# Patient Record
Sex: FEMALE | Race: WHITE | URBAN - METROPOLITAN AREA
[De-identification: names, ages, dates, MRNs, and addresses within clinical notes are randomized per-mention and may not be internally consistent; named-entity substitution may affect disease eponyms.]

---

## 2017-02-14 ENCOUNTER — EMERGENCY (EMERGENCY)
Facility: HOSPITAL | Age: 62
LOS: 0 days | Discharge: HOME | End: 2017-02-14
Attending: EMERGENCY MEDICINE | Admitting: EMERGENCY MEDICINE
Payer: COMMERCIAL

## 2017-02-14 VITALS
HEART RATE: 118 BPM | DIASTOLIC BLOOD PRESSURE: 77 MMHG | OXYGEN SATURATION: 98 % | SYSTOLIC BLOOD PRESSURE: 133 MMHG | RESPIRATION RATE: 15 BRPM | WEIGHT: 199.96 LBS | TEMPERATURE: 98 F

## 2017-02-14 VITALS
RESPIRATION RATE: 18 BRPM | TEMPERATURE: 98 F | OXYGEN SATURATION: 92 % | DIASTOLIC BLOOD PRESSURE: 79 MMHG | HEART RATE: 83 BPM | SYSTOLIC BLOOD PRESSURE: 116 MMHG

## 2017-02-14 DIAGNOSIS — R53.1 WEAKNESS: ICD-10-CM

## 2017-02-14 LAB
ALBUMIN SERPL ELPH-MCNC: 3.4 G/DL — SIGNIFICANT CHANGE UP (ref 3.3–5)
ALP SERPL-CCNC: 139 U/L — HIGH (ref 40–120)
ALT FLD-CCNC: 24 U/L — SIGNIFICANT CHANGE UP (ref 12–78)
ANION GAP SERPL CALC-SCNC: 8 MMOL/L — SIGNIFICANT CHANGE UP (ref 5–17)
APTT BLD: 30.1 SEC — SIGNIFICANT CHANGE UP (ref 27.5–37.4)
AST SERPL-CCNC: 30 U/L — SIGNIFICANT CHANGE UP (ref 15–37)
BASOPHILS # BLD AUTO: 0.1 K/UL — SIGNIFICANT CHANGE UP (ref 0–0.2)
BASOPHILS NFR BLD AUTO: 2 % — SIGNIFICANT CHANGE UP (ref 0–2)
BILIRUB SERPL-MCNC: 0.2 MG/DL — SIGNIFICANT CHANGE UP (ref 0.2–1.2)
BUN SERPL-MCNC: 11 MG/DL — SIGNIFICANT CHANGE UP (ref 7–23)
CALCIUM SERPL-MCNC: 8.2 MG/DL — LOW (ref 8.5–10.1)
CHLORIDE SERPL-SCNC: 100 MMOL/L — SIGNIFICANT CHANGE UP (ref 96–108)
CO2 SERPL-SCNC: 27 MMOL/L — SIGNIFICANT CHANGE UP (ref 22–31)
CREAT SERPL-MCNC: 0.81 MG/DL — SIGNIFICANT CHANGE UP (ref 0.5–1.3)
EOSINOPHIL # BLD AUTO: 0.4 K/UL — SIGNIFICANT CHANGE UP (ref 0–0.5)
EOSINOPHIL NFR BLD AUTO: 5.7 % — SIGNIFICANT CHANGE UP (ref 0–6)
GLUCOSE SERPL-MCNC: 95 MG/DL — SIGNIFICANT CHANGE UP (ref 70–99)
HCT VFR BLD CALC: 38.9 % — SIGNIFICANT CHANGE UP (ref 34.5–45)
HGB BLD-MCNC: 12.9 G/DL — SIGNIFICANT CHANGE UP (ref 11.5–15.5)
INR BLD: 0.99 RATIO — SIGNIFICANT CHANGE UP (ref 0.88–1.16)
LYMPHOCYTES # BLD AUTO: 2.2 K/UL — SIGNIFICANT CHANGE UP (ref 1–3.3)
LYMPHOCYTES # BLD AUTO: 32.7 % — SIGNIFICANT CHANGE UP (ref 13–44)
MCHC RBC-ENTMCNC: 32.8 PG — SIGNIFICANT CHANGE UP (ref 27–34)
MCHC RBC-ENTMCNC: 33.1 GM/DL — SIGNIFICANT CHANGE UP (ref 32–36)
MCV RBC AUTO: 99.1 FL — SIGNIFICANT CHANGE UP (ref 80–100)
MONOCYTES # BLD AUTO: 0.5 K/UL — SIGNIFICANT CHANGE UP (ref 0–0.9)
MONOCYTES NFR BLD AUTO: 6.7 % — SIGNIFICANT CHANGE UP (ref 2–14)
NEUTROPHILS # BLD AUTO: 3.6 K/UL — SIGNIFICANT CHANGE UP (ref 1.8–7.4)
NEUTROPHILS NFR BLD AUTO: 52.9 % — SIGNIFICANT CHANGE UP (ref 43–77)
PLATELET # BLD AUTO: 226 K/UL — SIGNIFICANT CHANGE UP (ref 150–400)
POTASSIUM SERPL-MCNC: 4 MMOL/L — SIGNIFICANT CHANGE UP (ref 3.5–5.3)
POTASSIUM SERPL-SCNC: 4 MMOL/L — SIGNIFICANT CHANGE UP (ref 3.5–5.3)
PROT SERPL-MCNC: 7.1 GM/DL — SIGNIFICANT CHANGE UP (ref 6–8.3)
PROTHROM AB SERPL-ACNC: 10.9 SEC — SIGNIFICANT CHANGE UP (ref 10–13.1)
RBC # BLD: 3.92 M/UL — SIGNIFICANT CHANGE UP (ref 3.8–5.2)
RBC # FLD: 12 % — SIGNIFICANT CHANGE UP (ref 10.3–14.5)
SODIUM SERPL-SCNC: 135 MMOL/L — SIGNIFICANT CHANGE UP (ref 135–145)
TROPONIN I SERPL-MCNC: <0.015 NG/ML — SIGNIFICANT CHANGE UP (ref 0.01–0.04)
WBC # BLD: 6.8 K/UL — SIGNIFICANT CHANGE UP (ref 3.8–10.5)
WBC # FLD AUTO: 6.8 K/UL — SIGNIFICANT CHANGE UP (ref 3.8–10.5)

## 2017-02-14 PROCEDURE — 99285 EMERGENCY DEPT VISIT HI MDM: CPT

## 2017-02-14 PROCEDURE — 93010 ELECTROCARDIOGRAM REPORT: CPT

## 2017-02-14 PROCEDURE — 70450 CT HEAD/BRAIN W/O DYE: CPT | Mod: 26

## 2017-02-14 RX ORDER — SODIUM CHLORIDE 9 MG/ML
3 INJECTION INTRAMUSCULAR; INTRAVENOUS; SUBCUTANEOUS ONCE
Qty: 0 | Refills: 0 | Status: DISCONTINUED | OUTPATIENT
Start: 2017-02-14 | End: 2017-02-14

## 2017-02-14 NOTE — ED STATDOCS - CHPI ED SYMPTOM NEG
no palpitations/no vomiting/no shortness of breath/no chills/no back pain/no diaphoresis/no fever/no cough/no nausea/no dizziness

## 2017-02-14 NOTE — ED PROVIDER NOTE - OBJECTIVE STATEMENT
60 y/o F with a hx of brain aneurysm, depression, anxiety, on trazodone, Klonopin  presents s/p episode of leg shakiness and weakness when she went to get up off of her recliner. Sx alleviated when pt sat back down. Was ambulatory to ED. At this time sx have resolved. Denies LOC, vision changes, n/v, CP, difficulty breathing and has no additional constitutional complaints. Pt was not answering 's questions for a few seconds, pt does not remember this. Denies changes in medication. Denies smoking hx. Has hx of similar episodes of weakness. PMD- [place].

## 2017-02-14 NOTE — ED PROVIDER NOTE - MEDICAL DECISION MAKING DETAILS
patient with episode of tremors; multiple similar episodes in past; following with a neurologist  well appearing in ED with normal neuro exam; will dc with family

## 2017-02-14 NOTE — ED PROVIDER NOTE - NEUROLOGICAL, MLM
Alert and oriented, no focal deficits, no motor or sensory deficits. NV in tact. No pronator drift. 5/5 strength in all 4 extremities. Good strength throughout.

## 2017-02-14 NOTE — ED PROVIDER NOTE - NS ED MD SCRIBE ATTENDING SCRIBE SECTIONS
DISPOSITION/REVIEW OF SYSTEMS/RESULTS/CONSULTATIONS/SHIFT CHANGE/PROGRESS NOTE/PAST MEDICAL/SURGICAL/SOCIAL HISTORY/PHYSICAL EXAM/HISTORY OF PRESENT ILLNESS

## 2017-02-14 NOTE — ED ADULT NURSE REASSESSMENT NOTE - NS ED NURSE REASSESS COMMENT FT1
pt received. alert and oriented. no signs of respiratory distress. breathing even and unlabored. denies complaints at this time. son at bedside. will monitor.
pt aaox4, neuro intact, as per dtr, pt had this seizure like activity 3 days ago with slurred speech, pt had similar episode today which started around 10am.  pt speech is slightly slurred, otherwise, neuro intact.  will con't to monitor.

## 2017-02-14 NOTE — ED ADULT TRIAGE NOTE - CHIEF COMPLAINT QUOTE
pt complains of general weakness, hx of brain aneurysm, no deficits at this time. pt presents alert and oriented x4, ambulatory onto EMS stretcher

## 2017-02-14 NOTE — ED STATDOCS - OBJECTIVE STATEMENT
62 y/o F BIBA after her  came home and found her hands were shaking and had memory loss. Sx currently resolved and patient back at baseline per .   Pt has had similar symptoms in the past and was found to have a brain aneurysm, followed by Dr Alston in [place]. Denies HA, weakness/numbness in extremities. PMD- Isis 62 y/o F BIBA after her  came home and found her hands were shaking and seemed to have memory loss. Sx currently resolved and patient back at baseline per .   Pt has had similar symptoms in the past and was found to have a brain aneurysm, followed by Dr Alston in [place]. Denies HA, weakness/numbness in extremities. Drank less than half a glass of wine earlier today. DEBO- Isis 62 y/o F BIBA after her  came home and found her hands were shaking and seemed to have memory loss. Sx currently resolved and patient back at baseline per .   Pt has had similar symptoms in the past and was found to have a brain aneurysm, followed by Dr Alston in [place]. Denies HA, weakness/numbness in extremities. No history of seizures. Drank less than half a glass of wine earlier today. PMJOSH- Isis

## 2017-04-16 NOTE — ED PROVIDER NOTE - CARDIOVASCULAR NEGATIVE STATEMENT, MLM
normal... normal rate and rhythm, no chest pain and no edema. well appearing, well nourished, and in no apparent distress.

## 2018-12-07 PROBLEM — F32.9 MAJOR DEPRESSIVE DISORDER, SINGLE EPISODE, UNSPECIFIED: Chronic | Status: ACTIVE | Noted: 2017-02-14

## 2018-12-07 PROBLEM — F41.9 ANXIETY DISORDER, UNSPECIFIED: Chronic | Status: ACTIVE | Noted: 2017-02-14

## 2018-12-07 PROBLEM — I67.1 CEREBRAL ANEURYSM, NONRUPTURED: Chronic | Status: ACTIVE | Noted: 2017-02-14

## 2018-12-11 ENCOUNTER — APPOINTMENT (OUTPATIENT)
Dept: DERMATOLOGY | Facility: CLINIC | Age: 63
End: 2018-12-11

## 2020-06-06 ENCOUNTER — INPATIENT (INPATIENT)
Facility: HOSPITAL | Age: 65
LOS: 4 days | Discharge: HOME | DRG: 643 | End: 2020-06-11
Attending: INTERNAL MEDICINE | Admitting: INTERNAL MEDICINE
Payer: COMMERCIAL

## 2020-06-06 VITALS
DIASTOLIC BLOOD PRESSURE: 87 MMHG | OXYGEN SATURATION: 94 % | SYSTOLIC BLOOD PRESSURE: 156 MMHG | WEIGHT: 149.91 LBS | HEIGHT: 64 IN | RESPIRATION RATE: 17 BRPM | TEMPERATURE: 99 F | HEART RATE: 92 BPM

## 2020-06-06 DIAGNOSIS — E87.1 HYPO-OSMOLALITY AND HYPONATREMIA: ICD-10-CM

## 2020-06-06 LAB
ADD ON TEST-SPECIMEN IN LAB: SIGNIFICANT CHANGE UP
ALBUMIN SERPL ELPH-MCNC: 3.6 G/DL — SIGNIFICANT CHANGE UP (ref 3.3–5)
ALP SERPL-CCNC: 96 U/L — SIGNIFICANT CHANGE UP (ref 40–120)
ALT FLD-CCNC: 18 U/L — SIGNIFICANT CHANGE UP (ref 12–78)
ANION GAP SERPL CALC-SCNC: 5 MMOL/L — SIGNIFICANT CHANGE UP (ref 5–17)
APPEARANCE UR: CLEAR — SIGNIFICANT CHANGE UP
AST SERPL-CCNC: 18 U/L — SIGNIFICANT CHANGE UP (ref 15–37)
BASOPHILS # BLD AUTO: 0.01 K/UL — SIGNIFICANT CHANGE UP (ref 0–0.2)
BASOPHILS NFR BLD AUTO: 0.2 % — SIGNIFICANT CHANGE UP (ref 0–2)
BILIRUB SERPL-MCNC: 0.5 MG/DL — SIGNIFICANT CHANGE UP (ref 0.2–1.2)
BILIRUB UR-MCNC: NEGATIVE — SIGNIFICANT CHANGE UP
BUN SERPL-MCNC: 11 MG/DL — SIGNIFICANT CHANGE UP (ref 7–23)
CALCIUM SERPL-MCNC: 9.1 MG/DL — SIGNIFICANT CHANGE UP (ref 8.5–10.1)
CHLORIDE SERPL-SCNC: 89 MMOL/L — LOW (ref 96–108)
CO2 SERPL-SCNC: 24 MMOL/L — SIGNIFICANT CHANGE UP (ref 22–31)
COLOR SPEC: YELLOW — SIGNIFICANT CHANGE UP
CREAT SERPL-MCNC: 0.62 MG/DL — SIGNIFICANT CHANGE UP (ref 0.5–1.3)
DIFF PNL FLD: NEGATIVE — SIGNIFICANT CHANGE UP
EOSINOPHIL # BLD AUTO: 0.05 K/UL — SIGNIFICANT CHANGE UP (ref 0–0.5)
EOSINOPHIL NFR BLD AUTO: 0.8 % — SIGNIFICANT CHANGE UP (ref 0–6)
GLUCOSE SERPL-MCNC: 114 MG/DL — HIGH (ref 70–99)
GLUCOSE UR QL: NEGATIVE MG/DL — SIGNIFICANT CHANGE UP
HCT VFR BLD CALC: 33.5 % — LOW (ref 34.5–45)
HGB BLD-MCNC: 12.2 G/DL — SIGNIFICANT CHANGE UP (ref 11.5–15.5)
IMM GRANULOCYTES NFR BLD AUTO: 0.5 % — SIGNIFICANT CHANGE UP (ref 0–1.5)
KETONES UR-MCNC: ABNORMAL
LEUKOCYTE ESTERASE UR-ACNC: NEGATIVE — SIGNIFICANT CHANGE UP
LYMPHOCYTES # BLD AUTO: 0.63 K/UL — LOW (ref 1–3.3)
LYMPHOCYTES # BLD AUTO: 9.7 % — LOW (ref 13–44)
MCHC RBC-ENTMCNC: 32.5 PG — SIGNIFICANT CHANGE UP (ref 27–34)
MCHC RBC-ENTMCNC: 36.4 GM/DL — HIGH (ref 32–36)
MCV RBC AUTO: 89.3 FL — SIGNIFICANT CHANGE UP (ref 80–100)
MONOCYTES # BLD AUTO: 0.52 K/UL — SIGNIFICANT CHANGE UP (ref 0–0.9)
MONOCYTES NFR BLD AUTO: 8 % — SIGNIFICANT CHANGE UP (ref 2–14)
NEUTROPHILS # BLD AUTO: 5.27 K/UL — SIGNIFICANT CHANGE UP (ref 1.8–7.4)
NEUTROPHILS NFR BLD AUTO: 80.8 % — HIGH (ref 43–77)
NITRITE UR-MCNC: NEGATIVE — SIGNIFICANT CHANGE UP
PH UR: 7 — SIGNIFICANT CHANGE UP (ref 5–8)
PLATELET # BLD AUTO: 277 K/UL — SIGNIFICANT CHANGE UP (ref 150–400)
POTASSIUM SERPL-MCNC: 4.2 MMOL/L — SIGNIFICANT CHANGE UP (ref 3.5–5.3)
POTASSIUM SERPL-SCNC: 4.2 MMOL/L — SIGNIFICANT CHANGE UP (ref 3.5–5.3)
PROT SERPL-MCNC: 7.2 GM/DL — SIGNIFICANT CHANGE UP (ref 6–8.3)
PROT UR-MCNC: NEGATIVE MG/DL — SIGNIFICANT CHANGE UP
RBC # BLD: 3.75 M/UL — LOW (ref 3.8–5.2)
RBC # FLD: 11.1 % — SIGNIFICANT CHANGE UP (ref 10.3–14.5)
SARS-COV-2 RNA SPEC QL NAA+PROBE: SIGNIFICANT CHANGE UP
SODIUM SERPL-SCNC: 118 MMOL/L — CRITICAL LOW (ref 135–145)
SP GR SPEC: 1.01 — SIGNIFICANT CHANGE UP (ref 1.01–1.02)
TROPONIN I SERPL-MCNC: <0.015 NG/ML — SIGNIFICANT CHANGE UP (ref 0.01–0.04)
UROBILINOGEN FLD QL: NEGATIVE MG/DL — SIGNIFICANT CHANGE UP
WBC # BLD: 6.51 K/UL — SIGNIFICANT CHANGE UP (ref 3.8–10.5)
WBC # FLD AUTO: 6.51 K/UL — SIGNIFICANT CHANGE UP (ref 3.8–10.5)

## 2020-06-06 PROCEDURE — 83935 ASSAY OF URINE OSMOLALITY: CPT

## 2020-06-06 PROCEDURE — 83930 ASSAY OF BLOOD OSMOLALITY: CPT

## 2020-06-06 PROCEDURE — 84443 ASSAY THYROID STIM HORMONE: CPT

## 2020-06-06 PROCEDURE — 82436 ASSAY OF URINE CHLORIDE: CPT

## 2020-06-06 PROCEDURE — 95816 EEG AWAKE AND DROWSY: CPT

## 2020-06-06 PROCEDURE — 71045 X-RAY EXAM CHEST 1 VIEW: CPT | Mod: 26

## 2020-06-06 PROCEDURE — 80048 BASIC METABOLIC PNL TOTAL CA: CPT

## 2020-06-06 PROCEDURE — 86803 HEPATITIS C AB TEST: CPT

## 2020-06-06 PROCEDURE — 84550 ASSAY OF BLOOD/URIC ACID: CPT

## 2020-06-06 PROCEDURE — 36415 COLL VENOUS BLD VENIPUNCTURE: CPT

## 2020-06-06 PROCEDURE — 99222 1ST HOSP IP/OBS MODERATE 55: CPT

## 2020-06-06 PROCEDURE — 93010 ELECTROCARDIOGRAM REPORT: CPT

## 2020-06-06 PROCEDURE — 70450 CT HEAD/BRAIN W/O DYE: CPT | Mod: 26

## 2020-06-06 PROCEDURE — 85027 COMPLETE CBC AUTOMATED: CPT

## 2020-06-06 PROCEDURE — 81003 URINALYSIS AUTO W/O SCOPE: CPT

## 2020-06-06 PROCEDURE — 84300 ASSAY OF URINE SODIUM: CPT

## 2020-06-06 RX ORDER — QUETIAPINE FUMARATE 200 MG/1
150 TABLET, FILM COATED ORAL AT BEDTIME
Refills: 0 | Status: DISCONTINUED | OUTPATIENT
Start: 2020-06-06 | End: 2020-06-11

## 2020-06-06 RX ORDER — CLONAZEPAM 1 MG
0.5 TABLET ORAL THREE TIMES A DAY
Refills: 0 | Status: DISCONTINUED | OUTPATIENT
Start: 2020-06-06 | End: 2020-06-09

## 2020-06-06 RX ORDER — GABAPENTIN 400 MG/1
100 CAPSULE ORAL DAILY
Refills: 0 | Status: DISCONTINUED | OUTPATIENT
Start: 2020-06-06 | End: 2020-06-09

## 2020-06-06 RX ORDER — SODIUM CHLORIDE 9 MG/ML
1000 INJECTION INTRAMUSCULAR; INTRAVENOUS; SUBCUTANEOUS ONCE
Refills: 0 | Status: COMPLETED | OUTPATIENT
Start: 2020-06-06 | End: 2020-06-06

## 2020-06-06 RX ORDER — HEPARIN SODIUM 5000 [USP'U]/ML
5000 INJECTION INTRAVENOUS; SUBCUTANEOUS
Refills: 0 | Status: DISCONTINUED | OUTPATIENT
Start: 2020-06-06 | End: 2020-06-11

## 2020-06-06 RX ORDER — SODIUM CHLORIDE 9 MG/ML
1000 INJECTION INTRAMUSCULAR; INTRAVENOUS; SUBCUTANEOUS
Refills: 0 | Status: DISCONTINUED | OUTPATIENT
Start: 2020-06-06 | End: 2020-06-08

## 2020-06-06 RX ORDER — ESCITALOPRAM OXALATE 10 MG/1
10 TABLET, FILM COATED ORAL DAILY
Refills: 0 | Status: DISCONTINUED | OUTPATIENT
Start: 2020-06-06 | End: 2020-06-07

## 2020-06-06 RX ADMIN — SODIUM CHLORIDE 1000 MILLILITER(S): 9 INJECTION INTRAMUSCULAR; INTRAVENOUS; SUBCUTANEOUS at 14:00

## 2020-06-06 RX ADMIN — SODIUM CHLORIDE 75 MILLILITER(S): 9 INJECTION INTRAMUSCULAR; INTRAVENOUS; SUBCUTANEOUS at 18:43

## 2020-06-06 RX ADMIN — Medication 0.5 MILLIGRAM(S): at 18:42

## 2020-06-06 RX ADMIN — QUETIAPINE FUMARATE 150 MILLIGRAM(S): 200 TABLET, FILM COATED ORAL at 21:58

## 2020-06-06 NOTE — ED ADULT TRIAGE NOTE - CHIEF COMPLAINT QUOTE
Pt BIBA with initial report of aphasia with pt verbal 3 hours ago per daughter.  MD to bedside with no code stroke called.  Per daughter, pt has been having increasingly complex symptoms including confusion and erratic behavior over time with adjustment of psych meds. Psychiatrist is Dr. Sepulveda.  Pt alert and able to nod head to questions. Pt BIBA with initial report of aphasia with pt verbal 3 hours ago per daughter.  MD to bedside with no code stroke called.  Per daughter, pt has been having increasingly complex symptoms including confusion and erratic behavior over time with adjustment of psych meds. Psychiatrist is Dr. Sepulveda.  Pt alert and able to nod head to questions. Stacey-daughter-cell-[phone], Tong-spouse-cell-[phone]

## 2020-06-06 NOTE — ED ADULT NURSE NOTE - NSIMPLEMENTINTERV_GEN_ALL_ED
Implemented All Fall Risk Interventions:  [place] to call system. Call bell, personal items and telephone within reach. Instruct patient to call for assistance. Room bathroom lighting operational. Non-slip footwear when patient is off stretcher. Physically safe environment: no spills, clutter or unnecessary equipment. Stretcher in lowest position, wheels locked, appropriate side rails in place. Provide visual cue, wrist band, yellow gown, etc. Monitor gait and stability. Monitor for mental status changes and reorient to person, place, and time. Review medications for side effects contributing to fall risk. Reinforce activity limits and safety measures with patient and family.

## 2020-06-06 NOTE — ED PROVIDER NOTE - NS_ ATTENDINGSCRIBEDETAILS _ED_A_ED_FT
Miriam Contreras M.D.: The history, relevant review of systems, past medical and surgical history, medical decision making, and physical examination was documented by the scribe in my presence and I attest to the accuracy of the documentation .

## 2020-06-06 NOTE — ED PROVIDER NOTE - NEUROLOGICAL, MLM
Alert and awake, able to say one word phrases, but when asked complicated questions was unable to give answers. All cranial nerves intact. Alert and awake, able to say one word phrases, but when asked complicated questions was unable to give answers. CN intact. Pt nodding head yes and no to questions but can not further assess orientation.

## 2020-06-06 NOTE — ED PROVIDER NOTE - PROGRESS NOTE DETAILS
[place] SCANLON for ED attending, Dr. Contreras: Spoke with Dr. Sepulveda, pt's psychiatrist of many years, who notes that pt has had catatonic features in the past but recently had been doing well and had no concerns. Recently there has been medication titration. He does not think that this is psych in origin and agrees with the work up we have done so far. Notes that hospitalist can call with questions as needed, number is [phone]. [place] SCANLON for ED attending, Dr. Contreras: Spoke to ICU about possibility of hypertonics, given pt's euvolemic stats, feel likely SH8 not treated with hypertonics feels pt is fitting to floor and routine management, but open to reconsult if needed. Miriam Contreras M.D: case discussed with hospitalist who accepts pt. requesting we give NS at this time to help imrpove sodium.

## 2020-06-06 NOTE — ED PROVIDER NOTE - OBJECTIVE STATEMENT
63 y/o female with PMHx of brain aneurysm, anxiety, depression presents to ED c/o aphasia. Pt not speaking, hx obtained from family who note that pt has been coming off of her psychiatric medications under the observation of Dr. Vazquez. Over the last week family have noticed intermittent waxing and waning episodes of confusion. Pt had a very good day yesterday, was at her baseline mental status and functional status. This morning pt was found in kitchen staring at ceiling with the refrigerator door open, thinking that she was making coffee. Following, pt became aphasic and has not said a word since. Pt is nodding head yes and no to questions, shaking head no to any pain or HA. 63 y/o female with PMHx of brain aneurysm, anxiety, depression presents to ED c/o aphasia. Pt not speaking, hx obtained from family who noted that pt has been coming off of her psychiatric medications under the observation of Dr. Sepulveda. Over the last week, pt's family noticed intermittent waxing and waning episodes of confusion. Pt was at her baseline mental status and functional status yesterday. This morning, pt was found in kitchen staring at ceiling with the refrigerator door open, thinking that she was making coffee. Following, pt became aphasic and has not said a word since. Pt is nodding head yes and no to questions, shaking head no to any pain or HA. No other complaints at this time.

## 2020-06-06 NOTE — ED PROVIDER NOTE - CLINICAL SUMMARY MEDICAL DECISION MAKING FREE TEXT BOX
Pt here with a week of waxing and waning confusion, now with aphasia that seems to be selective. possibility of underlying infection primary to neurological process, though given psychiatric hx and recent alteration of meds, feel that this may be some kind of catatonia. Plan: labs, EKG, CXR, CT head, neuro consult and likely admission for MRI and further workup. Pt here with a week of waxing and waning confusion, now with aphasia that seems to be selective. Possibility of underlying infection primary to neurological process, though given psychiatric hx and recent alteration of meds, feel that this may be some kind of catatonia. Plan: labs, EKG, CXR, CT head, neuro consult and likely admission for MRI and further workup.

## 2020-06-06 NOTE — ED ADULT NURSE NOTE - OBJECTIVE STATEMENT
Pt sent from home for intermittent confusion and erratic behavior x 1 week , today pt had 3 hours of aphasia , pt will say her name and say "yes" but will not answer other questions

## 2020-06-06 NOTE — H&P ADULT - ASSESSMENT
65 yo female with psychiatric Hx. who arrived to the ED via EMS, and she states her  called the ambulance because she was confused, staring  at the ceiling, and became aphasic. She started speaking while in the ER. ED attending spoke to Dr. Sepulveda the patient psychiatrist who felt the patient has a medical condition. ICU contacted for hyponatremia and did not meed criteria for ICU admission.  Rec. IV NS for hyponatremia.   assessment Dx:                       1.Hyponatremia                       2. AMS                       3.Psychosis                       4. Depression/ Anxiety    Plan:    admit to medicine               medications as per med rec. Hold central muscle relaxers, IV NS,                VTEP: Heparin               Labs: cbc.bmp               Radiology: CTH, CXRay               Cardiac diagnostics: EKG               Consults: Psychiatry

## 2020-06-06 NOTE — H&P ADULT - HISTORY OF PRESENT ILLNESS
HPI: The patient is a 65 yo female with psychiatric Hx. who arrived to the ED via EMS, and she states her  called the ambulance because she was confused, staring  at the ceiling, and became aphasic. She started speaking while in the ER. ED attending spoke to Dr. Sepulveda the patient psychiatrist who felt the patient has a medical condition. ICU contacted for hyponatremia and did not meed criteria for ICU admission.  Rec. IV NS for hyponatremia.     PMHx: brain aneurysm, muscle spasms, anxiety , depression.    PSHx: denies surgeries    Family Hx: she does not remember.      Social Hx.: not smoking, no alcohol use    ROS:   Eyes: no changes in vision    ENT/Mouth: no changes    Cardiovascular: no chest pain    Respiratory: no SOB    Gastrointestinal: no diarrhea, no nausea, no vomiting    Genitourinary: no dysuria    Breast: no pain    Musculoskeletal: no pain    Integumentary: no itching    Neurological: No Headache, no tremor,    Psychiatric: no suicidal ideations    Endocrine: no excessive thirst,     Hematologic/Lymphatic: no swollen glands    Allergic/Immunologic: no itching      Physical Exam: Vital Signs Last 24 Hrs  T(C): 36.9 (06 Jun 2020 15:04), Max: 37 (06 Jun 2020 11:23)  T(F): 98.5 (06 Jun 2020 15:04), Max: 98.6 (06 Jun 2020 11:23)  HR: 98 (06 Jun 2020 15:04) (92 - 98)  BP: 162/87 (06 Jun 2020 15:04) (156/87 - 162/87)  BP(mean): --  RR: 18 (06 Jun 2020 15:04) (17 - 18)  SpO2: 97% (06 Jun 2020 15:04) (94% - 97%)        HEENT: PRRL EOMI    MOUTH/TEETH/GUMS: Clear    NECK: no JVD    LUNGS: Clear    HEART: S1,S2 RR    ABDOMEN: soft nontender    EXTREMITIES:  no pedal edema    MUSCULOSKELETAL: no joint swelling     NEURO: no tremor, has generalized weakness, no focal signs.     SKIN: no rash    : CVA negative,     Lab: assessment Dx:                       1. Hyponatremia                       2. Aphasia                       3. AMS                       4. catatonic state.    Plan:    admit to medicine, hold muscle relaxers,                medications: as per med rec., IV NS  at 75 /h               VTEP: Heparin                Labs: cbc.bmp               Radiology: CTH neg               Cardiac diagnostics: EKG               Consults: Cardiology, Psychiatry Benzoyl Peroxide Counseling: Patient counseled that medicine may cause skin irritation and bleach clothing.  In the event of skin irritation, the patient was advised to reduce the amount of the drug applied or use it less frequently.   The patient verbalized understanding of the proper use and possible adverse effects of benzoyl peroxide.  All of the patient's questions and concerns were addressed.

## 2020-06-07 LAB
ANION GAP SERPL CALC-SCNC: 10 MMOL/L — SIGNIFICANT CHANGE UP (ref 5–17)
BUN SERPL-MCNC: 9 MG/DL — SIGNIFICANT CHANGE UP (ref 7–23)
CALCIUM SERPL-MCNC: 8.9 MG/DL — SIGNIFICANT CHANGE UP (ref 8.5–10.1)
CHLORIDE SERPL-SCNC: 94 MMOL/L — LOW (ref 96–108)
CO2 SERPL-SCNC: 20 MMOL/L — LOW (ref 22–31)
CREAT SERPL-MCNC: 0.52 MG/DL — SIGNIFICANT CHANGE UP (ref 0.5–1.3)
GLUCOSE SERPL-MCNC: 97 MG/DL — SIGNIFICANT CHANGE UP (ref 70–99)
HCV AB S/CO SERPL IA: 0.08 S/CO — SIGNIFICANT CHANGE UP (ref 0–0.99)
HCV AB SERPL-IMP: SIGNIFICANT CHANGE UP
OSMOLALITY UR: 351 MOSM/KG — SIGNIFICANT CHANGE UP (ref 50–1200)
POTASSIUM SERPL-MCNC: 4 MMOL/L — SIGNIFICANT CHANGE UP (ref 3.5–5.3)
POTASSIUM SERPL-SCNC: 4 MMOL/L — SIGNIFICANT CHANGE UP (ref 3.5–5.3)
SODIUM SERPL-SCNC: 124 MMOL/L — LOW (ref 135–145)
SODIUM UR-SCNC: 50 MMOL/L — SIGNIFICANT CHANGE UP
TSH SERPL-MCNC: 0.92 UU/ML — SIGNIFICANT CHANGE UP (ref 0.34–4.82)
URATE SERPL-MCNC: 1.8 MG/DL — LOW (ref 2.5–7)

## 2020-06-07 PROCEDURE — 99232 SBSQ HOSP IP/OBS MODERATE 35: CPT

## 2020-06-07 RX ORDER — ACETAMINOPHEN 500 MG
650 TABLET ORAL ONCE
Refills: 0 | Status: COMPLETED | OUTPATIENT
Start: 2020-06-07 | End: 2020-06-07

## 2020-06-07 RX ADMIN — HEPARIN SODIUM 5000 UNIT(S): 5000 INJECTION INTRAVENOUS; SUBCUTANEOUS at 18:56

## 2020-06-07 RX ADMIN — ESCITALOPRAM OXALATE 10 MILLIGRAM(S): 10 TABLET, FILM COATED ORAL at 11:31

## 2020-06-07 RX ADMIN — Medication 15 MILLIGRAM(S): at 18:55

## 2020-06-07 RX ADMIN — Medication 0.5 MILLIGRAM(S): at 04:04

## 2020-06-07 RX ADMIN — HEPARIN SODIUM 5000 UNIT(S): 5000 INJECTION INTRAVENOUS; SUBCUTANEOUS at 05:35

## 2020-06-07 RX ADMIN — GABAPENTIN 100 MILLIGRAM(S): 400 CAPSULE ORAL at 11:31

## 2020-06-07 RX ADMIN — Medication 0.5 MILLIGRAM(S): at 22:32

## 2020-06-07 RX ADMIN — QUETIAPINE FUMARATE 150 MILLIGRAM(S): 200 TABLET, FILM COATED ORAL at 21:29

## 2020-06-07 RX ADMIN — Medication 650 MILLIGRAM(S): at 04:18

## 2020-06-07 NOTE — PROGRESS NOTE ADULT - SUBJECTIVE AND OBJECTIVE BOX
Outpatient Providers	PCP Dr. Julian Marinelli,  Psychiatry Dr. Sepulveda.     History of Present Illness:  Reason for Admission: hyponatremia, catatonic state    History of Present Illness:   HPI: The patient is a 65 yo female with psychiatric Hx. who arrived to the ED via EMS, and she states her  called the ambulance because she was confused, staring  at the ceiling, and became aphasic. She started speaking while in the ER. ED attending spoke to Dr. Sepulveda the patient psychiatrist who felt the patient has a medical condition. ICU contacted for hyponatremia and did not meed criteria for ICU admission.  Rec. IV NS for hyponatremia.   6/7 - much more alert and state she feels better today    ROS:   All 10 systems reviewed and found to be negative with the exception of what has been described above.    Vital Signs Last 24 Hrs  T(C): 36.9 (07 Jun 2020 11:10), Max: 37.2 (07 Jun 2020 05:43)  T(F): 98.4 (07 Jun 2020 11:10), Max: 98.9 (07 Jun 2020 05:43)  HR: 88 (07 Jun 2020 11:10) (88 - 103)  BP: 132/66 (07 Jun 2020 11:10) (132/66 - 162/87)  BP(mean): --  RR: 18 (07 Jun 2020 11:10) (17 - 18)  SpO2: 99% (07 Jun 2020 11:10) (97% - 100%)    GEN: lying in bed, NAD  HEENT:   NC/AT, pupils equal and reactive, EOMI  CV:  +S1, +S2, RRR  RESP:   lungs clear to auscultation bilaterally, no wheeze, rales, rhonchi   BREAST:  not examined  GI:  abdomen soft, non-tender, non-distended, normoactive BS  RECTAL:  not examined  :  not examined  MSK:   normal muscle tone  EXT:  no edema  NEURO:  AAOX3, no focal neurological deficits  SKIN:  no rashes      Pt is 65 yo female with psychiatric hx presents with Hyponatremia, Aphasia, AMS and catatonic state.    # hyponatremia - possibly 2/2 SIADH in setting of lexapro  - will stop lexapro  - obtain psych eval for medication recs  - continue other psych meds for now  - continue IV NS and case d/w dr romano who will see the patient    # metabolic encephalopathy/catatonia/aphasia - 2/2 hyponatremia - resolving  - monitor                   #DVT proph - heparin

## 2020-06-07 NOTE — CONSULT NOTE ADULT - SUBJECTIVE AND OBJECTIVE BOX
NEPHROLOGY CONSULT  HPI:  HPI: The patient is a 63 yo female with psychiatric Hx. who arrived to the ED via EMS, and she states her  called the ambulance because she was confused, staring  at the ceiling, and became aphasic. She started speaking while in the ER. ED attending spoke to Dr. Sepulveda the patient psychiatrist who felt the patient has a medical condition. ICU contacted for hyponatremia and did not meed criteria for ICU admission.  Rec. IV NS for hyponatremia.     Above from chart:  Pt unable to give much info  Fluid intake 6-8 16 oz bottles a day  On lexapro, unable to recall how long  SSRI dc d  admitted w Na level 117 yesterday  up to 124 today  feels well although not spontaneous in answering questions    PMHx: brain aneurysm, muscle spasms, anxiety , depression.    PSHx: denies surgeries    Family Hx: she does not remember.      Social Hx.: not smoking, no alcohol use      PAST MEDICAL & SURGICAL HISTORY:  Brain aneurysm  Anxiety  Depression      FAMILY HISTORY:      MEDICATIONS  (STANDING):  busPIRone 15 milliGRAM(s) Oral two times a day  gabapentin 100 milliGRAM(s) Oral daily  heparin   Injectable 5000 Unit(s) SubCutaneous two times a day  QUEtiapine 150 milliGRAM(s) Oral at bedtime  sodium chloride 0.9%. 1000 milliLiter(s) (75 mL/Hr) IV Continuous <Continuous>    MEDICATIONS  (PRN):  clonazePAM  Tablet 0.5 milliGRAM(s) Oral three times a day PRN anxiety      Allergies    No Known Allergies    Intolerances        I&O's Summary        REVIEW OF SYSTEMS:        Vital Signs Last 24 Hrs  T(C): 36.9 (2020 11:10), Max: 37.2 (2020 05:43)  T(F): 98.4 (2020 11:10), Max: 98.9 (2020 05:43)  HR: 88 (2020 11:10) (88 - 103)  BP: 132/66 (2020 11:10) (132/66 - 157/70)  BP(mean): --  RR: 18 (2020 11:10) (18 - 18)  SpO2: 99% (2020 11:10) (99% - 100%)  Daily     Daily Weight in k.7 (2020 19:47)  I&O's Summary      PHYSICAL EXAM:    General:  Alert, well-developed ,No acute distress.    Neuro:  Alert and oriented to person, place, and time.     HEENT:  No JVD, no masses, Eyes anicteric, No carotid bruits.No lymphadenopathy,    Cardiovascular:  Regular rate and rhythm, with normal S1 and S2. No murmurs, rubs,  or gallops. No JVD.     Lungs:  clear. no rales, no wheezing, .    Abdomen:  Normoactive bowel sounds. Soft, flat, non-tender, and non-distended.  No hepatosplenomegaly, positive bowel sounds    Skin:  Warm, dry, well-perfused. No rashes or other lesions.     Extremities:  2+ pulses in upper and lower extremities. No lower extremity pain or  edema; legs are symmetric in appearance.    LABS:                        12.2   6.51  )-----------( 277      ( 2020 11:28 )             33.5     06-07    124<L>  |  94<L>  |  9   ----------------------------<  97  4.0   |  20<L>  |  0.52    Ca    8.9      2020 07:14    TPro  7.2  /  Alb  3.6  /  TBili  0.5  /  DBili  x   /  AST  18  /  ALT  18  /  AlkPhos  96  06-06      Urinalysis Basic - ( 2020 13:06 )    Color: Yellow / Appearance: Clear / S.010 / pH: x  Gluc: x / Ketone: Moderate  / Bili: Negative / Urobili: Negative mg/dL   Blood: x / Protein: Negative mg/dL / Nitrite: Negative   Leuk Esterase: Negative / RBC: x / WBC x   Sq Epi: x / Non Sq Epi: x / Bacteria: x

## 2020-06-07 NOTE — CONSULT NOTE ADULT - ASSESSMENT
HPI: The patient is a 63 yo female with psychiatric Hx. who arrived to the ED via EMS, and she states her  called the ambulance because she was confused, staring  at the ceiling, and became aphasic. She started speaking while in the ER. ED attending spoke to Dr. Sepulveda the patient psychiatrist who felt the patient has a medical condition. ICU contacted for hyponatremia and did not meed criteria for ICU admission.  Rec. IV NS for hyponatremia.     Above from chart:  Pt unable to give much info  Fluid intake 6-8 16 oz bottles a day  On lexapro, unable to recall how long  SSRI dc d  admitted w Na level 117 yesterday  up to 124 today  feels well although not spontaneous in answering questions    A/P  Hyponatremia  Possibly due to SSRI and water intake of approx 2 gallons of water daily  Agree w stopping SSRI and fluid intake  improving w NS @ 75 ml/hr  Urine lytes  Uric acid, serum osm sent

## 2020-06-08 DIAGNOSIS — F05 DELIRIUM DUE TO KNOWN PHYSIOLOGICAL CONDITION: ICD-10-CM

## 2020-06-08 DIAGNOSIS — F41.9 ANXIETY DISORDER, UNSPECIFIED: ICD-10-CM

## 2020-06-08 LAB
ANION GAP SERPL CALC-SCNC: 7 MMOL/L — SIGNIFICANT CHANGE UP (ref 5–17)
BUN SERPL-MCNC: 12 MG/DL — SIGNIFICANT CHANGE UP (ref 7–23)
CALCIUM SERPL-MCNC: 8.9 MG/DL — SIGNIFICANT CHANGE UP (ref 8.5–10.1)
CHLORIDE SERPL-SCNC: 94 MMOL/L — LOW (ref 96–108)
CHLORIDE UR-SCNC: 104 MMOL/L — SIGNIFICANT CHANGE UP
CO2 SERPL-SCNC: 24 MMOL/L — SIGNIFICANT CHANGE UP (ref 22–31)
CREAT SERPL-MCNC: 0.52 MG/DL — SIGNIFICANT CHANGE UP (ref 0.5–1.3)
GLUCOSE SERPL-MCNC: 87 MG/DL — SIGNIFICANT CHANGE UP (ref 70–99)
OSMOLALITY SERPL: 263 MOSMOL/KG — LOW (ref 280–301)
OSMOLALITY UR: 549 MOSM/KG — SIGNIFICANT CHANGE UP (ref 50–1200)
POTASSIUM SERPL-MCNC: 3.9 MMOL/L — SIGNIFICANT CHANGE UP (ref 3.5–5.3)
POTASSIUM SERPL-SCNC: 3.9 MMOL/L — SIGNIFICANT CHANGE UP (ref 3.5–5.3)
SODIUM SERPL-SCNC: 125 MMOL/L — LOW (ref 135–145)
SODIUM UR-SCNC: 102 MMOL/L — SIGNIFICANT CHANGE UP

## 2020-06-08 PROCEDURE — 99233 SBSQ HOSP IP/OBS HIGH 50: CPT

## 2020-06-08 RX ORDER — SODIUM CHLORIDE 9 MG/ML
1 INJECTION INTRAMUSCULAR; INTRAVENOUS; SUBCUTANEOUS THREE TIMES A DAY
Refills: 0 | Status: COMPLETED | OUTPATIENT
Start: 2020-06-08 | End: 2020-06-11

## 2020-06-08 RX ORDER — ACETAMINOPHEN 500 MG
650 TABLET ORAL ONCE
Refills: 0 | Status: COMPLETED | OUTPATIENT
Start: 2020-06-08 | End: 2020-06-08

## 2020-06-08 RX ADMIN — Medication 650 MILLIGRAM(S): at 05:45

## 2020-06-08 RX ADMIN — QUETIAPINE FUMARATE 150 MILLIGRAM(S): 200 TABLET, FILM COATED ORAL at 21:51

## 2020-06-08 RX ADMIN — HEPARIN SODIUM 5000 UNIT(S): 5000 INJECTION INTRAVENOUS; SUBCUTANEOUS at 05:45

## 2020-06-08 RX ADMIN — HEPARIN SODIUM 5000 UNIT(S): 5000 INJECTION INTRAVENOUS; SUBCUTANEOUS at 17:36

## 2020-06-08 RX ADMIN — SODIUM CHLORIDE 1 GRAM(S): 9 INJECTION INTRAMUSCULAR; INTRAVENOUS; SUBCUTANEOUS at 22:41

## 2020-06-08 RX ADMIN — SODIUM CHLORIDE 75 MILLILITER(S): 9 INJECTION INTRAMUSCULAR; INTRAVENOUS; SUBCUTANEOUS at 11:22

## 2020-06-08 RX ADMIN — SODIUM CHLORIDE 75 MILLILITER(S): 9 INJECTION INTRAMUSCULAR; INTRAVENOUS; SUBCUTANEOUS at 21:15

## 2020-06-08 RX ADMIN — GABAPENTIN 100 MILLIGRAM(S): 400 CAPSULE ORAL at 11:20

## 2020-06-08 NOTE — PROGRESS NOTE ADULT - ASSESSMENT
HPI: The patient is a 63 yo female with psychiatric Hx. who arrived to the ED via EMS, and she states her  called the ambulance because she was confused, staring  at the ceiling, and became aphasic. She started speaking while in the ER. ED attending spoke to Dr. Sepulveda the patient psychiatrist who felt the patient has a medical condition. ICU contacted for hyponatremia and did not meed criteria for ICU admission.  Rec. IV NS for hyponatremia.     Above from chart:  Pt unable to give much info  Fluid intake 6-8 16 oz bottles a day  On lexapro, unable to recall how long  SSRI dc d  admitted w Na level 117 yesterday  up to 124 today  feels well although not spontaneous in answering questions    A/P  Hyponatremia  Possibly due to SSRI and water intake of approx 2 gallons of water daily  Agree w stopping SSRI and fluid intake  improving w NS @ 75 ml/hr  Urine lytes  Uric acid, serum osm sent    6/8  Labs c/w SIADH most likely due to SSRI  now dc d  Na 125, was 124 yesterday  will dc NS  start nacl 1 g tid

## 2020-06-08 NOTE — PHYSICAL THERAPY INITIAL EVALUATION ADULT - PERTINENT HX OF CURRENT PROBLEM, REHAB EVAL
pt presented to ED 6/6/20 from home after  called ambulance as pt appeared more confused ,staring at the ceiling,not speaking ; pt has a Psych history and is follwed by Dr Sepulveda as outpatient

## 2020-06-08 NOTE — PROGRESS NOTE ADULT - SUBJECTIVE AND OBJECTIVE BOX
Outpatient Providers	PCP Dr. Julian Marinelli,  Psychiatry Dr. Sepulveda.     History of Present Illness:  Reason for Admission: hyponatremia, catatonic state    History of Present Illness:   HPI: The patient is a 63 yo female with psychiatric Hx. who arrived to the ED via EMS, and she states her  called the ambulance because she was confused, staring  at the ceiling, and became aphasic. She started speaking while in the ER. ED attending spoke to Dr. Sepulveda the patient psychiatrist who felt the patient has a medical condition. ICU contacted for hyponatremia and did not meed criteria for ICU admission.  Rec. IV NS for hyponatremia.   6/7 - much more alert and state she feels better today  6/8 - pt seen and examined at bedside. pt feeling better today no new acute complaints    ROS:   All 10 systems reviewed and found to be negative with the exception of what has been described above.    Vital Signs Last 24 Hrs  T(C): 36.7 (08 Jun 2020 06:17), Max: 36.9 (07 Jun 2020 22:55)  T(F): 98.1 (08 Jun 2020 06:17), Max: 98.5 (07 Jun 2020 22:55)  HR: 92 (08 Jun 2020 06:17) (78 - 92)  BP: 136/73 (08 Jun 2020 06:17) (136/67 - 136/73)  BP(mean): --  RR: 18 (08 Jun 2020 06:17) (18 - 18)  SpO2: 98% (08 Jun 2020 06:17) (98% - 98%)    GEN: lying in bed, NAD  HEENT:   NC/AT, pupils equal and reactive, EOMI  CV:  +S1, +S2, RRR  RESP:   lungs clear to auscultation bilaterally, no wheeze, rales, rhonchi   BREAST:  not examined  GI:  abdomen soft, non-tender, non-distended, normoactive BS  RECTAL:  not examined  :  not examined  MSK:   normal muscle tone  EXT:  no edema  NEURO:  AAOX3, no focal neurological deficits  SKIN:  no rashes    06-08    125<L>  |  94<L>  |  12  ----------------------------<  87  3.9   |  24  |  0.52    Ca    8.9      08 Jun 2020 09:08      MEDICATIONS  (STANDING):  busPIRone 15 milliGRAM(s) Oral two times a day  gabapentin 100 milliGRAM(s) Oral daily  heparin   Injectable 5000 Unit(s) SubCutaneous two times a day  QUEtiapine 150 milliGRAM(s) Oral at bedtime  sodium chloride 0.9%. 1000 milliLiter(s) (75 mL/Hr) IV Continuous <Continuous>    MEDICATIONS  (PRN):  clonazePAM  Tablet 0.5 milliGRAM(s) Oral three times a day PRN anxiety

## 2020-06-08 NOTE — BEHAVIORAL HEALTH ASSESSMENT NOTE - HPI (INCLUDE ILLNESS QUALITY, SEVERITY, DURATION, TIMING, CONTEXT, MODIFYING FACTORS, ASSOCIATED SIGNS AND SYMPTOMS)
64 year-old  female, with history of anxiety and depression, medical history of brain aneurysm, treated by Dr. Sepulveda - [phone], with no prior in-patient hospitalization, admitted after presenting with aphasia and catatonic like symptoms in the setting of hyponatremia: 6/6 - 117; 6/7 - 124.     As per Dr. Luna, patient is less lethargic and relatively more expressive today, however presenting with poverty of speech and content, providing minimal history. Note. Patient is a limited and poor historian. Patient is impaired in reasoning, and illogical at times. Reports  calling ambulance however uncertain of reason. Admits to drinking 3  - 4 L daily. Denies depressed mood although appearing to be depressed, blunted which may be lethargy. Denies anhedonia, hopelessness or suicidal ideation. Denies anxiety. Denies manic / psychotic symptoms.     As per chart review, Dr. Sepulveda does not think medical presentation is secondary to psychiatric condition.

## 2020-06-08 NOTE — PHYSICAL THERAPY INITIAL EVALUATION ADULT - GAIT DEVIATIONS NOTED, PT EVAL
decreased armswing B ,tending to rush/walk swiftly ,encouraged to slow down,take deep breaths ,relieve anxiety

## 2020-06-08 NOTE — BEHAVIORAL HEALTH ASSESSMENT NOTE - NSBHCHARTREVIEWLAB_PSY_A_CORE FT
06-08    125<L>  |  94<L>  |  12  ----------------------------<  87  3.9   |  24  |  0.52    Ca    8.9      08 Jun 2020 09:08                        Lactate Trend            CAPILLARY BLOOD GLUCOSE

## 2020-06-08 NOTE — PHYSICAL THERAPY INITIAL EVALUATION ADULT - FOLLOWS COMMANDS/ANSWERS QUESTIONS, REHAB EVAL
75% of the time/able to follow single-step instructions/gets up out of bed without warning to use bathroom while pT left briefly to fetch linen

## 2020-06-08 NOTE — BEHAVIORAL HEALTH ASSESSMENT NOTE - SUMMARY
64 year-old  female, with history of anxiety and depression, medical history of brain aneurysm, treated by Dr. Sepulveda - [phone], with no prior in-patient hospitalization, admitted after presenting with aphasia and catatonic like symptoms in the setting of hyponatremia: 6/6 - 117; 6/7 - 124.     Patient presentation indicative of hypoactive delirium in the setting of hyponatremia. Hyponatremia is less likely to be secondary to Lexapro and more likely to be secondary to hyperhydration. Hence. Lexapro to be reinstituted once sodium normalizes. Patient is seen to have clinical improvement during sodium being repleted but remains lethargic, with psychomotor retardation.

## 2020-06-08 NOTE — BEHAVIORAL HEALTH ASSESSMENT NOTE - NSBHCONSULTMEDS_PSY_A_CORE FT
Buspar 15 mg twice daily   Gabapentin 100 mg daily  Klonopin 0.5 mg three times daily as needed  Seroquel 150 mg at bedtime  HOLD Lexapro 10 mg

## 2020-06-08 NOTE — BEHAVIORAL HEALTH ASSESSMENT NOTE - NSBHCHARTREVIEWINVESTIGATE_PSY_A_CORE FT
Ventricular Rate 91 BPM    Atrial Rate 91 BPM    P-R Interval 188 ms    QRS Duration 82 ms    Q-T Interval 378 ms    QTC Calculation(Bezet) 464 ms    P Axis 49 degrees    R Axis 65 degrees    T Axis 45 degrees    Diagnosis Line Normal sinus rhythm  Possible Left atrial enlargement  Borderline ECG  When compared with ECG of 06-JUN-2020 12:18,  No significant change was found  Confirmed by Kvng Sánchez ([id number]),  TRINA BARBOZA ([id number]) on 6/8/2020 8:10:36 AM

## 2020-06-08 NOTE — PROGRESS NOTE ADULT - SUBJECTIVE AND OBJECTIVE BOX
NEPHROLOGY CONSULT  HPI:  HPI: The patient is a 63 yo female with psychiatric Hx. who arrived to the ED via EMS, and she states her  called the ambulance because she was confused, staring  at the ceiling, and became aphasic. She started speaking while in the ER. ED attending spoke to Dr. Sepulveda the patient psychiatrist who felt the patient has a medical condition. ICU contacted for hyponatremia and did not meed criteria for ICU admission.  Rec. IV NS for hyponatremia.     Above from chart:  Pt unable to give much info  Fluid intake 6-8 16 oz bottles a day  On lexapro, unable to recall how long  SSRI dc d  admitted w Na level 117 yesterday  up to 124 today  feels well although not spontaneous in answering questions    6/8  no new complaints  restricting fluids  on NS    PMHx: brain aneurysm, muscle spasms, anxiety , depression.    PSHx: denies surgeries    Family Hx: she does not remember.      Social Hx.: not smoking, no alcohol use      PAST MEDICAL & SURGICAL HISTORY:  Brain aneurysm  Anxiety  Depression      FAMILY HISTORY:    MEDICATIONS  (STANDING):  busPIRone 15 milliGRAM(s) Oral two times a day  gabapentin 100 milliGRAM(s) Oral daily  heparin   Injectable 5000 Unit(s) SubCutaneous two times a day  QUEtiapine 150 milliGRAM(s) Oral at bedtime    MEDICATIONS  (PRN):  clonazePAM  Tablet 0.5 milliGRAM(s) Oral three times a day PRN anxiety        Allergies    No Known Allergies    Intolerances        I&O's Summary        REVIEW OF SYSTEMS:        Vital Signs Last 24 Hrs  T(C): 36.9 (08 Jun 2020 20:51), Max: 36.9 (07 Jun 2020 22:55)  T(F): 98.4 (08 Jun 2020 20:51), Max: 98.5 (07 Jun 2020 22:55)  HR: 95 (08 Jun 2020 20:51) (78 - 95)  BP: 154/72 (08 Jun 2020 20:51) (136/67 - 154/72)  BP(mean): --  RR: 17 (08 Jun 2020 20:51) (17 - 18)  SpO2: 99% (08 Jun 2020 20:51) (98% - 100%)  I&O's Summary      PHYSICAL EXAM:    General:  Alert, well-developed ,No acute distress.    Neuro:  Alert and oriented to person, place, and time.     HEENT:  No JVD, no masses, Eyes anicteric, No carotid bruits.No lymphadenopathy,    Cardiovascular:  Regular rate and rhythm, with normal S1 and S2. No murmurs, rubs,  or gallops. No JVD.     Lungs:  clear. no rales, no wheezing, .    Abdomen:  Normoactive bowel sounds. Soft, flat, non-tender, and non-distended.  No hepatosplenomegaly, positive bowel sounds    Skin:  Warm, dry, well-perfused. No rashes or other lesions.     Extremities:  2+ pulses in upper and lower extremities. No lower extremity pain or  edema; legs are symmetric in appearance.    LABS:             06-08    125<L>  |  94<L>  |  12  ----------------------------<  87  3.9   |  24  |  0.52    Ca    8.9      08 Jun 2020 09:08

## 2020-06-08 NOTE — BEHAVIORAL HEALTH ASSESSMENT NOTE - NSBHCHARTREVIEWVS_PSY_A_CORE FT
Vital Signs Last 24 Hrs  T(C): 36.7 (08 Jun 2020 06:17), Max: 36.9 (07 Jun 2020 22:55)  T(F): 98.1 (08 Jun 2020 06:17), Max: 98.5 (07 Jun 2020 22:55)  HR: 92 (08 Jun 2020 06:17) (78 - 92)  BP: 136/73 (08 Jun 2020 06:17) (136/67 - 136/73)  BP(mean): --  RR: 18 (08 Jun 2020 06:17) (18 - 18)  SpO2: 98% (08 Jun 2020 06:17) (98% - 98%)

## 2020-06-08 NOTE — PROGRESS NOTE ADULT - ASSESSMENT
Pt is 65 yo female with psychiatric hx presents with Hyponatremia, Aphasia, AMS and catatonic state.    # hyponatremia - possibly 2/2 SIADH in setting of lexapro  - stopped lexapro  - follow psych eval for medication recs  - Buspar, Gabapentin, Klonopin, Seroquel  - continue IV NS and case d/w dr romano     # metabolic encephalopathy/catatonia/aphasia - 2/2 hyponatremia - resolving  - monitor                   #DVT proph - heparin

## 2020-06-08 NOTE — PHYSICAL THERAPY INITIAL EVALUATION ADULT - GENERAL OBSERVATIONS, REHAB EVAL
resting supine in bed awake,alert,constricted affect,wears eyeglasses,able to follow simple commands,Ox3,responds with YES to most questions initially even when meaning NO; poverty of speech lacking detail/descriptive content

## 2020-06-08 NOTE — BEHAVIORAL HEALTH ASSESSMENT NOTE - RISK ASSESSMENT
Low Acute Suicide Risk LOW RISK     ACUTE RISK FACTORS: poor insight and judgment, acute medical comorbidities     CHRONIC RISK FACTORS: depression and anxiety, medical comorbidities     PROTECTIVE FACTORS: no suicidal ideation/intent/plan

## 2020-06-08 NOTE — PROGRESS NOTE ADULT - ATTENDING COMMENTS
patient seen and examined with PA student Chrystal Baker I was physically present for the key portions of the evaluation and management (E/M) service provided.  I agree with the above history, physical, and plan which I have reviewed and edited where appropriate.  - Na level slowly improving  - renal following  - case d/w psych and pts   - hold off SSRI

## 2020-06-08 NOTE — PHYSICAL THERAPY INITIAL EVALUATION ADULT - PERSONAL SAFETY AND JUDGMENT, REHAB EVAL
impulsively gets up from bed/chair setting off alarms ,doesn't consistently follow safety cues to wait for assistance ,use CB ,sit down when asked/impaired

## 2020-06-09 LAB
ANION GAP SERPL CALC-SCNC: 10 MMOL/L — SIGNIFICANT CHANGE UP (ref 5–17)
ANION GAP SERPL CALC-SCNC: 8 MMOL/L — SIGNIFICANT CHANGE UP (ref 5–17)
BUN SERPL-MCNC: 11 MG/DL — SIGNIFICANT CHANGE UP (ref 7–23)
BUN SERPL-MCNC: 12 MG/DL — SIGNIFICANT CHANGE UP (ref 7–23)
CALCIUM SERPL-MCNC: 9.1 MG/DL — SIGNIFICANT CHANGE UP (ref 8.5–10.1)
CALCIUM SERPL-MCNC: 9.8 MG/DL — SIGNIFICANT CHANGE UP (ref 8.5–10.1)
CHLORIDE SERPL-SCNC: 91 MMOL/L — LOW (ref 96–108)
CHLORIDE SERPL-SCNC: 95 MMOL/L — LOW (ref 96–108)
CO2 SERPL-SCNC: 20 MMOL/L — LOW (ref 22–31)
CO2 SERPL-SCNC: 25 MMOL/L — SIGNIFICANT CHANGE UP (ref 22–31)
CREAT SERPL-MCNC: 0.48 MG/DL — LOW (ref 0.5–1.3)
CREAT SERPL-MCNC: 0.72 MG/DL — SIGNIFICANT CHANGE UP (ref 0.5–1.3)
GLUCOSE SERPL-MCNC: 96 MG/DL — SIGNIFICANT CHANGE UP (ref 70–99)
GLUCOSE SERPL-MCNC: 99 MG/DL — SIGNIFICANT CHANGE UP (ref 70–99)
HCT VFR BLD CALC: 33.9 % — LOW (ref 34.5–45)
HGB BLD-MCNC: 12.3 G/DL — SIGNIFICANT CHANGE UP (ref 11.5–15.5)
MCHC RBC-ENTMCNC: 33.1 PG — SIGNIFICANT CHANGE UP (ref 27–34)
MCHC RBC-ENTMCNC: 36.3 GM/DL — HIGH (ref 32–36)
MCV RBC AUTO: 91.1 FL — SIGNIFICANT CHANGE UP (ref 80–100)
PLATELET # BLD AUTO: 249 K/UL — SIGNIFICANT CHANGE UP (ref 150–400)
POTASSIUM SERPL-MCNC: 3.9 MMOL/L — SIGNIFICANT CHANGE UP (ref 3.5–5.3)
POTASSIUM SERPL-MCNC: 4 MMOL/L — SIGNIFICANT CHANGE UP (ref 3.5–5.3)
POTASSIUM SERPL-SCNC: 3.9 MMOL/L — SIGNIFICANT CHANGE UP (ref 3.5–5.3)
POTASSIUM SERPL-SCNC: 4 MMOL/L — SIGNIFICANT CHANGE UP (ref 3.5–5.3)
RBC # BLD: 3.72 M/UL — LOW (ref 3.8–5.2)
RBC # FLD: 11.1 % — SIGNIFICANT CHANGE UP (ref 10.3–14.5)
SODIUM SERPL-SCNC: 124 MMOL/L — LOW (ref 135–145)
SODIUM SERPL-SCNC: 125 MMOL/L — LOW (ref 135–145)
WBC # BLD: 5.9 K/UL — SIGNIFICANT CHANGE UP (ref 3.8–10.5)
WBC # FLD AUTO: 5.9 K/UL — SIGNIFICANT CHANGE UP (ref 3.8–10.5)

## 2020-06-09 PROCEDURE — 95816 EEG AWAKE AND DROWSY: CPT | Mod: 26

## 2020-06-09 PROCEDURE — 99232 SBSQ HOSP IP/OBS MODERATE 35: CPT

## 2020-06-09 PROCEDURE — 99233 SBSQ HOSP IP/OBS HIGH 50: CPT

## 2020-06-09 PROCEDURE — 99223 1ST HOSP IP/OBS HIGH 75: CPT

## 2020-06-09 RX ORDER — FUROSEMIDE 40 MG
40 TABLET ORAL ONCE
Refills: 0 | Status: COMPLETED | OUTPATIENT
Start: 2020-06-09 | End: 2020-06-09

## 2020-06-09 RX ORDER — CLONAZEPAM 1 MG
0.5 TABLET ORAL ONCE
Refills: 0 | Status: DISCONTINUED | OUTPATIENT
Start: 2020-06-09 | End: 2020-06-09

## 2020-06-09 RX ORDER — POTASSIUM CHLORIDE 20 MEQ
40 PACKET (EA) ORAL ONCE
Refills: 0 | Status: COMPLETED | OUTPATIENT
Start: 2020-06-09 | End: 2020-06-09

## 2020-06-09 RX ORDER — SODIUM CHLORIDE 9 MG/ML
1 INJECTION INTRAMUSCULAR; INTRAVENOUS; SUBCUTANEOUS ONCE
Refills: 0 | Status: COMPLETED | OUTPATIENT
Start: 2020-06-09 | End: 2020-06-09

## 2020-06-09 RX ADMIN — GABAPENTIN 100 MILLIGRAM(S): 400 CAPSULE ORAL at 11:31

## 2020-06-09 RX ADMIN — SODIUM CHLORIDE 1 GRAM(S): 9 INJECTION INTRAMUSCULAR; INTRAVENOUS; SUBCUTANEOUS at 13:52

## 2020-06-09 RX ADMIN — Medication 0.5 MILLIGRAM(S): at 04:17

## 2020-06-09 RX ADMIN — SODIUM CHLORIDE 1 GRAM(S): 9 INJECTION INTRAMUSCULAR; INTRAVENOUS; SUBCUTANEOUS at 21:43

## 2020-06-09 RX ADMIN — SODIUM CHLORIDE 1 GRAM(S): 9 INJECTION INTRAMUSCULAR; INTRAVENOUS; SUBCUTANEOUS at 11:31

## 2020-06-09 RX ADMIN — HEPARIN SODIUM 5000 UNIT(S): 5000 INJECTION INTRAVENOUS; SUBCUTANEOUS at 17:42

## 2020-06-09 RX ADMIN — HEPARIN SODIUM 5000 UNIT(S): 5000 INJECTION INTRAVENOUS; SUBCUTANEOUS at 04:17

## 2020-06-09 RX ADMIN — QUETIAPINE FUMARATE 150 MILLIGRAM(S): 200 TABLET, FILM COATED ORAL at 21:43

## 2020-06-09 RX ADMIN — Medication 40 MILLIEQUIVALENT(S): at 11:31

## 2020-06-09 RX ADMIN — SODIUM CHLORIDE 1 GRAM(S): 9 INJECTION INTRAMUSCULAR; INTRAVENOUS; SUBCUTANEOUS at 04:17

## 2020-06-09 RX ADMIN — Medication 40 MILLIGRAM(S): at 11:31

## 2020-06-09 RX ADMIN — Medication 0.5 MILLIGRAM(S): at 17:41

## 2020-06-09 NOTE — PROGRESS NOTE BEHAVIORAL HEALTH - RISK ASSESSMENT
LOW acute risk: PT has no hx of psych hospitalizations or SA, no current SI, no suicidal behavior, no agitation.   Moderate increase of long term risk, considering life long psych illness, misuses of [id number] meds, possible abuse of meds, Biot no SA hx.   Protective factors: Supportive family   Mitigation: meds and outpatient f/u with Dr Sepulveda, pt-s psychiatrist  of 20 years.

## 2020-06-09 NOTE — CONSULT NOTE ADULT - SUBJECTIVE AND OBJECTIVE BOX
Neurology Consult requested by:   Patient is a 64y old  Female who presents with a chief complaint of hypotnatremia, catatonic state, (09 Jun 2020 13:09)     HPI:  HPI: The patient is a 63 yo female with psychiatric Hx. who arrived to the ED via EMS, and she states her  called the ambulance because she was confused, staring  at the ceiling, and not responding. She started speaking while in the ED. E Rec. IV NS for hyponatremia.   Presently denies any difficulty speaking, no diff swallowing, transient weakness or numbness, no dizziness, vertigo, no hx of seizures.    PAST MEDICAL & SURGICAL HISTORY:  Brain aneurysm  Anxiety  Depression    FAMILY HISTORY: [facility]    Social Hx:  Nonsmoker, no drug or alcohol use  Medications and Allergies ReviewedMEDICATIONS  (STANDING):  heparin   Injectable 5000 Unit(s) SubCutaneous two times a day  QUEtiapine 150 milliGRAM(s) Oral at bedtime  sodium chloride 1 Gram(s) Oral three times a day     ROS: Pertinent positives in HPI, all other ROS were reviewed and are negative.      Examination:   Vital Signs Last 24 Hrs  T(C): 36.9 (09 Jun 2020 11:20), Max: 36.9 (08 Jun 2020 20:51)  T(F): 98.4 (09 Jun 2020 11:20), Max: 98.4 (08 Jun 2020 20:51)  HR: 80 (09 Jun 2020 11:20) (80 - 95)  BP: 159/84 (09 Jun 2020 11:20) (154/72 - 159/84)  RR: 16 (09 Jun 2020 11:20) (16 - 17)  SpO2: 100% (09 Jun 2020 11:20) (99% - 100%)  General: Cooperative, NAD   NECK: supple, no masses  ENT: Normal hearing   Vascular : no carotid bruits,   Lungs: CTAB  Chest: RRR, no murmurs  Extremities: nontender, no edema  Musculoskeletal: no adventitious movements, no joint stiffness  Skin: no rash    Neurological Examination:  NIHSS:0  MS: AOx3. Appropriately interactive, normal affect. Speech fluent w/o paraphasic error, repetition, naming, reading is intact   CN: VFFTC, PERLL, EOMI, V1-3 sensation intact, face symmetric, hearing intact, palate elevates symmetrically, tongue midline, SCM equal bilaterally  Motor: normal bulk and tone, no tremor, rigidity or bradykinesia.  5/5 all over   Sens: Intact to light touch.    Reflexes: 2/4 all over, downgoing toes b/l  Coord:  No dysmetria, MACIE intact   Gait: Cannot test    Labs: Reviewed  Complete Blood Count in AM (06.09.20 @ 08:32)    WBC Count: 5.90 K/uL    RBC Count: 3.72 M/uL    Hemoglobin: 12.3 g/dL    Hematocrit: 33.9 %    Mean Cell Volume: 91.1 fl    Mean Cell Hemoglobin: 33.1 pg    Mean Cell Hemoglobin Conc: 36.3 gm/dL    Red Cell Distrib Width: 11.1 %    Platelet Count - Automated: 249 K/uL    Basic Metabolic Panel in AM (06.09.20 @ 08:32)    Sodium, Serum: 124 mmol/L    Potassium, Serum: 4.0 mmol/L    Chloride, Serum: 91 mmol/L    Carbon Dioxide, Serum: 25 mmol/L    Anion Gap, Serum: 8 mmol/L    Blood Urea Nitrogen, Serum: 11 mg/dL    Creatinine, Serum: 0.48 mg/dL    Glucose, Serum: 99 mg/dL    Calcium, Total Serum: 9.1 mg/dL    eGFR if Non : 105  Thyroid Stimulating Hormone, Serum: 0.92 uU/mL (06.07.20 @ 17:12)        Imaging:   < from: CT Head No Cont (06.06.20 @ 12:07) >  COMPARISON: 2/14/2017.    FINDINGS:  There is diffuse parenchymal volume loss.  There is no acute parenchymal hemorrhage, parenchymal mass, mass effect or midline shift. There is no extra-axial fluid collection. There is no acute territorial infarct.   The ventricles are prominent likely secondary tocentral atrophy. There is intracranial atherosclerosis.  The calvarium is intact. The visualized paranasal sinuses are well aerated. The visualized orbits are unremarkable.  IMPRESSION: No acute intracranial hemorrhage or acute territorial infarct.

## 2020-06-09 NOTE — PROGRESS NOTE ADULT - SUBJECTIVE AND OBJECTIVE BOX
Outpatient Providers	PCP Dr. Julian Marinelli,  Psychiatry Dr. Sepulveda.     History of Present Illness:  Reason for Admission: hyponatremia, catatonic state    History of Present Illness:   HPI: The patient is a 65 yo female with psychiatric Hx. who arrived to the ED via EMS, and she states her  called the ambulance because she was confused, staring  at the ceiling, and became aphasic. She started speaking while in the ER. ED attending spoke to Dr. Sepulveda the patient psychiatrist who felt the patient has a medical condition. ICU contacted for hyponatremia and did not meed criteria for ICU admission.  Rec. IV NS for hyponatremia.   6/7 - much more alert and state she feels better today  6/8 - pt seen and examined at bedside. pt feeling better today no new acute complaints  6/9- pt seen and examined. pt with improvement today denies any acute complaints. pt more talkative today. pt encouraged and educated on the need for MRI, but refused to go.    ROS:   All 10 systems reviewed and found to be negative with the exception of what has been described above.    Vital Signs Last 24 Hrs  T(C): 36.9 (09 Jun 2020 11:20), Max: 36.9 (08 Jun 2020 20:51)  T(F): 98.4 (09 Jun 2020 11:20), Max: 98.4 (08 Jun 2020 20:51)  HR: 80 (09 Jun 2020 11:20) (80 - 95)  BP: 159/84 (09 Jun 2020 11:20) (154/72 - 159/84)  BP(mean): --  RR: 16 (09 Jun 2020 11:20) (16 - 17)  SpO2: 100% (09 Jun 2020 11:20) (99% - 100%)    GEN: sitting in chair, NAD  HEENT:   NC/AT, pupils equal and reactive, EOMI  CV:  +S1, +S2, RRR  RESP:   lungs clear to auscultation bilaterally, no wheeze, rales, rhonchi   BREAST:  not examined  GI:  abdomen soft, non-tender, non-distended, normoactive BS  RECTAL:  not examined  :  not examined  MSK:   normal muscle tone  EXT:  no edema  NEURO:  AAOX3, no focal neurological deficits  SKIN:  no rashes      LABS:                      12.3   5.90  )-----------( 249      ( 09 Jun 2020 08:32 )             33.9   06-09    124<L>  |  91<L>  |  11  ----------------------------<  99  4.0   |  25  |  0.48<L>    Ca    9.1      09 Jun 2020 08:32        MEDICATIONS  (STANDING):  heparin   Injectable 5000 Unit(s) SubCutaneous two times a day  QUEtiapine 150 milliGRAM(s) Oral at bedtime  sodium chloride 1 Gram(s) Oral three times a day

## 2020-06-09 NOTE — PROGRESS NOTE ADULT - SUBJECTIVE AND OBJECTIVE BOX
NEPHROLOGY CONSULT  HPI:  HPI: The patient is a 63 yo female with psychiatric Hx. who arrived to the ED via EMS, and she states her  called the ambulance because she was confused, staring  at the ceiling, and became aphasic. She started speaking while in the ER. ED attending spoke to Dr. Sepulveda the patient psychiatrist who felt the patient has a medical condition. ICU contacted for hyponatremia and did not meed criteria for ICU admission.  Rec. IV NS for hyponatremia.     Above from chart:  Pt unable to give much info  Fluid intake 6-8 16 oz bottles a day  On lexapro, unable to recall how long  SSRI dc d  admitted w Na level 117 yesterday  up to 124 today  feels well although not spontaneous in answering questions    6/8  no new complaints  restricting fluids  on NS    6/9  quiet today  states does not wish to talk  as per aide ambulates in the room to bathroom  started on NaCl tabs yesterday       PMHx: brain aneurysm, muscle spasms, anxiety , depression.    PSHx: denies surgeries    Family Hx: she does not remember.      Social Hx.: not smoking, no alcohol use      PAST MEDICAL & SURGICAL HISTORY:  Brain aneurysm  Anxiety  Depression      FAMILY HISTORY:    MEDICATIONS  (STANDING):  busPIRone 15 milliGRAM(s) Oral two times a day  gabapentin 100 milliGRAM(s) Oral daily  heparin   Injectable 5000 Unit(s) SubCutaneous two times a day  QUEtiapine 150 milliGRAM(s) Oral at bedtime    MEDICATIONS  (PRN):  clonazePAM  Tablet 0.5 milliGRAM(s) Oral three times a day PRN anxiety        Allergies    No Known Allergies    Intolerances        I&O's Summary        REVIEW OF SYSTEMS:    Vital Signs Last 24 Hrs  T(C): 36.9 (09 Jun 2020 11:20), Max: 36.9 (08 Jun 2020 20:51)  T(F): 98.4 (09 Jun 2020 11:20), Max: 98.4 (08 Jun 2020 20:51)  HR: 80 (09 Jun 2020 11:20) (80 - 95)  BP: 159/84 (09 Jun 2020 11:20) (149/72 - 159/84)  BP(mean): --  RR: 16 (09 Jun 2020 11:20) (16 - 18)  SpO2: 100% (09 Jun 2020 11:20) (99% - 100%)  PHYSICAL EXAM:    General:  Alert, well-developed ,No acute distress.  not talkative    Neuro:  Alert and oriented to person, place, and time.     HEENT:  No JVD, no masses, Eyes anicteric, No carotid bruits.No lymphadenopathy,    Cardiovascular:  Regular rate and rhythm, with normal S1 and S2. No murmurs, rubs,  or gallops. No JVD.     Lungs:  clear. no rales, no wheezing, .    Abdomen:  Normoactive bowel sounds. Soft, flat, non-tender, and non-distended.  No hepatosplenomegaly, positive bowel sounds    Skin:  Warm, dry, well-perfused. No rashes or other lesions.     Extremities:  2+ pulses in upper and lower extremities. No lower extremity pain or  edema; legs are symmetric in appearance.    LABS:             06-08 06-09    124<L>  |  91<L>  |  11  ----------------------------<  99  4.0   |  25  |  0.48<L>    Ca    9.1      09 Jun 2020 08:32

## 2020-06-09 NOTE — CONSULT NOTE ADULT - ASSESSMENT
Pt is 65 yo woman with psychiatric hx presents with Hyponatremia, difficulty speaking, unresponsive, possibly due to metabolic encephalopathy/catatonia/aphasia, due to hyponatremia . ? seizure hx of IC aneurysm.  suggest: EEG  MR head/MRA head

## 2020-06-09 NOTE — PROGRESS NOTE ADULT - ASSESSMENT
HPI: The patient is a 65 yo female with psychiatric Hx. who arrived to the ED via EMS, and she states her  called the ambulance because she was confused, staring  at the ceiling, and became aphasic. She started speaking while in the ER. ED attending spoke to Dr. Sepulveda the patient psychiatrist who felt the patient has a medical condition. ICU contacted for hyponatremia and did not meed criteria for ICU admission.  Rec. IV NS for hyponatremia.     Above from chart:  Pt unable to give much info  Fluid intake 6-8 16 oz bottles a day  On lexapro, unable to recall how long  SSRI dc d  admitted w Na level 117 yesterday  up to 124 today  feels well although not spontaneous in answering questions    A/P  Hyponatremia  Possibly due to SSRI and water intake of approx 2 gallons of water daily  Agree w stopping SSRI and fluid intake  improving w NS @ 75 ml/hr  Urine lytes  Uric acid, serum osm sent    6/8  Labs c/w SIADH most likely due to SSRI  now dc d  Na 125, was 124 yesterday  will dc NS  start nacl 1 g tid     6/9  refuses to talk  started NaCl tabs  lasix 40 po x 1, increase free water excretion  Serum UO greater than UO  K Cl po x 1  Labs 4 pm

## 2020-06-09 NOTE — PROGRESS NOTE BEHAVIORAL HEALTH - NSBHCHARTREVIEWLAB_PSY_A_CORE FT
12.3   5.90  )-----------( 249      ( 09 Jun 2020 08:32 )             33.9   06-09    124<L>  |  91<L>  |  11  ----------------------------<  99  4.0   |  25  |  0.48<L>    Ca    9.1      09 Jun 2020 08:32

## 2020-06-09 NOTE — PROGRESS NOTE BEHAVIORAL HEALTH - NSBHCHARTREVIEWVS_PSY_A_CORE FT
Vital Signs Last 24 Hrs  T(C): 36.9 (09 Jun 2020 11:20), Max: 36.9 (08 Jun 2020 20:51)  T(F): 98.4 (09 Jun 2020 11:20), Max: 98.4 (08 Jun 2020 20:51)  HR: 80 (09 Jun 2020 11:20) (80 - 95)  BP: 159/84 (09 Jun 2020 11:20) (154/72 - 159/84)  BP(mean): --  RR: 16 (09 Jun 2020 11:20) (16 - 17)  SpO2: 100% (09 Jun 2020 11:20) (99% - 100%)

## 2020-06-09 NOTE — PROGRESS NOTE ADULT - ASSESSMENT
Pt is 63 yo female with psychiatric hx presents with Hyponatremia, Aphasia, AMS and catatonic state.    # hyponatremia - possibly 2/2 SIADH in setting of lexapro  - stopped lexapro  - follow psych eval for medication recs  - discontinued Buspar, Gabapentin, Klonopin  - continue Klonopn  - continue IV NS and case d/w dr romano   - free water intake 1500mL  - started NaCl tabs  - f/u 4pm labs    # metabolic encephalopathy/catatonia/aphasia - 2/2 hyponatremia - resolving  - monitor  - F/u neuro consult  - pt refused MRI                   #DVT proph - heparin

## 2020-06-09 NOTE — PROGRESS NOTE BEHAVIORAL HEALTH - NSBHFUPINTERVALHXFT_PSY_A_CORE
PT is confused, inattentive, she was able to say her name , but she can not say her date of birth  or her age. She is disoriented time, place, situation. Comprehension seem to be impaired, but she answer the questions. Denies depressed mood, SI, ah, and anxiety. Na is 124 and it is most probably related to overhydration.

## 2020-06-09 NOTE — PROGRESS NOTE ADULT - ATTENDING COMMENTS
patient seen and examined with PA student Chrystal Baker I was physically present for the key portions of the evaluation and management (E/M) service provided.  I agree with the above history, physical, and plan which I have reviewed and edited where appropriate.  - pt much improved today and conversing more normally t his afternoon but continues to have depressed affect  - case dw renal and plan for fluid restriction, lasix of na tabs

## 2020-06-09 NOTE — PROGRESS NOTE BEHAVIORAL HEALTH - SUMMARY
64 year-old  female, with history of anxiety and depression, medical history of brain aneurysm, treated by Dr. Sepulveda - [phone], with no prior in-patient hospitalization, admitted after presenting with aphasia and catatonic like symptoms in the setting of hyponatremia: 6/6 - 117; 6/7 - 124, 6/9 124    Patient presentation indicative of hypoactive delirium in the setting of hyponatremia. Hyponatremia is less likely to be secondary to Lexapro and more likely to be secondary to overhydration.   Will hold all psychotropic except Seroquel to help sensorium clear.   d.// yamel - avoid benzos in delirium,  d/c buspar,   d/c ed lexapro.  Continue   QUEtiapine 150 milliGRAM(s) Oral at bedtime    Collaterals :    Tong [phone] - phone ringing, no answering service.   Dr. Sepulveda - [phone]  - PT has no hx fo SA or hospitalizations, Follows the opt last 20 tears, she smokes marijuana, DR Sepulveda thinks pt has tendency to abuse any meds and take smore than advised by bette. He does not see her as high suicide  risk pt. Meds discussed, Informed that we are holding all psychotropic excepts Seroquel.   Pt has hx fo being on multiple meds and polypharmacy. She is in treatement for the most of her life. She was in treatement in [facility] prior to started seeing DR Sepulveda 20 years ago.

## 2020-06-10 ENCOUNTER — TRANSCRIPTION ENCOUNTER (OUTPATIENT)
Age: 65
End: 2020-06-10

## 2020-06-10 LAB
ANION GAP SERPL CALC-SCNC: 7 MMOL/L — SIGNIFICANT CHANGE UP (ref 5–17)
BUN SERPL-MCNC: 18 MG/DL — SIGNIFICANT CHANGE UP (ref 7–23)
CALCIUM SERPL-MCNC: 9.4 MG/DL — SIGNIFICANT CHANGE UP (ref 8.5–10.1)
CHLORIDE SERPL-SCNC: 96 MMOL/L — SIGNIFICANT CHANGE UP (ref 96–108)
CO2 SERPL-SCNC: 25 MMOL/L — SIGNIFICANT CHANGE UP (ref 22–31)
CREAT SERPL-MCNC: 0.76 MG/DL — SIGNIFICANT CHANGE UP (ref 0.5–1.3)
GLUCOSE SERPL-MCNC: 100 MG/DL — HIGH (ref 70–99)
HCT VFR BLD CALC: 36.6 % — SIGNIFICANT CHANGE UP (ref 34.5–45)
HGB BLD-MCNC: 12.9 G/DL — SIGNIFICANT CHANGE UP (ref 11.5–15.5)
MCHC RBC-ENTMCNC: 32.2 PG — SIGNIFICANT CHANGE UP (ref 27–34)
MCHC RBC-ENTMCNC: 35.2 GM/DL — SIGNIFICANT CHANGE UP (ref 32–36)
MCV RBC AUTO: 91.3 FL — SIGNIFICANT CHANGE UP (ref 80–100)
PLATELET # BLD AUTO: 259 K/UL — SIGNIFICANT CHANGE UP (ref 150–400)
POTASSIUM SERPL-MCNC: 3.6 MMOL/L — SIGNIFICANT CHANGE UP (ref 3.5–5.3)
POTASSIUM SERPL-SCNC: 3.6 MMOL/L — SIGNIFICANT CHANGE UP (ref 3.5–5.3)
RBC # BLD: 4.01 M/UL — SIGNIFICANT CHANGE UP (ref 3.8–5.2)
RBC # FLD: 11.1 % — SIGNIFICANT CHANGE UP (ref 10.3–14.5)
SODIUM SERPL-SCNC: 128 MMOL/L — LOW (ref 135–145)
WBC # BLD: 7.81 K/UL — SIGNIFICANT CHANGE UP (ref 3.8–10.5)
WBC # FLD AUTO: 7.81 K/UL — SIGNIFICANT CHANGE UP (ref 3.8–10.5)

## 2020-06-10 PROCEDURE — 99232 SBSQ HOSP IP/OBS MODERATE 35: CPT

## 2020-06-10 RX ORDER — QUETIAPINE FUMARATE 200 MG/1
3 TABLET, FILM COATED ORAL
Qty: 0 | Refills: 0 | DISCHARGE
Start: 2020-06-10 | End: 2020-07-09

## 2020-06-10 RX ORDER — SODIUM CHLORIDE 9 MG/ML
1 INJECTION INTRAMUSCULAR; INTRAVENOUS; SUBCUTANEOUS
Qty: 8 | Refills: 0
Start: 2020-06-10 | End: 2020-06-13

## 2020-06-10 RX ORDER — ACETAMINOPHEN 500 MG
2 TABLET ORAL
Qty: 0 | Refills: 0 | DISCHARGE
Start: 2020-06-10

## 2020-06-10 RX ORDER — CLONAZEPAM 1 MG
0.5 TABLET ORAL ONCE
Refills: 0 | Status: DISCONTINUED | OUTPATIENT
Start: 2020-06-10 | End: 2020-06-10

## 2020-06-10 RX ORDER — GABAPENTIN 400 MG/1
1 CAPSULE ORAL
Qty: 0 | Refills: 0 | DISCHARGE

## 2020-06-10 RX ORDER — QUETIAPINE FUMARATE 200 MG/1
1 TABLET, FILM COATED ORAL
Qty: 0 | Refills: 0 | DISCHARGE

## 2020-06-10 RX ORDER — ACETAMINOPHEN 500 MG
650 TABLET ORAL EVERY 6 HOURS
Refills: 0 | Status: DISCONTINUED | OUTPATIENT
Start: 2020-06-10 | End: 2020-06-11

## 2020-06-10 RX ORDER — ESCITALOPRAM OXALATE 10 MG/1
1 TABLET, FILM COATED ORAL
Qty: 0 | Refills: 0 | DISCHARGE

## 2020-06-10 RX ORDER — CARISOPRODOL 250 MG
1 TABLET ORAL
Qty: 0 | Refills: 0 | DISCHARGE

## 2020-06-10 RX ORDER — TIZANIDINE 4 MG/1
1 TABLET ORAL
Qty: 0 | Refills: 0 | DISCHARGE

## 2020-06-10 RX ORDER — CLONAZEPAM 1 MG
1 TABLET ORAL
Qty: 0 | Refills: 0 | DISCHARGE

## 2020-06-10 RX ORDER — QUETIAPINE FUMARATE 200 MG/1
3 TABLET, FILM COATED ORAL
Qty: 90 | Refills: 0
Start: 2020-06-10 | End: 2020-07-09

## 2020-06-10 RX ORDER — QUETIAPINE FUMARATE 200 MG/1
1.5 TABLET, FILM COATED ORAL
Qty: 0 | Refills: 0 | DISCHARGE

## 2020-06-10 RX ADMIN — Medication 0.5 MILLIGRAM(S): at 14:35

## 2020-06-10 RX ADMIN — HEPARIN SODIUM 5000 UNIT(S): 5000 INJECTION INTRAVENOUS; SUBCUTANEOUS at 05:01

## 2020-06-10 RX ADMIN — Medication 650 MILLIGRAM(S): at 05:00

## 2020-06-10 RX ADMIN — SODIUM CHLORIDE 1 GRAM(S): 9 INJECTION INTRAMUSCULAR; INTRAVENOUS; SUBCUTANEOUS at 20:32

## 2020-06-10 RX ADMIN — SODIUM CHLORIDE 1 GRAM(S): 9 INJECTION INTRAMUSCULAR; INTRAVENOUS; SUBCUTANEOUS at 05:00

## 2020-06-10 RX ADMIN — HEPARIN SODIUM 5000 UNIT(S): 5000 INJECTION INTRAVENOUS; SUBCUTANEOUS at 19:15

## 2020-06-10 RX ADMIN — QUETIAPINE FUMARATE 150 MILLIGRAM(S): 200 TABLET, FILM COATED ORAL at 20:32

## 2020-06-10 RX ADMIN — SODIUM CHLORIDE 1 GRAM(S): 9 INJECTION INTRAMUSCULAR; INTRAVENOUS; SUBCUTANEOUS at 14:02

## 2020-06-10 NOTE — PROGRESS NOTE BEHAVIORAL HEALTH - NSBHCHARTREVIEWVS_PSY_A_CORE FT
Vital Signs Last 24 Hrs  T(C): 36.3 (10 Al 2020 10:47), Max: 36.9 (09 Jun 2020 11:20)  T(F): 97.4 (10 Al 2020 10:47), Max: 98.4 (09 Jun 2020 11:20)  HR: 94 (10 Al 2020 10:47) (80 - 94)  BP: 124/69 (10 Al 2020 10:47) (124/69 - 159/84)  BP(mean): --  RR: 16 (10 Al 2020 10:47) (16 - 17)  SpO2: 99% (10 Al 2020 10:47) (99% - 100%)

## 2020-06-10 NOTE — DISCHARGE NOTE NURSING/CASE MANAGEMENT/SOCIAL WORK - PATIENT PORTAL LINK FT
You can access the FollowMyHealth Patient Portal offered by [facility] by registering at the following website: [email or web address]. By joining [facility]’s FollowMyHealth portal, you will also be able to view your health information using other applications (apps) compatible with our system.

## 2020-06-10 NOTE — DISCHARGE NOTE PROVIDER - NSDCCPCAREPLAN_GEN_ALL_CORE_FT
PRINCIPAL DISCHARGE DIAGNOSIS  Diagnosis: Hyponatremia  Assessment and Plan of Treatment: na on dc is 128  - continue taking salt tabs twice a day until you are seen by Dr Romano,.   Please call Dr romano office [phone] to make an appointment to be ssen later this week. STOP KLONOPIN AND LEXAPRO  YOU ARE ONLY SUPPOSED TO BE ON SEROQUEL WHEN YOU GET DC'D  F/U WITH DR LORD LATER THIS WEEK- YOU WILL NEED YOUR ANTIDEPRESSANT MEDS ADJUSTED.      SECONDARY DISCHARGE DIAGNOSES  Diagnosis: Aphasia  Assessment and Plan of Treatment: PRINCIPAL DISCHARGE DIAGNOSIS  Diagnosis: Hyponatremia  Assessment and Plan of Treatment: na on dc is 131  - continue taking salt tabs twice a day until you are seen by Dr Romano,.   Please call Dr romano office [phone] to make an appointment to be ssen  this week. STOP KLONOPIN AND LEXAPRO  YOU ARE ONLY SUPPOSED TO BE ON SEROQUEL WHEN YOU GET DC'D  F/U WITH DR LORD LATER THIS WEEK- YOU WILL NEED YOUR ANTIDEPRESSANT MEDS ADJUSTED.  Follow up with neurology and behavioursal health as outpatient      SECONDARY DISCHARGE DIAGNOSES  Diagnosis: Aphasia  Assessment and Plan of Treatment:

## 2020-06-10 NOTE — DISCHARGE NOTE PROVIDER - NSDCMRMEDTOKEN_GEN_ALL_CORE_FT
acetaminophen 325 mg oral tablet: 2 tab(s) orally every 6 hours, As needed, Temp greater or equal to 38C (100.4F), Mild Pain (1 - 3)  QUEtiapine 50 mg oral tablet: 3 tab(s) orally once a day (at bedtime)  sodium chloride 1 g oral tablet: 1 tab(s) orally 2 times a day

## 2020-06-10 NOTE — DISCHARGE NOTE PROVIDER - PROVIDER TOKENS
PROVIDER:[TOKEN:[[id number]]],PROVIDER:[TOKEN:[[id number]]] PROVIDER:[TOKEN:[[id number]]],PROVIDER:[TOKEN:[[id number]]],PROVIDER:[TOKEN:[[id number]]]

## 2020-06-10 NOTE — DISCHARGE NOTE PROVIDER - CARE PROVIDERS DIRECT ADDRESSES
,DirectAddress_Unknown,DirectAddress_Unknown ,DirectAddress_Unknown,DirectAddress_Unknown,[email or web address] [email or web address]

## 2020-06-10 NOTE — DISCHARGE NOTE PROVIDER - CARE PROVIDER_API CALL
Ezequiel Vazquez  CHILD/ADOLESCENT PSYCHIATRY  [address]  [place] [address]  Phone: [phone]  Fax: [phone]  Follow Up Time:     Pérez Savage  NEPHROLOGY  [address]  [place] [address]  Phone: [phone]  Fax: [phone]  Follow Up Time: Ezequiel Vazquez  CHILD/ADOLESCENT PSYCHIATRY  [address]  [place] [address]  Phone: [phone]  Fax: [phone]  Follow Up Time:     Pérez Savage  NEPHROLOGY  [address]  [place] [address]  Phone: [phone]  Fax: [phone]  Follow Up Time:     Michael Mccormick  INTERNAL MEDICINE  [address] [place] [address]  Phone: [phone]  Fax: [phone]  Follow Up Time:

## 2020-06-10 NOTE — PROGRESS NOTE BEHAVIORAL HEALTH - RISK ASSESSMENT
LOW acute risk: PT has no hx of psych hospitalizations or SA, no current SI, no suicidal behavior, no agitation.   Moderate increase of long term risk, considering life long psych illness, misuses of [id number] meds, possible abuse of meds, Biot no SA hx.   Protective factors: Supportive family   Mitigation: meds and outpatient f/u with Dr Sepulveda, pt-s psychiatrist  of 20 years.

## 2020-06-10 NOTE — DISCHARGE NOTE PROVIDER - HOSPITAL COURSE
65 yo female with psychiatric Hx. who arrived to the ED via EMS, and she states her  called the ambulance because she was confused, staring  at the ceiling, and became aphasic. She started speaking while in the ER. ED attending spoke to Dr. Sepulveda the patient psychiatrist who felt the patient has a medical condition. ICU contacted for hyponatremia and did not meed criteria for ICU admission.  Rec. IV NS for hyponatremia. Na on admission was 118- SSRI was DC'd. Nephrology and Psych consulted. Patient was placed on Free water restriction. Lexapro was dc'd. Na on discharge was 128. Patient back to baseline mental status.         Vital Signs Last 24 Hrs    T(C): 36.7 (10 Al 2020 06:11), Max: 36.9 (09 Jun 2020 11:20)    T(F): 98.1 (10 Al 2020 06:11), Max: 98.4 (09 Jun 2020 11:20)    HR: 84 (10 Al 2020 06:11) (80 - 93)    BP: 130/76 (10 Al 2020 06:11) (130/76 - 159/84)    BP(mean): --    RR: 17 (10 Al 2020 06:11) (16 - 17)    SpO2: 100% (10 Al 2020 06:11) (99% - 100%)        GEN: sitting in chair, NAD    HEENT:   NC/AT, pupils equal and reactive, EOMI    CV:  +S1, +S2, RRR    RESP:   lungs clear to auscultation bilaterally, no wheeze, rales, rhonchi     BREAST:  not examined    GI:  abdomen soft, non-tender, non-distended, normoactive BS    MSK:   normal muscle tone    EXT:  no edema    NEURO:  AAOX3, no focal neurological deficits        PLAN:        # hyponatremia - possibly 2/2 SIADH in setting of lexapro    - stopped lexapro    - follow psych eval for medication recs- recommend seroquel on DC    - discontinued Buspar, Gabapentin, Klonopin    - Nephrology consult- ok for dc on salt tabs 1Gram BID for at least 4 days    - free water intake 1500mL    -  DC Na 128        # metabolic encephalopathy/catatonia/aphasia - 2/2 hyponatremia - resolved- back to baseline     - monitor    - F/u neuro consult    - pt refused MRI    - EEG no seizures.         Plan for dc home with instructions to f/u with Dr Savage and her Psychiatrist Dr Vazquez for further titration of meds. Will need f/u Na. 65 yo female with psychiatric Hx. who arrived to the ED via EMS, and she states her  called the ambulance because she was confused, staring  at the ceiling, and became aphasic. She started speaking while in the ER. ED attending spoke to Dr. Sepulveda the patient psychiatrist who felt the patient has a medical condition. ICU contacted for hyponatremia and did not meed criteria for ICU admission.  Rec. IV NS for hyponatremia. Na on admission was 118- SSRI was DC'd. Nephrology and Psych consulted. Patient was placed on Free water restriction. Lexapro was dc'd. Na on discharge was 128. Patient back to baseline mental status.         Vital Signs Last 24 Hrs    T(C): 36.7 (10 Al 2020 06:11), Max: 36.9 (09 Jun 2020 11:20)    T(F): 98.1 (10 Al 2020 06:11), Max: 98.4 (09 Jun 2020 11:20)    HR: 84 (10 Al 2020 06:11) (80 - 93)    BP: 130/76 (10 Al 2020 06:11) (130/76 - 159/84)    BP(mean): --    RR: 17 (10 Al 2020 06:11) (16 - 17)    SpO2: 100% (10 Al 2020 06:11) (99% - 100%)        GEN: sitting in chair, NAD    HEENT:   NC/AT, pupils equal and reactive, EOMI    CV:  +S1, +S2, RRR    RESP:   lungs clear to auscultation bilaterally, no wheeze, rales, rhonchi     BREAST:  not examined    GI:  abdomen soft, non-tender, non-distended, normoactive BS    MSK:   normal muscle tone    EXT:  no edema    NEURO:  AAOX3, no focal neurological deficits        PLAN:        # hyponatremia - possibly 2/2 SIADH in setting of lexapro    - stopped lexapro    - follow psych eval for medication recs- recommend seroquel on DC    - discontinued Buspar, Gabapentin, Klonopin    - Nephrology consult- ok for dc on salt tabs 1Gram BID for at least 4 days    - free water intake 1500mL    -  DC Na 128        # metabolic encephalopathy/catatonia/aphasia - 2/2 hyponatremia - resolved- back to baseline     - monitor    - F/u neuro consult    - pt refused MRI    - EEG no seizures.         Patient seen and examined with KIYA Webb.  I was physically present for the key portions of the evaluation and management (E/M) service provided.  I agree with the above history, physical, and plan which I have reviewed and edited where appropriate.     - plan for dc home with salt tabs and close f/u with dr romano for repeat bmp     - case d/w dtr and dr haque may hold dc til AM if pt becomes agitated 65 yo female with psychiatric Hx. who arrived to the ED via EMS, and she states her  called the ambulance because she was confused, staring  at the ceiling, and became aphasic. She started speaking while in the ER. ED attending spoke to Dr. Sepulveda the patient psychiatrist who felt the patient has a medical condition. ICU contacted for hyponatremia and did not meed criteria for ICU admission.  Rec. IV NS for hyponatremia. Na on admission was 118- SSRI was DC'd. Nephrology and Psych consulted. Patient was placed on Free water restriction. Lexapro was dc'd. Na on discharge was 128. Patient back to baseline mental status.         Vital Signs Last 24 Hrs    T(C): 36.7 (10 Al 2020 06:11), Max: 36.9 (09 Jun 2020 11:20)    T(F): 98.1 (10 Al 2020 06:11), Max: 98.4 (09 Jun 2020 11:20)    HR: 84 (10 Al 2020 06:11) (80 - 93)    BP: 130/76 (10 Al 2020 06:11) (130/76 - 159/84)    BP(mean): --    RR: 17 (10 Al 2020 06:11) (16 - 17)    SpO2: 100% (10 Al 2020 06:11) (99% - 100%)        GEN: sitting in chair, NAD    HEENT:   NC/AT, pupils equal and reactive, EOMI    CV:  +S1, +S2, RRR    RESP:   lungs clear to auscultation bilaterally, no wheeze, rales, rhonchi     BREAST:  not examined    GI:  abdomen soft, non-tender, non-distended, normoactive BS    MSK:   normal muscle tone    EXT:  no edema    NEURO:  AAOX3, no focal neurological deficits        PLAN:        # hyponatremia - possibly 2/2 SIADH in setting of lexapro    - stopped lexapro    - follow psych eval for medication recs- recommend seroquel on DC    - discontinued Buspar, Gabapentin, Klonopin    - Nephrology consult- ok for dc on salt tabs 1Gram BID for at least 4 days    - free water intake 1500mL    -  DC Na 131        # metabolic encephalopathy/catatonia/aphasia - 2/2 hyponatremia - resolved- back to baseline     - monitor    - F/u neuro consult as outpatient     - pt refused MRI    - EEG no seizures.         Patient seen and examined with KIYA Webb.  I was physically present for the key portions of the evaluation and management (E/M) service provided.  I agree with the above history, physical, and plan which I have reviewed and edited where appropriate.     - plan for dc home with salt tabs and close f/u with dr romano for repeat bmp     - case d/w dtr and dr haque     f/u with nephrology, psych and neurology as outpatient            discharge time 47 mins

## 2020-06-10 NOTE — PROGRESS NOTE BEHAVIORAL HEALTH - SUMMARY
64 year-old  female, with history of anxiety and depression, medical history of brain aneurysm, treated by Dr. Sepulveda - [phone], with no prior in-patient hospitalization, admitted after presenting with aphasia and catatonic like symptoms in the setting of hyponatremia: 6/6 - 117; 6/7 - 124, 6/9 124    Patient presentation indicative of hypoactive delirium in the setting of hyponatremia. Hyponatremia is less likely to be secondary to Lexapro and more likely to be secondary to overhydration.   Will hold all psychotropic except Seroquel to help sensorium clear.   d.// yamel - avoid benzos in delirium,  d/c buspar,   d/c ed lexapro.  Continue   QUEtiapine 150 milliGRAM(s) Oral at bedtime    Collaterals :    Tong [phone] - phone ringing, no answering service.   Dr. Sepulveda - [phone]  - PT has no hx fo SA or hospitalizations, Follows the opt last 20 tears, she smokes marijuana, DR Sepulveda thinks pt has tendency to abuse any meds and take smore than advised by bette. He does not see her as high suicide  risk pt. Meds discussed, Informed that we are holding all psychotropic excepts Seroquel.   Pt has hx fo being on multiple meds and polypharmacy. She is in treatement for the most of her life. She was in treatement in [facility] prior to started seeing DR Sepulveda 20 years ago.    6.10.2020 - Patient symptoms not indicating imminent risk for harm to self; not warranting involuntary in-patient hospitalization.     PATIENT IS PSYCHIATRICALLY CLEARED.

## 2020-06-10 NOTE — PROGRESS NOTE BEHAVIORAL HEALTH - NSBHFUPINTERVALHXFT_PSY_A_CORE
Patient is calm and cooperative, linear; attentive during conversation but lethargic; slow to respond to questions however overall improved. Patient is fairly related, able to answer questions but it simple in her responses with poverty of speech and content. Denies depressed mood or hopelessness or suicidal ideation. Denies manic / psychotic symptoms. No delusions elicited. Sodium 128: continues to improve.

## 2020-06-11 VITALS
OXYGEN SATURATION: 98 % | SYSTOLIC BLOOD PRESSURE: 132 MMHG | DIASTOLIC BLOOD PRESSURE: 75 MMHG | HEART RATE: 93 BPM | RESPIRATION RATE: 17 BRPM | TEMPERATURE: 98 F

## 2020-06-11 LAB
ANION GAP SERPL CALC-SCNC: 10 MMOL/L — SIGNIFICANT CHANGE UP (ref 5–17)
BUN SERPL-MCNC: 19 MG/DL — SIGNIFICANT CHANGE UP (ref 7–23)
CALCIUM SERPL-MCNC: 8.9 MG/DL — SIGNIFICANT CHANGE UP (ref 8.5–10.1)
CHLORIDE SERPL-SCNC: 98 MMOL/L — SIGNIFICANT CHANGE UP (ref 96–108)
CO2 SERPL-SCNC: 23 MMOL/L — SIGNIFICANT CHANGE UP (ref 22–31)
CREAT SERPL-MCNC: 0.7 MG/DL — SIGNIFICANT CHANGE UP (ref 0.5–1.3)
GLUCOSE SERPL-MCNC: 98 MG/DL — SIGNIFICANT CHANGE UP (ref 70–99)
POTASSIUM SERPL-MCNC: 3.6 MMOL/L — SIGNIFICANT CHANGE UP (ref 3.5–5.3)
POTASSIUM SERPL-SCNC: 3.6 MMOL/L — SIGNIFICANT CHANGE UP (ref 3.5–5.3)
SODIUM SERPL-SCNC: 131 MMOL/L — LOW (ref 135–145)

## 2020-06-11 PROCEDURE — 99232 SBSQ HOSP IP/OBS MODERATE 35: CPT

## 2020-06-11 PROCEDURE — 99239 HOSP IP/OBS DSCHRG MGMT >30: CPT

## 2020-06-11 RX ADMIN — SODIUM CHLORIDE 1 GRAM(S): 9 INJECTION INTRAMUSCULAR; INTRAVENOUS; SUBCUTANEOUS at 05:33

## 2020-06-11 RX ADMIN — HEPARIN SODIUM 5000 UNIT(S): 5000 INJECTION INTRAVENOUS; SUBCUTANEOUS at 05:33

## 2020-06-11 RX ADMIN — SODIUM CHLORIDE 1 GRAM(S): 9 INJECTION INTRAMUSCULAR; INTRAVENOUS; SUBCUTANEOUS at 13:28

## 2020-06-11 NOTE — PROGRESS NOTE BEHAVIORAL HEALTH - NSBHFUPINTERVALHXFT_PSY_A_CORE
Patient remains calm and cooperative, linear; attentive during conversation; spontaneous. Patient is fairly related, able to answer questions but it simple in her responses with poverty of speech and content. Denies depressed mood or hopelessness or suicidal ideation. Denies manic / psychotic symptoms. No delusions elicited. Sodium 131: continues to improve.

## 2020-06-11 NOTE — PROGRESS NOTE ADULT - SUBJECTIVE AND OBJECTIVE BOX
63 yo female with psychiatric Hx. who arrived to the ED via EMS, and she states her  called the ambulance because she was confused, staring  at the ceiling, and became aphasic. She started speaking while in the ER. ED attending spoke to Dr. Sepulveda the patient psychiatrist who felt the patient has a medical condition. ICU contacted for hyponatremia and did not meed criteria for ICU admission.  Rec. IV NS for hyponatremia. Na on admission was 118- SSRI was DC'd. Nephrology and Psych consulted. Patient was placed on Free water restriction. Lexapro was dc'd. Na on discharge was 128. Patient back to baseline mental status.    6/11- discharge was postponed yesterday- family was concerned that patient was behaving oddly. Na this am 131- aptietn appears to be back to baseline- anxious to be discharged. Received 2 extra doses of clonopin in 24 hours.     Vital Signs Last 24 Hrs  T(C): 36.3 (11 Jun 2020 04:52), Max: 36.8 (10 Al 2020 20:34)  T(F): 97.4 (11 Jun 2020 04:52), Max: 98.3 (10 Al 2020 20:34)  HR: 99 (11 Jun 2020 04:52) (94 - 101)  BP: 139/72 (11 Jun 2020 04:52) (124/69 - 139/72)  BP(mean): --  RR: 17 (11 Jun 2020 04:52) (16 - 17)  SpO2: 96% (11 Jun 2020 04:52) (96% - 100%)    REVIEW OF SYSTEMS:    CONSTITUTIONAL: No weakness, fevers or chills  EYES/ENT: No visual changes;  No vertigo or throat pain   NECK: No pain or stiffness  RESPIRATORY: No cough, wheezing, hemoptysis; No shortness of breath  CARDIOVASCULAR: No chest pain or palpitations  GASTROINTESTINAL: No abdominal or epigastric pain. No nausea, vomiting, or hematemesis; No diarrhea or constipation. No melena or hematochezia.  GENITOURINARY: No dysuria, frequency or hematuria  NEUROLOGICAL: No numbness or weakness  SKIN: No itching, burning, rashes, or lesions   All other review of systems is negative unless indicated above.    GEN: sitting in chair, NAD  HEENT:   NC/AT, pupils equal and reactive, EOMI  CV:  +S1, +S2, RRR  RESP:   lungs clear to auscultation bilaterally, no wheeze, rales, rhonchi   BREAST:  not examined  GI:  abdomen soft, non-tender, non-distended, normoactive BS  MSK:   normal muscle tone  EXT:  no edema  NEURO:  AAOX3, no focal neurological deficits    MEDICATIONS  (STANDING):  heparin   Injectable 5000 Unit(s) SubCutaneous two times a day  QUEtiapine 150 milliGRAM(s) Oral at bedtime  sodium chloride 1 Gram(s) Oral three times a day    MEDICATIONS  (PRN):  acetaminophen   Tablet .. 650 milliGRAM(s) Oral every 6 hours PRN Temp greater or equal to 38C (100.4F), Mild Pain (1 - 3)

## 2020-06-11 NOTE — PROGRESS NOTE ADULT - SUBJECTIVE AND OBJECTIVE BOX
NEPHROLOGY CONSULT  HPI:  HPI: The patient is a 63 yo female with psychiatric Hx. who arrived to the ED via EMS, and she states her  called the ambulance because she was confused, staring  at the ceiling, and became aphasic. She started speaking while in the ER. ED attending spoke to Dr. Sepulveda the patient psychiatrist who felt the patient has a medical condition. ICU contacted for hyponatremia and did not meed criteria for ICU admission.  Rec. IV NS for hyponatremia.     Above from chart:  Pt unable to give much info  Fluid intake 6-8 16 oz bottles a day  On lexapro, unable to recall how long  SSRI dc d  admitted w Na level 117 yesterday  up to 124 today  feels well although not spontaneous in answering questions    6/8  no new complaints  restricting fluids  on NS    6/9  quiet today  states does not wish to talk  as per aide ambulates in the room to bathroom  started on NaCl tabs yesterday     6/11  for dc home  Na 131  will be on NaCl tab 1 g bid  consume Na and K foods   BMP 6/12 and 6/15      PMHx: brain aneurysm, muscle spasms, anxiety , depression.    PSHx: denies surgeries    Family Hx: she does not remember.      Social Hx.: not smoking, no alcohol use      PAST MEDICAL & SURGICAL HISTORY:  Brain aneurysm  Anxiety  Depression      FAMILY HISTORY:    MEDICATIONS  (STANDING):  heparin   Injectable 5000 Unit(s) SubCutaneous two times a day  QUEtiapine 150 milliGRAM(s) Oral at bedtime  sodium chloride 1 Gram(s) Oral three times a day    MEDICATIONS  (PRN):  acetaminophen   Tablet .. 650 milliGRAM(s) Oral every 6 hours PRN Temp greater or equal to 38C (100.4F), Mild Pain (1 - 3)        Allergies    No Known Allergies    Intolerances        I&O's Summary        REVIEW OF SYSTEMS:    Vital Signs Last 24 Hrs  T(C): 36.8 (11 Jun 2020 10:52), Max: 36.8 (10 Al 2020 20:34)  T(F): 98.3 (11 Jun 2020 10:52), Max: 98.3 (10 Al 2020 20:34)  HR: 93 (11 Jun 2020 10:52) (93 - 101)  BP: 132/75 (11 Jun 2020 10:52) (127/79 - 139/72)  BP(mean): --  RR: 17 (11 Jun 2020 10:52) (17 - 17)  SpO2: 98% (11 Jun 2020 10:52) (96% - 100%)      PHYSICAL EXAM:    General:  Alert, well-developed ,No acute distress.  not talkative    Neuro:  Alert and oriented to person, place, and time.     HEENT:  No JVD, no masses, Eyes anicteric, No carotid bruits.No lymphadenopathy,    Cardiovascular:  Regular rate and rhythm, with normal S1 and S2. No murmurs, rubs,  or gallops. No JVD.     Lungs:  clear. no rales, no wheezing, .    Abdomen:  Normoactive bowel sounds. Soft, flat, non-tender, and non-distended.  No hepatosplenomegaly, positive bowel sounds    Skin:  Warm, dry, well-perfused. No rashes or other lesions.     Extremities:  2+ pulses in upper and lower extremities. No lower extremity pain or  edema; legs are symmetric in appearance.    LABS:               131    |  98     |  19     ----------------------------<  98        11 Jun 2020 06:40  3.6     |  23     |  0.70     128    |  96     |  18     ----------------------------<  100       10 Al 2020 06:43  3.6     |  25     |  0.76     125    |  95     |  12     ----------------------------<  96        09 Jun 2020 17:20  3.9     |  20     |  0.72     Ca    8.9        11 Jun 2020 06:40  Ca    9.4        10 Al 2020 06:43  Ca    9.8        09 Jun 2020 17:20

## 2020-06-11 NOTE — PROGRESS NOTE BEHAVIORAL HEALTH - SUMMARY
64 year-old  female, with history of anxiety and depression, medical history of brain aneurysm, treated by Dr. Sepulveda - [phone], with no prior in-patient hospitalization, admitted after presenting with aphasia and catatonic like symptoms in the setting of hyponatremia: 6/6 - 117; 6/7 - 124, 6/9 124    Patient presentation indicative of hypoactive delirium in the setting of hyponatremia. Hyponatremia is less likely to be secondary to Lexapro and more likely to be secondary to overhydration.   Will hold all psychotropic except Seroquel to help sensorium clear.   d.// yamel - avoid benzos in delirium,  d/c buspar,   d/c ed lexapro.  Continue   QUEtiapine 150 milliGRAM(s) Oral at bedtime    Collaterals :    Tong [phone] - phone ringing, no answering service.   Dr. Sepulveda - [phone]  - PT has no hx fo SA or hospitalizations, Follows the opt last 20 tears, she smokes marijuana, DR Sepulveda thinks pt has tendency to abuse any meds and take smore than advised by bette. He does not see her as high suicide  risk pt. Meds discussed, Informed that we are holding all psychotropic excepts Seroquel.   Pt has hx fo being on multiple meds and polypharmacy. She is in treatement for the most of her life. She was in treatement in [facility] prior to started seeing DR Sepulveda 20 years ago.    6.10.2020 - Patient symptoms not indicating imminent risk for harm to self; not warranting involuntary in-patient hospitalization.     6.11.2020 - Reports calm and cooperative; linear; Patient symptoms not indicating imminent risk for harm to self; not warranting involuntary in-patient hospitalization.  Patient is motivation for out-patient treatment with Dr. Sepulveda.    PATIENT IS PSYCHIATRICALLY CLEARED.

## 2020-06-11 NOTE — PROGRESS NOTE BEHAVIORAL HEALTH - NSBHCHARTREVIEWVS_PSY_A_CORE FT
Vital Signs Last 24 Hrs  T(C): 36.8 (11 Jun 2020 10:52), Max: 36.8 (10 Al 2020 20:34)  T(F): 98.3 (11 Jun 2020 10:52), Max: 98.3 (10 Al 2020 20:34)  HR: 93 (11 Jun 2020 10:52) (93 - 101)  BP: 132/75 (11 Jun 2020 10:52) (127/79 - 139/72)  BP(mean): --  RR: 17 (11 Jun 2020 10:52) (17 - 17)  SpO2: 98% (11 Jun 2020 10:52) (96% - 100%)

## 2020-06-11 NOTE — PROGRESS NOTE BEHAVIORAL HEALTH - RISK ASSESSMENT
LOW acute risk: PT has no hx of psych hospitalizations or SA, no current SI, no suicidal behavior, no agitation.   Moderate increase of long term risk, considering life long psych illness, misuses of [id number] meds, possible abuse of meds, Biot no SA hx.   Protective factors: Supportive family   Mitigation: meds and outpatient f/u with Dr Sepulveda, pt-s psychiatrist  of 20 years.

## 2020-06-11 NOTE — PROGRESS NOTE ADULT - ASSESSMENT
PLAN:    # hyponatremia - possibly 2/2 SIADH in setting of lexapro  - stopped lexapro  - follow psych eval for medication recs- recommend seroquel on DC  - discontinued Buspar, Gabapentin, Klonopin  - Nephrology consult- ok for dc on salt tabs 1Gram BID for at least 4 days  - free water intake 1500mL  -  DC Na 131  - will need close f/u with Dr Vazquez and Dr Savage    # metabolic encephalopathy/catatonia/aphasia - 2/2 hyponatremia - resolved- back to baseline   - monitor  - neuro consult  - pt refused MRI  - EEG no seizures.     - discussed with daughter and . Plan for dc home with instructions to f/u with Dr Vazquez and Dr Savage as a outpatient. PLAN:    # hyponatremia - possibly 2/2 SIADH in setting of lexapro  - stopped lexapro  - follow psych eval for medication recs- recommend seroquel on DC  - discontinued Buspar, Gabapentin, Klonopin  - Nephrology consult- ok for dc on salt tabs 1Gram BID for at least 4 days  - free water intake 1500mL  -  DC Na 131  - will need close f/u with Dr Vazquez and Dr Savage    # metabolic encephalopathy/catatonia/aphasia - 2/2 hyponatremia - resolved- back to baseline   - monitor  - neuro consult  - pt refused MRI  - EEG no seizures.     - discussed with daughter and . Plan for dc home with instructions to f/u with Dr Vazquez and Dr Savage and psychiatry as a outpatient.

## 2020-06-11 NOTE — PROGRESS NOTE ADULT - ASSESSMENT
HPI: The patient is a 65 yo female with psychiatric Hx. who arrived to the ED via EMS, and she states her  called the ambulance because she was confused, staring  at the ceiling, and became aphasic. She started speaking while in the ER. ED attending spoke to Dr. Sepulveda the patient psychiatrist who felt the patient has a medical condition. ICU contacted for hyponatremia and did not meed criteria for ICU admission.  Rec. IV NS for hyponatremia.     Above from chart:  Pt unable to give much info  Fluid intake 6-8 16 oz bottles a day  On lexapro, unable to recall how long  SSRI dc d  admitted w Na level 117 yesterday  up to 124 today  feels well although not spontaneous in answering questions    A/P  Hyponatremia  Possibly due to SSRI and water intake of approx 2 gallons of water daily  Agree w stopping SSRI and fluid intake  improving w NS @ 75 ml/hr  Urine lytes  Uric acid, serum osm sent    6/8  Labs c/w SIADH most likely due to SSRI  now dc d  Na 125, was 124 yesterday  will dc NS  start nacl 1 g tid     6/9  refuses to talk  started NaCl tabs  lasix 40 po x 1, increase free water excretion  Serum UO greater than UO  K Cl po x 1  Labs 4 pm    6/11  for dc home  Na 131  will be on NaCl tab 1 g bid  consume Na and K foods   BMP 6/12 and 6/15

## 2020-06-15 DIAGNOSIS — E22.2 SYNDROME OF INAPPROPRIATE SECRETION OF ANTIDIURETIC HORMONE: ICD-10-CM

## 2020-06-15 DIAGNOSIS — F41.9 ANXIETY DISORDER, UNSPECIFIED: ICD-10-CM

## 2020-06-15 DIAGNOSIS — G93.41 METABOLIC ENCEPHALOPATHY: ICD-10-CM

## 2020-06-15 DIAGNOSIS — F06.1 CATATONIC DISORDER DUE TO KNOWN PHYSIOLOGICAL CONDITION: ICD-10-CM

## 2020-06-15 DIAGNOSIS — F05 DELIRIUM DUE TO KNOWN PHYSIOLOGICAL CONDITION: ICD-10-CM

## 2020-06-15 DIAGNOSIS — R47.01 APHASIA: ICD-10-CM

## 2020-06-15 DIAGNOSIS — F32.9 MAJOR DEPRESSIVE DISORDER, SINGLE EPISODE, UNSPECIFIED: ICD-10-CM

## 2020-08-31 NOTE — ED ADULT NURSE NOTE - CHIEF COMPLAINT QUOTE
From: Meredith Roberts  To: Isabel Vega  Sent: 8/31/2020 11:39 AM CDT  Subject: Medication Question    This message is being sent by Brooke Denny on behalf of Meredith Roberts.    Helhenrique,  Does Isabel or any other physician have availability for an PE this week. Both of my boys(Meredith & Jamie) are temporarily home from school. One is leaving this weekend, the other heads out next weekend.     Meredith is in need of a refill supply of allergy medicine.    Thank you!   Pt BIBA with initial report of aphasia with pt verbal 3 hours ago per daughter.  MD to bedside with no code stroke called.  Per daughter, pt has been having increasingly complex symptoms including confusion and erratic behavior over time with adjustment of psych meds. Psychiatrist is Dr. Sepulveda.  Pt alert and able to nod head to questions.

## 2020-09-06 ENCOUNTER — INPATIENT (INPATIENT)
Facility: HOSPITAL | Age: 65
LOS: 18 days | Discharge: HOME HEALTH | DRG: 981 | End: 2020-09-25
Attending: FAMILY MEDICINE | Admitting: INTERNAL MEDICINE
Payer: COMMERCIAL

## 2020-09-06 VITALS — WEIGHT: 160.06 LBS

## 2020-09-06 DIAGNOSIS — Z98.890 OTHER SPECIFIED POSTPROCEDURAL STATES: Chronic | ICD-10-CM

## 2020-09-06 DIAGNOSIS — E87.1 HYPO-OSMOLALITY AND HYPONATREMIA: ICD-10-CM

## 2020-09-06 LAB
ALBUMIN SERPL ELPH-MCNC: 3.6 G/DL — SIGNIFICANT CHANGE UP (ref 3.3–5)
ALP SERPL-CCNC: 147 U/L — HIGH (ref 40–120)
ALT FLD-CCNC: 29 U/L — SIGNIFICANT CHANGE UP (ref 12–78)
AMPHET UR-MCNC: NEGATIVE — SIGNIFICANT CHANGE UP
ANION GAP SERPL CALC-SCNC: 12 MMOL/L — SIGNIFICANT CHANGE UP (ref 5–17)
APPEARANCE UR: CLEAR — SIGNIFICANT CHANGE UP
AST SERPL-CCNC: 25 U/L — SIGNIFICANT CHANGE UP (ref 15–37)
BARBITURATES UR SCN-MCNC: NEGATIVE — SIGNIFICANT CHANGE UP
BASE EXCESS BLDV CALC-SCNC: -1.5 MMOL/L — SIGNIFICANT CHANGE UP (ref -2–2)
BASOPHILS # BLD AUTO: 0.11 K/UL — SIGNIFICANT CHANGE UP (ref 0–0.2)
BASOPHILS NFR BLD AUTO: 1 % — SIGNIFICANT CHANGE UP (ref 0–2)
BENZODIAZ UR-MCNC: NEGATIVE — SIGNIFICANT CHANGE UP
BILIRUB SERPL-MCNC: 0.8 MG/DL — SIGNIFICANT CHANGE UP (ref 0.2–1.2)
BILIRUB UR-MCNC: NEGATIVE — SIGNIFICANT CHANGE UP
BUN SERPL-MCNC: 13 MG/DL — SIGNIFICANT CHANGE UP (ref 7–23)
CALCIUM SERPL-MCNC: 9.2 MG/DL — SIGNIFICANT CHANGE UP (ref 8.5–10.1)
CHLORIDE SERPL-SCNC: 80 MMOL/L — LOW (ref 96–108)
CO2 SERPL-SCNC: 19 MMOL/L — LOW (ref 22–31)
COCAINE METAB.OTHER UR-MCNC: NEGATIVE — SIGNIFICANT CHANGE UP
COLOR SPEC: YELLOW — SIGNIFICANT CHANGE UP
CREAT SERPL-MCNC: 0.84 MG/DL — SIGNIFICANT CHANGE UP (ref 0.5–1.3)
DIFF PNL FLD: ABNORMAL
EOSINOPHIL # BLD AUTO: 0.11 K/UL — SIGNIFICANT CHANGE UP (ref 0–0.5)
EOSINOPHIL NFR BLD AUTO: 1 % — SIGNIFICANT CHANGE UP (ref 0–6)
GLUCOSE SERPL-MCNC: 194 MG/DL — HIGH (ref 70–99)
GLUCOSE UR QL: 1000 MG/DL
HCO3 BLDV-SCNC: 19 MMOL/L — LOW (ref 21–29)
HCT VFR BLD CALC: 38 % — SIGNIFICANT CHANGE UP (ref 34.5–45)
HGB BLD-MCNC: 14 G/DL — SIGNIFICANT CHANGE UP (ref 11.5–15.5)
KETONES UR-MCNC: ABNORMAL
LEUKOCYTE ESTERASE UR-ACNC: ABNORMAL
LYMPHOCYTES # BLD AUTO: 1.47 K/UL — SIGNIFICANT CHANGE UP (ref 1–3.3)
LYMPHOCYTES # BLD AUTO: 13 % — SIGNIFICANT CHANGE UP (ref 13–44)
MAGNESIUM SERPL-MCNC: 1.4 MG/DL — LOW (ref 1.6–2.6)
MCHC RBC-ENTMCNC: 30.8 PG — SIGNIFICANT CHANGE UP (ref 27–34)
MCHC RBC-ENTMCNC: 36.8 GM/DL — HIGH (ref 32–36)
MCV RBC AUTO: 83.7 FL — SIGNIFICANT CHANGE UP (ref 80–100)
METHADONE UR-MCNC: NEGATIVE — SIGNIFICANT CHANGE UP
MONOCYTES # BLD AUTO: 0.45 K/UL — SIGNIFICANT CHANGE UP (ref 0–0.9)
MONOCYTES NFR BLD AUTO: 4 % — SIGNIFICANT CHANGE UP (ref 2–14)
NEUTROPHILS # BLD AUTO: 9.07 K/UL — HIGH (ref 1.8–7.4)
NEUTROPHILS NFR BLD AUTO: 80 % — HIGH (ref 43–77)
NITRITE UR-MCNC: NEGATIVE — SIGNIFICANT CHANGE UP
NRBC # BLD: SIGNIFICANT CHANGE UP /100 WBCS (ref 0–0)
OPIATES UR-MCNC: NEGATIVE — SIGNIFICANT CHANGE UP
PCO2 BLDV: 24 MMHG — LOW (ref 35–50)
PCP SPEC-MCNC: SIGNIFICANT CHANGE UP
PCP UR-MCNC: NEGATIVE — SIGNIFICANT CHANGE UP
PH BLDV: 7.52 — HIGH (ref 7.35–7.45)
PH UR: 5 — SIGNIFICANT CHANGE UP (ref 5–8)
PLATELET # BLD AUTO: 236 K/UL — SIGNIFICANT CHANGE UP (ref 150–400)
PO2 BLDV: 103 MMHG — HIGH (ref 25–45)
POTASSIUM SERPL-MCNC: 3.8 MMOL/L — SIGNIFICANT CHANGE UP (ref 3.5–5.3)
POTASSIUM SERPL-SCNC: 3.8 MMOL/L — SIGNIFICANT CHANGE UP (ref 3.5–5.3)
PROT SERPL-MCNC: 8 GM/DL — SIGNIFICANT CHANGE UP (ref 6–8.3)
PROT UR-MCNC: 30 MG/DL
RBC # BLD: 4.54 M/UL — SIGNIFICANT CHANGE UP (ref 3.8–5.2)
RBC # FLD: 11.6 % — SIGNIFICANT CHANGE UP (ref 10.3–14.5)
SAO2 % BLDV: 98 % — HIGH (ref 67–88)
SARS-COV-2 RNA SPEC QL NAA+PROBE: SIGNIFICANT CHANGE UP
SODIUM SERPL-SCNC: 111 MMOL/L — CRITICAL LOW (ref 135–145)
SP GR SPEC: 1.02 — SIGNIFICANT CHANGE UP (ref 1.01–1.02)
THC UR QL: POSITIVE — SIGNIFICANT CHANGE UP
TROPONIN I SERPL-MCNC: <0.015 NG/ML — SIGNIFICANT CHANGE UP (ref 0.01–0.04)
TSH SERPL-MCNC: 0.62 UU/ML — SIGNIFICANT CHANGE UP (ref 0.34–4.82)
URATE SERPL-MCNC: 2 MG/DL — LOW (ref 2.5–7)
UROBILINOGEN FLD QL: NEGATIVE MG/DL — SIGNIFICANT CHANGE UP
WBC # BLD: 11.34 K/UL — HIGH (ref 3.8–10.5)
WBC # FLD AUTO: 11.34 K/UL — HIGH (ref 3.8–10.5)

## 2020-09-06 PROCEDURE — 70553 MRI BRAIN STEM W/O & W/DYE: CPT

## 2020-09-06 PROCEDURE — 86850 RBC ANTIBODY SCREEN: CPT

## 2020-09-06 PROCEDURE — 82533 TOTAL CORTISOL: CPT

## 2020-09-06 PROCEDURE — 84550 ASSAY OF BLOOD/URIC ACID: CPT

## 2020-09-06 PROCEDURE — 86900 BLOOD TYPING SEROLOGIC ABO: CPT

## 2020-09-06 PROCEDURE — 87086 URINE CULTURE/COLONY COUNT: CPT

## 2020-09-06 PROCEDURE — C1729: CPT

## 2020-09-06 PROCEDURE — 88341 IMHCHEM/IMCYTCHM EA ADD ANTB: CPT

## 2020-09-06 PROCEDURE — 82607 VITAMIN B-12: CPT

## 2020-09-06 PROCEDURE — 70544 MR ANGIOGRAPHY HEAD W/O DYE: CPT

## 2020-09-06 PROCEDURE — 84443 ASSAY THYROID STIM HORMONE: CPT

## 2020-09-06 PROCEDURE — 80307 DRUG TEST PRSMV CHEM ANLYZR: CPT

## 2020-09-06 PROCEDURE — 85730 THROMBOPLASTIN TIME PARTIAL: CPT

## 2020-09-06 PROCEDURE — 88331 PATH CONSLTJ SURG 1 BLK 1SPC: CPT

## 2020-09-06 PROCEDURE — 83930 ASSAY OF BLOOD OSMOLALITY: CPT

## 2020-09-06 PROCEDURE — 93010 ELECTROCARDIOGRAM REPORT: CPT

## 2020-09-06 PROCEDURE — 36415 COLL VENOUS BLD VENIPUNCTURE: CPT

## 2020-09-06 PROCEDURE — 85027 COMPLETE CBC AUTOMATED: CPT

## 2020-09-06 PROCEDURE — 74177 CT ABD & PELVIS W/CONTRAST: CPT

## 2020-09-06 PROCEDURE — 85610 PROTHROMBIN TIME: CPT

## 2020-09-06 PROCEDURE — 82803 BLOOD GASES ANY COMBINATION: CPT

## 2020-09-06 PROCEDURE — 80053 COMPREHEN METABOLIC PANEL: CPT

## 2020-09-06 PROCEDURE — 81001 URINALYSIS AUTO W/SCOPE: CPT

## 2020-09-06 PROCEDURE — 84100 ASSAY OF PHOSPHORUS: CPT

## 2020-09-06 PROCEDURE — 70551 MRI BRAIN STEM W/O DYE: CPT

## 2020-09-06 PROCEDURE — C1889: CPT

## 2020-09-06 PROCEDURE — 97161 PT EVAL LOW COMPLEX 20 MIN: CPT | Mod: GP

## 2020-09-06 PROCEDURE — 84300 ASSAY OF URINE SODIUM: CPT

## 2020-09-06 PROCEDURE — 86923 COMPATIBILITY TEST ELECTRIC: CPT

## 2020-09-06 PROCEDURE — 88342 IMHCHEM/IMCYTCHM 1ST ANTB: CPT

## 2020-09-06 PROCEDURE — 95700 EEG CONT REC W/VID EEG TECH: CPT

## 2020-09-06 PROCEDURE — 86901 BLOOD TYPING SEROLOGIC RH(D): CPT

## 2020-09-06 PROCEDURE — 71045 X-RAY EXAM CHEST 1 VIEW: CPT

## 2020-09-06 PROCEDURE — 71045 X-RAY EXAM CHEST 1 VIEW: CPT | Mod: 26

## 2020-09-06 PROCEDURE — 97163 PT EVAL HIGH COMPLEX 45 MIN: CPT | Mod: GP

## 2020-09-06 PROCEDURE — 83735 ASSAY OF MAGNESIUM: CPT

## 2020-09-06 PROCEDURE — 85025 COMPLETE CBC W/AUTO DIFF WBC: CPT

## 2020-09-06 PROCEDURE — 97116 GAIT TRAINING THERAPY: CPT | Mod: GP

## 2020-09-06 PROCEDURE — 71260 CT THORAX DX C+: CPT

## 2020-09-06 PROCEDURE — 88307 TISSUE EXAM BY PATHOLOGIST: CPT

## 2020-09-06 PROCEDURE — 94760 N-INVAS EAR/PLS OXIMETRY 1: CPT

## 2020-09-06 PROCEDURE — 94640 AIRWAY INHALATION TREATMENT: CPT

## 2020-09-06 PROCEDURE — 83935 ASSAY OF URINE OSMOLALITY: CPT

## 2020-09-06 PROCEDURE — 95711 VEEG 2-12 HR UNMONITORED: CPT

## 2020-09-06 PROCEDURE — 86769 SARS-COV-2 COVID-19 ANTIBODY: CPT

## 2020-09-06 PROCEDURE — 83036 HEMOGLOBIN GLYCOSYLATED A1C: CPT

## 2020-09-06 PROCEDURE — 70450 CT HEAD/BRAIN W/O DYE: CPT | Mod: 26

## 2020-09-06 PROCEDURE — 82140 ASSAY OF AMMONIA: CPT

## 2020-09-06 PROCEDURE — 80048 BASIC METABOLIC PNL TOTAL CA: CPT

## 2020-09-06 RX ORDER — SODIUM CHLORIDE 9 MG/ML
1000 INJECTION INTRAMUSCULAR; INTRAVENOUS; SUBCUTANEOUS
Refills: 0 | Status: DISCONTINUED | OUTPATIENT
Start: 2020-09-06 | End: 2020-09-08

## 2020-09-06 RX ORDER — MAGNESIUM SULFATE 500 MG/ML
2 VIAL (ML) INJECTION
Refills: 0 | Status: COMPLETED | OUTPATIENT
Start: 2020-09-06 | End: 2020-09-06

## 2020-09-06 RX ORDER — ONDANSETRON 8 MG/1
4 TABLET, FILM COATED ORAL EVERY 6 HOURS
Refills: 0 | Status: DISCONTINUED | OUTPATIENT
Start: 2020-09-06 | End: 2020-09-24

## 2020-09-06 RX ORDER — HEPARIN SODIUM 5000 [USP'U]/ML
5000 INJECTION INTRAVENOUS; SUBCUTANEOUS EVERY 8 HOURS
Refills: 0 | Status: DISCONTINUED | OUTPATIENT
Start: 2020-09-06 | End: 2020-09-17

## 2020-09-06 RX ORDER — SODIUM CHLORIDE 5 G/100ML
100 INJECTION, SOLUTION INTRAVENOUS
Refills: 0 | Status: DISCONTINUED | OUTPATIENT
Start: 2020-09-06 | End: 2020-09-06

## 2020-09-06 RX ORDER — CHLORHEXIDINE GLUCONATE 213 G/1000ML
1 SOLUTION TOPICAL
Refills: 0 | Status: DISCONTINUED | OUTPATIENT
Start: 2020-09-06 | End: 2020-09-24

## 2020-09-06 RX ADMIN — SODIUM CHLORIDE 100 MILLILITER(S): 9 INJECTION INTRAMUSCULAR; INTRAVENOUS; SUBCUTANEOUS at 22:58

## 2020-09-06 RX ADMIN — CHLORHEXIDINE GLUCONATE 1 APPLICATION(S): 213 SOLUTION TOPICAL at 22:58

## 2020-09-06 RX ADMIN — Medication 50 GRAM(S): at 23:55

## 2020-09-06 RX ADMIN — ONDANSETRON 4 MILLIGRAM(S): 8 TABLET, FILM COATED ORAL at 23:54

## 2020-09-06 RX ADMIN — SODIUM CHLORIDE 600 MILLILITER(S): 5 INJECTION, SOLUTION INTRAVENOUS at 19:54

## 2020-09-06 RX ADMIN — Medication 50 GRAM(S): at 21:23

## 2020-09-06 RX ADMIN — SODIUM CHLORIDE 100 MILLILITER(S): 5 INJECTION, SOLUTION INTRAVENOUS at 20:04

## 2020-09-06 NOTE — H&P ADULT - NSHPREVIEWOFSYSTEMS_GEN_ALL_CORE
Review of Systems:  CONSTITUTIONAL: No fever, chills, or fatigue  EYES: No eye pain, visual disturbances, or discharge  ENMT:  No difficulty hearing, tinnitus, vertigo; No sinus or throat pain  NECK: No pain or stiffness  RESPIRATORY: No cough, wheezing, chills or hemoptysis; No shortness of breath  CARDIOVASCULAR: No chest pain, palpitations, dizziness, or leg swelling  GASTROINTESTINAL: No abdominal or epigastric pain. + nausea, + vomiting, No hematemesis; + diarrhea, No constipation. No melena or hematochezia.  GENITOURINARY: No dysuria, frequency, hematuria, or incontinence  NEUROLOGICAL: No headaches, memory loss, loss of strength, numbness, or tremors. + confusion  SKIN: No itching, burning, rashes, or lesions   MUSCULOSKELETAL: No joint pain or swelling; No muscle, back, or extremity pain  PSYCHIATRIC: + depression, + anxiety, No mood swings, or difficulty sleeping

## 2020-09-06 NOTE — H&P ADULT - NSCORESITESY/N_GEN_A_CORE_RD
Subjective     Chief Complaint:  Physical Exam.    History of Present Illness    History obtained from the patient.    The patient saw her oncologist, Dr. Ortiz, once per year, last in May 2018,  for her left Breast Cancer. She is not currently on medication. Last Mammogram was 4/24/18, cat 2.    The patient states she had a Pap smear on 6/11/8T, which is normal.  She states she went through Menopause in December 2016.  However, the day after her Pap she had a full menstrual cycle.  She has an appointment to see Dr. Lara tomorrow.  She states she will be getting an ultrasound and possibly an endometrial biopsy.    The patient's blood sugar on 6/14/17 was 114 fasting.      Primary Care Cardiac Diagnostic [facility]: The patient is here today for a follow-up visit.      Her Hypertension is stable.   Medication(s): Lisinopril and Aspirin.   The patient is adherent with her medication regimen. She denies medication side effects.      Interval Events: The patient does not check her blood pressure at home.     Symptoms: Denies chest pain,  intermittent leg claudication,  dyspnea, lower extremity edema,  exercise intolerance, fatigue,  visual impairment,  muscle pain,  and denies muscle weakness.   Associated symptoms:  A 7 pound recent weight gain in the past year, but no palpitations, no syncope, no headache, no orthopnea, no PND, no polydipsia, no polyuria, no focal neurologic deficits, and no memory loss.    Lifestyle and Disease Management: Diet: She consumes a diverse and healthy diet. Weight Issues: She has weight concerns. Exercise: She exercises regularly. She exercises 5 times per week. Exercise includes walking and yard work.   Smoking: She does not use tobacco.         Gastroesophageal Reflux Disease Follow-up: The patient is being seen for a routine clinic follow-up of Gastroesophageal Reflux Disease, which is stable.   Symptoms: no heartburn, no abdominal pain, no acid regurgitation, no  Yes nausea, no vomiting, no sore throat, no dysphagia, no odynophagia, no hematemesis and no melena.   Associated symptoms: no anorexia, no early satiety, no bloating, no belching, no hoarseness, no cough and no wheezing.   Medications:   Omeprazole every other day and Prozac for IBS.   By report, there is good symptom control.          Colonic Polyp Follow-up: The patient is being seen for a routine clinic follow-up of Colon Polyp(s), which is stable.   Interval Events:  Current diagnosis was determined by Colonoscopy and last 12/13/17, no polyps.   Symptoms:  but no melena, no hematochezia, no constipation, no diarrhea,  no decreased stool caliber, no change in bowel habits and no abdominal pain    Pertinent Medical History: anal fissure.   Associated symptoms: no rectal prolapse.   Medication:    Align and 2 stool softeners. Off  Miralax.         Ksenia Ratliff is a 58 y.o. female who presents for an Annual Physical.      PMH, PSH, SocHx, FamHx, Allergies, and Medications: Reviewed and updated.    Outpatient Medications Prior to Visit   Medication Sig Dispense Refill   • aspirin 81 MG tablet Take 3 tablets by mouth daily.     • calcium citrate-vitamin d (CALCITRATE) 315-250 MG-UNIT tablet tablet Take  by mouth Daily.     • fluticasone (FLONASE) 50 MCG/ACT nasal spray into each nostril daily.     • Multiple Vitamins-Minerals (MULTI VITAMIN/MINERALS PO) Take  by mouth daily.     • FLUoxetine (PROzac) 20 MG capsule TAKE ONE CAPSULE BY MOUTH DAILY 30 capsule 2   • lisinopril (PRINIVIL,ZESTRIL) 10 MG tablet TAKE ONE TABLET BY MOUTH DAILY 30 tablet 2   • montelukast (SINGULAIR) 10 MG tablet Take 1 tablet by mouth Daily. 90 tablet 3   • loratadine-pseudoephedrine (CLARITIN-D 12 HOUR) 5-120 MG per 12 hr tablet Take 1 tablet by mouth 2 (Two) Times a Day As Needed for Allergies. 60 tablet 11   • Probiotic Product (ALIGN) capsule Take  by mouth.     • ranitidine (ZANTAC) 150 MG tablet Take 150 mg by mouth daily. AS NEEDED        No facility-administered medications prior to visit.        Immunization History   Administered Date(s) Administered   • Influenza, Quadrivalent 01/10/2016, 10/07/2017   • Tdap 03/21/2011         Patient Active Problem List   Diagnosis   • Malignant neoplasm of breast   • Lung nodule, solitary   • Allergic rhinitis   • Anemia   • Benign colonic polyp   • Gastroesophageal reflux disease   • Hypertension   • Irritable bowel syndrome   • Menopausal state       Health Habits:  Dental Exam. up to date  Eye Exam. up to date  Hearing Loss:  No  Exercise: 5-7 times/week.  Current exercise activities include: walking and yard work  Diet: Healthy  Multivitamin: Yes    Safe Driving:  Yes  Seat Belt:  Yes  Bike Helmet:  No  Skin Screening:  Yes  Sunscreen: Yes  SBE / COLE: Yes  Sexual Activity:  Yes  Birth Control:  Menopause  STD Prevention:  N/A    Last Pap: 6/11/18, normal per patient (GYN).  Last Mammogram:  4/24/18, category 2.  Last DEXA Scan: 7/21/17, normal.  Last Colonoscopy: 12/13/17, normal except diverticulosis and internal hemorrhoids.  Last PSA: N/A    Social:    Social History     Social History   • Marital status:      Spouse name: N/A   • Number of children: 2   • Years of education: N/A     Occupational History   • Retired     Social History Main Topics   • Smoking status: Former Smoker   • Smokeless tobacco: Never Used      Comment: 1/2 ppd x 4-6 years in high school   • Alcohol use Yes      Comment: 1-3 glasses of wine daily   • Drug use: No   • Sexual activity: Yes     Partners: Male     Birth control/ protection: Post-menopausal     Other Topics Concern   • Not on file     Social History Narrative   • No narrative on file         Current Medical Providers:    Eun Mcmanus MD (Internal Medicine / Pediatrics)    The [facility] providers who are involved in the care of this patient are listed above.         Review of Systems   Constitutional: Negative for chills, fatigue, fever and unexpected  weight change.        No weight gain or weight loss.  No night sweats.  No generalized pain.   HENT: Negative for congestion, ear pain, hearing loss, nosebleeds, postnasal drip, rhinorrhea, sinus pressure, sneezing, sore throat, tinnitus and voice change.         Denies snoring.   Eyes: Negative for photophobia, pain, discharge, redness, itching and visual disturbance.   Respiratory: Negative for cough, chest tightness, shortness of breath, wheezing and stridor.         No orthopnea, dyspnea on exertion, or PND.  No chest congestion.   No  hemoptysis.   Cardiovascular: Negative for chest pain, palpitations and leg swelling.        No claudication or syncope.   Gastrointestinal: Negative for abdominal pain, blood in stool, constipation, diarrhea, nausea, rectal pain and vomiting.        No hematemesis.  No heartburn, dysphagia or odynophagia.  No belching or bloating.  No melena.   Endocrine: Positive for heat intolerance. Negative for cold intolerance, polydipsia, polyphagia and polyuria.        No hair loss or dry skin.  No generalized weakness.  No hot flashes.   Genitourinary: Positive for menstrual problem. Negative for difficulty urinating, dyspareunia, dysuria, flank pain, frequency, hematuria, pelvic pain, urgency and vaginal discharge.        No nocturia, incomplete emptying, or incontinence.   Musculoskeletal: Negative for arthralgias, back pain, gait problem, joint swelling, myalgias, neck pain and neck stiffness.        No joint stiffness.  Has left elbow pain, from needlepointing, x 6-12 months, unchanged over this time.  She has not treated this with anything.   Skin: Negative for rash.        No new skin lesions or changes in skin lesions. No breast pain or masses.  No nipple discharge or nipple inversion.   Neurological: Positive for numbness (fingers (with Soduko)). Negative for dizziness, tremors, syncope, speech difficulty, weakness, light-headedness and headaches.        No tingling.  No memory  loss.  No decreased concentration.   Hematological: Negative for adenopathy. Does not bruise/bleed easily.   Psychiatric/Behavioral: Negative for confusion, sleep disturbance and suicidal ideas. The patient is not nervous/anxious.         No depression.           Objective     Vitals:    06/27/18 0859   BP: 120/78   BP Location: Right arm   Pulse: 72   Resp: 20   Temp: 96.8 °F (36 °C)   TempSrc: Temporal Artery    Weight: 84.8 kg (187 lb)       Body mass index is 31.6 kg/m².    Physical Exam   Constitutional:   Obese.   HENT:   Head: Normocephalic and atraumatic.   Right Ear: Tympanic membrane, external ear and ear canal normal.   Left Ear: Tympanic membrane, external ear and ear canal normal.   Mouth/Throat: Oropharynx is clear and moist.   Eyes: Conjunctivae and EOM are normal. Pupils are equal, round, and reactive to light.   Fundi normal bilaterally.   Neck: Normal range of motion. Neck supple. Carotid bruit is not present. No thyromegaly present.   Cardiovascular: Normal rate, regular rhythm, normal heart sounds and intact distal pulses.  Exam reveals no gallop and no friction rub.    No murmur heard.  Pulmonary/Chest: Effort normal and breath sounds normal.   The patient declined a breast exam today.   Abdominal: Soft. Bowel sounds are normal. She exhibits no distension, no abdominal bruit and no mass. There is no hepatosplenomegaly. There is no tenderness.   Genitourinary:   Genitourinary Comments:  and rectal exam deferred.   Musculoskeletal: Normal range of motion.   Lymphadenopathy:     She has no cervical adenopathy.     She has no axillary adenopathy.        Right: No inguinal and no supraclavicular adenopathy present.        Left: No inguinal and no supraclavicular adenopathy present.   Neurological: She is alert. She has normal strength and normal reflexes. No cranial nerve deficit.   Skin: No rash noted.   No atypical skin lesions.   Psychiatric: She has a normal mood and affect.   Nursing note and  vitals reviewed.      Results for orders placed or performed in visit on 06/27/18   POC Urinalysis Dipstick, Multipro   Result Value Ref Range    Color Yellow Yellow, Straw, Dark Yellow, Dede    Clarity, UA Clear Clear    Glucose, UA Negative Negative, 1000 mg/dL (3+) mg/dL    Ketones, UA Negative Negative    Specific Gravity  1.025 1.005 - 1.030    Blood, UA Trace (A) Negative    pH, Urine 5.5 5.0 - 8.0    Protein, POC Negative Negative mg/dL    Nitrite, UA Negative Negative    Leukocytes Negative Negative    Protein/Creatinine Ratio, Urine 200.0 mg/G Crea    Lot Number [id number]     Expiration Date 10-31-18    POC Glycosylated Hemoglobin (Hb A1C)   Result Value Ref Range    Hemoglobin A1C 5.4 %    Lot Number [id number]     Expiration Date 12-19          Assessment/Plan       Ksenia was seen today for annual exam.    Diagnoses and all orders for this visit:    Encounter for health maintenance examination in adult  -     CBC & Differential  -     Lipid Panel  -     Magnesium  -     Comprehensive Metabolic Panel  -     POC Urinalysis Dipstick, Multipro  -     Vitamin D 25 Hydroxy  -     TSH  -     POC Glycosylated Hemoglobin (Hb A1C)  -     CBC Auto Differential    Essential hypertension  -     lisinopril (PRINIVIL,ZESTRIL) 10 MG tablet; Take 1 tablet by mouth Daily.  -     CBC & Differential  -     Lipid Panel  -     Magnesium  -     Comprehensive Metabolic Panel  -     POC Urinalysis Dipstick, Multipro  -     TSH  -     CBC Auto Differential    Gastroesophageal reflux disease without esophagitis    Benign colonic polyp    Irritable bowel syndrome without diarrhea  -     FLUoxetine (PROzac) 20 MG capsule; Take 1 capsule by mouth Daily.    Hyperglycemia  -     POC Glycosylated Hemoglobin (Hb A1C)    High risk medication use  -     Magnesium    Other orders  -     montelukast (SINGULAIR) 10 MG tablet; Take 1 tablet by mouth Daily.  -     loratadine-pseudoephedrine (CLARITIN-D 24 HOUR)  MG per 24 hr tablet; Take  1 tablet by mouth Daily.      Recommend Shingrix *new Shingles vaccine) at the pharmacy.    For the elbow pain, recommend NSAIDS, ice, and a tennis elbow brace.  If no better after 2 weeks, would recommend physical therapy.      Return in about 1 year (around 6/27/2019) for Annual physical, fasting.

## 2020-09-06 NOTE — H&P ADULT - NSHPSOCIALHISTORY_GEN_ALL_CORE
lives with spouse, former smoker (20PYH), denies EtOH, uses marajuana occassional (smokes), denies other drug use

## 2020-09-06 NOTE — ED PROVIDER NOTE - CRITICAL CARE PROVIDED
documentation/additional history taking/direct patient care (not related to procedure)/consult w/ pt's family directly relating to pts condition/interpretation of diagnostic studies/consultation with other physicians

## 2020-09-06 NOTE — H&P ADULT - NSHPLABSRESULTS_GEN_ALL_CORE
LABS:                        14.0   11.34 )-----------( 236      ( 06 Sep 2020 17:53 )             38.0     -    111<LL>  |  80<L>  |  13  ----------------------------<  194<H>  3.8   |  19<L>  |  0.84    Ca    9.2      06 Sep 2020 17:53  Mg     1.4         TPro  8.0  /  Alb  3.6  /  TBili  0.8  /  DBili  x   /  AST  25  /  ALT  29  /  AlkPhos  147<H>        CARDIAC MARKERS ( 06 Sep 2020 17:53 )  <0.015 ng/mL / x     / x     / x     / x          CAPILLARY BLOOD GLUCOSE          Urinalysis Basic - ( 06 Sep 2020 20:40 )    Color: Yellow / Appearance: Clear / S.025 / pH: x  Gluc: x / Ketone: Moderate  / Bili: Negative / Urobili: Negative mg/dL   Blood: x / Protein: 30 mg/dL / Nitrite: Negative   Leuk Esterase: Trace / RBC: Negative /HPF / WBC 0-2   Sq Epi: x / Non Sq Epi: Negative / Bacteria: Moderate      CULTURES:  RADIOLOGY: ***

## 2020-09-06 NOTE — ED ADULT TRIAGE NOTE - CHIEF COMPLAINT QUOTE
PT BROUGHT TO ED FOR EVALUATION OF AMS, NAUSEA, VOMITING. FAMILY STS POSSIBLE LOW SODIUM WHICH HAS HAPPENED IN THE PAST. PT HAS DECREASED PO INTAKE AND HAS NOT BEEN "ACTING HERSELF" X "A FEW WEEKS" AT HOME. PT  A AND O X3

## 2020-09-06 NOTE — H&P ADULT - ASSESSMENT
Impression:  1. hypo-osmolar hyponatremia  2. AMS  3. hypomagnesemia    Plan:    Neuro - CTH negative, check tox screen, avoid neurosuppressants              psych follow in am              no signs of seizure activity              AMS most likely secondary to hyponatremia    CV -  hemodynamics stable    Pulm -  stable on RA, sats>90%, no difficulty breathing    GI -  NPO for now except meds, abd exam benign, symptoms likely related to hyponatremia, anti-emetics PRN    Renal/lytes- Cr stable, check serum/urine osmo, urine Na, TSH, cortisol                    although rare there are case reports of hyponatremia with seroquel use, d/c seroquel for now                    if severe anxiety will add PRN ativan                    BMP q 4hours                    Gentle hydration with NS goal to achieve Na correction of no more than 8mEq in 24hours                    free water restriction                    strict I/Os                    replete Mg with 4gms    Heme -  Pharmacologic DVT PPx  in addition to SCD's    ID - stable    Endo -  mod ketones in urine and hyperglycosuria, suspect ketones from lack of poor nutrition, will check A1c      CRITICAL CARE TIME SPENT: 45 mins assessing presenting problems of acute illness that poses high probability of life threatening deterioration or end organ damage/dysfunction.  Medical decision making including Initiating plan of care, reviewing data, reviewing radiology, direct patient bedside evaluation and interpretation of vital signs, any necessary ventilator management , discussion with multidisciplinary team, discussing goals of care with patient/family, all non inclusive of procedures       Case and plan discussed with eICU attending.

## 2020-09-06 NOTE — CONSULT NOTE ADULT - SUBJECTIVE AND OBJECTIVE BOX
REASON FOR CONSULT: ***    Chief Complaint: Confusion    HPI: Pt is 63 yo WF with h/o brain aneurysm, depression, anxiety, psychosis and a prior admission in June 2 to hyponatremia. Pt presented to the ER 2 to AMS associated with N/V/D x1 week. Pt's  states that she has been acting more confused. In the ER pt found to have a Na of 111 and was slightly confused. Hyponatremia 2 to N/V/D +/- Seroquel    PAST MEDICAL & SURGICAL HISTORY:  Hyponatremia  Brain aneurysm  Anxiety  Depression  History of neck surgery    Allergies    No Known Allergies    Intolerances      FAMILY HISTORY:      Review of Systems: N/VD      Medications: Seroquel        heparin   Injectable 5000 Unit(s) SubCutaneous every 8 hours          magnesium sulfate  IVPB 2 Gram(s) IV Intermittent every 1 hour  sodium chloride 0.9%. 1000 milliLiter(s) IV Continuous <Continuous>      chlorhexidine 2% Cloths 1 Application(s) Topical <User Schedule>        ICU Vital Signs Last 24 Hrs  T(C): 36.6 (06 Sep 2020 17:15), Max: 36.6 (06 Sep 2020 17:15)  T(F): 97.8 (06 Sep 2020 17:15), Max: 97.8 (06 Sep 2020 17:15)  HR: 98 (06 Sep 2020 19:47) (92 - 102)  BP: 121/88 (06 Sep 2020 19:47) (119/103 - 151/94)  BP(mean): --  ABP: --  ABP(mean): --  RR: 20 (06 Sep 2020 19:47) (19 - 20)  SpO2: 97% (06 Sep 2020 19:47) (97% - 100%)    Vital Signs Last 24 Hrs  T(C): 36.6 (06 Sep 2020 17:15), Max: 36.6 (06 Sep 2020 17:15)  T(F): 97.8 (06 Sep 2020 17:15), Max: 97.8 (06 Sep 2020 17:15)  HR: 98 (06 Sep 2020 19:47) (92 - 102)  BP: 121/88 (06 Sep 2020 19:47) (119/103 - 151/94)  BP(mean): --  RR: 20 (06 Sep 2020 19:47) (19 - 20)  SpO2: 97% (06 Sep 2020 19:47) (97% - 100%)        I&O's Detail        LABS:                        14.0   11.34 )-----------( 236      ( 06 Sep 2020 17:53 )             38.0     09-06    111<LL>  |  80<L>  |  13  ----------------------------<  194<H>  3.8   |  19<L>  |  0.84    Ca    9.2      06 Sep 2020 17:53  Mg     1.4     09-06    TPro  8.0  /  Alb  3.6  /  TBili  0.8  /  DBili  x   /  AST  25  /  ALT  29  /  AlkPhos  147<H>  09-06      CARDIAC MARKERS ( 06 Sep 2020 17:53 )  <0.015 ng/mL / x     / x     / x     / x          CAPILLARY BLOOD GLUCOSE            CULTURES:      Physical Examination:  Physical exam as per bedside MD (direct physical examination could not be performed because the visit was provided via Telemedicine):       RADIOLOGY: ***    A/P    CRITICAL CARE TIME SPENT: ***    This visit was provided via telemedicine. Patient was located at     [facility] ED. [address] [place] [address]  Provider was located at [facility] [place], [place] for the visit.

## 2020-09-06 NOTE — H&P ADULT - HISTORY OF PRESENT ILLNESS
History obtained from pt and significant other at bedside  64F with PMHx of depression/anxieity, brain aneurysm, hyponatremia (admission in June for Na 118/AMS) who presents to ED with 3-4day history of nausea/intermittent vomiting, wretching,  and progressive AMS. Evaluation in ED revealed Na 111. CTH without acute pathology. eICU consulted and pt admitted to ICU. Upon arrival pt receiving 3% NaCL bolus, discontinued upon my arrival. Prior admission and workup revealed hyponatremia possibly SIADH vs SSRI vs excessive free water intake. She was discharged following correction on NaCl tabs as outpt. She is no longer taking these. She states they were stopped by nephro on her follow up visit as outpt. She was taken off her SSRI at last admission and placed on seroquel only. Her dosing has been escalated since last admission to 150mg at HS and 50mg 2x during the day. She reports no longer using her klonipin.

## 2020-09-06 NOTE — ED ADULT NURSE NOTE - HOW OFTEN DO YOU HAVE A DRINK CONTAINING ALCOHOL?
Loli/ Technician of [address] [place] [address] is calling to request refill medication Pantoprazole Sodium 40 MG Oral Tab EC. Technician states Dr. Kelli Rich (Er DR) was the one who originally prescribed patient script, however, requesting refill script through PCP. Never

## 2020-09-06 NOTE — ED ADULT NURSE NOTE - OBJECTIVE STATEMENT
BIB  for evaluation of AMS worse today, c/o n/v x 3 days. Denies fevers or sick contacts. Denies SI/HI. hx depression, hyponatremia

## 2020-09-06 NOTE — ED PROVIDER NOTE - OBJECTIVE STATEMENT
63 y/o F with PMHx of Anxiety, brain aneurysm, Depression presenting to the ED c/o AMS with associated N/V/D x1 week.  states that she has been acting more confused, with decreased PO intake due to worsening nausea. Came to the ED for further evaluation. Patient was seen in the ED x3 months ago with AMS, diagnosed with hyponitremia, feels like past symptoms.  Denies abd pain, fever or chills.  PCD: Dr. Marinelli

## 2020-09-06 NOTE — CONSULT NOTE ADULT - ASSESSMENT
Pt is 65 yo WF with h/o brain aneurysm, depression, anxiety, psychosis and a prior admission in June 2 to hyponatremia. Pt presented to the ER 2 to AMS associated with N/V/D x1 week. Pt's  states that she has been acting more confused. In the ER pt found to have a Na of 111 and was slightly confused. Admitting dx: Hypotonic hyponatremia 2 to N/V/D +/- Seroquel    FEN: Goal increase in Na 4-8 mEq over 24 hrs/ Would repeat Na now; pt was given NS  Final disposition of pt as per ICU team Pt is 63 yo WF with h/o brain aneurysm, depression, anxiety, psychosis and a prior admission in June 2 to hyponatremia. Pt presented to the ER 2 to AMS associated with N/V/D x1 week. Pt's  states that she has been acting more confused. In the ER pt found to have a Na of 111 and was slightly confused. Admitting dx: Hypotonic hyponatremia 2 to N/V/D +/- Seroquel    FEN: Goal increase in Na 4-8 mEq over 24 hrs/ Would repeat Na now; pt was given NS/ Follow Na q6 hrs  Final disposition of pt as per ICU team

## 2020-09-06 NOTE — H&P ADULT - NSHPPHYSICALEXAM_GEN_ALL_CORE
ICU Vital Signs Last 24 Hrs  T(C): 36.6 (06 Sep 2020 17:15), Max: 36.6 (06 Sep 2020 17:15)  T(F): 97.8 (06 Sep 2020 17:15), Max: 97.8 (06 Sep 2020 17:15)  HR: 98 (06 Sep 2020 19:47) (92 - 102)  BP: 121/88 (06 Sep 2020 19:47) (119/103 - 151/94)  BP(mean): --  ABP: --  ABP(mean): --  RR: 20 (06 Sep 2020 19:47) (19 - 20)  SpO2: 97% (06 Sep 2020 19:47) (97% - 100%)  Physical Examination:    General: No acute distress.  Alert, oriented x 2, disoriented to time, interactive, nonfocal    HEENT: Pupils equal, reactive to light.  Symmetric, sclera anicteric    PULM: Clear to auscultation bilaterally, no significant sputum production    CVS: Regular rate and rhythm, no murmurs appreciated    ABD: obese, Soft, nondistended, nontender, normoactive bowel sounds, no rebound or guarding    EXT: No edema, nontender    SKIN: Warm and well perfused, no rashes noted.

## 2020-09-07 LAB
A1C WITH ESTIMATED AVERAGE GLUCOSE RESULT: 5.8 % — HIGH (ref 4–5.6)
ADD ON TEST-SPECIMEN IN LAB: SIGNIFICANT CHANGE UP
ANION GAP SERPL CALC-SCNC: 12 MMOL/L — SIGNIFICANT CHANGE UP (ref 5–17)
ANION GAP SERPL CALC-SCNC: 14 MMOL/L — SIGNIFICANT CHANGE UP (ref 5–17)
ANION GAP SERPL CALC-SCNC: 9 MMOL/L — SIGNIFICANT CHANGE UP (ref 5–17)
BUN SERPL-MCNC: 11 MG/DL — SIGNIFICANT CHANGE UP (ref 7–23)
BUN SERPL-MCNC: 12 MG/DL — SIGNIFICANT CHANGE UP (ref 7–23)
BUN SERPL-MCNC: 13 MG/DL — SIGNIFICANT CHANGE UP (ref 7–23)
CALCIUM SERPL-MCNC: 8.2 MG/DL — LOW (ref 8.5–10.1)
CALCIUM SERPL-MCNC: 8.2 MG/DL — LOW (ref 8.5–10.1)
CALCIUM SERPL-MCNC: 8.3 MG/DL — LOW (ref 8.5–10.1)
CALCIUM SERPL-MCNC: 8.4 MG/DL — LOW (ref 8.5–10.1)
CALCIUM SERPL-MCNC: 9 MG/DL — SIGNIFICANT CHANGE UP (ref 8.5–10.1)
CHLORIDE SERPL-SCNC: 83 MMOL/L — LOW (ref 96–108)
CHLORIDE SERPL-SCNC: 85 MMOL/L — LOW (ref 96–108)
CHLORIDE SERPL-SCNC: 87 MMOL/L — LOW (ref 96–108)
CHLORIDE SERPL-SCNC: 87 MMOL/L — LOW (ref 96–108)
CHLORIDE SERPL-SCNC: 88 MMOL/L — LOW (ref 96–108)
CO2 SERPL-SCNC: 17 MMOL/L — LOW (ref 22–31)
CO2 SERPL-SCNC: 18 MMOL/L — LOW (ref 22–31)
CO2 SERPL-SCNC: 19 MMOL/L — LOW (ref 22–31)
CO2 SERPL-SCNC: 20 MMOL/L — LOW (ref 22–31)
CO2 SERPL-SCNC: 21 MMOL/L — LOW (ref 22–31)
CREAT SERPL-MCNC: 0.68 MG/DL — SIGNIFICANT CHANGE UP (ref 0.5–1.3)
CREAT SERPL-MCNC: 0.7 MG/DL — SIGNIFICANT CHANGE UP (ref 0.5–1.3)
CREAT SERPL-MCNC: 0.76 MG/DL — SIGNIFICANT CHANGE UP (ref 0.5–1.3)
CREAT SERPL-MCNC: 0.77 MG/DL — SIGNIFICANT CHANGE UP (ref 0.5–1.3)
CREAT SERPL-MCNC: 0.79 MG/DL — SIGNIFICANT CHANGE UP (ref 0.5–1.3)
CULTURE RESULTS: NO GROWTH — SIGNIFICANT CHANGE UP
ESTIMATED AVERAGE GLUCOSE: 120 MG/DL — HIGH (ref 68–114)
GLUCOSE SERPL-MCNC: 103 MG/DL — HIGH (ref 70–99)
GLUCOSE SERPL-MCNC: 110 MG/DL — HIGH (ref 70–99)
GLUCOSE SERPL-MCNC: 111 MG/DL — HIGH (ref 70–99)
GLUCOSE SERPL-MCNC: 111 MG/DL — HIGH (ref 70–99)
GLUCOSE SERPL-MCNC: 119 MG/DL — HIGH (ref 70–99)
MAGNESIUM SERPL-MCNC: 3 MG/DL — HIGH (ref 1.6–2.6)
OSMOLALITY SERPL: 241 MOSMOL/KG — LOW (ref 280–301)
OSMOLALITY UR: 792 MOSM/KG — SIGNIFICANT CHANGE UP (ref 50–1200)
POTASSIUM SERPL-MCNC: 3.3 MMOL/L — LOW (ref 3.5–5.3)
POTASSIUM SERPL-MCNC: 3.5 MMOL/L — SIGNIFICANT CHANGE UP (ref 3.5–5.3)
POTASSIUM SERPL-MCNC: 3.6 MMOL/L — SIGNIFICANT CHANGE UP (ref 3.5–5.3)
POTASSIUM SERPL-MCNC: 3.7 MMOL/L — SIGNIFICANT CHANGE UP (ref 3.5–5.3)
POTASSIUM SERPL-MCNC: 3.7 MMOL/L — SIGNIFICANT CHANGE UP (ref 3.5–5.3)
POTASSIUM SERPL-SCNC: 3.3 MMOL/L — LOW (ref 3.5–5.3)
POTASSIUM SERPL-SCNC: 3.5 MMOL/L — SIGNIFICANT CHANGE UP (ref 3.5–5.3)
POTASSIUM SERPL-SCNC: 3.6 MMOL/L — SIGNIFICANT CHANGE UP (ref 3.5–5.3)
POTASSIUM SERPL-SCNC: 3.7 MMOL/L — SIGNIFICANT CHANGE UP (ref 3.5–5.3)
POTASSIUM SERPL-SCNC: 3.7 MMOL/L — SIGNIFICANT CHANGE UP (ref 3.5–5.3)
SARS-COV-2 IGG SERPL QL IA: NEGATIVE — SIGNIFICANT CHANGE UP
SARS-COV-2 IGM SERPL IA-ACNC: 0.09 INDEX — SIGNIFICANT CHANGE UP
SODIUM SERPL-SCNC: 114 MMOL/L — CRITICAL LOW (ref 135–145)
SODIUM SERPL-SCNC: 115 MMOL/L — CRITICAL LOW (ref 135–145)
SODIUM SERPL-SCNC: 115 MMOL/L — CRITICAL LOW (ref 135–145)
SODIUM SERPL-SCNC: 117 MMOL/L — CRITICAL LOW (ref 135–145)
SODIUM SERPL-SCNC: 117 MMOL/L — CRITICAL LOW (ref 135–145)
SODIUM UR-SCNC: 33 MMOL/L — SIGNIFICANT CHANGE UP
SPECIMEN SOURCE: SIGNIFICANT CHANGE UP

## 2020-09-07 PROCEDURE — 99291 CRITICAL CARE FIRST HOUR: CPT

## 2020-09-07 RX ORDER — POTASSIUM CHLORIDE 20 MEQ
10 PACKET (EA) ORAL
Refills: 0 | Status: COMPLETED | OUTPATIENT
Start: 2020-09-07 | End: 2020-09-07

## 2020-09-07 RX ORDER — IBUPROFEN 200 MG
400 TABLET ORAL EVERY 6 HOURS
Refills: 0 | Status: DISCONTINUED | OUTPATIENT
Start: 2020-09-07 | End: 2020-09-16

## 2020-09-07 RX ORDER — INFLUENZA VIRUS VACCINE 15; 15; 15; 15 UG/.5ML; UG/.5ML; UG/.5ML; UG/.5ML
0.5 SUSPENSION INTRAMUSCULAR ONCE
Refills: 0 | Status: COMPLETED | OUTPATIENT
Start: 2020-09-07 | End: 2020-09-07

## 2020-09-07 RX ADMIN — ONDANSETRON 4 MILLIGRAM(S): 8 TABLET, FILM COATED ORAL at 17:51

## 2020-09-07 RX ADMIN — Medication 100 MILLIEQUIVALENT(S): at 01:49

## 2020-09-07 RX ADMIN — ONDANSETRON 4 MILLIGRAM(S): 8 TABLET, FILM COATED ORAL at 05:14

## 2020-09-07 RX ADMIN — Medication 400 MILLIGRAM(S): at 20:17

## 2020-09-07 RX ADMIN — HEPARIN SODIUM 5000 UNIT(S): 5000 INJECTION INTRAVENOUS; SUBCUTANEOUS at 17:51

## 2020-09-07 RX ADMIN — Medication 100 MILLIEQUIVALENT(S): at 02:52

## 2020-09-07 RX ADMIN — SODIUM CHLORIDE 100 MILLILITER(S): 9 INJECTION INTRAMUSCULAR; INTRAVENOUS; SUBCUTANEOUS at 20:16

## 2020-09-07 RX ADMIN — Medication 100 MILLIEQUIVALENT(S): at 11:59

## 2020-09-07 RX ADMIN — ONDANSETRON 4 MILLIGRAM(S): 8 TABLET, FILM COATED ORAL at 10:59

## 2020-09-07 RX ADMIN — Medication 100 MILLIEQUIVALENT(S): at 10:57

## 2020-09-07 RX ADMIN — Medication 100 MILLIEQUIVALENT(S): at 09:11

## 2020-09-07 RX ADMIN — HEPARIN SODIUM 5000 UNIT(S): 5000 INJECTION INTRAVENOUS; SUBCUTANEOUS at 23:00

## 2020-09-07 RX ADMIN — Medication 400 MILLIGRAM(S): at 21:00

## 2020-09-07 RX ADMIN — HEPARIN SODIUM 5000 UNIT(S): 5000 INJECTION INTRAVENOUS; SUBCUTANEOUS at 05:14

## 2020-09-07 RX ADMIN — SODIUM CHLORIDE 100 MILLILITER(S): 9 INJECTION INTRAMUSCULAR; INTRAVENOUS; SUBCUTANEOUS at 09:11

## 2020-09-07 NOTE — CHART NOTE - NSCHARTNOTEFT_GEN_A_CORE
Spoke with  at bedside-- All concerns addressed including but not limited to diagnosis, treatment plan and overall prognosis

## 2020-09-07 NOTE — DIETITIAN INITIAL EVALUATION ADULT. - OTHER INFO
64F with PMHx of depression/anxieity, brain aneurysm, hyponatremia (admission in June for Na 118/AMS) who presents to ED with 3-4day history of nausea/intermittent vomiting, wretching,  and progressive AMS. Evaluation in ED revealed Na 111. CTH without acute pathology.  Pt admitted with hypo-osmolar hyponatremia, AMS, and hypomagnesemia.    *A1C of 5.8 (pre-diabetes range).  hyponatremia and hypokalemia reviewed on labs.    *wt documented in chart from 6/6/2020: 184.5#, close to current admit wt.

## 2020-09-07 NOTE — DIETITIAN INITIAL EVALUATION ADULT. - FACTORS AFF FOOD INTAKE
other (specify)/pt reports severe nausea x 1 year; not able to tolerate much food.  drinking mostly coke to help with nausea,

## 2020-09-07 NOTE — PROGRESS NOTE ADULT - SUBJECTIVE AND OBJECTIVE BOX
Patient is a 64y old  Female who presents with a chief complaint of confusion/N/V (07 Sep 2020 11:32)      BRIEF HOSPITAL COURSE:   64F with PMHx of depression/anxieity, brain aneurysm, hyponatremia (admission in  for Na 118/AMS) who presents to ED with 3-4day history of nausea/intermittent vomiting, wretching,  and progressive AMS. Evaluation in ED revealed Na 111. CTH without acute pathology. eICU consulted and pt admitted to ICU. Upon arrival pt receiving 3% NaCL bolus, discontinued upon my arrival. Prior admission and workup revealed hyponatremia possibly SIADH vs SSRI vs excessive free water intake. She was discharged following correction on NaCl tabs as outpt. She is no longer taking these. She states they were stopped by nephro on her follow up visit as outpt. She was taken off her SSRI at last admission and placed on seroquel only. Her dosing has been escalated since last admission to 150mg at HS and 50mg 2x during the day. She reports no longer using her klonipin.     Events last 24 hours: ***    PAST MEDICAL & SURGICAL HISTORY:  Hyponatremia  Brain aneurysm  Anxiety  Depression  History of neck surgery      Review of Systems:  10pt ROS negative unless otherwise stated above      Medications:        ibuprofen  Tablet. 400 milliGRAM(s) Oral every 6 hours PRN  ondansetron Injectable 4 milliGRAM(s) IV Push every 6 hours PRN      heparin   Injectable 5000 Unit(s) SubCutaneous every 8 hours          sodium chloride 0.9%. 1000 milliLiter(s) IV Continuous <Continuous>    influenza   Vaccine 0.5 milliLiter(s) IntraMuscular once    chlorhexidine 2% Cloths 1 Application(s) Topical <User Schedule>            ICU Vital Signs Last 24 Hrs  T(C): 36.7 (07 Sep 2020 18:00), Max: 36.8 (07 Sep 2020 05:00)  T(F): 98.1 (07 Sep 2020 18:00), Max: 98.2 (07 Sep 2020 05:00)  HR: 83 (07 Sep 2020 21:00) (83 - 99)  BP: 152/86 (07 Sep 2020 21:00) (31/21 - 196/100)  BP(mean): 106 (07 Sep 2020 21:00) (26 - 142)  ABP: --  ABP(mean): --  RR: 24 (07 Sep 2020 20:00) (13 - 28)  SpO2: 97% (07 Sep 2020 21:00) (97% - 100%)                LABS:                        14.0   11.34 )-----------( 236      ( 06 Sep 2020 17:53 )             38.0         117<LL>  |  88<L>  |  11  ----------------------------<  103<H>  3.7   |  20<L>  |  0.76    Ca    8.3<L>      07 Sep 2020 17:22  Mg     2.3         TPro  8.0  /  Alb  3.6  /  TBili  0.8  /  DBili  x   /  AST  25  /  ALT  29  /  AlkPhos  147<H>  09      CARDIAC MARKERS ( 06 Sep 2020 17:53 )  <0.015 ng/mL / x     / x     / x     / x          CAPILLARY BLOOD GLUCOSE          Urinalysis Basic - ( 06 Sep 2020 20:40 )    Color: Yellow / Appearance: Clear / S.025 / pH: x  Gluc: x / Ketone: Moderate  / Bili: Negative / Urobili: Negative mg/dL   Blood: x / Protein: 30 mg/dL / Nitrite: Negative   Leuk Esterase: Trace / RBC: Negative /HPF / WBC 0-2   Sq Epi: x / Non Sq Epi: Negative / Bacteria: Moderate      CULTURES:  Culture Results:   No growth ( @ 20:40)      Physical Examination:    General: No acute distress.  Alert, oriented, interactive, nonfocal    HEENT: Pupils equal, reactive to light.  Symmetric, sclera anicteric    PULM: Clear to auscultation bilaterally, no significant sputum production    CVS: Regular rate and rhythm    ABD: Soft, nondistended, nontender, normoactive bowel sounds, no rebound or guarding    EXT: No edema, nontender    SKIN: Warm and well perfused, no rashes noted.    RADIOLOGY:   EXAM:  CT BRAIN                            PROCEDURE DATE:  2020          INTERPRETATION:  CLINICAL INFORMATION:  AMS    TECHNIQUE:  Axial CT images were acquired through the head.  Intravenous contrast: None  Two-dimensional reformats were generated.    COMPARISON STUDY: CT head 2020, MRI head 10/5/2016.    FINDINGS:    There is no CT evidence of acute intracranial hemorrhage, mass effect, midline shift, or acute, large territorial infarct.    The ventricles and sulci are normal in size and configuration. The basal cisterns are patent.    A tiny linear focus of encephalomalacia is again demonstrated in the right cerebellar hemisphere.    The mastoid air cells and middle ear cavities are grossly clear. The visualized paranasal sinuses are well aerated.    The calvarium and skull base are grossly intact.    IMPRESSION:  No acute intracranial hemorrhage, mass effect, or acute osseous fracture.                  INDU POWELL MD; Attending Radiologist  This document has been electronically signed. Sep  6 2020  7:38PM    EXAM:  XR CHEST 1 VIEW                            PROCEDURE DATE:  2020          INTERPRETATION:  Exam Date: 2020 6:14 PM    Chest radiograph (one view)    CLINICAL INFORMATION:  AMS    TECHNIQUE:  Single frontal view of the chest was obtained.    COMPARISON: 2020    FINDINGS/  IMPRESSION:        Nonspecific mild prominence of the interstitium in the right lung diffusely, may reflect a chronic interstitial process. Left lung is clear, however with limited evaluation secondary to patient rotation. No pleural effusions. CT chest may be helpful for further evaluation.            CHAPARRITA MORA M.D., ATTENDING RADIOLOGIST  This document has been electronically signed. Sep  7 2020  7:19AM      CRITICAL CARE TIME SPENT: 35 mins assessing presenting problems of acute illness that poses high probability of life threatening deterioration or end organ damage/dysfunction.  Medical decision making including Initiating plan of care, reviewing data, reviewing radiology ,direct patient bedside evaluation and interpretation of vital signs, any necessary ventilator management , discussion with multidisciplinary team, discussing goals of care with patient, all non inclusive of procedures Patient is a 64y old  Female who presents with a chief complaint of confusion/N/V (07 Sep 2020 11:32)      BRIEF HOSPITAL COURSE:   64F with PMHx of depression/anxieity, brain aneurysm, hyponatremia (admission in  for Na 118/AMS) who presents to ED with 3-4day history of nausea/intermittent vomiting, wretching,  and progressive AMS. Evaluation in ED revealed Na 111. CTH without acute pathology. eICU consulted and pt admitted to ICU. Upon arrival pt receiving 3% NaCL bolus, discontinued upon my arrival. Prior admission and workup revealed hyponatremia possibly SIADH vs SSRI vs excessive free water intake. She was discharged following correction on NaCl tabs as outpt. She is no longer taking these. She states they were stopped by nephro on her follow up visit as outpt. She was taken off her SSRI at last admission and placed on seroquel only. Her dosing has been escalated since last admission to 150mg at HS and 50mg 2x during the day. She reports no longer using her klonipin.     Events last 24 hours: Called by nursing for repeat Na tonight 117, pts symptoms improved, non       PAST MEDICAL & SURGICAL HISTORY:  Hyponatremia  Brain aneurysm  Anxiety  Depression  History of neck surgery      Review of Systems:  10pt ROS negative unless otherwise stated above      Medications:        ibuprofen  Tablet. 400 milliGRAM(s) Oral every 6 hours PRN  ondansetron Injectable 4 milliGRAM(s) IV Push every 6 hours PRN      heparin   Injectable 5000 Unit(s) SubCutaneous every 8 hours          sodium chloride 0.9%. 1000 milliLiter(s) IV Continuous <Continuous>    influenza   Vaccine 0.5 milliLiter(s) IntraMuscular once    chlorhexidine 2% Cloths 1 Application(s) Topical <User Schedule>            ICU Vital Signs Last 24 Hrs  T(C): 36.7 (07 Sep 2020 18:00), Max: 36.8 (07 Sep 2020 05:00)  T(F): 98.1 (07 Sep 2020 18:00), Max: 98.2 (07 Sep 2020 05:00)  HR: 83 (07 Sep 2020 21:00) (83 - 99)  BP: 152/86 (07 Sep 2020 21:00) (31/21 - 196/100)  BP(mean): 106 (07 Sep 2020 21:00) (26 - 142)  ABP: --  ABP(mean): --  RR: 24 (07 Sep 2020 20:00) (13 - 28)  SpO2: 97% (07 Sep 2020 21:00) (97% - 100%)                LABS:                        14.0   11.34 )-----------( 236      ( 06 Sep 2020 17:53 )             38.0     09-    117<LL>  |  88<L>  |  11  ----------------------------<  103<H>  3.7   |  20<L>  |  0.76    Ca    8.3<L>      07 Sep 2020 17:22  Mg     2.3         TPro  8.0  /  Alb  3.6  /  TBili  0.8  /  DBili  x   /  AST  25  /  ALT  29  /  AlkPhos  147<H>        CARDIAC MARKERS ( 06 Sep 2020 17:53 )  <0.015 ng/mL / x     / x     / x     / x          CAPILLARY BLOOD GLUCOSE          Urinalysis Basic - ( 06 Sep 2020 20:40 )    Color: Yellow / Appearance: Clear / S.025 / pH: x  Gluc: x / Ketone: Moderate  / Bili: Negative / Urobili: Negative mg/dL   Blood: x / Protein: 30 mg/dL / Nitrite: Negative   Leuk Esterase: Trace / RBC: Negative /HPF / WBC 0-2   Sq Epi: x / Non Sq Epi: Negative / Bacteria: Moderate      CULTURES:  Culture Results:   No growth ( @ 20:40)      Physical Examination:    General: No acute distress.  Alert, oriented, interactive, nonfocal    HEENT: Pupils equal, reactive to light.  Symmetric, sclera anicteric    PULM: Clear to auscultation bilaterally, no significant sputum production    CVS: Regular rate and rhythm    ABD: Soft, nondistended, nontender, normoactive bowel sounds, no rebound or guarding    EXT: No edema, nontender    SKIN: Warm and well perfused, no rashes noted.    RADIOLOGY:   EXAM:  CT BRAIN                            PROCEDURE DATE:  2020          INTERPRETATION:  CLINICAL INFORMATION:  AMS    TECHNIQUE:  Axial CT images were acquired through the head.  Intravenous contrast: None  Two-dimensional reformats were generated.    COMPARISON STUDY: CT head 2020, MRI head 10/5/2016.    FINDINGS:    There is no CT evidence of acute intracranial hemorrhage, mass effect, midline shift, or acute, large territorial infarct.    The ventricles and sulci are normal in size and configuration. The basal cisterns are patent.    A tiny linear focus of encephalomalacia is again demonstrated in the right cerebellar hemisphere.    The mastoid air cells and middle ear cavities are grossly clear. The visualized paranasal sinuses are well aerated.    The calvarium and skull base are grossly intact.    IMPRESSION:  No acute intracranial hemorrhage, mass effect, or acute osseous fracture.                  INDU POWELL MD; Attending Radiologist  This document has been electronically signed. Sep  6 2020  7:38PM    EXAM:  XR CHEST 1 VIEW                            PROCEDURE DATE:  2020          INTERPRETATION:  Exam Date: 2020 6:14 PM    Chest radiograph (one view)    CLINICAL INFORMATION:  AMS    TECHNIQUE:  Single frontal view of the chest was obtained.    COMPARISON: 2020    FINDINGS/  IMPRESSION:        Nonspecific mild prominence of the interstitium in the right lung diffusely, may reflect a chronic interstitial process. Left lung is clear, however with limited evaluation secondary to patient rotation. No pleural effusions. CT chest may be helpful for further evaluation.            CHAPARRITA MORA M.D., ATTENDING RADIOLOGIST  This document has been electronically signed. Sep  7 2020  7:19AM      CRITICAL CARE TIME SPENT: 35 mins assessing presenting problems of acute illness that poses high probability of life threatening deterioration or end organ damage/dysfunction.  Medical decision making including Initiating plan of care, reviewing data, reviewing radiology ,direct patient bedside evaluation and interpretation of vital signs, any necessary ventilator management , discussion with multidisciplinary team, discussing goals of care with patient, all non inclusive of procedures Patient is a 64y old  Female who presents with a chief complaint of confusion/N/V (07 Sep 2020 11:32)      BRIEF HOSPITAL COURSE:   64F with PMHx of depression/anxieity, brain aneurysm, hyponatremia (admission in  for Na 118/AMS) who presents to ED with 3-4day history of nausea/intermittent vomiting, wretching,  and progressive AMS. Evaluation in ED revealed Na 111. CTH without acute pathology. eICU consulted and pt admitted to ICU. Upon arrival pt receiving 3% NaCL bolus, discontinued upon my arrival. Prior admission and workup revealed hyponatremia possibly SIADH vs SSRI vs excessive free water intake. She was discharged following correction on NaCl tabs as outpt. She is no longer taking these. She states they were stopped by nephro on her follow up visit as outpt. She was taken off her SSRI at last admission and placed on seroquel only. Her dosing has been escalated since last admission to 150mg at HS and 50mg 2x during the day. She reports no longer using her klonipin.     Events last 24 hours: Called by nursing for repeat Na tonight 117, pts symptoms improved, no further nausea today, confusion is improving slowly, remains on NS at 100cc/hr    PAST MEDICAL & SURGICAL HISTORY:  Hyponatremia  Brain aneurysm  Anxiety  Depression  History of neck surgery      Review of Systems:  10pt ROS negative unless otherwise stated above      Medications:        ibuprofen  Tablet. 400 milliGRAM(s) Oral every 6 hours PRN  ondansetron Injectable 4 milliGRAM(s) IV Push every 6 hours PRN      heparin   Injectable 5000 Unit(s) SubCutaneous every 8 hours          sodium chloride 0.9%. 1000 milliLiter(s) IV Continuous <Continuous>    influenza   Vaccine 0.5 milliLiter(s) IntraMuscular once    chlorhexidine 2% Cloths 1 Application(s) Topical <User Schedule>            ICU Vital Signs Last 24 Hrs  T(C): 36.7 (07 Sep 2020 18:00), Max: 36.8 (07 Sep 2020 05:00)  T(F): 98.1 (07 Sep 2020 18:00), Max: 98.2 (07 Sep 2020 05:00)  HR: 83 (07 Sep 2020 21:00) (83 - 99)  BP: 152/86 (07 Sep 2020 21:00) (31/21 - 196/100)  BP(mean): 106 (07 Sep 2020 21:00) (26 - 142)  ABP: --  ABP(mean): --  RR: 24 (07 Sep 2020 20:00) (13 - 28)  SpO2: 97% (07 Sep 2020 21:00) (97% - 100%)                LABS:                        14.0   11.34 )-----------( 236      ( 06 Sep 2020 17:53 )             38.0     -    117<LL>  |  88<L>  |  11  ----------------------------<  103<H>  3.7   |  20<L>  |  0.76    Ca    8.3<L>      07 Sep 2020 17:22  Mg     2.3         TPro  8.0  /  Alb  3.6  /  TBili  0.8  /  DBili  x   /  AST  25  /  ALT  29  /  AlkPhos  147<H>        CARDIAC MARKERS ( 06 Sep 2020 17:53 )  <0.015 ng/mL / x     / x     / x     / x          CAPILLARY BLOOD GLUCOSE          Urinalysis Basic - ( 06 Sep 2020 20:40 )    Color: Yellow / Appearance: Clear / S.025 / pH: x  Gluc: x / Ketone: Moderate  / Bili: Negative / Urobili: Negative mg/dL   Blood: x / Protein: 30 mg/dL / Nitrite: Negative   Leuk Esterase: Trace / RBC: Negative /HPF / WBC 0-2   Sq Epi: x / Non Sq Epi: Negative / Bacteria: Moderate      CULTURES:  Culture Results:   No growth ( @ 20:40)      Physical Examination:    General: No acute distress.  Alert, oriented, interactive, nonfocal    HEENT: Pupils equal, reactive to light.  Symmetric, sclera anicteric    PULM: Clear to auscultation bilaterally, no significant sputum production    CVS: Regular rate and rhythm    ABD: Soft, nondistended, nontender, normoactive bowel sounds, no rebound or guarding    EXT: No edema, nontender    SKIN: Warm and well perfused, no rashes noted.    RADIOLOGY:   EXAM:  CT BRAIN                            PROCEDURE DATE:  2020          INTERPRETATION:  CLINICAL INFORMATION:  AMS    TECHNIQUE:  Axial CT images were acquired through the head.  Intravenous contrast: None  Two-dimensional reformats were generated.    COMPARISON STUDY: CT head 2020, MRI head 10/5/2016.    FINDINGS:    There is no CT evidence of acute intracranial hemorrhage, mass effect, midline shift, or acute, large territorial infarct.    The ventricles and sulci are normal in size and configuration. The basal cisterns are patent.    A tiny linear focus of encephalomalacia is again demonstrated in the right cerebellar hemisphere.    The mastoid air cells and middle ear cavities are grossly clear. The visualized paranasal sinuses are well aerated.    The calvarium and skull base are grossly intact.    IMPRESSION:  No acute intracranial hemorrhage, mass effect, or acute osseous fracture.                  INDU POWELL MD; Attending Radiologist  This document has been electronically signed. Sep  6 2020  7:38PM    EXAM:  XR CHEST 1 VIEW                            PROCEDURE DATE:  2020          INTERPRETATION:  Exam Date: 2020 6:14 PM    Chest radiograph (one view)    CLINICAL INFORMATION:  AMS    TECHNIQUE:  Single frontal view of the chest was obtained.    COMPARISON: 2020    FINDINGS/  IMPRESSION:        Nonspecific mild prominence of the interstitium in the right lung diffusely, may reflect a chronic interstitial process. Left lung is clear, however with limited evaluation secondary to patient rotation. No pleural effusions. CT chest may be helpful for further evaluation.            CHAPARRITA MORA M.D., ATTENDING RADIOLOGIST  This document has been electronically signed. Sep  7 2020  7:19AM      CRITICAL CARE TIME SPENT: 35 mins assessing presenting problems of acute illness that poses high probability of life threatening deterioration or end organ damage/dysfunction.  Medical decision making including Initiating plan of care, reviewing data, reviewing radiology ,direct patient bedside evaluation and interpretation of vital signs, any necessary ventilator management , discussion with multidisciplinary team, discussing goals of care with patient, all non inclusive of procedures

## 2020-09-07 NOTE — PROGRESS NOTE ADULT - ASSESSMENT
Impression:  1. hypo-osmolar hyponatremia  2. AMS  3. hypomagnesemia    Plan:    Neuro - CTH negative, check tox screen, avoid neurosuppressants              psych follow in am              no signs of seizure activity              AMS most likely secondary to hyponatremia    CV -  hemodynamics stable    Pulm -  stable on RA, sats>90%, no difficulty breathing    GI -  NPO for now except meds, abd exam benign, symptoms likely related to hyponatremia, anti-emetics PRN    Renal/lytes- Cr stable, check serum/urine osmo, urine Na, TSH, cortisol                    although rare there are case reports of hyponatremia with seroquel use, d/c seroquel for now                    if severe anxiety will add PRN ativan                    BMP q 4hours                    Gentle hydration with NS goal to achieve Na correction of no more than 8mEq in 24hours                    free water restriction                    strict I/Os                    replete Mg with 4gms    Heme -  Pharmacologic DVT PPx  in addition to SCD's    ID - stable    Endo -  mod ketones in urine and hyperglycosuria, suspect ketones from lack of poor nutrition, will check A1c Impression:  1. hypo-osmolar hyponatremia  2. AMS  3. depression    Plan:    Neuro - CTH negative, tox screen + only for THC, avoiding neurosuppressants              no signs of seizure activity              AMS secondary to hyponatremia              will not restart seroquel, if meds needed will consult psych for recs    CV -  hemodynamics stable    Pulm -  stable on RA, sats>90%, no difficulty breathing    GI -  NPO for now except meds, abd exam benign, symptoms likely related to hyponatremia, anti-emetics PRN    Renal/lytes- Cr stable, urine lytes/osmo likely altered due to small hypertonic saline infusion prior to draw,  TSH normal                    although rare there are case reports of hyponatremia with seroquel use, d/c seroquel for now                    increase IV saline to 120ml/hr, have maintained a <8mEq increase in first 24hours                    BMP q 4hours, once >120 will consider transition to salt tabs                     free water restriction                    strict I/Os    Heme -  Pharmacologic DVT PPx  in addition to SCD's    ID - stable    Endo -  BS stable, A1c normal

## 2020-09-07 NOTE — DIETITIAN INITIAL EVALUATION ADULT. - ADD RECOMMEND
1) advance diet as tolerated to consistent carb and monitor for tolerance and intake adequacy (add glucerna prn) 2) add MVI with minerals daily to ensure 100% RDI met 3) consider checking vitamin D level 4) daily wt checks to track./trend changes

## 2020-09-07 NOTE — DIETITIAN INITIAL EVALUATION ADULT. - PERTINENT MEDS FT
MEDICATIONS  (STANDING):  chlorhexidine 2% Cloths 1 Application(s) Topical <User Schedule>  heparin   Injectable 5000 Unit(s) SubCutaneous every 8 hours  influenza   Vaccine 0.5 milliLiter(s) IntraMuscular once  potassium chloride  10 mEq/100 mL IVPB 10 milliEquivalent(s) IV Intermittent every 1 hour  sodium chloride 0.9%. 1000 milliLiter(s) (100 mL/Hr) IV Continuous <Continuous>    MEDICATIONS  (PRN):  ondansetron Injectable 4 milliGRAM(s) IV Push every 6 hours PRN Nausea and/or Vomiting

## 2020-09-07 NOTE — PROGRESS NOTE ADULT - ASSESSMENT
IMP:    63 y/o female with depression/anxieity, brain aneurysm, hyponatremia (admission in June for Na 118/AMS) who presents to ED with 2 weeks of Nausea unable to eat but continued to drink 8 8oz water daily and AMS found to have Na 111  Hypovolemic hyponatremic--likely subacute/chronic   Metabolic Encephalopathy     High risk for acute decompensation and deterioration including death     Plan:    Cont with   Clears  Fluid restriction   Serial BMP over next 24 hrs  Correct no faster than 8 per 24hrs  OOB  Replete K  DVT prophy--SCD and sqhep    ICU care-- d/w ICU staff on multi disciplinary rounds

## 2020-09-07 NOTE — DIETITIAN INITIAL EVALUATION ADULT. - PHYSICAL APPEARANCE
other (specify)/obese NFPE revealed no significant muscle/fat wasting.  no edema  nunu score of 18; no PU documented  BM (+) 9/5 (+) N

## 2020-09-07 NOTE — DIETITIAN INITIAL EVALUATION ADULT. - NAME AND PHONE
Ann Dee MA, RDN, CDN, [facility]  [phone] (office number)  [phone] (pager number)

## 2020-09-07 NOTE — PROGRESS NOTE ADULT - SUBJECTIVE AND OBJECTIVE BOX
[facility]  ICU # 1    CC:  AMS    HPI:    63 y/o female with depression/anxieity, brain aneurysm, hyponatremia (admission in  for Na 118/AMS) who presents to ED with 2 weeks of Nausea unable to eat but continued to drink 8 8oz water daily and AMS found to have Na 111.  Pt was admitted to ICU and placed on .      :  Case d/w SUNNY Woody.  Pt seen and examined in ICU--Na 115--awake and alert and able to provide Hx.  Wants to have soup.  borderline BP    PMH:  As above.     PSH:  As above.     FH: Non Contributory other than those listed in HPI    Social History:  [place]    MEDICATIONS  (STANDING):  chlorhexidine 2% Cloths 1 Application(s) Topical <User Schedule>  heparin   Injectable 5000 Unit(s) SubCutaneous every 8 hours  influenza   Vaccine 0.5 milliLiter(s) IntraMuscular once  potassium chloride  10 mEq/100 mL IVPB 10 milliEquivalent(s) IV Intermittent every 1 hour  sodium chloride 0.9%. 1000 milliLiter(s) (100 mL/Hr) IV Continuous <Continuous>    MEDICATIONS  (PRN):  ondansetron Injectable 4 milliGRAM(s) IV Push every 6 hours PRN Nausea and/or Vomiting      Allergies: NKDA    ROS:  SEE BELOW      Weight (kg): 72.6 ( @ 17:08)    ICU Vital Signs Last 24 Hrs  T(C): 36.2 (07 Sep 2020 08:00), Max: 36.9 (06 Sep 2020 22:40)  T(F): 97.1 (07 Sep 2020 08:00), Max: 98.4 (06 Sep 2020 22:40)  HR: 87 (07 Sep 2020 09:00) (86 - 102)  BP: 88/69 (07 Sep 2020 09:00) (31/21 - 196/100)  BP(mean): 76 (07 Sep 2020 09:00) (26 - 114)  ABP: --  ABP(mean): --  RR: 18 (07 Sep 2020 09:00) (18 - 26)  SpO2: 100% (07 Sep 2020 09:00) (97% - 100%)          I&O's Summary    06 Sep 2020 07:01  -  07 Sep 2020 07:00  --------------------------------------------------------  IN: 855 mL / OUT: 0 mL / NET: 855 mL        Physical Exam:  SEE BELOW                          14.0   11.34 )-----------( 236      ( 06 Sep 2020 17:53 )             38.0       09-07    115<LL>  |  85<L>  |  11  ----------------------------<  111<H>  3.3<L>   |  18<L>  |  0.70    Ca    8.2<L>      07 Sep 2020 06:13  Mg     2.3     -    TPro  8.0  /  Alb  3.6  /  TBili  0.8  /  DBili  x   /  AST  25  /  ALT  29  /  AlkPhos  147<H>  09-      CARDIAC MARKERS ( 06 Sep 2020 17:53 )  <0.015 ng/mL / x     / x     / x     / x                Urinalysis Basic - ( 06 Sep 2020 20:40 )    Color: Yellow / Appearance: Clear / S.025 / pH: x  Gluc: x / Ketone: Moderate  / Bili: Negative / Urobili: Negative mg/dL   Blood: x / Protein: 30 mg/dL / Nitrite: Negative   Leuk Esterase: Trace / RBC: Negative /HPF / WBC 0-2   Sq Epi: x / Non Sq Epi: Negative / Bacteria: Moderate        DVT Prophylaxis:                                                            Contraindication:     Advanced Directives:    Discussed with:    Visit Information:  Time spent excluding procedure:  30 cc mins    ** Time is exclusive of billed procedures and/or teaching and/or routine family updates.

## 2020-09-07 NOTE — DIETITIAN INITIAL EVALUATION ADULT. - PERTINENT LABORATORY DATA
09-07 Na115 mmol/L<LL> Glu 111 mg/dL<H> K+ 3.3 mmol/L<L> Cr  0.70 mg/dL BUN 11 mg/dL Phos n/a   Alb n/a   PAB n/a

## 2020-09-08 LAB
ANION GAP SERPL CALC-SCNC: 10 MMOL/L — SIGNIFICANT CHANGE UP (ref 5–17)
ANION GAP SERPL CALC-SCNC: 6 MMOL/L — SIGNIFICANT CHANGE UP (ref 5–17)
ANION GAP SERPL CALC-SCNC: 9 MMOL/L — SIGNIFICANT CHANGE UP (ref 5–17)
BUN SERPL-MCNC: 11 MG/DL — SIGNIFICANT CHANGE UP (ref 7–23)
BUN SERPL-MCNC: 12 MG/DL — SIGNIFICANT CHANGE UP (ref 7–23)
BUN SERPL-MCNC: 12 MG/DL — SIGNIFICANT CHANGE UP (ref 7–23)
CALCIUM SERPL-MCNC: 8 MG/DL — LOW (ref 8.5–10.1)
CALCIUM SERPL-MCNC: 8 MG/DL — LOW (ref 8.5–10.1)
CALCIUM SERPL-MCNC: 8.1 MG/DL — LOW (ref 8.5–10.1)
CALCIUM SERPL-MCNC: 8.2 MG/DL — LOW (ref 8.5–10.1)
CALCIUM SERPL-MCNC: 8.3 MG/DL — LOW (ref 8.5–10.1)
CHLORIDE SERPL-SCNC: 87 MMOL/L — LOW (ref 96–108)
CHLORIDE SERPL-SCNC: 88 MMOL/L — LOW (ref 96–108)
CO2 SERPL-SCNC: 19 MMOL/L — LOW (ref 22–31)
CO2 SERPL-SCNC: 20 MMOL/L — LOW (ref 22–31)
CO2 SERPL-SCNC: 23 MMOL/L — SIGNIFICANT CHANGE UP (ref 22–31)
CREAT SERPL-MCNC: 0.54 MG/DL — SIGNIFICANT CHANGE UP (ref 0.5–1.3)
CREAT SERPL-MCNC: 0.55 MG/DL — SIGNIFICANT CHANGE UP (ref 0.5–1.3)
CREAT SERPL-MCNC: 0.59 MG/DL — SIGNIFICANT CHANGE UP (ref 0.5–1.3)
CREAT SERPL-MCNC: 0.68 MG/DL — SIGNIFICANT CHANGE UP (ref 0.5–1.3)
CREAT SERPL-MCNC: 0.68 MG/DL — SIGNIFICANT CHANGE UP (ref 0.5–1.3)
GLUCOSE SERPL-MCNC: 100 MG/DL — HIGH (ref 70–99)
GLUCOSE SERPL-MCNC: 109 MG/DL — HIGH (ref 70–99)
GLUCOSE SERPL-MCNC: 129 MG/DL — HIGH (ref 70–99)
GLUCOSE SERPL-MCNC: 88 MG/DL — SIGNIFICANT CHANGE UP (ref 70–99)
GLUCOSE SERPL-MCNC: 94 MG/DL — SIGNIFICANT CHANGE UP (ref 70–99)
HCT VFR BLD CALC: 32.3 % — LOW (ref 34.5–45)
HGB BLD-MCNC: 11.8 G/DL — SIGNIFICANT CHANGE UP (ref 11.5–15.5)
MCHC RBC-ENTMCNC: 30.6 PG — SIGNIFICANT CHANGE UP (ref 27–34)
MCHC RBC-ENTMCNC: 36.5 GM/DL — HIGH (ref 32–36)
MCV RBC AUTO: 83.7 FL — SIGNIFICANT CHANGE UP (ref 80–100)
PLATELET # BLD AUTO: 178 K/UL — SIGNIFICANT CHANGE UP (ref 150–400)
POTASSIUM SERPL-MCNC: 3.4 MMOL/L — LOW (ref 3.5–5.3)
POTASSIUM SERPL-MCNC: 3.6 MMOL/L — SIGNIFICANT CHANGE UP (ref 3.5–5.3)
POTASSIUM SERPL-MCNC: 3.6 MMOL/L — SIGNIFICANT CHANGE UP (ref 3.5–5.3)
POTASSIUM SERPL-MCNC: 3.7 MMOL/L — SIGNIFICANT CHANGE UP (ref 3.5–5.3)
POTASSIUM SERPL-MCNC: 3.8 MMOL/L — SIGNIFICANT CHANGE UP (ref 3.5–5.3)
POTASSIUM SERPL-SCNC: 3.4 MMOL/L — LOW (ref 3.5–5.3)
POTASSIUM SERPL-SCNC: 3.6 MMOL/L — SIGNIFICANT CHANGE UP (ref 3.5–5.3)
POTASSIUM SERPL-SCNC: 3.6 MMOL/L — SIGNIFICANT CHANGE UP (ref 3.5–5.3)
POTASSIUM SERPL-SCNC: 3.7 MMOL/L — SIGNIFICANT CHANGE UP (ref 3.5–5.3)
POTASSIUM SERPL-SCNC: 3.8 MMOL/L — SIGNIFICANT CHANGE UP (ref 3.5–5.3)
RBC # BLD: 3.86 M/UL — SIGNIFICANT CHANGE UP (ref 3.8–5.2)
RBC # FLD: 11.9 % — SIGNIFICANT CHANGE UP (ref 10.3–14.5)
SODIUM SERPL-SCNC: 116 MMOL/L — CRITICAL LOW (ref 135–145)
SODIUM SERPL-SCNC: 117 MMOL/L — CRITICAL LOW (ref 135–145)
WBC # BLD: 9.63 K/UL — SIGNIFICANT CHANGE UP (ref 3.8–10.5)
WBC # FLD AUTO: 9.63 K/UL — SIGNIFICANT CHANGE UP (ref 3.8–10.5)

## 2020-09-08 PROCEDURE — 99233 SBSQ HOSP IP/OBS HIGH 50: CPT

## 2020-09-08 RX ORDER — SODIUM CHLORIDE 9 MG/ML
1 INJECTION INTRAMUSCULAR; INTRAVENOUS; SUBCUTANEOUS
Refills: 0 | Status: DISCONTINUED | OUTPATIENT
Start: 2020-09-08 | End: 2020-09-19

## 2020-09-08 RX ORDER — LANOLIN ALCOHOL/MO/W.PET/CERES
5 CREAM (GRAM) TOPICAL AT BEDTIME
Refills: 0 | Status: DISCONTINUED | OUTPATIENT
Start: 2020-09-08 | End: 2020-09-24

## 2020-09-08 RX ORDER — LANOLIN ALCOHOL/MO/W.PET/CERES
5 CREAM (GRAM) TOPICAL AT BEDTIME
Refills: 0 | Status: DISCONTINUED | OUTPATIENT
Start: 2020-09-08 | End: 2020-09-08

## 2020-09-08 RX ORDER — SODIUM CHLORIDE 9 MG/ML
1000 INJECTION INTRAMUSCULAR; INTRAVENOUS; SUBCUTANEOUS
Refills: 0 | Status: DISCONTINUED | OUTPATIENT
Start: 2020-09-08 | End: 2020-09-08

## 2020-09-08 RX ORDER — SODIUM,POTASSIUM PHOSPHATES 278-250MG
2 POWDER IN PACKET (EA) ORAL EVERY 4 HOURS
Refills: 0 | Status: COMPLETED | OUTPATIENT
Start: 2020-09-08 | End: 2020-09-08

## 2020-09-08 RX ORDER — ACETAMINOPHEN 500 MG
650 TABLET ORAL EVERY 6 HOURS
Refills: 0 | Status: DISCONTINUED | OUTPATIENT
Start: 2020-09-08 | End: 2020-09-24

## 2020-09-08 RX ADMIN — HEPARIN SODIUM 5000 UNIT(S): 5000 INJECTION INTRAVENOUS; SUBCUTANEOUS at 05:40

## 2020-09-08 RX ADMIN — SODIUM CHLORIDE 1 GRAM(S): 9 INJECTION INTRAMUSCULAR; INTRAVENOUS; SUBCUTANEOUS at 21:09

## 2020-09-08 RX ADMIN — HEPARIN SODIUM 5000 UNIT(S): 5000 INJECTION INTRAVENOUS; SUBCUTANEOUS at 13:49

## 2020-09-08 RX ADMIN — ONDANSETRON 4 MILLIGRAM(S): 8 TABLET, FILM COATED ORAL at 08:29

## 2020-09-08 RX ADMIN — ONDANSETRON 4 MILLIGRAM(S): 8 TABLET, FILM COATED ORAL at 19:36

## 2020-09-08 RX ADMIN — HEPARIN SODIUM 5000 UNIT(S): 5000 INJECTION INTRAVENOUS; SUBCUTANEOUS at 21:09

## 2020-09-08 RX ADMIN — Medication 400 MILLIGRAM(S): at 06:40

## 2020-09-08 RX ADMIN — SODIUM CHLORIDE 1 GRAM(S): 9 INJECTION INTRAMUSCULAR; INTRAVENOUS; SUBCUTANEOUS at 10:38

## 2020-09-08 RX ADMIN — Medication 2 PACKET(S): at 15:05

## 2020-09-08 RX ADMIN — Medication 650 MILLIGRAM(S): at 17:45

## 2020-09-08 RX ADMIN — Medication 2 PACKET(S): at 08:51

## 2020-09-08 RX ADMIN — Medication 5 MILLIGRAM(S): at 21:37

## 2020-09-08 RX ADMIN — Medication 650 MILLIGRAM(S): at 19:00

## 2020-09-08 RX ADMIN — ONDANSETRON 4 MILLIGRAM(S): 8 TABLET, FILM COATED ORAL at 13:49

## 2020-09-08 RX ADMIN — CHLORHEXIDINE GLUCONATE 1 APPLICATION(S): 213 SOLUTION TOPICAL at 06:11

## 2020-09-08 RX ADMIN — Medication 5 MILLIGRAM(S): at 00:49

## 2020-09-08 NOTE — PHARMACOTHERAPY INTERVENTION NOTE - COMMENTS
Medication history complete. Pt states that her only medication is Seroquel and has stopped all other medications.   Confirmed meds with esteest.

## 2020-09-08 NOTE — PROGRESS NOTE ADULT - SUBJECTIVE AND OBJECTIVE BOX
Patient is a 64y old  Female who presents with a chief complaint of confusion/N/V (08 Sep 2020 11:12)    PAST MEDICAL & SURGICAL HISTORY:  Hyponatremia  Brain aneurysm  Anxiety  Depression  History of neck surgery    BAO HICKEY   64y    Female    BRIEF HOSPITAL COURSE:    64F with PMHx of depression/anxieity, brain aneurysm, hyponatremia (admission in June for Na 118/AMS) who presents to ED with 3-4day history of nausea/intermittent vomiting, wretching,  and progressive AMS. Evaluation in ED revealed Na 111. CTH without acute pathology. eICU consulted and pt admitted to ICU. Upon arrival pt receiving 3% NaCL bolus, discontinued upon my arrival. Prior admission and workup revealed hyponatremia possibly SIADH vs SSRI vs excessive free water intake. She was discharged following correction on NaCl tabs as outpt. She is no longer taking these. She states they were stopped by nephro on her follow up visit as outpt. She was taken off her SSRI at last admission and placed on seroquel only. Her dosing has been escalated since last admission to 150mg at HS and 50mg 2x during the day. She reports no longer using her klonipin.     IVF stopped       Review of Systems:  nausea chronic                      All other ROS are negative.    Allergies    No Known Allergies    Intolerances          ICU Vital Signs Last 24 Hrs  T(C): 36.6 (08 Sep 2020 18:19), Max: 36.8 (08 Sep 2020 12:00)  T(F): 97.8 (08 Sep 2020 18:19), Max: 98.2 (08 Sep 2020 12:00)  HR: 77 (08 Sep 2020 22:00) (68 - 90)  BP: 150/92 (08 Sep 2020 22:00) (113/66 - 154/92)  BP(mean): 102 (08 Sep 2020 22:00) (82 - 116)  ABP: --  ABP(mean): --  RR: 17 (08 Sep 2020 22:00) (11 - 22)  SpO2: 99% (08 Sep 2020 22:00) (97% - 100%)    Physical Examination:    General: NAD    HEENT: no JVD    PULM: bilateral BS    CVS: s1 s2 reg    ABD: soft NT    EXT: no edema     SKIN: warm    Neuro: alert not speaking as it makes her nauseous           LABS:                        11.8   9.63  )-----------( 178      ( 08 Sep 2020 06:30 )             32.3     09-08    116<LL>  |  87<L>  |  12  ----------------------------<  129<H>  3.6   |  19<L>  |  0.68    Ca    8.1<L>      08 Sep 2020 17:26  Phos  2.0     09-08  Mg     2.0     09-08            CAPILLARY BLOOD GLUCOSE            CULTURES:  Culture Results:   No growth (09-06 @ 20:40)      Medications:  MEDICATIONS  (STANDING):  chlorhexidine 2% Cloths 1 Application(s) Topical <User Schedule>  heparin   Injectable 5000 Unit(s) SubCutaneous every 8 hours  influenza   Vaccine 0.5 milliLiter(s) IntraMuscular once  sodium chloride 1 Gram(s) Oral two times a day    MEDICATIONS  (PRN):  acetaminophen   Tablet .. 650 milliGRAM(s) Oral every 6 hours PRN Temp greater or equal to 38.5C (101.3F), Mild Pain (1 - 3)  ibuprofen  Tablet. 400 milliGRAM(s) Oral every 6 hours PRN Moderate Pain (4 - 6)  melatonin 5 milliGRAM(s) Oral at bedtime PRN Insomnia  ondansetron Injectable 4 milliGRAM(s) IV Push every 6 hours PRN Nausea and/or Vomiting        09-07 @ 07:01  -  09-08 @ 07:00  --------------------------------------------------------  IN: 850 mL / OUT: 900 mL / NET: -50 mL        RADIOLOGY/IMAGING/ECHO    Critical care point of care ultrasound:    Assessment/Plan:    64F Profound  hypo-osmolar hyponatremia  Gastric metaplasia with nausea   Low solute diet  ? component of SIADH      Fluid restrict  Nacl tabs  slow rise in NA+ 6-8 meq/24 hr  DVT P

## 2020-09-08 NOTE — PROGRESS NOTE ADULT - ASSESSMENT
IMP:    63 y/o female with depression/anxieity, brain aneurysm, hyponatremia (admission in June for Na 118/AMS) who presents to ED with 2 weeks of Nausea unable to eat but continued to drink 8 8oz water daily and AMS found to have Na 111  Hypovolemic hyponatremic--likely subacute/chronic   Metabolic Encephalopathy--better    High risk for acute decompensation and deterioration including death     Plan:    Advance to reg diet  DC IVF  Fluid restriction   Serial BMP over next 24 hrs  Correct no faster than 8 per 24hrs  OOB  DVT prophy--SCD and sqhep  OOB    ICU care-- d/w ICU staff on multi disciplinary rounds and pt and  at bedside-- All concerns addressed including but not limited to diagnosis, treatment plan and overall prognosis

## 2020-09-08 NOTE — PROGRESS NOTE ADULT - SUBJECTIVE AND OBJECTIVE BOX
H[facility]  ICU # 2    CC:  AMS    HPI:    65 y/o female with depression/anxieity, brain aneurysm, hyponatremia (admission in  for Na 118/AMS) who presents to ED with 2 weeks of Nausea unable to eat but continued to drink 8 8oz water daily and AMS found to have Na 111.  Pt was admitted to ICU and placed on .      :  Case d/w SUNNY Woody.  Pt seen and examined in ICU--Na 115--awake and alert and able to provide Hx.  Wants to have soup.  borderline BP  :  Pt seen and examined in ICU--awake and alert--still feels nausea (chronic).  Na 117. Had intermittent dysarthria yesterday similar to prior hyponatremic state--resolved      PMH:  As above.     PSH:  As above.     FH: Non Contributory other than those listed in HPI    Social History:  NC    MEDICATIONS  (STANDING):  chlorhexidine 2% Cloths 1 Application(s) Topical <User Schedule>  heparin   Injectable 5000 Unit(s) SubCutaneous every 8 hours  influenza   Vaccine 0.5 milliLiter(s) IntraMuscular once  potassium phosphate / sodium phosphate Powder (PHOS-NaK) 2 Packet(s) Oral every 4 hours  sodium chloride 1 Gram(s) Oral two times a day    MEDICATIONS  (PRN):  ibuprofen  Tablet. 400 milliGRAM(s) Oral every 6 hours PRN Moderate Pain (4 - 6)  melatonin 5 milliGRAM(s) Oral at bedtime PRN Insomnia  ondansetron Injectable 4 milliGRAM(s) IV Push every 6 hours PRN Nausea and/or Vomiting      Allergies: NKDA    ROS:  SEE BELOW        ICU Vital Signs Last 24 Hrs  T(C): 36.7 (08 Sep 2020 06:00), Max: 36.9 (07 Sep 2020 22:00)  T(F): 98.1 (08 Sep 2020 06:00), Max: 98.4 (07 Sep 2020 22:00)  HR: 84 (08 Sep 2020 11:00) (68 - 98)  BP: 135/83 (08 Sep 2020 11:00) (113/66 - 176/130)  BP(mean): 99 (08 Sep 2020 11:00) (82 - 142)  ABP: --  ABP(mean): --  RR: 17 (08 Sep 2020 11:00) (12 - 28)  SpO2: 100% (08 Sep 2020 08:12) (97% - 100%)          I&O's Summary    07 Sep 2020 07:01  -  08 Sep 2020 07:00  --------------------------------------------------------  IN: 850 mL / OUT: 900 mL / NET: -50 mL        Physical Exam:  SEE BELOW                          11.8   9.63  )-----------( 178      ( 08 Sep 2020 06:30 )             32.3       09-08    117<LL>  |  88<L>  |  11  ----------------------------<  94  3.6   |  19<L>  |  0.55    Ca    8.0<L>      08 Sep 2020 06:30  Phos  2.0     09-08  Mg     2.0         TPro  8.0  /  Alb  3.6  /  TBili  0.8  /  DBili  x   /  AST  25  /  ALT  29  /  AlkPhos  147<H>  09-06      CARDIAC MARKERS ( 06 Sep 2020 17:53 )  <0.015 ng/mL / x     / x     / x     / x                Urinalysis Basic - ( 06 Sep 2020 20:40 )    Color: Yellow / Appearance: Clear / S.025 / pH: x  Gluc: x / Ketone: Moderate  / Bili: Negative / Urobili: Negative mg/dL   Blood: x / Protein: 30 mg/dL / Nitrite: Negative   Leuk Esterase: Trace / RBC: Negative /HPF / WBC 0-2   Sq Epi: x / Non Sq Epi: Negative / Bacteria: Moderate        DVT Prophylaxis:                                                            Contraindication:     Advanced Directives:    Discussed with:    Visit Information:  Time spent excluding procedure:      ** Time is exclusive of billed procedures and/or teaching and/or routine family updates.

## 2020-09-09 LAB
ANION GAP SERPL CALC-SCNC: 10 MMOL/L — SIGNIFICANT CHANGE UP (ref 5–17)
ANION GAP SERPL CALC-SCNC: 6 MMOL/L — SIGNIFICANT CHANGE UP (ref 5–17)
ANION GAP SERPL CALC-SCNC: 9 MMOL/L — SIGNIFICANT CHANGE UP (ref 5–17)
BUN SERPL-MCNC: 10 MG/DL — SIGNIFICANT CHANGE UP (ref 7–23)
BUN SERPL-MCNC: 8 MG/DL — SIGNIFICANT CHANGE UP (ref 7–23)
BUN SERPL-MCNC: 9 MG/DL — SIGNIFICANT CHANGE UP (ref 7–23)
CALCIUM SERPL-MCNC: 8.2 MG/DL — LOW (ref 8.5–10.1)
CALCIUM SERPL-MCNC: 8.3 MG/DL — LOW (ref 8.5–10.1)
CALCIUM SERPL-MCNC: 8.3 MG/DL — LOW (ref 8.5–10.1)
CHLORIDE SERPL-SCNC: 82 MMOL/L — LOW (ref 96–108)
CHLORIDE SERPL-SCNC: 83 MMOL/L — LOW (ref 96–108)
CHLORIDE SERPL-SCNC: 85 MMOL/L — LOW (ref 96–108)
CO2 SERPL-SCNC: 21 MMOL/L — LOW (ref 22–31)
CO2 SERPL-SCNC: 22 MMOL/L — SIGNIFICANT CHANGE UP (ref 22–31)
CO2 SERPL-SCNC: 23 MMOL/L — SIGNIFICANT CHANGE UP (ref 22–31)
CREAT SERPL-MCNC: 0.52 MG/DL — SIGNIFICANT CHANGE UP (ref 0.5–1.3)
CREAT SERPL-MCNC: 0.53 MG/DL — SIGNIFICANT CHANGE UP (ref 0.5–1.3)
CREAT SERPL-MCNC: 0.54 MG/DL — SIGNIFICANT CHANGE UP (ref 0.5–1.3)
GLUCOSE SERPL-MCNC: 100 MG/DL — HIGH (ref 70–99)
GLUCOSE SERPL-MCNC: 107 MG/DL — HIGH (ref 70–99)
GLUCOSE SERPL-MCNC: 112 MG/DL — HIGH (ref 70–99)
HCT VFR BLD CALC: 33.1 % — LOW (ref 34.5–45)
HGB BLD-MCNC: 11.9 G/DL — SIGNIFICANT CHANGE UP (ref 11.5–15.5)
MAGNESIUM SERPL-MCNC: 2 MG/DL — SIGNIFICANT CHANGE UP (ref 1.6–2.6)
MCHC RBC-ENTMCNC: 30.4 PG — SIGNIFICANT CHANGE UP (ref 27–34)
MCHC RBC-ENTMCNC: 36 GM/DL — SIGNIFICANT CHANGE UP (ref 32–36)
MCV RBC AUTO: 84.4 FL — SIGNIFICANT CHANGE UP (ref 80–100)
PHOSPHATE SERPL-MCNC: 2.1 MG/DL — LOW (ref 2.5–4.5)
PLATELET # BLD AUTO: 185 K/UL — SIGNIFICANT CHANGE UP (ref 150–400)
POTASSIUM SERPL-MCNC: 3.6 MMOL/L — SIGNIFICANT CHANGE UP (ref 3.5–5.3)
POTASSIUM SERPL-MCNC: 3.6 MMOL/L — SIGNIFICANT CHANGE UP (ref 3.5–5.3)
POTASSIUM SERPL-MCNC: 3.8 MMOL/L — SIGNIFICANT CHANGE UP (ref 3.5–5.3)
POTASSIUM SERPL-SCNC: 3.6 MMOL/L — SIGNIFICANT CHANGE UP (ref 3.5–5.3)
POTASSIUM SERPL-SCNC: 3.6 MMOL/L — SIGNIFICANT CHANGE UP (ref 3.5–5.3)
POTASSIUM SERPL-SCNC: 3.8 MMOL/L — SIGNIFICANT CHANGE UP (ref 3.5–5.3)
RBC # BLD: 3.92 M/UL — SIGNIFICANT CHANGE UP (ref 3.8–5.2)
RBC # FLD: 11.7 % — SIGNIFICANT CHANGE UP (ref 10.3–14.5)
SODIUM SERPL-SCNC: 111 MMOL/L — CRITICAL LOW (ref 135–145)
SODIUM SERPL-SCNC: 114 MMOL/L — CRITICAL LOW (ref 135–145)
SODIUM SERPL-SCNC: 116 MMOL/L — CRITICAL LOW (ref 135–145)
WBC # BLD: 10.31 K/UL — SIGNIFICANT CHANGE UP (ref 3.8–10.5)
WBC # FLD AUTO: 10.31 K/UL — SIGNIFICANT CHANGE UP (ref 3.8–10.5)

## 2020-09-09 PROCEDURE — 99291 CRITICAL CARE FIRST HOUR: CPT

## 2020-09-09 RX ORDER — SODIUM CHLORIDE 5 G/100ML
500 INJECTION, SOLUTION INTRAVENOUS
Refills: 0 | Status: DISCONTINUED | OUTPATIENT
Start: 2020-09-09 | End: 2020-09-09

## 2020-09-09 RX ORDER — SODIUM,POTASSIUM PHOSPHATES 278-250MG
2 POWDER IN PACKET (EA) ORAL ONCE
Refills: 0 | Status: COMPLETED | OUTPATIENT
Start: 2020-09-09 | End: 2020-09-09

## 2020-09-09 RX ORDER — SODIUM CHLORIDE 5 G/100ML
500 INJECTION, SOLUTION INTRAVENOUS
Refills: 0 | Status: COMPLETED | OUTPATIENT
Start: 2020-09-09 | End: 2020-09-09

## 2020-09-09 RX ORDER — SODIUM CHLORIDE 5 G/100ML
500 INJECTION, SOLUTION INTRAVENOUS
Refills: 0 | Status: DISCONTINUED | OUTPATIENT
Start: 2020-09-09 | End: 2020-09-10

## 2020-09-09 RX ADMIN — Medication 650 MILLIGRAM(S): at 15:51

## 2020-09-09 RX ADMIN — SODIUM CHLORIDE 1 GRAM(S): 9 INJECTION INTRAMUSCULAR; INTRAVENOUS; SUBCUTANEOUS at 21:34

## 2020-09-09 RX ADMIN — ONDANSETRON 4 MILLIGRAM(S): 8 TABLET, FILM COATED ORAL at 12:47

## 2020-09-09 RX ADMIN — SODIUM CHLORIDE 1 GRAM(S): 9 INJECTION INTRAMUSCULAR; INTRAVENOUS; SUBCUTANEOUS at 09:32

## 2020-09-09 RX ADMIN — HEPARIN SODIUM 5000 UNIT(S): 5000 INJECTION INTRAVENOUS; SUBCUTANEOUS at 21:32

## 2020-09-09 RX ADMIN — Medication 650 MILLIGRAM(S): at 16:30

## 2020-09-09 RX ADMIN — ONDANSETRON 4 MILLIGRAM(S): 8 TABLET, FILM COATED ORAL at 01:32

## 2020-09-09 RX ADMIN — ONDANSETRON 4 MILLIGRAM(S): 8 TABLET, FILM COATED ORAL at 21:32

## 2020-09-09 RX ADMIN — HEPARIN SODIUM 5000 UNIT(S): 5000 INJECTION INTRAVENOUS; SUBCUTANEOUS at 14:38

## 2020-09-09 RX ADMIN — SODIUM CHLORIDE 20 MILLILITER(S): 5 INJECTION, SOLUTION INTRAVENOUS at 09:29

## 2020-09-09 RX ADMIN — Medication 650 MILLIGRAM(S): at 22:41

## 2020-09-09 RX ADMIN — CHLORHEXIDINE GLUCONATE 1 APPLICATION(S): 213 SOLUTION TOPICAL at 06:53

## 2020-09-09 RX ADMIN — Medication 400 MILLIGRAM(S): at 12:47

## 2020-09-09 RX ADMIN — HEPARIN SODIUM 5000 UNIT(S): 5000 INJECTION INTRAVENOUS; SUBCUTANEOUS at 06:35

## 2020-09-09 RX ADMIN — Medication 650 MILLIGRAM(S): at 06:53

## 2020-09-09 RX ADMIN — Medication 650 MILLIGRAM(S): at 08:00

## 2020-09-09 RX ADMIN — Medication 2 PACKET(S): at 09:29

## 2020-09-09 RX ADMIN — Medication 5 MILLIGRAM(S): at 21:32

## 2020-09-09 RX ADMIN — Medication 650 MILLIGRAM(S): at 21:33

## 2020-09-09 RX ADMIN — Medication 400 MILLIGRAM(S): at 14:00

## 2020-09-09 NOTE — PROGRESS NOTE ADULT - SUBJECTIVE AND OBJECTIVE BOX
Hospital # 3  ICU # 3    CC:  AMS    HPI:    65 y/o female with depression/anxieity, Gastric metaplasia resulting in persistent nausea,  brain aneurysm, hyponatremia (admission in June for Na 118/AMS) who presents to ED with 2 weeks of Nausea unable to eat but continued to drink 8 8oz water daily and AMS found to have Na 111.  Pt was admitted to ICU and placed on .      9/7:  Case d/w SUNNY Woody.  Pt seen and examined in ICU--Na 115--awake and alert and able to provide Hx.  Wants to have soup.  borderline BP  9/8:  Pt seen and examined in ICU--awake and alert--still feels nausea (chronic).  Na 117. Had intermittent dysarthria yesterday similar to prior hyponatremic state--resolved    9/9: Still feels nausea--unable to really eat--NS stopped yesterday and Na has slowly decreased to 114 this morning--will start 3% at 20    PMH:  As above.     PSH:  As above.     FH: Non Contributory other than those listed in HPI    Social History:  As above    MEDICATIONS  (STANDING):  chlorhexidine 2% Cloths 1 Application(s) Topical <User Schedule>  heparin   Injectable 5000 Unit(s) SubCutaneous every 8 hours  influenza   Vaccine 0.5 milliLiter(s) IntraMuscular once  sodium chloride 1 Gram(s) Oral two times a day  sodium chloride 3%. 500 milliLiter(s) (20 mL/Hr) IV Continuous <Continuous>    MEDICATIONS  (PRN):  acetaminophen   Tablet .. 650 milliGRAM(s) Oral every 6 hours PRN Temp greater or equal to 38.5C (101.3F), Mild Pain (1 - 3)  ibuprofen  Tablet. 400 milliGRAM(s) Oral every 6 hours PRN Moderate Pain (4 - 6)  melatonin 5 milliGRAM(s) Oral at bedtime PRN Insomnia  ondansetron Injectable 4 milliGRAM(s) IV Push every 6 hours PRN Nausea and/or Vomiting      Allergies: NKDA    ROS:  SEE BELOW        ICU Vital Signs Last 24 Hrs  T(C): 36.4 (09 Sep 2020 08:00), Max: 36.8 (08 Sep 2020 12:00)  T(F): 97.5 (09 Sep 2020 08:00), Max: 98.2 (08 Sep 2020 12:00)  HR: 81 (09 Sep 2020 08:00) (71 - 87)  BP: 144/86 (09 Sep 2020 08:00) (116/83 - 159/86)  BP(mean): 104 (09 Sep 2020 08:00) (94 - 114)  ABP: --  ABP(mean): --  RR: 18 (09 Sep 2020 08:00) (11 - 25)  SpO2: 94% (09 Sep 2020 08:00) (94% - 99%)          I&O's Summary    08 Sep 2020 07:01  -  09 Sep 2020 07:00  --------------------------------------------------------  IN: 0 mL / OUT: 400 mL / NET: -400 mL        Physical Exam:  SEE BELOW                          11.9   10.31 )-----------( 185      ( 09 Sep 2020 06:26 )             33.1       09-09    114<LL>  |  84<L>  |  10  ----------------------------<  98  3.4<L>   |  19<L>  |  0.42<L>    Ca    8.5      09 Sep 2020 06:26  Phos  2.1     09-09  Mg     2.0     09-09                      DVT Prophylaxis:                                                            Contraindication:     Advanced Directives:    Discussed with:    Visit Information:  Time spent excluding procedure:  30 cc mins    ** Time is exclusive of billed procedures and/or teaching and/or routine family updates.

## 2020-09-09 NOTE — CDI QUERY NOTE - NSCDIOTHERTXTBX_GEN_ALL_CORE_HH
Patient admitted with hyponatremia    Phosphate levels= 2.0 > 2.1    potassium phosphate 2 packets oral given x 1    Please clarify if the above labs are indicative of a diagnosis  a) Hypophosphatemia  b) Labs clinically insignificant  c) Other, please clarify

## 2020-09-09 NOTE — PROGRESS NOTE ADULT - ASSESSMENT
IMP:    63 y/o female with depression/anxieity, Gastric Metaplasia resulting in persistent nausea, brain aneurysm, hyponatremia (admission in  for Na 118/AMS) who presents to ED with 2 weeks of Nausea unable to eat but continued to drink 8 8oz water daily and AMS found to have Na 111.  Unable to take PO due to nausea, she is unable to auto correct Na level and has developed intermittent dysarthria   Hypovolemic hyponatremic--likely subacute/chronic   Metabolic Encephalopathy--better    High risk for acute decompensation and deterioration including death--Symptomatic Hyponatremia     Plan:    Start 3% saline at 20  Serial BMP  PO as tolerated  Fluid restriction   OOB  DVT prophy--SCD and sqhep  Her medical marijuana license has      ICU care-- d/w ICU staff on multi disciplinary rounds and pt-- All concerns addressed including but not limited to diagnosis, treatment plan and overall prognosis IMP:    65 y/o female with depression/anxieity, Gastric Metaplasia resulting in persistent nausea, brain aneurysm, hyponatremia (admission in  for Na 118/AMS) who presents to ED with 2 weeks of Nausea unable to eat but continued to drink 8 8oz water daily and AMS found to have Na 111.  Unable to take PO due to nausea, she is unable to auto correct Na level and has developed intermittent dysarthria   Hypovolemic hyponatremic POA--likely subacute/chronic   Metabolic Encephalopathy--better  Hypophosphatemia not POA--replete   No clinical sig Ca level    High risk for acute decompensation and deterioration including death--Symptomatic Hyponatremia     Plan:    Start 3% saline at 20  Serial BMP  PO as tolerated  Fluid restriction   OOB  DVT prophy--SCD and sqhep  Her medical marijuana license has      ICU care-- d/w ICU staff on multi disciplinary rounds and pt-- All concerns addressed including but not limited to diagnosis, treatment plan and overall prognosis

## 2020-09-09 NOTE — CDI QUERY NOTE - NSCDIOTHERTXTBX2_GEN_ALL_CORE_FT
Patient admitted with hyponatremia    Calcium levels= 9.3 > 8.2 > 8.0 > 8.1 > 8.5    Please clarify if the above labs are indicative of a diagnosis  a) Hypocalcemia  b) Labs clinically insignificant  c) Other, please clarify

## 2020-09-10 LAB
ANION GAP SERPL CALC-SCNC: 6 MMOL/L — SIGNIFICANT CHANGE UP (ref 5–17)
ANION GAP SERPL CALC-SCNC: 7 MMOL/L — SIGNIFICANT CHANGE UP (ref 5–17)
ANION GAP SERPL CALC-SCNC: 7 MMOL/L — SIGNIFICANT CHANGE UP (ref 5–17)
ANION GAP SERPL CALC-SCNC: 8 MMOL/L — SIGNIFICANT CHANGE UP (ref 5–17)
BUN SERPL-MCNC: 7 MG/DL — SIGNIFICANT CHANGE UP (ref 7–23)
BUN SERPL-MCNC: 8 MG/DL — SIGNIFICANT CHANGE UP (ref 7–23)
CALCIUM SERPL-MCNC: 8 MG/DL — LOW (ref 8.5–10.1)
CALCIUM SERPL-MCNC: 8.4 MG/DL — LOW (ref 8.5–10.1)
CALCIUM SERPL-MCNC: 8.5 MG/DL — SIGNIFICANT CHANGE UP (ref 8.5–10.1)
CALCIUM SERPL-MCNC: 8.5 MG/DL — SIGNIFICANT CHANGE UP (ref 8.5–10.1)
CHLORIDE SERPL-SCNC: 87 MMOL/L — LOW (ref 96–108)
CHLORIDE SERPL-SCNC: 89 MMOL/L — LOW (ref 96–108)
CHLORIDE SERPL-SCNC: 90 MMOL/L — LOW (ref 96–108)
CHLORIDE SERPL-SCNC: 93 MMOL/L — LOW (ref 96–108)
CO2 SERPL-SCNC: 22 MMOL/L — SIGNIFICANT CHANGE UP (ref 22–31)
CO2 SERPL-SCNC: 22 MMOL/L — SIGNIFICANT CHANGE UP (ref 22–31)
CO2 SERPL-SCNC: 23 MMOL/L — SIGNIFICANT CHANGE UP (ref 22–31)
CO2 SERPL-SCNC: 24 MMOL/L — SIGNIFICANT CHANGE UP (ref 22–31)
CREAT SERPL-MCNC: 0.52 MG/DL — SIGNIFICANT CHANGE UP (ref 0.5–1.3)
CREAT SERPL-MCNC: 0.56 MG/DL — SIGNIFICANT CHANGE UP (ref 0.5–1.3)
CREAT SERPL-MCNC: 0.57 MG/DL — SIGNIFICANT CHANGE UP (ref 0.5–1.3)
CREAT SERPL-MCNC: 0.58 MG/DL — SIGNIFICANT CHANGE UP (ref 0.5–1.3)
GLUCOSE SERPL-MCNC: 106 MG/DL — HIGH (ref 70–99)
GLUCOSE SERPL-MCNC: 114 MG/DL — HIGH (ref 70–99)
GLUCOSE SERPL-MCNC: 128 MG/DL — HIGH (ref 70–99)
GLUCOSE SERPL-MCNC: 131 MG/DL — HIGH (ref 70–99)
HCT VFR BLD CALC: 33.8 % — LOW (ref 34.5–45)
HGB BLD-MCNC: 12.3 G/DL — SIGNIFICANT CHANGE UP (ref 11.5–15.5)
MAGNESIUM SERPL-MCNC: 1.9 MG/DL — SIGNIFICANT CHANGE UP (ref 1.6–2.6)
MCHC RBC-ENTMCNC: 30.9 PG — SIGNIFICANT CHANGE UP (ref 27–34)
MCHC RBC-ENTMCNC: 36.4 GM/DL — HIGH (ref 32–36)
MCV RBC AUTO: 84.9 FL — SIGNIFICANT CHANGE UP (ref 80–100)
PHOSPHATE SERPL-MCNC: 2.1 MG/DL — LOW (ref 2.5–4.5)
PLATELET # BLD AUTO: 171 K/UL — SIGNIFICANT CHANGE UP (ref 150–400)
POTASSIUM SERPL-MCNC: 3.2 MMOL/L — LOW (ref 3.5–5.3)
POTASSIUM SERPL-MCNC: 4.1 MMOL/L — SIGNIFICANT CHANGE UP (ref 3.5–5.3)
POTASSIUM SERPL-MCNC: 4.2 MMOL/L — SIGNIFICANT CHANGE UP (ref 3.5–5.3)
POTASSIUM SERPL-MCNC: 4.3 MMOL/L — SIGNIFICANT CHANGE UP (ref 3.5–5.3)
POTASSIUM SERPL-SCNC: 3.2 MMOL/L — LOW (ref 3.5–5.3)
POTASSIUM SERPL-SCNC: 4.1 MMOL/L — SIGNIFICANT CHANGE UP (ref 3.5–5.3)
POTASSIUM SERPL-SCNC: 4.2 MMOL/L — SIGNIFICANT CHANGE UP (ref 3.5–5.3)
POTASSIUM SERPL-SCNC: 4.3 MMOL/L — SIGNIFICANT CHANGE UP (ref 3.5–5.3)
RBC # BLD: 3.98 M/UL — SIGNIFICANT CHANGE UP (ref 3.8–5.2)
RBC # FLD: 11.8 % — SIGNIFICANT CHANGE UP (ref 10.3–14.5)
SODIUM SERPL-SCNC: 117 MMOL/L — CRITICAL LOW (ref 135–145)
SODIUM SERPL-SCNC: 118 MMOL/L — CRITICAL LOW (ref 135–145)
SODIUM SERPL-SCNC: 121 MMOL/L — LOW (ref 135–145)
SODIUM SERPL-SCNC: 122 MMOL/L — LOW (ref 135–145)
VIT B12 SERPL-MCNC: 677 PG/ML — SIGNIFICANT CHANGE UP (ref 232–1245)
WBC # BLD: 8.28 K/UL — SIGNIFICANT CHANGE UP (ref 3.8–10.5)
WBC # FLD AUTO: 8.28 K/UL — SIGNIFICANT CHANGE UP (ref 3.8–10.5)

## 2020-09-10 PROCEDURE — 99291 CRITICAL CARE FIRST HOUR: CPT

## 2020-09-10 RX ORDER — SENNA PLUS 8.6 MG/1
2 TABLET ORAL AT BEDTIME
Refills: 0 | Status: DISCONTINUED | OUTPATIENT
Start: 2020-09-10 | End: 2020-09-24

## 2020-09-10 RX ORDER — SODIUM CHLORIDE 5 G/100ML
500 INJECTION, SOLUTION INTRAVENOUS
Refills: 0 | Status: DISCONTINUED | OUTPATIENT
Start: 2020-09-10 | End: 2020-09-10

## 2020-09-10 RX ORDER — POTASSIUM CHLORIDE 20 MEQ
40 PACKET (EA) ORAL EVERY 4 HOURS
Refills: 0 | Status: COMPLETED | OUTPATIENT
Start: 2020-09-10 | End: 2020-09-10

## 2020-09-10 RX ORDER — POTASSIUM CHLORIDE 20 MEQ
10 PACKET (EA) ORAL
Refills: 0 | Status: DISCONTINUED | OUTPATIENT
Start: 2020-09-10 | End: 2020-09-10

## 2020-09-10 RX ORDER — SODIUM,POTASSIUM PHOSPHATES 278-250MG
2 POWDER IN PACKET (EA) ORAL ONCE
Refills: 0 | Status: COMPLETED | OUTPATIENT
Start: 2020-09-10 | End: 2020-09-10

## 2020-09-10 RX ORDER — SODIUM CHLORIDE 5 G/100ML
500 INJECTION, SOLUTION INTRAVENOUS
Refills: 0 | Status: DISCONTINUED | OUTPATIENT
Start: 2020-09-10 | End: 2020-09-11

## 2020-09-10 RX ADMIN — Medication 40 MILLIEQUIVALENT(S): at 09:38

## 2020-09-10 RX ADMIN — Medication 400 MILLIGRAM(S): at 18:47

## 2020-09-10 RX ADMIN — Medication 5 MILLIGRAM(S): at 21:15

## 2020-09-10 RX ADMIN — HEPARIN SODIUM 5000 UNIT(S): 5000 INJECTION INTRAVENOUS; SUBCUTANEOUS at 13:19

## 2020-09-10 RX ADMIN — SODIUM CHLORIDE 1 GRAM(S): 9 INJECTION INTRAMUSCULAR; INTRAVENOUS; SUBCUTANEOUS at 09:38

## 2020-09-10 RX ADMIN — SENNA PLUS 2 TABLET(S): 8.6 TABLET ORAL at 21:15

## 2020-09-10 RX ADMIN — Medication 200 MILLIGRAM(S): at 11:02

## 2020-09-10 RX ADMIN — Medication 40 MILLIEQUIVALENT(S): at 04:55

## 2020-09-10 RX ADMIN — Medication 400 MILLIGRAM(S): at 04:55

## 2020-09-10 RX ADMIN — HEPARIN SODIUM 5000 UNIT(S): 5000 INJECTION INTRAVENOUS; SUBCUTANEOUS at 06:06

## 2020-09-10 RX ADMIN — CHLORHEXIDINE GLUCONATE 1 APPLICATION(S): 213 SOLUTION TOPICAL at 06:06

## 2020-09-10 RX ADMIN — Medication 2 PACKET(S): at 11:02

## 2020-09-10 RX ADMIN — HEPARIN SODIUM 5000 UNIT(S): 5000 INJECTION INTRAVENOUS; SUBCUTANEOUS at 21:15

## 2020-09-10 RX ADMIN — SODIUM CHLORIDE 40 MILLILITER(S): 5 INJECTION, SOLUTION INTRAVENOUS at 13:13

## 2020-09-10 RX ADMIN — ONDANSETRON 4 MILLIGRAM(S): 8 TABLET, FILM COATED ORAL at 08:06

## 2020-09-10 RX ADMIN — SODIUM CHLORIDE 1 GRAM(S): 9 INJECTION INTRAMUSCULAR; INTRAVENOUS; SUBCUTANEOUS at 21:15

## 2020-09-10 RX ADMIN — ONDANSETRON 4 MILLIGRAM(S): 8 TABLET, FILM COATED ORAL at 18:47

## 2020-09-10 RX ADMIN — SODIUM CHLORIDE 40 MILLILITER(S): 5 INJECTION, SOLUTION INTRAVENOUS at 01:16

## 2020-09-10 RX ADMIN — Medication 400 MILLIGRAM(S): at 19:00

## 2020-09-10 RX ADMIN — Medication 400 MILLIGRAM(S): at 05:35

## 2020-09-10 NOTE — PROGRESS NOTE ADULT - SUBJECTIVE AND OBJECTIVE BOX
Hospital # 4  ICU # 4    CC:  AMS    HPI:    65 y/o female with depression/anxieity, Gastric metaplasia resulting in persistent nausea,  brain aneurysm, hyponatremia (admission in June for Na 118/AMS) who presents to ED with 2 weeks of Nausea unable to eat but continued to drink 8 8oz water daily and AMS found to have Na 111.  Pt was admitted to ICU and placed on .      9/7:  Case d/w SUNNY Woody.  Pt seen and examined in ICU--Na 115--awake and alert and able to provide Hx.  Wants to have soup.  borderline BP  9/8:  Pt seen and examined in ICU--awake and alert--still feels nausea (chronic).  Na 117. Had intermittent dysarthria yesterday similar to prior hyponatremic state--resolved    9/9: Still feels nausea--unable to really eat--NS stopped yesterday and Na has slowly decreased to 114 this morning--will start 3% at 20  9/10:  Na rising on 3%.  Poor appetite. Discussed ?? PEG for nutrition support but she refuses.  + dysarthric this morning     PMH:  As above.     PSH:  As above.     FH: Non Contributory other than those listed in HPI    Social History:  NC    MEDICATIONS  (STANDING):  chlorhexidine 2% Cloths 1 Application(s) Topical <User Schedule>  heparin   Injectable 5000 Unit(s) SubCutaneous every 8 hours  influenza   Vaccine 0.5 milliLiter(s) IntraMuscular once  potassium phosphate / sodium phosphate Powder (PHOS-NaK) 2 Packet(s) Oral once  sodium chloride 1 Gram(s) Oral two times a day  sodium chloride 3%. 500 milliLiter(s) (40 mL/Hr) IV Continuous <Continuous>  trimethobenzamide Injectable 200 milliGRAM(s) IntraMuscular once    MEDICATIONS  (PRN):  acetaminophen   Tablet .. 650 milliGRAM(s) Oral every 6 hours PRN Temp greater or equal to 38.5C (101.3F), Mild Pain (1 - 3)  ibuprofen  Tablet. 400 milliGRAM(s) Oral every 6 hours PRN Moderate Pain (4 - 6)  melatonin 5 milliGRAM(s) Oral at bedtime PRN Insomnia  ondansetron Injectable 4 milliGRAM(s) IV Push every 6 hours PRN Nausea and/or Vomiting      Allergies: NKDA    ROS:  SEE BELOW        ICU Vital Signs Last 24 Hrs  T(C): 36.7 (10 Sep 2020 06:00), Max: 36.7 (09 Sep 2020 20:00)  T(F): 98 (10 Sep 2020 06:00), Max: 98.1 (09 Sep 2020 20:00)  HR: 73 (10 Sep 2020 09:00) (65 - 88)  BP: 146/87 (10 Sep 2020 09:00) (130/117 - 154/103)  BP(mean): 105 (10 Sep 2020 09:00) (88 - 124)  ABP: --  ABP(mean): --  RR: 14 (10 Sep 2020 09:00) (13 - 26)  SpO2: 98% (10 Sep 2020 09:00) (93% - 100%)          I&O's Summary    09 Sep 2020 07:01  -  10 Sep 2020 07:00  --------------------------------------------------------  IN: 1008.2 mL / OUT: 850 mL / NET: 158.2 mL        Physical Exam:  SEE BELOW                          12.3   8.28  )-----------( 171      ( 10 Sep 2020 06:52 )             33.8       09-10    118<LL>  |  86<L>  |  7   ----------------------------<  97  3.6   |  24  |  0.55    Ca    8.4<L>      10 Sep 2020 06:52  Phos  2.1     09-10  Mg     1.9     09-10                      DVT Prophylaxis:                                                            Contraindication:     Advanced Directives:    Discussed with:    Visit Information:  Time spent excluding procedure:  30 cc mins    ** Time is exclusive of billed procedures and/or teaching and/or routine family updates.

## 2020-09-10 NOTE — PROGRESS NOTE ADULT - ASSESSMENT
IMP:    65 y/o female with depression/anxieity, Gastric Metaplasia resulting in persistent nausea, brain aneurysm, hyponatremia (admission in June for Na 118/AMS) who presents to ED with 2 weeks of Nausea unable to eat but continued to drink 8 8oz water daily and AMS found to have Na 111.  Unable to take PO due to nausea, she is unable to auto correct Na level and has developed intermittent dysarthria   Hypovolemic hyponatremic POA--likely subacute/chronic   Metabolic Encephalopathy--better  Hypophosphatemia not POA--replete   No clinical sig Ca level    High risk for acute decompensation and deterioration including death--Symptomatic Hyponatremia     Plan:    Start 3% saline increased to 40  Serial BMP  PO as tolerated--trial of Tigan    Fluid restriction   OOB  DVT prophy--SCD and sqhep    ICU care-- d/w ICU staff on multi disciplinary rounds and pt-- All concerns addressed including but not limited to diagnosis, treatment plan and overall prognosis.  Also d/w  at bedside

## 2020-09-11 LAB
ANION GAP SERPL CALC-SCNC: 4 MMOL/L — LOW (ref 5–17)
ANION GAP SERPL CALC-SCNC: 4 MMOL/L — LOW (ref 5–17)
ANION GAP SERPL CALC-SCNC: 6 MMOL/L — SIGNIFICANT CHANGE UP (ref 5–17)
ANION GAP SERPL CALC-SCNC: 6 MMOL/L — SIGNIFICANT CHANGE UP (ref 5–17)
BUN SERPL-MCNC: 10 MG/DL — SIGNIFICANT CHANGE UP (ref 7–23)
BUN SERPL-MCNC: 8 MG/DL — SIGNIFICANT CHANGE UP (ref 7–23)
BUN SERPL-MCNC: 8 MG/DL — SIGNIFICANT CHANGE UP (ref 7–23)
BUN SERPL-MCNC: 9 MG/DL — SIGNIFICANT CHANGE UP (ref 7–23)
CALCIUM SERPL-MCNC: 8.3 MG/DL — LOW (ref 8.5–10.1)
CALCIUM SERPL-MCNC: 8.6 MG/DL — SIGNIFICANT CHANGE UP (ref 8.5–10.1)
CALCIUM SERPL-MCNC: 8.6 MG/DL — SIGNIFICANT CHANGE UP (ref 8.5–10.1)
CALCIUM SERPL-MCNC: 8.7 MG/DL — SIGNIFICANT CHANGE UP (ref 8.5–10.1)
CHLORIDE SERPL-SCNC: 92 MMOL/L — LOW (ref 96–108)
CHLORIDE SERPL-SCNC: 93 MMOL/L — LOW (ref 96–108)
CHLORIDE SERPL-SCNC: 95 MMOL/L — LOW (ref 96–108)
CHLORIDE SERPL-SCNC: 95 MMOL/L — LOW (ref 96–108)
CO2 SERPL-SCNC: 23 MMOL/L — SIGNIFICANT CHANGE UP (ref 22–31)
CO2 SERPL-SCNC: 24 MMOL/L — SIGNIFICANT CHANGE UP (ref 22–31)
CO2 SERPL-SCNC: 26 MMOL/L — SIGNIFICANT CHANGE UP (ref 22–31)
CO2 SERPL-SCNC: 26 MMOL/L — SIGNIFICANT CHANGE UP (ref 22–31)
CREAT SERPL-MCNC: 0.52 MG/DL — SIGNIFICANT CHANGE UP (ref 0.5–1.3)
CREAT SERPL-MCNC: 0.63 MG/DL — SIGNIFICANT CHANGE UP (ref 0.5–1.3)
CREAT SERPL-MCNC: 0.63 MG/DL — SIGNIFICANT CHANGE UP (ref 0.5–1.3)
CREAT SERPL-MCNC: 0.71 MG/DL — SIGNIFICANT CHANGE UP (ref 0.5–1.3)
GLUCOSE SERPL-MCNC: 100 MG/DL — HIGH (ref 70–99)
GLUCOSE SERPL-MCNC: 107 MG/DL — HIGH (ref 70–99)
GLUCOSE SERPL-MCNC: 124 MG/DL — HIGH (ref 70–99)
GLUCOSE SERPL-MCNC: 94 MG/DL — SIGNIFICANT CHANGE UP (ref 70–99)
POTASSIUM SERPL-MCNC: 3.9 MMOL/L — SIGNIFICANT CHANGE UP (ref 3.5–5.3)
POTASSIUM SERPL-MCNC: 3.9 MMOL/L — SIGNIFICANT CHANGE UP (ref 3.5–5.3)
POTASSIUM SERPL-MCNC: 4.1 MMOL/L — SIGNIFICANT CHANGE UP (ref 3.5–5.3)
POTASSIUM SERPL-MCNC: 4.2 MMOL/L — SIGNIFICANT CHANGE UP (ref 3.5–5.3)
POTASSIUM SERPL-SCNC: 3.9 MMOL/L — SIGNIFICANT CHANGE UP (ref 3.5–5.3)
POTASSIUM SERPL-SCNC: 3.9 MMOL/L — SIGNIFICANT CHANGE UP (ref 3.5–5.3)
POTASSIUM SERPL-SCNC: 4.1 MMOL/L — SIGNIFICANT CHANGE UP (ref 3.5–5.3)
POTASSIUM SERPL-SCNC: 4.2 MMOL/L — SIGNIFICANT CHANGE UP (ref 3.5–5.3)
SODIUM SERPL-SCNC: 122 MMOL/L — LOW (ref 135–145)
SODIUM SERPL-SCNC: 122 MMOL/L — LOW (ref 135–145)
SODIUM SERPL-SCNC: 125 MMOL/L — LOW (ref 135–145)
SODIUM SERPL-SCNC: 125 MMOL/L — LOW (ref 135–145)

## 2020-09-11 PROCEDURE — 99233 SBSQ HOSP IP/OBS HIGH 50: CPT

## 2020-09-11 RX ORDER — SODIUM CHLORIDE 5 G/100ML
500 INJECTION, SOLUTION INTRAVENOUS
Refills: 0 | Status: DISCONTINUED | OUTPATIENT
Start: 2020-09-11 | End: 2020-09-12

## 2020-09-11 RX ORDER — ALPRAZOLAM 0.25 MG
0.5 TABLET ORAL ONCE
Refills: 0 | Status: DISCONTINUED | OUTPATIENT
Start: 2020-09-11 | End: 2020-09-12

## 2020-09-11 RX ADMIN — SODIUM CHLORIDE 1 GRAM(S): 9 INJECTION INTRAMUSCULAR; INTRAVENOUS; SUBCUTANEOUS at 10:33

## 2020-09-11 RX ADMIN — Medication 5 MILLIGRAM(S): at 21:31

## 2020-09-11 RX ADMIN — HEPARIN SODIUM 5000 UNIT(S): 5000 INJECTION INTRAVENOUS; SUBCUTANEOUS at 21:25

## 2020-09-11 RX ADMIN — Medication 400 MILLIGRAM(S): at 06:00

## 2020-09-11 RX ADMIN — HEPARIN SODIUM 5000 UNIT(S): 5000 INJECTION INTRAVENOUS; SUBCUTANEOUS at 15:45

## 2020-09-11 RX ADMIN — ONDANSETRON 4 MILLIGRAM(S): 8 TABLET, FILM COATED ORAL at 01:24

## 2020-09-11 RX ADMIN — ONDANSETRON 4 MILLIGRAM(S): 8 TABLET, FILM COATED ORAL at 17:31

## 2020-09-11 RX ADMIN — Medication 400 MILLIGRAM(S): at 05:28

## 2020-09-11 RX ADMIN — CHLORHEXIDINE GLUCONATE 1 APPLICATION(S): 213 SOLUTION TOPICAL at 05:21

## 2020-09-11 RX ADMIN — HEPARIN SODIUM 5000 UNIT(S): 5000 INJECTION INTRAVENOUS; SUBCUTANEOUS at 05:24

## 2020-09-11 RX ADMIN — ONDANSETRON 4 MILLIGRAM(S): 8 TABLET, FILM COATED ORAL at 10:33

## 2020-09-11 RX ADMIN — SODIUM CHLORIDE 1 GRAM(S): 9 INJECTION INTRAMUSCULAR; INTRAVENOUS; SUBCUTANEOUS at 21:26

## 2020-09-11 RX ADMIN — SENNA PLUS 2 TABLET(S): 8.6 TABLET ORAL at 21:26

## 2020-09-11 RX ADMIN — Medication 400 MILLIGRAM(S): at 17:31

## 2020-09-11 RX ADMIN — SODIUM CHLORIDE 40 MILLILITER(S): 5 INJECTION, SOLUTION INTRAVENOUS at 00:00

## 2020-09-11 NOTE — PROGRESS NOTE ADULT - ASSESSMENT
IMP:    63 y/o female with depression/anxieity, Gastric Metaplasia resulting in persistent nausea, brain aneurysm, hyponatremia (admission in June for Na 118/AMS) who presents to ED with 2 weeks of Nausea unable to eat but continued to drink 8 8oz water daily and AMS found to have Na 111.  Unable to take PO due to nausea, she is unable to auto correct Na level and has developed intermittent dysarthria   Hypovolemic hyponatremic POA--likely subacute/chronic   Metabolic Encephalopathy--better  Hypophosphatemia not POA--replete   No clinical sig Ca level    High risk for acute decompensation and deterioration including death--Symptomatic Hyponatremia     Plan:    Stop 3%  Serial BMP--q 6 hrs   PO as tolerated--Tigan did not really work  Fluid restriction   Brain MR--Xanax 0.5 prior to exam  OOB  DVT prophy--SCD and sqhep    SDU care-- d/w ICU staff on multi disciplinary rounds and pt-- All concerns addressed including but not limited to diagnosis, treatment plan and overall prognosis

## 2020-09-11 NOTE — PROGRESS NOTE ADULT - SUBJECTIVE AND OBJECTIVE BOX
H[facility] # 5  ICU # 5    CC:  AMS    HPI:    65 y/o female with depression/anxieity, Gastric metaplasia resulting in persistent nausea,  brain aneurysm, hyponatremia (admission in June for Na 118/AMS) who presents to ED with 2 weeks of Nausea unable to eat but continued to drink 8 8oz water daily and AMS found to have Na 111.  Pt was admitted to ICU and placed on .      9/7:  Case d/w SUNNY Woody.  Pt seen and examined in ICU--Na 115--awake and alert and able to provide Hx.  Wants to have soup.  borderline BP  9/8:  Pt seen and examined in ICU--awake and alert--still feels nausea (chronic).  Na 117. Had intermittent dysarthria yesterday similar to prior hyponatremic state--resolved    9/9: Still feels nausea--unable to really eat--NS stopped yesterday and Na has slowly decreased to 114 this morning--will start 3% at 20  9/10:  Na rising on 3%.  Poor appetite. Discussed ?? PEG for nutrition support but she refuses.  + dysarthric this morning   9/11:  Na 125.  3% off.  Sitting in chair.  +dysarthric this morning.  Eating more.  Will obtain Brain MR today.      PMH:  As above.     PSH:  As above.     FH: Non Contributory other than those listed in HPI    Social History:      MEDICATIONS  (STANDING):  ALPRAZolam 0.5 milliGRAM(s) Oral once  chlorhexidine 2% Cloths 1 Application(s) Topical <User Schedule>  heparin   Injectable 5000 Unit(s) SubCutaneous every 8 hours  influenza   Vaccine 0.5 milliLiter(s) IntraMuscular once  senna 2 Tablet(s) Oral at bedtime  sodium chloride 1 Gram(s) Oral two times a day    MEDICATIONS  (PRN):  acetaminophen   Tablet .. 650 milliGRAM(s) Oral every 6 hours PRN Temp greater or equal to 38.5C (101.3F), Mild Pain (1 - 3)  ibuprofen  Tablet. 400 milliGRAM(s) Oral every 6 hours PRN Moderate Pain (4 - 6)  melatonin 5 milliGRAM(s) Oral at bedtime PRN Insomnia  ondansetron Injectable 4 milliGRAM(s) IV Push every 6 hours PRN Nausea and/or Vomiting      Allergies: NKDA    ROS:  SEE BELOW        ICU Vital Signs Last 24 Hrs  T(C): 36.9 (11 Sep 2020 10:26), Max: 36.9 (10 Sep 2020 14:00)  T(F): 98.5 (11 Sep 2020 10:26), Max: 98.5 (10 Sep 2020 21:00)  HR: 81 (11 Sep 2020 09:00) (68 - 87)  BP: 153/83 (11 Sep 2020 09:00) (118/79 - 163/93)  BP(mean): 98 (11 Sep 2020 09:00) (85 - 113)  ABP: --  ABP(mean): --  RR: 18 (11 Sep 2020 09:00) (15 - 26)  SpO2: 98% (11 Sep 2020 09:00) (97% - 100%)          I&O's Summary    10 Sep 2020 07:01  -  11 Sep 2020 07:00  --------------------------------------------------------  IN: 769 mL / OUT: 1200 mL / NET: -431 mL        Physical Exam:  SEE BELOW                          12.3   8.28  )-----------( 171      ( 10 Sep 2020 06:52 )             33.8       09-11    125<L>  |  95<L>  |  8   ----------------------------<  100<H>  4.2   |  24  |  0.63    Ca    8.6      11 Sep 2020 06:46  Phos  2.1     09-10  Mg     1.9     09-10                      DVT Prophylaxis:                                                            Contraindication:     Advanced Directives:    Discussed with:    Visit Information:  Time spent excluding procedure:      ** Time is exclusive of billed procedures and/or teaching and/or routine family updates.

## 2020-09-11 NOTE — CHART NOTE - NSCHARTNOTEFT_GEN_A_CORE
Assessment:   *Nausea unable to eat but continued to drink 8 8oz water daily and AMS found to have Na 111.  Unable to take PO due to nausea, she is unable to auto correct Na level and has developed intermittent dysarthria, Hypovolemic hyponatremic POA--likely subacute/chronic, Metabolic Encephalopathy-, Hypophosphatemia not POA-, No clinical sig Ca level    *labs reviewed; hyponatremia slightly improved.  B12 back WNL.      *no edema. BM (+) 9/10.  (+) nausea, dry heaving.  pt reports no improvement with tigan, however, pt actually ate 3 meals on 9/10 with tolerance which pt did not previously do.  So, consider tigan may actually be helping more than pt reported.     *breakfast tray observed, pt consumed 1/2 of plate and 100% of fruit bowl (75%, of tray consumed) with no reported nausea.  d/w pt gelatein addition to optimize protein intake.  Pt verbalized understanding.    Recommendations:  1) add gelatein BID  2) add MVI with minerals daily to ensure 100% RDI met  3) obtain new wt when feasible    Diet Prescription: Diet, Regular (09-08-20 @ 11:12)      Wt Hx:  no new wt since admission  Weight (kg): 72.6 (09-06-20 @ 17:08)      09-10-20 @ 07:01  -  09-11-20 @ 07:00  --------------------------------------------------------  IN: 769 mL / OUT: 1200 mL / NET: -431 mL        Estimated Needs:   · Weight Used for Calculation	adjusted  64Kg     Estimated Energy Needs (25-30 calories/kg):  · Weight  (lbs)	141 lb  · Weight (kg)	64 kg  · From (25cal/kg)	1600· To (30cal/kg)	1920     Other Calculation:  · Other Calculation	Ht. 65 "     Wt. 83.6 Kg       31 BMI       IBW 57  Kg      Pt is at  147  %  IBW     Estimated Protein Needs (1.4-1.6 g/kg):  · Weight  (lbs)	141  · Weight (kg)	64 kg  · From (1.4 g/kg)	89.6 · To (1.6 g/kg)	102.4     Estimated Fluid Needs (25-30 ml/kg):  · Weight  (lbs)	141  · Weight (kg)	64  · From (25 ml/kg)	1600 · To (30 ml/kg) 1920          Pertinent Labs: 09-11 Na125 mmol/L<L> Glu 100 mg/dL<H> K+ 4.2 mmol/L Cr  0.63 mg/dL BUN 8 mg/dL 09-10 Phos 2.1 mg/dL<L> 09-06 Alb 3.6 g/dL    Skin: nunu score = 20  no PU documented      Monitoring and Evaluation:   [x] PO intake/Nutr support infusion [ x ] Tolerance to Nutr [ x ] weights [ x ] labs[ x ] follow up per protocol  [ ] other:

## 2020-09-12 LAB
ANION GAP SERPL CALC-SCNC: 5 MMOL/L — SIGNIFICANT CHANGE UP (ref 5–17)
ANION GAP SERPL CALC-SCNC: 6 MMOL/L — SIGNIFICANT CHANGE UP (ref 5–17)
ANION GAP SERPL CALC-SCNC: 7 MMOL/L — SIGNIFICANT CHANGE UP (ref 5–17)
BUN SERPL-MCNC: 13 MG/DL — SIGNIFICANT CHANGE UP (ref 7–23)
BUN SERPL-MCNC: 14 MG/DL — SIGNIFICANT CHANGE UP (ref 7–23)
BUN SERPL-MCNC: 15 MG/DL — SIGNIFICANT CHANGE UP (ref 7–23)
CALCIUM SERPL-MCNC: 8.6 MG/DL — SIGNIFICANT CHANGE UP (ref 8.5–10.1)
CALCIUM SERPL-MCNC: 8.9 MG/DL — SIGNIFICANT CHANGE UP (ref 8.5–10.1)
CALCIUM SERPL-MCNC: 9 MG/DL — SIGNIFICANT CHANGE UP (ref 8.5–10.1)
CHLORIDE SERPL-SCNC: 95 MMOL/L — LOW (ref 96–108)
CHLORIDE SERPL-SCNC: 95 MMOL/L — LOW (ref 96–108)
CHLORIDE SERPL-SCNC: 96 MMOL/L — SIGNIFICANT CHANGE UP (ref 96–108)
CO2 SERPL-SCNC: 24 MMOL/L — SIGNIFICANT CHANGE UP (ref 22–31)
CO2 SERPL-SCNC: 27 MMOL/L — SIGNIFICANT CHANGE UP (ref 22–31)
CO2 SERPL-SCNC: 28 MMOL/L — SIGNIFICANT CHANGE UP (ref 22–31)
CREAT SERPL-MCNC: 0.59 MG/DL — SIGNIFICANT CHANGE UP (ref 0.5–1.3)
CREAT SERPL-MCNC: 0.64 MG/DL — SIGNIFICANT CHANGE UP (ref 0.5–1.3)
CREAT SERPL-MCNC: 0.67 MG/DL — SIGNIFICANT CHANGE UP (ref 0.5–1.3)
GLUCOSE SERPL-MCNC: 102 MG/DL — HIGH (ref 70–99)
GLUCOSE SERPL-MCNC: 105 MG/DL — HIGH (ref 70–99)
GLUCOSE SERPL-MCNC: 107 MG/DL — HIGH (ref 70–99)
HCT VFR BLD CALC: 34 % — LOW (ref 34.5–45)
HGB BLD-MCNC: 11.8 G/DL — SIGNIFICANT CHANGE UP (ref 11.5–15.5)
MAGNESIUM SERPL-MCNC: 2 MG/DL — SIGNIFICANT CHANGE UP (ref 1.6–2.6)
MCHC RBC-ENTMCNC: 30.3 PG — SIGNIFICANT CHANGE UP (ref 27–34)
MCHC RBC-ENTMCNC: 34.7 GM/DL — SIGNIFICANT CHANGE UP (ref 32–36)
MCV RBC AUTO: 87.4 FL — SIGNIFICANT CHANGE UP (ref 80–100)
PHOSPHATE SERPL-MCNC: 2.7 MG/DL — SIGNIFICANT CHANGE UP (ref 2.5–4.5)
PLATELET # BLD AUTO: 180 K/UL — SIGNIFICANT CHANGE UP (ref 150–400)
POTASSIUM SERPL-MCNC: 3.9 MMOL/L — SIGNIFICANT CHANGE UP (ref 3.5–5.3)
POTASSIUM SERPL-MCNC: 4.3 MMOL/L — SIGNIFICANT CHANGE UP (ref 3.5–5.3)
POTASSIUM SERPL-MCNC: 4.3 MMOL/L — SIGNIFICANT CHANGE UP (ref 3.5–5.3)
POTASSIUM SERPL-SCNC: 3.9 MMOL/L — SIGNIFICANT CHANGE UP (ref 3.5–5.3)
POTASSIUM SERPL-SCNC: 4.3 MMOL/L — SIGNIFICANT CHANGE UP (ref 3.5–5.3)
POTASSIUM SERPL-SCNC: 4.3 MMOL/L — SIGNIFICANT CHANGE UP (ref 3.5–5.3)
RBC # BLD: 3.89 M/UL — SIGNIFICANT CHANGE UP (ref 3.8–5.2)
RBC # FLD: 12.1 % — SIGNIFICANT CHANGE UP (ref 10.3–14.5)
SODIUM SERPL-SCNC: 126 MMOL/L — LOW (ref 135–145)
SODIUM SERPL-SCNC: 128 MMOL/L — LOW (ref 135–145)
SODIUM SERPL-SCNC: 129 MMOL/L — LOW (ref 135–145)
WBC # BLD: 10.75 K/UL — HIGH (ref 3.8–10.5)
WBC # FLD AUTO: 10.75 K/UL — HIGH (ref 3.8–10.5)

## 2020-09-12 PROCEDURE — 99233 SBSQ HOSP IP/OBS HIGH 50: CPT

## 2020-09-12 PROCEDURE — 70551 MRI BRAIN STEM W/O DYE: CPT | Mod: 26

## 2020-09-12 RX ADMIN — Medication 5 MILLIGRAM(S): at 22:22

## 2020-09-12 RX ADMIN — SODIUM CHLORIDE 1 GRAM(S): 9 INJECTION INTRAMUSCULAR; INTRAVENOUS; SUBCUTANEOUS at 11:09

## 2020-09-12 RX ADMIN — Medication 0.5 MILLIGRAM(S): at 09:20

## 2020-09-12 RX ADMIN — SENNA PLUS 2 TABLET(S): 8.6 TABLET ORAL at 22:21

## 2020-09-12 RX ADMIN — ONDANSETRON 4 MILLIGRAM(S): 8 TABLET, FILM COATED ORAL at 15:52

## 2020-09-12 RX ADMIN — ONDANSETRON 4 MILLIGRAM(S): 8 TABLET, FILM COATED ORAL at 22:23

## 2020-09-12 RX ADMIN — Medication 400 MILLIGRAM(S): at 17:38

## 2020-09-12 RX ADMIN — HEPARIN SODIUM 5000 UNIT(S): 5000 INJECTION INTRAVENOUS; SUBCUTANEOUS at 22:21

## 2020-09-12 RX ADMIN — SODIUM CHLORIDE 1 GRAM(S): 9 INJECTION INTRAMUSCULAR; INTRAVENOUS; SUBCUTANEOUS at 22:22

## 2020-09-12 RX ADMIN — HEPARIN SODIUM 5000 UNIT(S): 5000 INJECTION INTRAVENOUS; SUBCUTANEOUS at 15:45

## 2020-09-12 RX ADMIN — HEPARIN SODIUM 5000 UNIT(S): 5000 INJECTION INTRAVENOUS; SUBCUTANEOUS at 06:00

## 2020-09-12 RX ADMIN — CHLORHEXIDINE GLUCONATE 1 APPLICATION(S): 213 SOLUTION TOPICAL at 11:09

## 2020-09-12 NOTE — PROGRESS NOTE ADULT - ASSESSMENT
IMP:    63 y/o female with depression/anxieity, Gastric Metaplasia resulting in persistent nausea, brain aneurysm, hyponatremia (admission in June for Na 118/AMS) who presents to ED with 2 weeks of Nausea unable to eat but continued to drink 8 8oz water daily and AMS found to have Na 111.  Unable to take PO due to nausea, she is unable to auto correct Na level and has intermittent dysarthria with unrevealing brain MR  Hypovolemic hyponatremic POA--likely subacute/chronic   Metabolic Encephalopathy--better  Hypophosphatemia not POA--replete   No clinical sig Ca level    High risk for acute decompensation and deterioration including death--Symptomatic Hyponatremia     Plan:    Stop 3%  BMP at 1700  PO as tolerated--Negro did not really work  Fluid restriction   OOB  DVT prophy--SCD and sqhep  Neuro eval     SDU care-- d/w ICU staff on multi disciplinary rounds and pt-- All concerns addressed including but not limited to diagnosis, treatment plan and overall prognosis

## 2020-09-13 LAB
ANION GAP SERPL CALC-SCNC: 3 MMOL/L — LOW (ref 5–17)
BUN SERPL-MCNC: 21 MG/DL — SIGNIFICANT CHANGE UP (ref 7–23)
CALCIUM SERPL-MCNC: 8.9 MG/DL — SIGNIFICANT CHANGE UP (ref 8.5–10.1)
CHLORIDE SERPL-SCNC: 95 MMOL/L — LOW (ref 96–108)
CO2 SERPL-SCNC: 29 MMOL/L — SIGNIFICANT CHANGE UP (ref 22–31)
CREAT SERPL-MCNC: 0.61 MG/DL — SIGNIFICANT CHANGE UP (ref 0.5–1.3)
GLUCOSE SERPL-MCNC: 104 MG/DL — HIGH (ref 70–99)
HCT VFR BLD CALC: 34.7 % — SIGNIFICANT CHANGE UP (ref 34.5–45)
HGB BLD-MCNC: 11.9 G/DL — SIGNIFICANT CHANGE UP (ref 11.5–15.5)
MAGNESIUM SERPL-MCNC: 1.8 MG/DL — SIGNIFICANT CHANGE UP (ref 1.6–2.6)
MCHC RBC-ENTMCNC: 30.6 PG — SIGNIFICANT CHANGE UP (ref 27–34)
MCHC RBC-ENTMCNC: 34.3 GM/DL — SIGNIFICANT CHANGE UP (ref 32–36)
MCV RBC AUTO: 89.2 FL — SIGNIFICANT CHANGE UP (ref 80–100)
PHOSPHATE SERPL-MCNC: 3.2 MG/DL — SIGNIFICANT CHANGE UP (ref 2.5–4.5)
PLATELET # BLD AUTO: 180 K/UL — SIGNIFICANT CHANGE UP (ref 150–400)
POTASSIUM SERPL-MCNC: 4.5 MMOL/L — SIGNIFICANT CHANGE UP (ref 3.5–5.3)
POTASSIUM SERPL-SCNC: 4.5 MMOL/L — SIGNIFICANT CHANGE UP (ref 3.5–5.3)
RBC # BLD: 3.89 M/UL — SIGNIFICANT CHANGE UP (ref 3.8–5.2)
RBC # FLD: 12.2 % — SIGNIFICANT CHANGE UP (ref 10.3–14.5)
SODIUM SERPL-SCNC: 127 MMOL/L — LOW (ref 135–145)
WBC # BLD: 12.72 K/UL — HIGH (ref 3.8–10.5)
WBC # FLD AUTO: 12.72 K/UL — HIGH (ref 3.8–10.5)

## 2020-09-13 PROCEDURE — 99223 1ST HOSP IP/OBS HIGH 75: CPT

## 2020-09-13 PROCEDURE — 99232 SBSQ HOSP IP/OBS MODERATE 35: CPT

## 2020-09-13 RX ADMIN — Medication 400 MILLIGRAM(S): at 08:46

## 2020-09-13 RX ADMIN — SENNA PLUS 2 TABLET(S): 8.6 TABLET ORAL at 21:29

## 2020-09-13 RX ADMIN — Medication 400 MILLIGRAM(S): at 01:00

## 2020-09-13 RX ADMIN — Medication 400 MILLIGRAM(S): at 00:22

## 2020-09-13 RX ADMIN — SODIUM CHLORIDE 1 GRAM(S): 9 INJECTION INTRAMUSCULAR; INTRAVENOUS; SUBCUTANEOUS at 10:16

## 2020-09-13 RX ADMIN — Medication 400 MILLIGRAM(S): at 00:00

## 2020-09-13 RX ADMIN — HEPARIN SODIUM 5000 UNIT(S): 5000 INJECTION INTRAVENOUS; SUBCUTANEOUS at 21:29

## 2020-09-13 RX ADMIN — Medication 400 MILLIGRAM(S): at 17:43

## 2020-09-13 RX ADMIN — HEPARIN SODIUM 5000 UNIT(S): 5000 INJECTION INTRAVENOUS; SUBCUTANEOUS at 05:29

## 2020-09-13 RX ADMIN — Medication 400 MILLIGRAM(S): at 18:13

## 2020-09-13 RX ADMIN — SODIUM CHLORIDE 1 GRAM(S): 9 INJECTION INTRAMUSCULAR; INTRAVENOUS; SUBCUTANEOUS at 21:29

## 2020-09-13 RX ADMIN — HEPARIN SODIUM 5000 UNIT(S): 5000 INJECTION INTRAVENOUS; SUBCUTANEOUS at 14:00

## 2020-09-13 RX ADMIN — Medication 400 MILLIGRAM(S): at 09:45

## 2020-09-13 RX ADMIN — ONDANSETRON 4 MILLIGRAM(S): 8 TABLET, FILM COATED ORAL at 05:29

## 2020-09-13 RX ADMIN — CHLORHEXIDINE GLUCONATE 1 APPLICATION(S): 213 SOLUTION TOPICAL at 05:29

## 2020-09-13 NOTE — PROGRESS NOTE ADULT - RS GEN PE MLT RESP DETAILS PC
airway patent
airway patent/breath sounds equal
breath sounds equal/airway patent
airway patent/breath sounds equal

## 2020-09-13 NOTE — PROGRESS NOTE ADULT - MS EXT PE MLT D E PC
no cyanosis/no pedal edema
no pedal edema/no cyanosis
no cyanosis/no pedal edema
no pedal edema/no cyanosis
no cyanosis/no pedal edema

## 2020-09-13 NOTE — PROGRESS NOTE ADULT - GASTROINTESTINAL DETAILS
nontender/soft
nontender/soft
soft/nontender
nontender/soft

## 2020-09-13 NOTE — PROGRESS NOTE ADULT - ASSESSMENT
IMP:    63 y/o female with depression/anxieity, Gastric Metaplasia resulting in persistent nausea, brain aneurysm, hyponatremia (admission in June for Na 118/AMS) who presents to ED with 2 weeks of Nausea unable to eat but continued to drink 8 8oz water daily and AMS found to have Na 111.  Unable to take PO due to nausea, she is unable to auto correct Na level and has intermittent dysarthria with unrevealing brain MR--this has improved   Hypovolemic hyponatremic POA--likely subacute/chronic   Metabolic Encephalopathy--resolved  Hypophosphatemia not POA--replete   No clinical sig Ca level    Plan:    Daily labs  Psych consult  Neuro consult  PO as tolerated--Negro did not really work  Fluid restriction   OOB  DVT prophy--SCD and sqhep    SDU care-- d/w ICU staff on multi disciplinary rounds and pt-- All concerns addressed including but not limited to diagnosis, treatment plan and overall prognosis

## 2020-09-13 NOTE — PROGRESS NOTE ADULT - NEUROLOGICAL DETAILS
Grossly non focal/alert and oriented x 3
Grossly non focal/alert and oriented x 3
alert and oriented x 3/Grossly non focal
alert and oriented x 3/Grossly non focal
Grossly non focal/alert and oriented x 3
alert and oriented x 3/Grossly non focal
alert and oriented x 3/Grossly non focal

## 2020-09-13 NOTE — CONSULT NOTE ADULT - SUBJECTIVE AND OBJECTIVE BOX
Patient is a 64y old  Female who presents with a chief complaint of confusion/N/V (13 Sep 2020 10:29)      HPI:    64F with PMHx of depression/anxieity, brain aneurysm, hyponatremia (admission in June for Na 118/AMS) who presents to ED with 3-4day history of nausea/intermittent vomiting, wretching,  and progressive AMS. Evaluation in ED revealed Na 111. CTH without acute pathology. eICU consulted and pt admitted to ICU. Upon arrival pt receiving 3% NaCL bolus, discontinued upon my arrival. Prior admission and workup revealed hyponatremia possibly SIADH vs SSRI vs excessive free water intake. She was discharged following correction on NaCl tabs as outpt. She is no longer taking these. She states they were stopped by nephro on her follow up visit as outpt. She was taken off her SSRI at last admission and placed on seroquel only. Her dosing has been escalated since last admission to 150mg at HS and 50mg 2x during the day. She reports no longer using her klonipin. (06 Sep 2020 20:29)    Neurology was consulted at patient's request for difficulty speaking.  She reports intermittent difficulty with speech. She denies word finding difficulty but states that she has difficulty forming the words.  She denies having any difficulty swallowing.  Symptoms come and go but she says today she has not noticed this problem. She first started noticing the problem since admission.    PAST MEDICAL & SURGICAL HISTORY:  Hyponatremia  Brain aneurysm  Anxiety  Depression  History of neck surgery        FAMILY HISTORY:  non-contributory      Social Hx:  Nonsmoker, no drug or alcohol use    MEDICATIONS  (STANDING):  chlorhexidine 2% Cloths 1 Application(s) Topical <User Schedule>  heparin   Injectable 5000 Unit(s) SubCutaneous every 8 hours  influenza   Vaccine 0.5 milliLiter(s) IntraMuscular once  senna 2 Tablet(s) Oral at bedtime  sodium chloride 1 Gram(s) Oral two times a day       Allergies    No Known Allergies    Intolerances        ROS: Pertinent positives in HPI, all other ROS were reviewed and are negative.      Vital Signs Last 24 Hrs  T(C): 36.6 (13 Sep 2020 05:00), Max: 36.9 (12 Sep 2020 17:00)  T(F): 97.9 (13 Sep 2020 05:00), Max: 98.4 (12 Sep 2020 17:00)  HR: 79 (13 Sep 2020 12:00) (70 - 91)  BP: 149/67 (13 Sep 2020 12:00) (131/90 - 160/78)  BP(mean): 87 (13 Sep 2020 12:00) (86 - 122)  RR: 19 (13 Sep 2020 12:00) (14 - 26)  SpO2: 97% (13 Sep 2020 12:00) (96% - 100%)        Constitutional: awake and alert.  HEENT: PERRLA, EOMI,   Neck: Supple.  Respiratory: Breath sounds are clear bilaterally  Cardiovascular: S1 and S2, regular / irregular rhythm  Gastrointestinal: soft, nontender  Extremities:  no edema  Vascular: Carotid Bruit - no  Musculoskeletal: no joint swelling/tenderness, no abnormal movements  Skin: No rashes    Neurological exam:  HF: A x O x 3. Appropriately interactive, normal affect. Speech fluent, No Aphasia or paraphasic errors. Naming /repetition intact   CN: WILLIS, EOMI, VFF, facial sensation normal, no NLFD, tongue midline, Palate moves equally, SCM equal bilaterally  Motor: No pronator drift, Strength 5/5 in all 4 ext, normal bulk and tone, no tremor, rigidity or bradykinesia.    Sens: Intact to light touch   Reflexes: Symmetric and normal . BJ 1, BR 1, KJ 1, AJ 1, downgoing toes b/l  Coord:  No FNFA, dysmetria, MACIE intact           Labs:   09-13    127<L>  |  95<L>  |  21  ----------------------------<  104<H>  4.5   |  29  |  0.61    Ca    8.9      13 Sep 2020 06:55  Phos  3.2     09-13  Mg     1.8     09-13                                11.9   12.72 )-----------( 180      ( 13 Sep 2020 06:55 )             34.7       Radiology:  Head CT   ct head 9/6/20:  No acute intracranial hemorrhage, mass effect, or acute osseous fracture.      mri brain without contrast 9/12/20:  1. No evidence of central pontine or extrapontine myelinolysis.  2. No recent infarct on DWI. 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 No blood products on gradient echo imaging.  3. No extra-axial collections, hydrocephalus, midline shift or space-occupying mass lesion.  4. Minimal predominantly periventricular chronic white matter microvascular ischemic changes.

## 2020-09-13 NOTE — PROGRESS NOTE ADULT - COMMENTS
ROS o/w negative

## 2020-09-13 NOTE — CONSULT NOTE ADULT - ASSESSMENT
64F with PMHx of depression/anxieity, brain aneurysm, hyponatremia (admission in June for Na 118/AMS) who presents to ED with 3-4day history of nausea/intermittent vomiting, wretching,  and progressive AMS.   She reports intermittent speech difficulty with what sounds like some expressive aphasia, but also some dysarthria noted by ICU physician.  At this time patient's symptoms are improved.  MRI brain did not show any infarct.  Symptoms likely related to hyponatremia.  Will monitor and if symptoms return will consider EEG to r/o focal seizures.

## 2020-09-13 NOTE — PROGRESS NOTE ADULT - SUBJECTIVE AND OBJECTIVE BOX
Hospital # 7  ICU/SDU # 7    CC:  AMS    HPI:    65 y/o female with depression/anxieity, Gastric metaplasia resulting in persistent nausea,  brain aneurysm, hyponatremia (admission in June for Na 118/AMS) who presents to ED with 2 weeks of Nausea unable to eat but continued to drink 8 8oz water daily and AMS found to have Na 111.  Pt was admitted to ICU and placed on .      9/7:  Case d/w SUNNY Woody.  Pt seen and examined in ICU--Na 115--awake and alert and able to provide Hx.  Wants to have soup.  borderline BP  9/8:  Pt seen and examined in ICU--awake and alert--still feels nausea (chronic).  Na 117. Had intermittent dysarthria yesterday similar to prior hyponatremic state--resolved    9/9: Still feels nausea--unable to really eat--NS stopped yesterday and Na has slowly decreased to 114 this morning--will start 3% at 20  9/10:  Na rising on 3%.  Poor appetite. Discussed ?? PEG for nutrition support but she refuses.  + dysarthric this morning   9/11:  Na 125.  3% off.  Sitting in chair.  +dysarthric this morning.  Eating more.  Will obtain Brain MR today  9/12:  Na 129.  3% stopped this morning.  Dysarthria better but still present.  Better PO intake.  Brain MR--no acute findings   9/13:  Off 3%.  Na 127.  Appetite improving.  Eating more.  Dysarthria resolved this morning.  Pt is sad and depressed and would like to see psych--denies SI     PMH:  As above.     PSH:  As above.     FH: Non Contributory other than those listed in HPI    Social History:  As above     MEDICATIONS  (STANDING):  chlorhexidine 2% Cloths 1 Application(s) Topical <User Schedule>  heparin   Injectable 5000 Unit(s) SubCutaneous every 8 hours  influenza   Vaccine 0.5 milliLiter(s) IntraMuscular once  senna 2 Tablet(s) Oral at bedtime  sodium chloride 1 Gram(s) Oral two times a day    MEDICATIONS  (PRN):  acetaminophen   Tablet .. 650 milliGRAM(s) Oral every 6 hours PRN Temp greater or equal to 38.5C (101.3F), Mild Pain (1 - 3)  ibuprofen  Tablet. 400 milliGRAM(s) Oral every 6 hours PRN Moderate Pain (4 - 6)  melatonin 5 milliGRAM(s) Oral at bedtime PRN Insomnia  ondansetron Injectable 4 milliGRAM(s) IV Push every 6 hours PRN Nausea and/or Vomiting      Allergies: NKDA    ROS:  SEE BELOW        ICU Vital Signs Last 24 Hrs  T(C): 36.6 (13 Sep 2020 05:00), Max: 36.9 (12 Sep 2020 17:00)  T(F): 97.9 (13 Sep 2020 05:00), Max: 98.4 (12 Sep 2020 17:00)  HR: 90 (13 Sep 2020 09:00) (70 - 91)  BP: 131/90 (13 Sep 2020 09:00) (131/90 - 160/78)  BP(mean): 98 (13 Sep 2020 09:00) (86 - 122)  ABP: --  ABP(mean): --  RR: 14 (13 Sep 2020 09:00) (14 - 26)  SpO2: 99% (13 Sep 2020 09:00) (96% - 100%)          I&O's Summary    12 Sep 2020 07:01  -  13 Sep 2020 07:00  --------------------------------------------------------  IN: 0 mL / OUT: 2000 mL / NET: -2000 mL        Physical Exam:  SEE BELOW                          11.9   12.72 )-----------( 180      ( 13 Sep 2020 06:55 )             34.7       09-13    127<L>  |  95<L>  |  21  ----------------------------<  104<H>  4.5   |  29  |  0.61    Ca    8.9      13 Sep 2020 06:55  Phos  3.2     09-13  Mg     1.8     09-13                      DVT Prophylaxis:                                                            Contraindication:     Advanced Directives:    Discussed with:    Visit Information:  Time spent excluding procedure:      ** Time is exclusive of billed procedures and/or teaching and/or routine family updates.

## 2020-09-14 LAB
ANION GAP SERPL CALC-SCNC: 7 MMOL/L — SIGNIFICANT CHANGE UP (ref 5–17)
BUN SERPL-MCNC: 16 MG/DL — SIGNIFICANT CHANGE UP (ref 7–23)
CALCIUM SERPL-MCNC: 9 MG/DL — SIGNIFICANT CHANGE UP (ref 8.5–10.1)
CHLORIDE SERPL-SCNC: 93 MMOL/L — LOW (ref 96–108)
CO2 SERPL-SCNC: 26 MMOL/L — SIGNIFICANT CHANGE UP (ref 22–31)
CREAT SERPL-MCNC: 0.56 MG/DL — SIGNIFICANT CHANGE UP (ref 0.5–1.3)
GLUCOSE SERPL-MCNC: 104 MG/DL — HIGH (ref 70–99)
HCT VFR BLD CALC: 34.3 % — LOW (ref 34.5–45)
HGB BLD-MCNC: 11.9 G/DL — SIGNIFICANT CHANGE UP (ref 11.5–15.5)
MAGNESIUM SERPL-MCNC: 1.8 MG/DL — SIGNIFICANT CHANGE UP (ref 1.6–2.6)
MCHC RBC-ENTMCNC: 30.4 PG — SIGNIFICANT CHANGE UP (ref 27–34)
MCHC RBC-ENTMCNC: 34.7 GM/DL — SIGNIFICANT CHANGE UP (ref 32–36)
MCV RBC AUTO: 87.7 FL — SIGNIFICANT CHANGE UP (ref 80–100)
PHOSPHATE SERPL-MCNC: 2.7 MG/DL — SIGNIFICANT CHANGE UP (ref 2.5–4.5)
PLATELET # BLD AUTO: 181 K/UL — SIGNIFICANT CHANGE UP (ref 150–400)
POTASSIUM SERPL-MCNC: 3.8 MMOL/L — SIGNIFICANT CHANGE UP (ref 3.5–5.3)
POTASSIUM SERPL-SCNC: 3.8 MMOL/L — SIGNIFICANT CHANGE UP (ref 3.5–5.3)
RBC # BLD: 3.91 M/UL — SIGNIFICANT CHANGE UP (ref 3.8–5.2)
RBC # FLD: 12.2 % — SIGNIFICANT CHANGE UP (ref 10.3–14.5)
SODIUM SERPL-SCNC: 126 MMOL/L — LOW (ref 135–145)
WBC # BLD: 14.72 K/UL — HIGH (ref 3.8–10.5)
WBC # FLD AUTO: 14.72 K/UL — HIGH (ref 3.8–10.5)

## 2020-09-14 PROCEDURE — 70544 MR ANGIOGRAPHY HEAD W/O DYE: CPT | Mod: 26

## 2020-09-14 PROCEDURE — 99233 SBSQ HOSP IP/OBS HIGH 50: CPT

## 2020-09-14 PROCEDURE — 99232 SBSQ HOSP IP/OBS MODERATE 35: CPT

## 2020-09-14 RX ORDER — SODIUM CHLORIDE 9 MG/ML
1000 INJECTION INTRAMUSCULAR; INTRAVENOUS; SUBCUTANEOUS
Refills: 0 | Status: DISCONTINUED | OUTPATIENT
Start: 2020-09-14 | End: 2020-09-14

## 2020-09-14 RX ORDER — ALPRAZOLAM 0.25 MG
0.5 TABLET ORAL ONCE
Refills: 0 | Status: DISCONTINUED | OUTPATIENT
Start: 2020-09-14 | End: 2020-09-14

## 2020-09-14 RX ADMIN — SENNA PLUS 2 TABLET(S): 8.6 TABLET ORAL at 21:13

## 2020-09-14 RX ADMIN — SODIUM CHLORIDE 1 GRAM(S): 9 INJECTION INTRAMUSCULAR; INTRAVENOUS; SUBCUTANEOUS at 09:44

## 2020-09-14 RX ADMIN — SODIUM CHLORIDE 75 MILLILITER(S): 9 INJECTION INTRAMUSCULAR; INTRAVENOUS; SUBCUTANEOUS at 13:32

## 2020-09-14 RX ADMIN — Medication 400 MILLIGRAM(S): at 02:41

## 2020-09-14 RX ADMIN — Medication 400 MILLIGRAM(S): at 09:44

## 2020-09-14 RX ADMIN — HEPARIN SODIUM 5000 UNIT(S): 5000 INJECTION INTRAVENOUS; SUBCUTANEOUS at 05:02

## 2020-09-14 RX ADMIN — SODIUM CHLORIDE 1 GRAM(S): 9 INJECTION INTRAMUSCULAR; INTRAVENOUS; SUBCUTANEOUS at 22:03

## 2020-09-14 RX ADMIN — Medication 400 MILLIGRAM(S): at 23:25

## 2020-09-14 RX ADMIN — HEPARIN SODIUM 5000 UNIT(S): 5000 INJECTION INTRAVENOUS; SUBCUTANEOUS at 21:13

## 2020-09-14 RX ADMIN — ONDANSETRON 4 MILLIGRAM(S): 8 TABLET, FILM COATED ORAL at 21:13

## 2020-09-14 RX ADMIN — Medication 400 MILLIGRAM(S): at 02:11

## 2020-09-14 RX ADMIN — ONDANSETRON 4 MILLIGRAM(S): 8 TABLET, FILM COATED ORAL at 03:48

## 2020-09-14 RX ADMIN — Medication 400 MILLIGRAM(S): at 10:45

## 2020-09-14 RX ADMIN — CHLORHEXIDINE GLUCONATE 1 APPLICATION(S): 213 SOLUTION TOPICAL at 08:37

## 2020-09-14 RX ADMIN — Medication 5 MILLIGRAM(S): at 21:13

## 2020-09-14 RX ADMIN — HEPARIN SODIUM 5000 UNIT(S): 5000 INJECTION INTRAVENOUS; SUBCUTANEOUS at 13:31

## 2020-09-14 RX ADMIN — ONDANSETRON 4 MILLIGRAM(S): 8 TABLET, FILM COATED ORAL at 13:31

## 2020-09-14 RX ADMIN — Medication 400 MILLIGRAM(S): at 16:05

## 2020-09-14 RX ADMIN — Medication 0.5 MILLIGRAM(S): at 21:18

## 2020-09-14 RX ADMIN — Medication 400 MILLIGRAM(S): at 22:55

## 2020-09-14 NOTE — PROGRESS NOTE ADULT - ASSESSMENT
64F with PMHx of depression/anxieity, brain aneurysm, hyponatremia (admission in June for Na 118/AMS) who presents to ED with 3-4day history of nausea/intermittent vomiting, wretching,  and progressive AMS.   She reports intermittent speech difficulty with what sounds like some expressive aphasia, but also some dysarthria noted by ICU physician.  At this time patient's symptoms are improved.  MRI brain did not show any infarct.  Symptoms likely related to hyponatremia.  Please call back if symptoms recur. If so, will get EEG.

## 2020-09-14 NOTE — CONSULT NOTE ADULT - SUBJECTIVE AND OBJECTIVE BOX
NEPHROLOGY INTERVAL HPI/OVERNIGHT EVENTS:  20 @ 15:21  HPI:  History obtained from pt and significant other at bedside  64F with PMHx of depression/anxieity, brain aneurysm, hyponatremia (admission in  for Na 118/AMS) who presents to ED with 3-4day history of nausea/intermittent vomiting, wretching,  and progressive AMS. Evaluation in ED revealed Na 111. CTH without acute pathology. eICU consulted and pt admitted to ICU. Upon arrival pt receiving 3% NaCL bolus, discontinued upon my arrival. Prior admission and workup revealed hyponatremia possibly SIADH vs SSRI vs excessive free water intake. She was discharged following correction on NaCl tabs as outpt. She is no longer taking these. She states they were stopped by nephro on her follow up visit as outpt. She was taken off her SSRI at last admission and placed on seroquel only. Her dosing has been escalated since last admission to 150mg at HS and 50mg 2x during the day. She reports no longer using her klonipin. (06 Sep 2020 20:29)      Patient is a 64y old  Female who presents with a chief complaint of confusion/N/V (14 Sep 2020 12:42)  --------------------------------------------------------------------------------  hx from  and friend, adamaris donaldson  pt was dc after ssri/lexapro related hyponatremia.  upon dc in 2020 post dc na was normal and was dc from salt tablets by dr romano  pt as per  was doing well and eating but maintained fluid restriciton   progressively with more nauseau and inc lethargy  admitted with hyponatremia and was tx with hypertonic and nacl tabs  on ns, na with variability and drop   taking in po but Nauseau most disturbing sx  has been on seroquel to manage her anxiety.  has been off since admission  workup for nauseau with dr carson that was neg   no upto date mammogram/pap  sees dr raymond for moles   had cerebral aneurysm 5mm? as per  and was advised annual fu 5 years ago  her slow speech with flat affect atypical for her   no diarrhea or vomiting  has been trying to eat while here   over course of 3 years, has had 70lb weight loss     MEDICATIONS  (STANDING):  chlorhexidine 2% Cloths 1 Application(s) Topical <User Schedule>  heparin   Injectable 5000 Unit(s) SubCutaneous every 8 hours  influenza   Vaccine 0.5 milliLiter(s) IntraMuscular once  senna 2 Tablet(s) Oral at bedtime  sodium chloride 1 Gram(s) Oral two times a day  sodium chloride 0.9%. 1000 milliLiter(s) (75 mL/Hr) IV Continuous <Continuous>    MEDICATIONS  (PRN):  acetaminophen   Tablet .. 650 milliGRAM(s) Oral every 6 hours PRN Temp greater or equal to 38.5C (101.3F), Mild Pain (1 - 3)  ibuprofen  Tablet. 400 milliGRAM(s) Oral every 6 hours PRN Moderate Pain (4 - 6)  melatonin 5 milliGRAM(s) Oral at bedtime PRN Insomnia  ondansetron Injectable 4 milliGRAM(s) IV Push every 6 hours PRN Nausea and/or Vomiting      Allergies    No Known Allergies    Intolerances        I&O's Detail    13 Sep 2020 07:01  -  14 Sep 2020 07:00  --------------------------------------------------------  IN:  Total IN: 0 mL    OUT:    Voided (mL): 1050 mL  Total OUT: 1050 mL    Total NET: -1050 mL      Vital Signs Last 24 Hrs  T(C): 36.9 (14 Sep 2020 14:00), Max: 36.9 (14 Sep 2020 14:00)  T(F): 98.5 (14 Sep 2020 14:00), Max: 98.5 (14 Sep 2020 14:00)  HR: 85 (14 Sep 2020 14:00) (71 - 87)  BP: 145/56 (14 Sep 2020 14:00) (138/80 - 168/86)  BP(mean): 74 (14 Sep 2020 14:00) (74 - 125)  RR: 19 (14 Sep 2020 14:00) (15 - 24)  SpO2: 98% (14 Sep 2020 14:00) (96% - 100%)  Daily     Daily Weight in k (14 Sep 2020 05:00)    PHYSICAL EXAM:  General: alert. awake NAD  HEENT: MMM  CV: s1s2 rrr  LUNGS: B/L CTA  EXT: no edema    LABS:                        11.9   14.72 )-----------( 181      ( 14 Sep 2020 06:30 )             34.3     -14    126<L>  |  93<L>  |  16  ----------------------------<  104<H>  3.8   |  26  |  0.56    Ca    9.0      14 Sep 2020 06:30  Phos  2.7       Mg     1.8         Magnesium, Serum: 1.8 mg/dL ( @ 06:30)  Phosphorus Level, Serum: 2.7 mg/dL ( @ 06:30)

## 2020-09-14 NOTE — PROGRESS NOTE ADULT - SUBJECTIVE AND OBJECTIVE BOX
Interval History:  9/14/20: Not feeling well today but denies having any speech difficulties at this time - none present in last two days    MEDICATIONS  (STANDING):  chlorhexidine 2% Cloths 1 Application(s) Topical <User Schedule>  heparin   Injectable 5000 Unit(s) SubCutaneous every 8 hours  influenza   Vaccine 0.5 milliLiter(s) IntraMuscular once  senna 2 Tablet(s) Oral at bedtime  sodium chloride 1 Gram(s) Oral two times a day  sodium chloride 0.9%. 1000 milliLiter(s) (75 mL/Hr) IV Continuous <Continuous>    MEDICATIONS  (PRN):  acetaminophen   Tablet .. 650 milliGRAM(s) Oral every 6 hours PRN Temp greater or equal to 38.5C (101.3F), Mild Pain (1 - 3)  ibuprofen  Tablet. 400 milliGRAM(s) Oral every 6 hours PRN Moderate Pain (4 - 6)  melatonin 5 milliGRAM(s) Oral at bedtime PRN Insomnia  ondansetron Injectable 4 milliGRAM(s) IV Push every 6 hours PRN Nausea and/or Vomiting      Allergies    No Known Allergies    Intolerances        PHYSICAL EXAM:  Vital Signs Last 24 Hrs  T(F): 98.3 (09-14-20 @ 05:00)  HR: 80 (09-14-20 @ 10:00)  BP: 151/92 (09-14-20 @ 10:00)  RR: 18 (09-14-20 @ 10:00)    GENERAL: NAD, well-groomed, well-developed  HEAD:  Atraumatic, Normocephalic  Neuro:  Awake, alert, no aphasia  CN: PERRL, EOMI, no nystagmus, no facial weakness, tongue protrudes in the midline  motor: normal tone, no pronator drift, full strength in all four extremities  sensory: intact to light touch  coordination: finger to nose intact bilaterally  DTRs: symmetric, plantar responses flexor bilaterally  LABS:                        11.9   14.72 )-----------( 181      ( 14 Sep 2020 06:30 )             34.3     09-14    126<L>  |  93<L>  |  16  ----------------------------<  104<H>  3.8   |  26  |  0.56    Ca    9.0      14 Sep 2020 06:30  Phos  2.7     09-14  Mg     1.8     09-14            RADIOLOGY & ADDITIONAL STUDIES:  Head CT   ct head 9/6/20:  No acute intracranial hemorrhage, mass effect, or acute osseous fracture.      mri brain without contrast 9/12/20:  1. No evidence of central pontine or extrapontine myelinolysis.  2. No recent infarct on DWI. 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 No blood products on gradient echo imaging.  3. No extra-axial collections, hydrocephalus, midline shift or space-occupying mass lesion.  4. Minimal predominantly periventricular chronic white matter microvascular ischemic changes.

## 2020-09-14 NOTE — PROGRESS NOTE ADULT - SUBJECTIVE AND OBJECTIVE BOX
Events Overnight: awake and alert, slightly withdrawn, sodium 126.    HPI:      65 y/o female with depression/anxieity, Gastric metaplasia resulting in persistent nausea,  brain aneurysm, hyponatremia (admission in June for Na 118/AMS) who presents to ED with 2 weeks of Nausea unable to eat but continued to drink 8 8oz water daily and AMS found to have Na 111.  Pt was admitted to ICU and placed on .    patient had slurred speech and was intermittently treated with hypertonic saline, was on seroquel prior to admission.     PMH:  as above        MEDICATIONS  (STANDING):  chlorhexidine 2% Cloths 1 Application(s) Topical <User Schedule>  heparin   Injectable 5000 Unit(s) SubCutaneous every 8 hours  influenza   Vaccine 0.5 milliLiter(s) IntraMuscular once  senna 2 Tablet(s) Oral at bedtime  sodium chloride 1 Gram(s) Oral two times a day    MEDICATIONS  (PRN):  acetaminophen   Tablet .. 650 milliGRAM(s) Oral every 6 hours PRN Temp greater or equal to 38.5C (101.3F), Mild Pain (1 - 3)  ibuprofen  Tablet. 400 milliGRAM(s) Oral every 6 hours PRN Moderate Pain (4 - 6)  melatonin 5 milliGRAM(s) Oral at bedtime PRN Insomnia  ondansetron Injectable 4 milliGRAM(s) IV Push every 6 hours PRN Nausea and/or Vomiting      ICU Vital Signs Last 24 Hrs  T(C): 36.8 (14 Sep 2020 05:00), Max: 36.8 (14 Sep 2020 05:00)  T(F): 98.3 (14 Sep 2020 05:00), Max: 98.3 (14 Sep 2020 05:00)  HR: 80 (14 Sep 2020 10:00) (71 - 87)  BP: 151/92 (14 Sep 2020 10:00) (138/80 - 168/86)  BP(mean): 106 (14 Sep 2020 10:00) (80 - 125)  ABP: --  ABP(mean): --  RR: 18 (14 Sep 2020 10:00) (16 - 23)  SpO2: 98% (14 Sep 2020 10:00) (96% - 100%)    I&O's Summary    13 Sep 2020 07:01  -  14 Sep 2020 07:00  --------------------------------------------------------  IN: 0 mL / OUT: 1050 mL / NET: -1050 mL                            11.9   14.72 )-----------( 181      ( 14 Sep 2020 06:30 )             34.3       09-14    126<L>  |  93<L>  |  16  ----------------------------<  104<H>  3.8   |  26  |  0.56    Ca    9.0      14 Sep 2020 06:30  Phos  2.7     09-14  Mg     1.8     09-14      DVT Prophylaxis:    Heparin subq                                                             Advanced Directives: Full code

## 2020-09-14 NOTE — BEHAVIORAL HEALTH ASSESSMENT NOTE - SUMMARY
64 year-old  female, with history of anxiety and depression, medical history of brain aneurysm, treated by Dr. Sepulveda - [phone], with no prior in-patient hospitalization, admitted after presenting with  hyponatremia. Patient presented with hyponatremia 6.2020, of which Lexapro was discontinued and patient remained only on Seroquel.    Patient presenting with exacerbation of depression and anxiety in the setting of medical comorbidities. Patient depression is moderate, with no hopelessness and no suicidal ideation. Patient has no mp No delusions elicited. Patient has therapeutic relationships and social supports. Patient is future oriented. Patient engaged in safety planning. Patient symptoms not indicating imminent risk for harm to self; not warranting involuntary in-patient hospitalization.     PLAN  1. Emotional support  2. HOLD psychotropic management

## 2020-09-14 NOTE — CONSULT NOTE ADULT - ASSESSMENT
64F with PMHx of depression/anxieity, brain aneurysm, hyponatremia (admission in June for Na 118/AMS) who presents to ED with 3-4day history of nausea/intermittent vomiting, wretching,  and progressive AMS. Evaluation in ED revealed Na 111. CTH without acute pathology.  PT with hx of saidh due to lexapro on june 2020.  Now with persistent low na value and renal consult requested.  MRI with contrast of brain was negative.      PLAN   - dc IVF   - continue with fluid restrict  - resent urine and serum studies and evaluate for siadh vs adh excess from nauseau  - nauseau control  - etiology of nauseau undefined with neg GI work-up  - MRA of brain with hx of cerebral aneurysm.  hx of 5mm?? 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 Evaluation in ED revealed Na 111. CTH without acute pathology.  PT with hx of saidh due to lexapro on june 2020.  Now with persistent low na value and renal consult requested.  MRI with contrast of brain was negative.      PLAN   - dc IVF   - continue with fluid restrict  - resent urine and serum studies and evaluate for siadh vs adh excess from nauseau  - nauseau control  - etiology of nauseau undefined with neg GI work-up  - MRA of brain with hx of cerebral aneurysm.  hx of 5mm?? 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

## 2020-09-14 NOTE — PROGRESS NOTE ADULT - CARDIOVASCULAR
detailed exam
Regular rate & rhythm, normal S1, S2; no murmurs, gallops or rubs; no S3, S4
detailed exam

## 2020-09-14 NOTE — PROGRESS NOTE ADULT - EXTREMITIES
No cyanosis, clubbing or edema
detailed exam

## 2020-09-14 NOTE — BEHAVIORAL HEALTH ASSESSMENT NOTE - SUICIDE PROTECTIVE FACTORS
Identifies reasons for living/Ability to cope with stress/Responsibility to family and others/Supportive social network of family or friends/Has future plans

## 2020-09-14 NOTE — PROGRESS NOTE ADULT - ASSESSMENT
63 y/o female with depression/anxieity, Gastric Metaplasia resulting in persistent nausea, brain aneurysm, hyponatremia (admission in June for Na 118/AMS) who presents to ED with 2 weeks of Nausea unable to eat but continued to drink 8 8oz water daily and AMS found to have Na 111.   mri was negative  Hypovolemic hyponatremic --likely subacute/chronic , was on seroquel(rare cause) chronic nausea  Metabolic Encephalopathy--improved  No clinical sig Ca level  patient [id number] of feeling depressed        Plan:  Psych consult  Neuro consult  PO as tolerated--Tigan did not really work  Fluid restriction  will give hydration with normal saline, will get nephrology consult likely SIADH  May transfer to floor  OOB  DVT prophy--SCD and sqhep    SDU care-- d/w ICU staff on multi disciplinary rounds and pt-- All concerns addressed including but not limited to diagnosis, treatment plan and overall prognosis

## 2020-09-14 NOTE — PROGRESS NOTE ADULT - CONSTITUTIONAL DETAILS
no distress
distress due to pain
no distress
no distress/Intermittent dysarthric
no distress/Normal speech
no distress

## 2020-09-14 NOTE — PROGRESS NOTE ADULT - RESPIRATORY
detailed exam
Breath Sounds equal & clear to percussion & auscultation, no accessory muscle use
detailed exam

## 2020-09-14 NOTE — BEHAVIORAL HEALTH ASSESSMENT NOTE - HPI (INCLUDE ILLNESS QUALITY, SEVERITY, DURATION, TIMING, CONTEXT, MODIFYING FACTORS, ASSOCIATED SIGNS AND SYMPTOMS)
64 year-old  female, with history of anxiety and depression, medical history of brain aneurysm, treated by Dr. Sepulveda - [phone], with no prior in-patient hospitalization, admitted after presenting with  hyponatremia. Patient presented with hyponatremia 6.2020, of which Lexapro was discontinued and patient remained only on Seroquel.    Patient presenting calm and cooperative, linear, organized, normal reasoning, with no thought disorder. Patient reports depression and anxiety being chronic, however endorsing a worsening in the last several weeks in the setting of worsening medical condition(s). Reports poor sleep at times, finding mood the next morning / day to be contingent on her sleep the night prior. Reports depressed mood being moderate today. Denies hopelessness or suicidal ideation stating "I love my life. I want it to get better." Reports appetite being fair. Denies manic / psychotic symptoms.. No delusions elicited. Reports being unable to take medication to nausea and vomiting, let alone hyponatremia. Reports to manage her depression with therapy for now, stating appreciating bedside emotional support.      bedside, provides collateral, corroborating psychiatric history as above, and symptoms. Reports patient is in better overall mood when she is able to sleep at night. Denies other acute symptoms, stating depressed mood being acute secondary to medical comorbidities and requiring hospitalization. Denies excessive water intake. Reports medication compliance with Seroquel. Denies other safety concerns.

## 2020-09-14 NOTE — BEHAVIORAL HEALTH ASSESSMENT NOTE - NSBHCHARTREVIEWINVESTIGATE_PSY_A_CORE FT
Ventricular Rate 91 BPM    Atrial Rate 91 BPM    P-R Interval 188 ms    QRS Duration 82 ms    Q-T Interval 378 ms    QTC Calculation(Bezet) 464 ms    P Axis 49 degrees    R Axis 65 degrees    T Axis 45 degrees    Diagnosis Line Normal sinus rhythm  Possible Left atrial enlargement  Borderline ECG  When compared with ECG of 06-JUN-2020 12:18,  No significant change was found  Confirmed by Kvng Sánchez ([id number]),  TRINA BARBOZA ([id number]) on 6/8/2020 8:10:36 AM

## 2020-09-15 LAB
ALBUMIN SERPL ELPH-MCNC: 3.5 G/DL — SIGNIFICANT CHANGE UP (ref 3.3–5)
ALP SERPL-CCNC: 124 U/L — HIGH (ref 40–120)
ALT FLD-CCNC: 40 U/L — SIGNIFICANT CHANGE UP (ref 12–78)
AMMONIA BLD-MCNC: 41 UMOL/L — HIGH (ref 11–32)
ANION GAP SERPL CALC-SCNC: 11 MMOL/L — SIGNIFICANT CHANGE UP (ref 5–17)
ANION GAP SERPL CALC-SCNC: 7 MMOL/L — SIGNIFICANT CHANGE UP (ref 5–17)
ANION GAP SERPL CALC-SCNC: 7 MMOL/L — SIGNIFICANT CHANGE UP (ref 5–17)
ANION GAP SERPL CALC-SCNC: 8 MMOL/L — SIGNIFICANT CHANGE UP (ref 5–17)
ANION GAP SERPL CALC-SCNC: 8 MMOL/L — SIGNIFICANT CHANGE UP (ref 5–17)
AST SERPL-CCNC: 31 U/L — SIGNIFICANT CHANGE UP (ref 15–37)
BILIRUB SERPL-MCNC: 0.6 MG/DL — SIGNIFICANT CHANGE UP (ref 0.2–1.2)
BUN SERPL-MCNC: 18 MG/DL — SIGNIFICANT CHANGE UP (ref 7–23)
BUN SERPL-MCNC: 19 MG/DL — SIGNIFICANT CHANGE UP (ref 7–23)
BUN SERPL-MCNC: 19 MG/DL — SIGNIFICANT CHANGE UP (ref 7–23)
BUN SERPL-MCNC: 20 MG/DL — SIGNIFICANT CHANGE UP (ref 7–23)
BUN SERPL-MCNC: 20 MG/DL — SIGNIFICANT CHANGE UP (ref 7–23)
CALCIUM SERPL-MCNC: 8.7 MG/DL — SIGNIFICANT CHANGE UP (ref 8.5–10.1)
CALCIUM SERPL-MCNC: 8.8 MG/DL — SIGNIFICANT CHANGE UP (ref 8.5–10.1)
CALCIUM SERPL-MCNC: 8.8 MG/DL — SIGNIFICANT CHANGE UP (ref 8.5–10.1)
CALCIUM SERPL-MCNC: 8.9 MG/DL — SIGNIFICANT CHANGE UP (ref 8.5–10.1)
CALCIUM SERPL-MCNC: 9.2 MG/DL — SIGNIFICANT CHANGE UP (ref 8.5–10.1)
CHLORIDE SERPL-SCNC: 86 MMOL/L — LOW (ref 96–108)
CHLORIDE SERPL-SCNC: 87 MMOL/L — LOW (ref 96–108)
CHLORIDE SERPL-SCNC: 87 MMOL/L — LOW (ref 96–108)
CHLORIDE SERPL-SCNC: 89 MMOL/L — LOW (ref 96–108)
CHLORIDE SERPL-SCNC: 90 MMOL/L — LOW (ref 96–108)
CO2 SERPL-SCNC: 22 MMOL/L — SIGNIFICANT CHANGE UP (ref 22–31)
CO2 SERPL-SCNC: 24 MMOL/L — SIGNIFICANT CHANGE UP (ref 22–31)
CO2 SERPL-SCNC: 24 MMOL/L — SIGNIFICANT CHANGE UP (ref 22–31)
CO2 SERPL-SCNC: 25 MMOL/L — SIGNIFICANT CHANGE UP (ref 22–31)
CO2 SERPL-SCNC: 25 MMOL/L — SIGNIFICANT CHANGE UP (ref 22–31)
CREAT SERPL-MCNC: 0.54 MG/DL — SIGNIFICANT CHANGE UP (ref 0.5–1.3)
CREAT SERPL-MCNC: 0.55 MG/DL — SIGNIFICANT CHANGE UP (ref 0.5–1.3)
CREAT SERPL-MCNC: 0.62 MG/DL — SIGNIFICANT CHANGE UP (ref 0.5–1.3)
CREAT SERPL-MCNC: 0.67 MG/DL — SIGNIFICANT CHANGE UP (ref 0.5–1.3)
CREAT SERPL-MCNC: 0.68 MG/DL — SIGNIFICANT CHANGE UP (ref 0.5–1.3)
GLUCOSE SERPL-MCNC: 114 MG/DL — HIGH (ref 70–99)
GLUCOSE SERPL-MCNC: 118 MG/DL — HIGH (ref 70–99)
GLUCOSE SERPL-MCNC: 121 MG/DL — HIGH (ref 70–99)
GLUCOSE SERPL-MCNC: 131 MG/DL — HIGH (ref 70–99)
GLUCOSE SERPL-MCNC: 153 MG/DL — HIGH (ref 70–99)
HCT VFR BLD CALC: 33.1 % — LOW (ref 34.5–45)
HGB BLD-MCNC: 11.9 G/DL — SIGNIFICANT CHANGE UP (ref 11.5–15.5)
MCHC RBC-ENTMCNC: 30.8 PG — SIGNIFICANT CHANGE UP (ref 27–34)
MCHC RBC-ENTMCNC: 36 GM/DL — SIGNIFICANT CHANGE UP (ref 32–36)
MCV RBC AUTO: 85.8 FL — SIGNIFICANT CHANGE UP (ref 80–100)
PLATELET # BLD AUTO: 171 K/UL — SIGNIFICANT CHANGE UP (ref 150–400)
POTASSIUM SERPL-MCNC: 3.4 MMOL/L — LOW (ref 3.5–5.3)
POTASSIUM SERPL-MCNC: 3.6 MMOL/L — SIGNIFICANT CHANGE UP (ref 3.5–5.3)
POTASSIUM SERPL-MCNC: 3.6 MMOL/L — SIGNIFICANT CHANGE UP (ref 3.5–5.3)
POTASSIUM SERPL-MCNC: 3.7 MMOL/L — SIGNIFICANT CHANGE UP (ref 3.5–5.3)
POTASSIUM SERPL-MCNC: 3.8 MMOL/L — SIGNIFICANT CHANGE UP (ref 3.5–5.3)
POTASSIUM SERPL-SCNC: 3.4 MMOL/L — LOW (ref 3.5–5.3)
POTASSIUM SERPL-SCNC: 3.6 MMOL/L — SIGNIFICANT CHANGE UP (ref 3.5–5.3)
POTASSIUM SERPL-SCNC: 3.6 MMOL/L — SIGNIFICANT CHANGE UP (ref 3.5–5.3)
POTASSIUM SERPL-SCNC: 3.7 MMOL/L — SIGNIFICANT CHANGE UP (ref 3.5–5.3)
POTASSIUM SERPL-SCNC: 3.8 MMOL/L — SIGNIFICANT CHANGE UP (ref 3.5–5.3)
PROT SERPL-MCNC: 7.1 GM/DL — SIGNIFICANT CHANGE UP (ref 6–8.3)
RBC # BLD: 3.86 M/UL — SIGNIFICANT CHANGE UP (ref 3.8–5.2)
RBC # FLD: 12.1 % — SIGNIFICANT CHANGE UP (ref 10.3–14.5)
SODIUM SERPL-SCNC: 118 MMOL/L — CRITICAL LOW (ref 135–145)
SODIUM SERPL-SCNC: 119 MMOL/L — CRITICAL LOW (ref 135–145)
SODIUM SERPL-SCNC: 120 MMOL/L — CRITICAL LOW (ref 135–145)
SODIUM SERPL-SCNC: 121 MMOL/L — LOW (ref 135–145)
SODIUM SERPL-SCNC: 122 MMOL/L — LOW (ref 135–145)
WBC # BLD: 14.23 K/UL — HIGH (ref 3.8–10.5)
WBC # FLD AUTO: 14.23 K/UL — HIGH (ref 3.8–10.5)

## 2020-09-15 PROCEDURE — 99233 SBSQ HOSP IP/OBS HIGH 50: CPT

## 2020-09-15 RX ORDER — SODIUM CHLORIDE 5 G/100ML
500 INJECTION, SOLUTION INTRAVENOUS
Refills: 0 | Status: DISCONTINUED | OUTPATIENT
Start: 2020-09-15 | End: 2020-09-19

## 2020-09-15 RX ORDER — FUROSEMIDE 40 MG
10 TABLET ORAL ONCE
Refills: 0 | Status: COMPLETED | OUTPATIENT
Start: 2020-09-15 | End: 2020-09-15

## 2020-09-15 RX ADMIN — ONDANSETRON 4 MILLIGRAM(S): 8 TABLET, FILM COATED ORAL at 06:03

## 2020-09-15 RX ADMIN — Medication 10 MILLIGRAM(S): at 14:34

## 2020-09-15 RX ADMIN — Medication 5 MILLIGRAM(S): at 22:16

## 2020-09-15 RX ADMIN — HEPARIN SODIUM 5000 UNIT(S): 5000 INJECTION INTRAVENOUS; SUBCUTANEOUS at 06:03

## 2020-09-15 RX ADMIN — Medication 400 MILLIGRAM(S): at 22:16

## 2020-09-15 RX ADMIN — SODIUM CHLORIDE 40 MILLILITER(S): 5 INJECTION, SOLUTION INTRAVENOUS at 14:35

## 2020-09-15 RX ADMIN — SODIUM CHLORIDE 1 GRAM(S): 9 INJECTION INTRAMUSCULAR; INTRAVENOUS; SUBCUTANEOUS at 09:28

## 2020-09-15 RX ADMIN — HEPARIN SODIUM 5000 UNIT(S): 5000 INJECTION INTRAVENOUS; SUBCUTANEOUS at 22:16

## 2020-09-15 RX ADMIN — Medication 400 MILLIGRAM(S): at 06:30

## 2020-09-15 RX ADMIN — SODIUM CHLORIDE 1 GRAM(S): 9 INJECTION INTRAMUSCULAR; INTRAVENOUS; SUBCUTANEOUS at 22:16

## 2020-09-15 RX ADMIN — HEPARIN SODIUM 5000 UNIT(S): 5000 INJECTION INTRAVENOUS; SUBCUTANEOUS at 15:55

## 2020-09-15 RX ADMIN — Medication 400 MILLIGRAM(S): at 06:04

## 2020-09-15 NOTE — PROGRESS NOTE ADULT - SUBJECTIVE AND OBJECTIVE BOX
NEPHROLOGY INTERVAL HPI/OVERNIGHT EVENTS:  09-14-20 @ 15:21  HPI:  History obtained from pt and significant other at bedside  64F with PMHx of depression/anxieity, brain aneurysm, hyponatremia (admission in June for Na 118/AMS) who presents to ED with 3-4day history of nausea/intermittent vomiting, wretching,  and progressive AMS. Evaluation in ED revealed Na 111. CTH without acute pathology. eICU consulted and pt admitted to ICU. Upon arrival pt receiving 3% NaCL bolus, discontinued upon my arrival. Prior admission and workup revealed hyponatremia possibly SIADH vs SSRI vs excessive free water intake. She was discharged following correction on NaCl tabs as outpt. She is no longer taking these. She states they were stopped by nephro on her follow up visit as outpt. She was taken off her SSRI at last admission and placed on seroquel only. Her dosing has been escalated since last admission to 150mg at HS and 50mg 2x during the day. She reports no longer using her klonipin. (06 Sep 2020 20:29)      Patient is a 64y old  Female who presents with a chief complaint of confusion/N/V (14 Sep 2020 12:42)  --------------------------------------------------------------------------------  hx from  and friend, adamaris donaldson  pt was dc after ssri/lexapro related hyponatremia.  upon dc in june 2020 post dc na was normal and was dc from salt tablets by dr romano  pt as per  was doing well and eating but maintained fluid restriciton   progressively with more nauseau and inc lethargy  admitted with hyponatremia and was tx with hypertonic and nacl tabs  on ns, na with variability and drop   taking in po but Nauseau most disturbing sx  has been on seroquel to manage her anxiety.  has been off since admission  workup for nauseau with dr carson that was neg   no upto date mammogram/pap  sees dr raymond for moles   had cerebral aneurysm 5mm? as per  and was advised annual fu 5 years ago  her slow speech with flat affect atypical for her   no diarrhea or vomiting  has been trying to eat while here   over course of 3 years, has had 70lb weight loss     9/15  confused,   NAD   Na level down to 118 today         MEDICATIONS  (STANDING):  chlorhexidine 2% Cloths 1 Application(s) Topical <User Schedule>  furosemide   Injectable 10 milliGRAM(s) IV Push once  heparin   Injectable 5000 Unit(s) SubCutaneous every 8 hours  influenza   Vaccine 0.5 milliLiter(s) IntraMuscular once  senna 2 Tablet(s) Oral at bedtime  sodium chloride 1 Gram(s) Oral two times a day  sodium chloride 3%. 500 milliLiter(s) (40 mL/Hr) IV Continuous <Continuous>    MEDICATIONS  (PRN):  acetaminophen   Tablet .. 650 milliGRAM(s) Oral every 6 hours PRN Temp greater or equal to 38.5C (101.3F), Mild Pain (1 - 3)  ibuprofen  Tablet. 400 milliGRAM(s) Oral every 6 hours PRN Moderate Pain (4 - 6)  melatonin 5 milliGRAM(s) Oral at bedtime PRN Insomnia  ondansetron Injectable 4 milliGRAM(s) IV Push every 6 hours PRN Nausea and/or Vomiting      Allergies    No Known Allergies    Intolerances        I&O's Detail    13 Sep 2020 07:01  -  14 Sep 2020 07:00  --------------------------------------------------------  IN:  Total IN: 0 mL    OUT:    Voided (mL): 1050 mL  Total OUT: 1050 mL    Total NET: -1050 mL    Vital Signs Last 24 Hrs  T(C): 36.4 (15 Sep 2020 08:50), Max: 36.7 (14 Sep 2020 16:53)  T(F): 97.6 (15 Sep 2020 08:50), Max: 98.1 (14 Sep 2020 16:53)  HR: 82 (15 Sep 2020 08:50) (77 - 92)  BP: 153/80 (15 Sep 2020 08:50) (136/84 - 153/80)  BP(mean): --  RR: 18 (15 Sep 2020 08:50) (18 - 20)  SpO2: 99% (15 Sep 2020 08:50) (97% - 100%)      PHYSICAL EXAM:  General: alert. awake NAD  HEENT: MMM  CV: s1s2 rrr  LUNGS: B/L CTA  EXT: no edema    LABS:                                   11.9   14.23 )-----------( 171      ( 15 Sep 2020 12:10 )             33.1                         11.9   14.72 )-----------( 181      ( 14 Sep 2020 06:30 )             34.3                         11.9   12.72 )-----------( 180      ( 13 Sep 2020 06:55 )             34.7       09-14      118    |  87     |  20     ----------------------------<  118       15 Sep 2020 12:10  3.8     |  24     |  0.55     121    |  89     |  20     ----------------------------<  114       15 Sep 2020 07:47  3.6     |  25     |  0.54     126    |  93     |  16     ----------------------------<  104       14 Sep 2020 06:30  3.8     |  26     |  0.56     Ca    8.8        15 Sep 2020 12:10  Ca    8.8        15 Sep 2020 07:47  Ca    9.0        14 Sep 2020 06:30    Phos  2.7       14 Sep 2020 06:30  Phos  3.2       13 Sep 2020 06:55  Phos  2.7       12 Sep 2020 06:22    Mg     1.8       14 Sep 2020 06:30  Mg     1.8       13 Sep 2020 06:55  Mg     2.0       12 Sep 2020 06:22

## 2020-09-15 NOTE — PROGRESS NOTE ADULT - ASSESSMENT
64F with PMHx of depression/anxieity, brain aneurysm, hyponatremia (admission in June for Na 118/AMS) who presents to ED with 3-4day history of nausea/intermittent vomiting, wretching,  and progressive AMS. Evaluation in ED revealed Na 111. CTH without acute pathology.  PT with hx of saidh due to lexapro on june 2020.  Now with persistent low na value and renal consult requested.  MRI with contrast of brain was negative.      PLAN   - dc IVF   - continue with fluid restrict  - resent urine and serum studies and evaluate for siadh vs adh excess from nauseau  - nausea control  - etiology of nausea undefined with neg GI work-up  - MRA of brain with hx of cerebral aneurysm.  hx of 5mm?? aneurysm   - dc Seroquel with reports of SIADH with use  - cw NaCl tabs for now   etiologies discussed at length with  and friend, adamaris donaldson np    9/15  Pt with fall in Na to 118  on NaCl tabs  Will administer 3% Sodium Chloride 40 ml/ hr x 4 hrs  Lasix IV x 1   Labs 2 hrs and 4 hrs after infusion

## 2020-09-15 NOTE — PROGRESS NOTE ADULT - SUBJECTIVE AND OBJECTIVE BOX
CC: Confusion / nausea:     HPI:   65 y/o female with depression/anxieity, Gastric metaplasia resulting in persistent nausea,  brain aneurysm, hyponatremia (admission in June for Na 118/AMS) who presents to ED with 2 weeks of Nausea unable to eat but continued to drink 8 8oz water daily and AMS found to have Na 111.  Pt was admitted to ICU and placed on .   patient had slurred speech and was intermittently treated with hypertonic saline, was on seroquel prior to admission.     9/15: Pt was treated in ICU and eventually downgraded once sodium improved and mentation improved accordingly.  Pt however now altered with drop in sodium over last 24 hours.  Pt very confused.  I spoke to daughter to update her on her mothers symptoms.  I discussed with her plan of care to increase sodium with 3% infusion of saline per nephology recommendations.  Pt otherwise comfortable and alert and sitting in chair.    REVIEW OF SYSTEMS: All other review of systems is negative unless indicated above.    Vital Signs Last 24 Hrs  T(C): 36.4 (15 Sep 2020 08:50), Max: 36.7 (14 Sep 2020 16:53)  T(F): 97.6 (15 Sep 2020 08:50), Max: 98.1 (14 Sep 2020 16:53)  HR: 81 (15 Sep 2020 14:35) (77 - 92)  BP: 174/85 (15 Sep 2020 14:35) (136/84 - 174/85)  BP(mean): --  RR: 18 (15 Sep 2020 08:50) (18 - 20)  SpO2: 99% (15 Sep 2020 08:50) (97% - 100%)    PHYSICAL EXAM:    Constitutional: NAD, awake and alert, well-developed  HEENT: PERR, EOMI, Normal Hearing, MMM  Neck: Soft and supple  Respiratory: Breath sounds are clear bilaterally, No wheezing, rales or rhonchi  Cardiovascular: S1 and S2, regular rate and rhythm, no Murmurs, gallops or rubs  Gastrointestinal: Bowel Sounds present, soft, nontender, nondistended, no guarding, no rebound  Extremities: No peripheral edema  Neurological: A/O x 3, no focal deficits in my limited exam      MEDICATIONS  (STANDING):  chlorhexidine 2% Cloths 1 Application(s) Topical <User Schedule>  heparin   Injectable 5000 Unit(s) SubCutaneous every 8 hours  influenza   Vaccine 0.5 milliLiter(s) IntraMuscular once  senna 2 Tablet(s) Oral at bedtime  sodium chloride 1 Gram(s) Oral two times a day  sodium chloride 3%. 500 milliLiter(s) (40 mL/Hr) IV Continuous <Continuous>    MEDICATIONS  (PRN):  acetaminophen   Tablet .. 650 milliGRAM(s) Oral every 6 hours PRN Temp greater or equal to 38.5C (101.3F), Mild Pain (1 - 3)  ibuprofen  Tablet. 400 milliGRAM(s) Oral every 6 hours PRN Moderate Pain (4 - 6)  melatonin 5 milliGRAM(s) Oral at bedtime PRN Insomnia  ondansetron Injectable 4 milliGRAM(s) IV Push every 6 hours PRN Nausea and/or Vomiting                              11.9   14.23 )-----------( 171      ( 15 Sep 2020 12:10 )             33.1     09-15    118<LL>  |  87<L>  |  20  ----------------------------<  118<H>  3.8   |  24  |  0.55    Ca    8.8      15 Sep 2020 12:10  Phos  2.7     09-14  Mg     1.8     09-14    TPro  7.1  /  Alb  3.5  /  TBili  0.6  /  DBili  x   /  AST  31  /  ALT  40  /  AlkPhos  124<H>  09-15    CAPILLARY BLOOD GLUCOSE        LIVER FUNCTIONS - ( 15 Sep 2020 12:10 )  Alb: 3.5 g/dL / Pro: 7.1 gm/dL / ALK PHOS: 124 U/L / ALT: 40 U/L / AST: 31 U/L / GGT: x                 Assessment and Plan: 65 y/o female with depression/anxieity, Gastric Metaplasia resulting in persistent nausea, brain aneurysm, hyponatremia (admission in June for Na 118/AMS) who presents to ED with 2 weeks of Nausea and AMS.     Metabolic encephalopathy / hyponatremia: Acutely worse today   -All psych meds stopped (Seroquel, SSRIs)   -mri negative for acute pathology  -sodium worsening, nephro following --> lasix, 3% sodium infusion x 4 hours, close monitoring of electrolytes    Depression/anxiety:  -psych meds stopped  -re-evaluation once acute metabolic issue stable   -psych following    Weight loss / nausea / poor oral intake:  -supportive care  -supplementation, dietician following  -If no improvement in nutrition once above stable will consult GI    Cerebral aneurysm:  -outpatient close monitoring     DVT prophy:  -SCD and sqhep

## 2020-09-15 NOTE — PROVIDER CONTACT NOTE (CRITICAL VALUE NOTIFICATION) - ACTION/TREATMENT ORDERED:
No new orders received at this time.
No orders received at this time
new orders received. IVF rate increased to 40ml/hr
no further orders
no further orders
on unit to see patient, no orders received at this time
Recheck Na  Tx to SD - report given to LORENE Ramires

## 2020-09-15 NOTE — CHART NOTE - NSCHARTNOTEFT_GEN_A_CORE
Called by RN to report that pt's NA is now 120 ( up from 118 earlier today)  Will continue with  3% Sodium Chloride 40 ml/ hr x 4 hrs per nephro  Continue to trend Na level  Pt is in the process of transferring to monitored unit  Nephro to continue following pt

## 2020-09-16 LAB
ALBUMIN SERPL ELPH-MCNC: 3.2 G/DL — LOW (ref 3.3–5)
ALP SERPL-CCNC: 121 U/L — HIGH (ref 40–120)
ALT FLD-CCNC: 39 U/L — SIGNIFICANT CHANGE UP (ref 12–78)
ANION GAP SERPL CALC-SCNC: 4 MMOL/L — LOW (ref 5–17)
ANION GAP SERPL CALC-SCNC: 6 MMOL/L — SIGNIFICANT CHANGE UP (ref 5–17)
ANION GAP SERPL CALC-SCNC: 8 MMOL/L — SIGNIFICANT CHANGE UP (ref 5–17)
AST SERPL-CCNC: 32 U/L — SIGNIFICANT CHANGE UP (ref 15–37)
BILIRUB SERPL-MCNC: 0.5 MG/DL — SIGNIFICANT CHANGE UP (ref 0.2–1.2)
BUN SERPL-MCNC: 18 MG/DL — SIGNIFICANT CHANGE UP (ref 7–23)
BUN SERPL-MCNC: 21 MG/DL — SIGNIFICANT CHANGE UP (ref 7–23)
BUN SERPL-MCNC: 22 MG/DL — SIGNIFICANT CHANGE UP (ref 7–23)
CALCIUM SERPL-MCNC: 8.7 MG/DL — SIGNIFICANT CHANGE UP (ref 8.5–10.1)
CALCIUM SERPL-MCNC: 8.8 MG/DL — SIGNIFICANT CHANGE UP (ref 8.5–10.1)
CALCIUM SERPL-MCNC: 9.2 MG/DL — SIGNIFICANT CHANGE UP (ref 8.5–10.1)
CHLORIDE SERPL-SCNC: 90 MMOL/L — LOW (ref 96–108)
CHLORIDE SERPL-SCNC: 95 MMOL/L — LOW (ref 96–108)
CHLORIDE SERPL-SCNC: 97 MMOL/L — SIGNIFICANT CHANGE UP (ref 96–108)
CO2 SERPL-SCNC: 24 MMOL/L — SIGNIFICANT CHANGE UP (ref 22–31)
CO2 SERPL-SCNC: 24 MMOL/L — SIGNIFICANT CHANGE UP (ref 22–31)
CO2 SERPL-SCNC: 25 MMOL/L — SIGNIFICANT CHANGE UP (ref 22–31)
CREAT SERPL-MCNC: 0.54 MG/DL — SIGNIFICANT CHANGE UP (ref 0.5–1.3)
CREAT SERPL-MCNC: 0.61 MG/DL — SIGNIFICANT CHANGE UP (ref 0.5–1.3)
CREAT SERPL-MCNC: 0.62 MG/DL — SIGNIFICANT CHANGE UP (ref 0.5–1.3)
GLUCOSE SERPL-MCNC: 103 MG/DL — HIGH (ref 70–99)
GLUCOSE SERPL-MCNC: 107 MG/DL — HIGH (ref 70–99)
GLUCOSE SERPL-MCNC: 119 MG/DL — HIGH (ref 70–99)
HCT VFR BLD CALC: 35 % — SIGNIFICANT CHANGE UP (ref 34.5–45)
HGB BLD-MCNC: 12.2 G/DL — SIGNIFICANT CHANGE UP (ref 11.5–15.5)
MCHC RBC-ENTMCNC: 30.3 PG — SIGNIFICANT CHANGE UP (ref 27–34)
MCHC RBC-ENTMCNC: 34.9 GM/DL — SIGNIFICANT CHANGE UP (ref 32–36)
MCV RBC AUTO: 86.8 FL — SIGNIFICANT CHANGE UP (ref 80–100)
OSMOLALITY UR: 530 MOSM/KG — SIGNIFICANT CHANGE UP (ref 50–1200)
PLATELET # BLD AUTO: 165 K/UL — SIGNIFICANT CHANGE UP (ref 150–400)
POTASSIUM SERPL-MCNC: 3.8 MMOL/L — SIGNIFICANT CHANGE UP (ref 3.5–5.3)
POTASSIUM SERPL-MCNC: 3.9 MMOL/L — SIGNIFICANT CHANGE UP (ref 3.5–5.3)
POTASSIUM SERPL-MCNC: 4.8 MMOL/L — SIGNIFICANT CHANGE UP (ref 3.5–5.3)
POTASSIUM SERPL-SCNC: 3.8 MMOL/L — SIGNIFICANT CHANGE UP (ref 3.5–5.3)
POTASSIUM SERPL-SCNC: 3.9 MMOL/L — SIGNIFICANT CHANGE UP (ref 3.5–5.3)
POTASSIUM SERPL-SCNC: 4.8 MMOL/L — SIGNIFICANT CHANGE UP (ref 3.5–5.3)
PROT SERPL-MCNC: 7 GM/DL — SIGNIFICANT CHANGE UP (ref 6–8.3)
RBC # BLD: 4.03 M/UL — SIGNIFICANT CHANGE UP (ref 3.8–5.2)
RBC # FLD: 11.9 % — SIGNIFICANT CHANGE UP (ref 10.3–14.5)
SODIUM SERPL-SCNC: 122 MMOL/L — LOW (ref 135–145)
SODIUM SERPL-SCNC: 124 MMOL/L — LOW (ref 135–145)
SODIUM SERPL-SCNC: 127 MMOL/L — LOW (ref 135–145)
SODIUM UR-SCNC: 131 MMOL/L — SIGNIFICANT CHANGE UP
WBC # BLD: 13.51 K/UL — HIGH (ref 3.8–10.5)
WBC # FLD AUTO: 13.51 K/UL — HIGH (ref 3.8–10.5)

## 2020-09-16 PROCEDURE — 99232 SBSQ HOSP IP/OBS MODERATE 35: CPT

## 2020-09-16 PROCEDURE — 99233 SBSQ HOSP IP/OBS HIGH 50: CPT

## 2020-09-16 RX ORDER — POTASSIUM CHLORIDE 20 MEQ
40 PACKET (EA) ORAL ONCE
Refills: 0 | Status: COMPLETED | OUTPATIENT
Start: 2020-09-16 | End: 2020-09-16

## 2020-09-16 RX ORDER — SODIUM CHLORIDE 5 G/100ML
500 INJECTION, SOLUTION INTRAVENOUS
Refills: 0 | Status: DISCONTINUED | OUTPATIENT
Start: 2020-09-16 | End: 2020-09-19

## 2020-09-16 RX ADMIN — Medication 5 MILLIGRAM(S): at 23:18

## 2020-09-16 RX ADMIN — SENNA PLUS 2 TABLET(S): 8.6 TABLET ORAL at 22:27

## 2020-09-16 RX ADMIN — SODIUM CHLORIDE 1 GRAM(S): 9 INJECTION INTRAMUSCULAR; INTRAVENOUS; SUBCUTANEOUS at 22:27

## 2020-09-16 RX ADMIN — ONDANSETRON 4 MILLIGRAM(S): 8 TABLET, FILM COATED ORAL at 23:18

## 2020-09-16 RX ADMIN — HEPARIN SODIUM 5000 UNIT(S): 5000 INJECTION INTRAVENOUS; SUBCUTANEOUS at 22:27

## 2020-09-16 RX ADMIN — HEPARIN SODIUM 5000 UNIT(S): 5000 INJECTION INTRAVENOUS; SUBCUTANEOUS at 14:55

## 2020-09-16 RX ADMIN — CHLORHEXIDINE GLUCONATE 1 APPLICATION(S): 213 SOLUTION TOPICAL at 03:46

## 2020-09-16 RX ADMIN — SODIUM CHLORIDE 50 MILLILITER(S): 5 INJECTION, SOLUTION INTRAVENOUS at 19:38

## 2020-09-16 RX ADMIN — Medication 400 MILLIGRAM(S): at 05:50

## 2020-09-16 RX ADMIN — SODIUM CHLORIDE 1 GRAM(S): 9 INJECTION INTRAMUSCULAR; INTRAVENOUS; SUBCUTANEOUS at 10:16

## 2020-09-16 RX ADMIN — SODIUM CHLORIDE 50 MILLILITER(S): 5 INJECTION, SOLUTION INTRAVENOUS at 10:47

## 2020-09-16 RX ADMIN — HEPARIN SODIUM 5000 UNIT(S): 5000 INJECTION INTRAVENOUS; SUBCUTANEOUS at 05:50

## 2020-09-16 RX ADMIN — Medication 40 MILLIEQUIVALENT(S): at 05:49

## 2020-09-16 RX ADMIN — ONDANSETRON 4 MILLIGRAM(S): 8 TABLET, FILM COATED ORAL at 01:48

## 2020-09-16 NOTE — PHARMACOTHERAPY INTERVENTION NOTE - COMMENTS
S/O      Pt CL is a 65 y/o F who presented to the ED with complaints of N/V for past 2 weeks but continued to drink plenty of water. Relevant PMH include depression/ anxiety as well as gastric metaplasia. On admission pt was hyponatremic with a serum sodium level of 111. Pt states that her only home medication is quetiapine.  Pt is currently being prescribed 4mg IV push of ondansetron every 6 hours prn.      Pt is still complaining of nausea and states the ondansetron is only helping mildly. Pt has poor PO intake d/t nausea and there are concerns regarding her low Na levels.    Per conversation with , he states she used to tolerate PO trimethobenzamide which helped reduce symptoms of nausea. However, pt and  have been unable to find a pharmacy with this medication     A/P      Due to previous success with trimethobenzamide and increasing concerns regarding nausea and low sodium levels, would recommend a one dose trial of IM trimethobenzamide 200mg. PO is not an option as pharmacy does not have in stock.

## 2020-09-16 NOTE — PROGRESS NOTE ADULT - SUBJECTIVE AND OBJECTIVE BOX
CC: Confusion / nausea:     HPI:   65 y/o female with depression/anxieity, Gastric metaplasia resulting in persistent nausea,  brain aneurysm, hyponatremia (admission in June for Na 118/AMS) who presents to ED with 2 weeks of Nausea unable to eat but continued to drink 8 8oz water daily and AMS found to have Na 111.  Pt was admitted to ICU and placed on .   patient had slurred speech and was intermittently treated with hypertonic saline, was on seroquel prior to admission.     9/15: Pt was treated in ICU and eventually downgraded once sodium improved and mentation improved accordingly.  Pt however now altered with drop in sodium over last 24 hours.  Pt very confused.  I spoke to daughter to update her on her mothers symptoms.  I discussed with her plan of care to increase sodium with 3% infusion of saline per nephology recommendations.  Pt otherwise comfortable and alert and sitting in chair.  9/16: On and off confusion, slow delayed response  REVIEW OF SYSTEMS: All other review of systems is negative unless indicated above.    Vital Signs Last 24 Hrs  T(C): 37 (16 Sep 2020 09:45), Max: 37.1 (15 Sep 2020 21:45)  T(F): 98.6 (16 Sep 2020 09:45), Max: 98.7 (15 Sep 2020 21:45)  HR: 81 (16 Sep 2020 11:00) (77 - 94)  BP: 128/78 (16 Sep 2020 10:00) (128/78 - 174/85)  BP(mean): 90 (16 Sep 2020 10:00) (90 - 145)  RR: 17 (16 Sep 2020 11:00) (16 - 22)  SpO2: 98% (16 Sep 2020 11:00) (96% - 100%)  I&O's Detail    15 Sep 2020 07:01  -  16 Sep 2020 07:00  --------------------------------------------------------  IN:    Oral Fluid: 600 mL    sodium chloride 3%: 160 mL  Total IN: 760 mL    OUT:    Voided (mL): 1075 mL  Total OUT: 1075 mL    Total NET: -315 mL                       12.2   13.51 )-----------( 165      ( 16 Sep 2020 06:42 )             35.0     16 Sep 2020 06:42    122    |  90     |  18     ----------------------------<  103    3.8     |  24     |  0.54     Ca    8.7        16 Sep 2020 06:42    TPro  7.0    /  Alb  3.2    /  TBili  0.5    /  DBili  x      /  AST  32     /  ALT  39     /  AlkPhos  121    16 Sep 2020 06:42    LIVER FUNCTIONS - ( 16 Sep 2020 06:42 )  Alb: 3.2 g/dL / Pro: 7.0 gm/dL / ALK PHOS: 121 U/L / ALT: 39 U/L / AST: 32 U/L / GGT: x           MEDICATIONS  (STANDING):  chlorhexidine 2% Cloths 1 Application(s) Topical <User Schedule>  heparin   Injectable 5000 Unit(s) SubCutaneous every 8 hours  influenza   Vaccine 0.5 milliLiter(s) IntraMuscular once  senna 2 Tablet(s) Oral at bedtime  sodium chloride 1 Gram(s) Oral two times a day  sodium chloride 3%. 500 milliLiter(s) (50 mL/Hr) IV Continuous <Continuous>  sodium chloride 3%. 500 milliLiter(s) (40 mL/Hr) IV Continuous <Continuous>  sodium chloride 3%. 500 milliLiter(s) (40 mL/Hr) IV Continuous <Continuous>    MEDICATIONS  (PRN):  acetaminophen   Tablet .. 650 milliGRAM(s) Oral every 6 hours PRN Temp greater or equal to 38.5C (101.3F), Mild Pain (1 - 3)  melatonin 5 milliGRAM(s) Oral at bedtime PRN Insomnia  ondansetron Injectable 4 milliGRAM(s) IV Push every 6 hours PRN Nausea and/or Vomiting    PHYSICAL EXAM:    Constitutional: NAD, awake and alert, well-developed  HEENT: PERR, EOMI, Normal Hearing, MMM  Neck: Soft and supple  Respiratory: Breath sounds are clear bilaterally, No wheezing, rales or rhonchi  Cardiovascular: S1 and S2, regular rate and rhythm, no Murmurs, gallops or rubs  Gastrointestinal: Bowel Sounds present, soft, nontender, nondistended, no guarding, no rebound  Extremities: No peripheral edema  Neurological: AAOx3, no gross deficits, fluctuating mentation

## 2020-09-16 NOTE — PROGRESS NOTE ADULT - ASSESSMENT
64F with PMHx of depression/anxieity, brain aneurysm, hyponatremia (admission in June for Na 118/AMS) who presents to ED with 3-4day history of nausea/intermittent vomiting, wretching,  and progressive AMS. Evaluation in ED revealed Na 111. CTH without acute pathology.  PT with hx of saidh due to lexapro on june 2020.  Now with persistent low na value and renal consult requested.  MRI with contrast of brain was negative.      PLAN   - dc IVF   - continue with fluid restrict  - resent urine and serum studies and evaluate for siadh vs adh excess from nauseau  - nausea control  - etiology of nausea undefined with neg GI work-up  - MRA of brain with hx of cerebral aneurysm.  hx of 5mm?? 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 Course C/w SIADH.  Persistent with very minimal response to 3% NS.    SSRI d/brianna.    Noted with NSAIDS q 6 hours--D/c for its effect on water excretion.    3% NS today again.

## 2020-09-16 NOTE — PROGRESS NOTE ADULT - SUBJECTIVE AND OBJECTIVE BOX
NEPHROLOGY INTERVAL HPI/OVERNIGHT EVENTS:    Date of Service: 09-16-20 @ 09:54  9/16 SY  Sitting up in chair.  Appears comfortable.  Oriented to person only today.  Answers "I have a rash" to various questions.    HPI:  History obtained from pt and significant other at bedside  64F with PMHx of depression/anxieity, brain aneurysm, hyponatremia (admission in June for Na 118/AMS) who presents to ED with 3-4day history of nausea/intermittent vomiting, wretching,  and progressive AMS. Evaluation in ED revealed Na 111. CTH without acute pathology. eICU consulted and pt admitted to ICU. Upon arrival pt receiving 3% NaCL bolus, discontinued upon my arrival. Prior admission and workup revealed hyponatremia possibly SIADH vs SSRI vs excessive free water intake. She was discharged following correction on NaCl tabs as outpt. She is no longer taking these. She states they were stopped by nephro on her follow up visit as outpt. She was taken off her SSRI at last admission and placed on seroquel only. Her dosing has been escalated since last admission to 150mg at HS and 50mg 2x during the day. She reports no longer using her klonipin. (06 Sep 2020 20:29)    Patient is a 64y old  Female who presents with a chief complaint of confusion/N/V (14 Sep 2020 12:42)  --------------------------------------------------------------------------------  hx from  and friend, adamaris donaldson  pt was dc after ssri/lexapro related hyponatremia.  upon dc in june 2020 post dc na was normal and was dc from salt tablets by dr romano  pt as per  was doing well and eating but maintained fluid restriciton   progressively with more nauseau and inc lethargy  admitted with hyponatremia and was tx with hypertonic and nacl tabs  on ns, na with variability and drop   taking in po but Nauseau most disturbing sx  has been on seroquel to manage her anxiety.  has been off since admission  workup for nauseau with dr carson that was neg   no upto date mammogram/pap  sees dr raymond for moles   had cerebral aneurysm 5mm? as per  and was advised annual fu 5 years ago  her slow speech with flat affect atypical for her   no diarrhea or vomiting  has been trying to eat while here   over course of 3 years, has had 70lb weight loss     MEDICATIONS  (STANDING):  chlorhexidine 2% Cloths 1 Application(s) Topical <User Schedule>  heparin   Injectable 5000 Unit(s) SubCutaneous every 8 hours  influenza   Vaccine 0.5 milliLiter(s) IntraMuscular once  senna 2 Tablet(s) Oral at bedtime  sodium chloride 1 Gram(s) Oral two times a day  sodium chloride 3%. 500 milliLiter(s) (40 mL/Hr) IV Continuous <Continuous>  sodium chloride 3%. 500 milliLiter(s) (40 mL/Hr) IV Continuous <Continuous>    MEDICATIONS  (PRN):  acetaminophen   Tablet .. 650 milliGRAM(s) Oral every 6 hours PRN Temp greater or equal to 38.5C (101.3F), Mild Pain (1 - 3)  melatonin 5 milliGRAM(s) Oral at bedtime PRN Insomnia  ondansetron Injectable 4 milliGRAM(s) IV Push every 6 hours PRN Nausea and/or Vomiting    Vital Signs Last 24 Hrs  T(C): 37 (16 Sep 2020 09:45), Max: 37.1 (15 Sep 2020 21:45)  T(F): 98.6 (16 Sep 2020 09:45), Max: 98.7 (15 Sep 2020 21:45)  HR: 86 (16 Sep 2020 09:00) (77 - 94)  BP: 166/137 (16 Sep 2020 08:00) (141/75 - 174/85)  BP(mean): 145 (16 Sep 2020 08:00) (92 - 145)  RR: 17 (16 Sep 2020 09:00) (16 - 22)  SpO2: 99% (16 Sep 2020 09:00) (96% - 100%)    09-15 @ 07:01  -  09-16 @ 07:00  --------------------------------------------------------  IN: 760 mL / OUT: 1075 mL / NET: -315 mL    PHYSICAL EXAM: Alert, Confused  GENERAL: No distress  CHEST/LUNG: Clear to aus  HEART: S1S2 RRR  ABDOMEN: soft  EXTREMITIES: no edema  SKIN:     LABS:                        12.2   13.51 )-----------( 165      ( 16 Sep 2020 06:42 )             35.0     09-16    122<L>  |  90<L>  |  18  ----------------------------<  103<H>  3.8   |  24  |  0.54    Ca    8.7      16 Sep 2020 06:42    TPro  7.0  /  Alb  3.2<L>  /  TBili  0.5  /  DBili  x   /  AST  32  /  ALT  39  /  AlkPhos  121<H>  09-16                RADIOLOGY & ADDITIONAL TESTS:

## 2020-09-16 NOTE — PROGRESS NOTE ADULT - ASSESSMENT
Assessment and Plan: 65 y/o female with depression/anxiety, Gastric Metaplasia resulting in persistent nausea, brain aneurysm, hyponatremia (admission in June for Na 118/AMS) who presents to ED with 2 weeks of Nausea and AMS.     Metabolic encephalopathy due to persistent hyponatremia with fluctuating mentation and Na level   - back to 3% Saline, monitor BMP q8h    off SSRI and NSAIDs   - MRI negative for acute pathology    Depression/anxiety off medications, follow with psychiatry    Weight loss with nausea and initially poor oral intake - significantly improved po intake    Cerebral aneurysm - outpatient close monitoring     DVT prophy UFH

## 2020-09-16 NOTE — PROGRESS NOTE ADULT - ASSESSMENT
64F with PMHx of depression/anxieity, brain aneurysm, hyponatremia (admission in June for Na 118/AMS) who presents to ED with 3-4day history of nausea/intermittent vomiting, wretching,  and progressive AMS.   She has had intermittent speech and language difficulties which were improved on 9/13 and 9/14 but now have returned.  Hyponatremia continues to be a problem.   MRI brain performed for these symptoms was negative for any infarct or CPM.  Will get 24 hour EEG to see if there is any seizure activity associated with symptoms.

## 2020-09-16 NOTE — PROGRESS NOTE ADULT - SUBJECTIVE AND OBJECTIVE BOX
Interval History:  9/16/20: Asked to reevaluate patient for return of confusion and difficulty speaking.   is at bedside and confirms that current symptoms are reminiscent of her symptoms the first few days of admission.    MEDICATIONS  (STANDING):  chlorhexidine 2% Cloths 1 Application(s) Topical <User Schedule>  heparin   Injectable 5000 Unit(s) SubCutaneous every 8 hours  influenza   Vaccine 0.5 milliLiter(s) IntraMuscular once  senna 2 Tablet(s) Oral at bedtime  sodium chloride 1 Gram(s) Oral two times a day  sodium chloride 3%. 500 milliLiter(s) (50 mL/Hr) IV Continuous <Continuous>  sodium chloride 3%. 500 milliLiter(s) (40 mL/Hr) IV Continuous <Continuous>  sodium chloride 3%. 500 milliLiter(s) (40 mL/Hr) IV Continuous <Continuous>    MEDICATIONS  (PRN):  acetaminophen   Tablet .. 650 milliGRAM(s) Oral every 6 hours PRN Temp greater or equal to 38.5C (101.3F), Mild Pain (1 - 3)  melatonin 5 milliGRAM(s) Oral at bedtime PRN Insomnia  ondansetron Injectable 4 milliGRAM(s) IV Push every 6 hours PRN Nausea and/or Vomiting      Allergies    No Known Allergies    Intolerances        PHYSICAL EXAM:  Vital Signs Last 24 Hrs  T(F): 97.9 (09-16-20 @ 14:33)  HR: 84 (09-16-20 @ 14:00)  BP: 146/90 (09-16-20 @ 14:00)  RR: 18 (09-16-20 @ 14:00)    GENERAL: NAD, well-groomed, well-developed  HEAD:  Atraumatic, Normocephalic  Neuro:  Awake, alert. Able to name objects inconsistently. Making paraphasic errors. When showed a spoon asked, "Why do I have a spoon?" When shown her Gatorade she could nto name it. When shown her glasses she said, "Why do I have a ping?"  CN: PERRL, EOMI, no nystagmus, no facial weakness, tongue protrudes in the midline  motor: normal tone, no pronator drift, no focal weakness  sensory: intact to light touch  coordination: finger to nose intact bilaterally  DTRs: symmetric, plantar responses flexor bilaterally    LABS:                        12.2   13.51 )-----------( 165      ( 16 Sep 2020 06:42 )             35.0     09-16    122<L>  |  90<L>  |  18  ----------------------------<  103<H>  3.8   |  24  |  0.54    Ca    8.7      16 Sep 2020 06:42    TPro  7.0  /  Alb  3.2<L>  /  TBili  0.5  /  DBili  x   /  AST  32  /  ALT  39  /  AlkPhos  121<H>  09-16          RADIOLOGY & ADDITIONAL STUDIES:  Head CT   ct head 9/6/20:  No acute intracranial hemorrhage, mass effect, or acute osseous fracture.      mri brain without contrast 9/12/20:  1. No evidence of central pontine or extrapontine myelinolysis.  2. No recent infarct on DWI. 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 No blood products on gradient echo imaging.  3. No extra-axial collections, hydrocephalus, midline shift or space-occupying mass lesion.  4. Minimal predominantly periventricular chronic white matter microvascular ischemic changes.

## 2020-09-17 LAB
ANION GAP SERPL CALC-SCNC: 6 MMOL/L — SIGNIFICANT CHANGE UP (ref 5–17)
ANION GAP SERPL CALC-SCNC: 7 MMOL/L — SIGNIFICANT CHANGE UP (ref 5–17)
ANION GAP SERPL CALC-SCNC: 7 MMOL/L — SIGNIFICANT CHANGE UP (ref 5–17)
ANION GAP SERPL CALC-SCNC: 8 MMOL/L — SIGNIFICANT CHANGE UP (ref 5–17)
BUN SERPL-MCNC: 21 MG/DL — SIGNIFICANT CHANGE UP (ref 7–23)
BUN SERPL-MCNC: 26 MG/DL — HIGH (ref 7–23)
CALCIUM SERPL-MCNC: 9 MG/DL — SIGNIFICANT CHANGE UP (ref 8.5–10.1)
CALCIUM SERPL-MCNC: 9.1 MG/DL — SIGNIFICANT CHANGE UP (ref 8.5–10.1)
CHLORIDE SERPL-SCNC: 94 MMOL/L — LOW (ref 96–108)
CHLORIDE SERPL-SCNC: 94 MMOL/L — LOW (ref 96–108)
CHLORIDE SERPL-SCNC: 95 MMOL/L — LOW (ref 96–108)
CHLORIDE SERPL-SCNC: 98 MMOL/L — SIGNIFICANT CHANGE UP (ref 96–108)
CO2 SERPL-SCNC: 21 MMOL/L — LOW (ref 22–31)
CO2 SERPL-SCNC: 22 MMOL/L — SIGNIFICANT CHANGE UP (ref 22–31)
CO2 SERPL-SCNC: 23 MMOL/L — SIGNIFICANT CHANGE UP (ref 22–31)
CO2 SERPL-SCNC: 26 MMOL/L — SIGNIFICANT CHANGE UP (ref 22–31)
CREAT SERPL-MCNC: 0.55 MG/DL — SIGNIFICANT CHANGE UP (ref 0.5–1.3)
CREAT SERPL-MCNC: 0.56 MG/DL — SIGNIFICANT CHANGE UP (ref 0.5–1.3)
CREAT SERPL-MCNC: 0.61 MG/DL — SIGNIFICANT CHANGE UP (ref 0.5–1.3)
CREAT SERPL-MCNC: 0.64 MG/DL — SIGNIFICANT CHANGE UP (ref 0.5–1.3)
GLUCOSE SERPL-MCNC: 107 MG/DL — HIGH (ref 70–99)
GLUCOSE SERPL-MCNC: 149 MG/DL — HIGH (ref 70–99)
GLUCOSE SERPL-MCNC: 165 MG/DL — HIGH (ref 70–99)
GLUCOSE SERPL-MCNC: 97 MG/DL — SIGNIFICANT CHANGE UP (ref 70–99)
HCT VFR BLD CALC: 35.7 % — SIGNIFICANT CHANGE UP (ref 34.5–45)
HGB BLD-MCNC: 12.5 G/DL — SIGNIFICANT CHANGE UP (ref 11.5–15.5)
INR BLD: 1.04 RATIO — SIGNIFICANT CHANGE UP (ref 0.88–1.16)
MCHC RBC-ENTMCNC: 30.5 PG — SIGNIFICANT CHANGE UP (ref 27–34)
MCHC RBC-ENTMCNC: 35 GM/DL — SIGNIFICANT CHANGE UP (ref 32–36)
MCV RBC AUTO: 87.1 FL — SIGNIFICANT CHANGE UP (ref 80–100)
PLATELET # BLD AUTO: 159 K/UL — SIGNIFICANT CHANGE UP (ref 150–400)
POTASSIUM SERPL-MCNC: 3.9 MMOL/L — SIGNIFICANT CHANGE UP (ref 3.5–5.3)
POTASSIUM SERPL-MCNC: 4.2 MMOL/L — SIGNIFICANT CHANGE UP (ref 3.5–5.3)
POTASSIUM SERPL-SCNC: 3.9 MMOL/L — SIGNIFICANT CHANGE UP (ref 3.5–5.3)
POTASSIUM SERPL-SCNC: 4.2 MMOL/L — SIGNIFICANT CHANGE UP (ref 3.5–5.3)
PROTHROM AB SERPL-ACNC: 12 SEC — SIGNIFICANT CHANGE UP (ref 10.6–13.6)
RBC # BLD: 4.1 M/UL — SIGNIFICANT CHANGE UP (ref 3.8–5.2)
RBC # FLD: 12 % — SIGNIFICANT CHANGE UP (ref 10.3–14.5)
SODIUM SERPL-SCNC: 124 MMOL/L — LOW (ref 135–145)
SODIUM SERPL-SCNC: 124 MMOL/L — LOW (ref 135–145)
SODIUM SERPL-SCNC: 126 MMOL/L — LOW (ref 135–145)
SODIUM SERPL-SCNC: 127 MMOL/L — LOW (ref 135–145)
WBC # BLD: 14.67 K/UL — HIGH (ref 3.8–10.5)
WBC # FLD AUTO: 14.67 K/UL — HIGH (ref 3.8–10.5)

## 2020-09-17 PROCEDURE — 74177 CT ABD & PELVIS W/CONTRAST: CPT | Mod: 26

## 2020-09-17 PROCEDURE — 99254 IP/OBS CNSLTJ NEW/EST MOD 60: CPT

## 2020-09-17 PROCEDURE — 71260 CT THORAX DX C+: CPT | Mod: 26

## 2020-09-17 PROCEDURE — 99233 SBSQ HOSP IP/OBS HIGH 50: CPT

## 2020-09-17 PROCEDURE — 95718 EEG PHYS/QHP 2-12 HR W/VEEG: CPT

## 2020-09-17 PROCEDURE — 99232 SBSQ HOSP IP/OBS MODERATE 35: CPT

## 2020-09-17 PROCEDURE — 99254 IP/OBS CNSLTJ NEW/EST MOD 60: CPT | Mod: 57

## 2020-09-17 RX ORDER — LEVETIRACETAM 250 MG/1
500 TABLET, FILM COATED ORAL
Refills: 0 | Status: DISCONTINUED | OUTPATIENT
Start: 2020-09-17 | End: 2020-09-18

## 2020-09-17 RX ORDER — FUROSEMIDE 40 MG
20 TABLET ORAL
Refills: 0 | Status: DISCONTINUED | OUTPATIENT
Start: 2020-09-17 | End: 2020-09-18

## 2020-09-17 RX ORDER — SODIUM CHLORIDE 5 G/100ML
500 INJECTION, SOLUTION INTRAVENOUS
Refills: 0 | Status: DISCONTINUED | OUTPATIENT
Start: 2020-09-17 | End: 2020-09-19

## 2020-09-17 RX ADMIN — CHLORHEXIDINE GLUCONATE 1 APPLICATION(S): 213 SOLUTION TOPICAL at 05:03

## 2020-09-17 RX ADMIN — ONDANSETRON 4 MILLIGRAM(S): 8 TABLET, FILM COATED ORAL at 05:26

## 2020-09-17 RX ADMIN — HEPARIN SODIUM 5000 UNIT(S): 5000 INJECTION INTRAVENOUS; SUBCUTANEOUS at 05:04

## 2020-09-17 RX ADMIN — SODIUM CHLORIDE 50 MILLILITER(S): 5 INJECTION, SOLUTION INTRAVENOUS at 12:21

## 2020-09-17 RX ADMIN — LEVETIRACETAM 500 MILLIGRAM(S): 250 TABLET, FILM COATED ORAL at 12:21

## 2020-09-17 RX ADMIN — ONDANSETRON 4 MILLIGRAM(S): 8 TABLET, FILM COATED ORAL at 21:16

## 2020-09-17 NOTE — CHART NOTE - NSCHARTNOTEFT_GEN_A_CORE
Assessment:   *pt being managed for hyponatremia persistent 2/2 SIADH, pending CT chest abd and pelvis to r/o malignancy.  Toxic metabolic encephalopathy, r/o paraneoplastic vs infection (being followed by neurology).  AMS accompanied by speech and language issues.  pt only answering questions with a couple words.    *labs reviewed; hyponatremia improved, persistent.  Pt on sodium chloride 3%.  Being followed by nephrology.    *BM (+) 9/15.  no edema documented.    *pt with fluctuating PO intake.  RN reporting good PO intake in the past 24hours.  d/w RN to encourage protein jello (gelatein) to optimize PO intake.  rec'd document all PO intake in flowsheet to track.     *new wt shows no wt change since admission.    Recommendations:  1) encourage gelatein between meals to optimize PO intake  2) record all PO intake in flowsheet  3) weekly wt checks to track/trend changes  4) add MVI with minerals daily to ensure 100% RDI met      Diet Prescription: Diet, Regular:   800mL Fluid Restriction ([id number]) (09-15-20 @ 08:58)      Wt Hx:  73.7Kg (9/17)  Weight (kg): 72.6 (09-06-20 @ 17:08)      09-16-20 @ 07:01  -  09-17-20 @ 07:00  --------------------------------------------------------  IN: 1000 mL / OUT: 1350 mL / NET: -350 mL        Estimated Needs:   · Weight Used for Calculation	adjusted  64Kg     Estimated Energy Needs (25-30 calories/kg):  · Weight  (lbs)	141 lb  · Weight (kg)	64 kg  · From (25cal/kg)	1600· To (30cal/kg)	1920     Other Calculation:  · Other Calculation	Ht. 65 "     Wt. 83.6 Kg       31 BMI       IBW 57  Kg      Pt is at  147  %  IBW     Estimated Protein Needs (1.4-1.6 g/kg):  · Weight  (lbs)	141  · Weight (kg)	64 kg  · From (1.4 g/kg)	89.6 · To (1.6 g/kg)	102.4     Estimated Fluid Needs (25-30 ml/kg):  · Weight  (lbs)	141  · Weight (kg)	64  · From (25 ml/kg)	1600 · To (30 ml/kg) 1920        Pertinent Labs: 09-17 Na124 mmol/L<L> Glu 165 mg/dL<H> K+ 3.9 mmol/L Cr  0.64 mg/dL BUN 21 mg/dL 09-14 Phos 2.7 mg/dL 09-16 Alb 3.2 g/dL<L>      Skin: nunu score = 18  no PU documented    Monitoring and Evaluation:   [x] PO intake/Nutr support infusion [ x ] Tolerance to Nutr [ x ] weights [ x ] labs[ x ] follow up per protocol  [ ] other:

## 2020-09-17 NOTE — CONSULT NOTE ADULT - SUBJECTIVE AND OBJECTIVE BOX
History of Present Illness:  64y Female with depression/anxieity, Gastric metaplasia resulting in persistent nausea,  brain aneurysm, hyponatremia (admission in June for Na 118/AMS) admitted 9/8 w Na 111.  Pt was admitted to ICU and placed on NS. Treated for Toxic metabolic encephalopathy, despite improvement of Na. Pt had a CT chest /A/P today to look for occult malignancy and found to have a Left suprahilar/mediastinal mass compatible with neoplasm.  As well as numerous tiny low-density liver lesions raising suspicion for metastatic disease. Call for evaluation.          PMH/PSH:  Hyponatremia    Brain aneurysm    Anxiety    Depression      History of neck surgery        Relevant Family History  FAMILY HISTORY:      SOCIAL HISTORY:  Smoker: [ ] Yes  [x ] No        PACK YEARS:                         WHEN QUIT?  ETOH use: [ ] Yes  [x ] No              FREQUENCY / QUANTITY:  Ilicit Drug use:  [x ] Yes  [ ] No marjuina for nausea  Occupation:  Live with:       MEDICATIONS  (STANDING):  chlorhexidine 2% Cloths 1 Application(s) Topical <User Schedule>  furosemide    Tablet 20 milliGRAM(s) Oral two times a day  influenza   Vaccine 0.5 milliLiter(s) IntraMuscular once  levETIRAcetam 500 milliGRAM(s) Oral two times a day  senna 2 Tablet(s) Oral at bedtime  sodium chloride 1 Gram(s) Oral two times a day  sodium chloride 3%. 500 milliLiter(s) (40 mL/Hr) IV Continuous <Continuous>  sodium chloride 3%. 500 milliLiter(s) (50 mL/Hr) IV Continuous <Continuous>  sodium chloride 3%. 500 milliLiter(s) (50 mL/Hr) IV Continuous <Continuous>  sodium chloride 3%. 500 milliLiter(s) (40 mL/Hr) IV Continuous <Continuous>  sodium chloride 3%. 500 milliLiter(s) (50 mL/Hr) IV Continuous <Continuous>    MEDICATIONS  (PRN):  acetaminophen   Tablet .. 650 milliGRAM(s) Oral every 6 hours PRN Temp greater or equal to 38.5C (101.3F), Mild Pain (1 - 3)  melatonin 5 milliGRAM(s) Oral at bedtime PRN Insomnia  ondansetron Injectable 4 milliGRAM(s) IV Push every 6 hours PRN Nausea and/or Vomiting      Allergies: No Known Allergies                                                            LABS:                        12.5   14.67 )-----------( 159      ( 17 Sep 2020 06:38 )             35.7     09-17    124<L>  |  94<L>  |  21  ----------------------------<  165<H>  3.9   |  23  |  0.64    Ca    9.1      17 Sep 2020 10:55    TPro  7.0  /  Alb  3.2<L>  /  TBili  0.5  /  DBili  x   /  AST  32  /  ALT  39  /  AlkPhos  121<H>  09-16    PT/INR - ( 17 Sep 2020 10:55 )   PT: 12.0 sec;   INR: 1.04 ratio         PTT - ( 17 Sep 2020 10:55 )  PTT:30.1 sec      FINDINGS:  CHEST:  LUNGS AND LARGE AIRWAYS/MEDIASTINUM: 5.3 x 2.7 cm left mass in the left suprahilar region, extending into the mediastinum. Additional mediastinal lymph nodes noted, for example a 1.4 x 1.3 cm right lower paratracheal lymph node (2; 27).  PLEURA: No pleural effusion.  VESSELS: Atherosclerotic changes of the aorta and coronary arteries.  HEART: Heart size is normal. No pericardial effusion.  CHEST WALL AND LOWER NECK: Within normal limits.    ABDOMEN AND PELVIS:  LIVER: Numerous subcentimeter low-density lesions throughout the liver.  BILE DUCTS: Normal caliber.  GALLBLADDER: No visible gallstones. Fundal adenomyomatosis.  SPLEEN: Within normal limits.  PANCREAS: Within normal limits.  ADRENALS: Within normal limits.  KIDNEYS/URETERS: Within normal limits.    BLADDER: Within normal limits.  REPRODUCTIVE ORGANS: Uterus and adnexa within normal limits.    BOWEL: No bowel obstruction. Appendix is normal. Mild colonic diverticulosis.  PERITONEUM: No ascites.  VESSELS: Atherosclerotic changes.  RETROPERITONEUM/LYMPH NODES: No lymphadenopathy.  ABDOMINAL WALL: Within normal limits.  BONES: No lytic or blastic bony lesion. Status post lower cervical spine fusion.    IMPRESSION:  Left suprahilar/mediastinal mass compatible with neoplasm.    Numerous tiny low-density liver lesions raising suspicion for metastatic disease. MRI recommended.    < end of copied text >            Review of Systems         Constitutional:  weight loss,  HEENT: denies     Respiratory: denies   Cardiovascular: denies    Gastrointestinal:  nausea, vomiting,   Genitourinary: denies   Skin/Breast: denies    Musculoskeletal: denies  Neurologic: AMS  Psychiatric: denies   Endocrine:  hyponatremia  Hematology/Oncology: mediastinal mass  ROS negative x 10 systems except as noted above    T(C): 36.6 (09-17-20 @ 16:32), Max: 36.9 (09-17-20 @ 14:16)  HR: 92 (09-17-20 @ 16:00) (84 - 105)  BP: 125/83 (09-17-20 @ 16:00) (125/83 - 163/79)  RR: 16 (09-17-20 @ 16:00) (16 - 43)  SpO2: 96% (09-17-20 @ 16:00) (94% - 100%)      Physical Exam  General: WD, WN, NAD                                                         Neuro: A+O x 3, non-focal, speech clear and intact                     Eyes: PERRL, EOMI   ENT: Normal exam of nasal/oral mucosa with absence of cyanosis.   Neck: supple, no JVD, trachea midline   Chest: CTA, equal bilaterally, no wheezes/rales/rhonchi, normal excursion, no accessory muscle use note  CV: RRR, +S1S2  GI: soft, NT, ND, +BS, no organomegaly noted  Extremities: COLBY x 4, no C/C/E, distal motor/neuro/circ intact  SKIN: warm, dry, intact    History of Present Illness:  64y Female with depression/anxieity, Gastric metaplasia resulting in persistent nausea,  brain aneurysm, hyponatremia (admission in June for Na 118/AMS) admitted 9/8 w Na 111.  Pt was admitted to ICU and placed on NS. Treated for Toxic metabolic encephalopathy, despite improvement of Na. Pt had a CT chest /A/P today to look for occult malignancy and found to have a Left suprahilar/mediastinal mass compatible with neoplasm.  As well as numerous tiny low-density liver lesions raising suspicion for metastatic disease. Call for evaluation. Pt seen, denies CP/SOB.           PMH/PSH:  Hyponatremia    Brain aneurysm    Anxiety    Depression      History of neck surgery        Relevant Family History  FAMILY HISTORY:      SOCIAL HISTORY:  Smoker: [ ] Yes  [x ] No        PACK YEARS:                         WHEN QUIT?  ETOH use: [ ] Yes  [x ] No              FREQUENCY / QUANTITY:  Ilicit Drug use:  [x ] Yes  [ ] No marjuina for nausea  Occupation:  Live with:       MEDICATIONS  (STANDING):  chlorhexidine 2% Cloths 1 Application(s) Topical <User Schedule>  furosemide    Tablet 20 milliGRAM(s) Oral two times a day  influenza   Vaccine 0.5 milliLiter(s) IntraMuscular once  levETIRAcetam 500 milliGRAM(s) Oral two times a day  senna 2 Tablet(s) Oral at bedtime  sodium chloride 1 Gram(s) Oral two times a day  sodium chloride 3%. 500 milliLiter(s) (40 mL/Hr) IV Continuous <Continuous>  sodium chloride 3%. 500 milliLiter(s) (50 mL/Hr) IV Continuous <Continuous>  sodium chloride 3%. 500 milliLiter(s) (50 mL/Hr) IV Continuous <Continuous>  sodium chloride 3%. 500 milliLiter(s) (40 mL/Hr) IV Continuous <Continuous>  sodium chloride 3%. 500 milliLiter(s) (50 mL/Hr) IV Continuous <Continuous>    MEDICATIONS  (PRN):  acetaminophen   Tablet .. 650 milliGRAM(s) Oral every 6 hours PRN Temp greater or equal to 38.5C (101.3F), Mild Pain (1 - 3)  melatonin 5 milliGRAM(s) Oral at bedtime PRN Insomnia  ondansetron Injectable 4 milliGRAM(s) IV Push every 6 hours PRN Nausea and/or Vomiting      Allergies: No Known Allergies                                                            LABS:                        12.5   14.67 )-----------( 159      ( 17 Sep 2020 06:38 )             35.7     09-17    124<L>  |  94<L>  |  21  ----------------------------<  165<H>  3.9   |  23  |  0.64    Ca    9.1      17 Sep 2020 10:55    TPro  7.0  /  Alb  3.2<L>  /  TBili  0.5  /  DBili  x   /  AST  32  /  ALT  39  /  AlkPhos  121<H>  09-16    PT/INR - ( 17 Sep 2020 10:55 )   PT: 12.0 sec;   INR: 1.04 ratio         PTT - ( 17 Sep 2020 10:55 )  PTT:30.1 sec      FINDINGS:  CHEST:  LUNGS AND LARGE AIRWAYS/MEDIASTINUM: 5.3 x 2.7 cm left mass in the left suprahilar region, extending into the mediastinum. Additional mediastinal lymph nodes noted, for example a 1.4 x 1.3 cm right lower paratracheal lymph node (2; 27).  PLEURA: No pleural effusion.  VESSELS: Atherosclerotic changes of the aorta and coronary arteries.  HEART: Heart size is normal. No pericardial effusion.  CHEST WALL AND LOWER NECK: Within normal limits.    ABDOMEN AND PELVIS:  LIVER: Numerous subcentimeter low-density lesions throughout the liver.  BILE DUCTS: Normal caliber.  GALLBLADDER: No visible gallstones. Fundal adenomyomatosis.  SPLEEN: Within normal limits.  PANCREAS: Within normal limits.  ADRENALS: Within normal limits.  KIDNEYS/URETERS: Within normal limits.    BLADDER: Within normal limits.  REPRODUCTIVE ORGANS: Uterus and adnexa within normal limits.    BOWEL: No bowel obstruction. Appendix is normal. Mild colonic diverticulosis.  PERITONEUM: No ascites.  VESSELS: Atherosclerotic changes.  RETROPERITONEUM/LYMPH NODES: No lymphadenopathy.  ABDOMINAL WALL: Within normal limits.  BONES: No lytic or blastic bony lesion. Status post lower cervical spine fusion.    IMPRESSION:  Left suprahilar/mediastinal mass compatible with neoplasm.    Numerous tiny low-density liver lesions raising suspicion for metastatic disease. MRI recommended.    < end of copied text >            Review of Systems         Constitutional:  weight loss,  HEENT: denies     Respiratory: denies   Cardiovascular: denies    Gastrointestinal:  nausea, vomiting,   Genitourinary: denies   Skin/Breast: denies    Musculoskeletal: denies  Neurologic: AMS  Psychiatric: denies   Endocrine:  hyponatremia  Hematology/Oncology: mediastinal mass  ROS negative x 10 systems except as noted above    T(C): 36.6 (09-17-20 @ 16:32), Max: 36.9 (09-17-20 @ 14:16)  HR: 92 (09-17-20 @ 16:00) (84 - 105)  BP: 125/83 (09-17-20 @ 16:00) (125/83 - 163/79)  RR: 16 (09-17-20 @ 16:00) (16 - 43)  SpO2: 96% (09-17-20 @ 16:00) (94% - 100%)      Physical Exam  General: WD, WN, NAD                                                         Neuro: alert, AAOx2  Neck: supple, no JVD, trachea midline   Chest: CTA, equal bilaterally, normal excursion, no accessory muscle use note  CV: RRR,   GI: soft, NT, ND, obese  Extremities: warm

## 2020-09-17 NOTE — CONSULT NOTE ADULT - ATTENDING COMMENTS
Patient seen and examined. CT chest reviewed. Concerning for primary lung cancer.   She has mediastinal lymphadenopathy and a mass abutting the ascending aorta and the aorto/pulmonary window.     These can be accessed by mediastinoscopy. If mediastinoscopy is not diagnostic, then she will need a left VATS.   I don't think the chest findings are accessible for IR guided biopsy.   I cannot see the liver lesions, so I am not sure if these are large enough to biopsy.     Patient in on the OR schedule for around 2 pm for mediastinoscopy.

## 2020-09-17 NOTE — PROGRESS NOTE ADULT - SUBJECTIVE AND OBJECTIVE BOX
Interval History:  9/17/20: Patient remains confused    MEDICATIONS  (STANDING):  chlorhexidine 2% Cloths 1 Application(s) Topical <User Schedule>  influenza   Vaccine 0.5 milliLiter(s) IntraMuscular once  levETIRAcetam 500 milliGRAM(s) Oral two times a day  senna 2 Tablet(s) Oral at bedtime  sodium chloride 1 Gram(s) Oral two times a day  sodium chloride 3%. 500 milliLiter(s) (40 mL/Hr) IV Continuous <Continuous>  sodium chloride 3%. 500 milliLiter(s) (50 mL/Hr) IV Continuous <Continuous>  sodium chloride 3%. 500 milliLiter(s) (50 mL/Hr) IV Continuous <Continuous>  sodium chloride 3%. 500 milliLiter(s) (40 mL/Hr) IV Continuous <Continuous>    MEDICATIONS  (PRN):  acetaminophen   Tablet .. 650 milliGRAM(s) Oral every 6 hours PRN Temp greater or equal to 38.5C (101.3F), Mild Pain (1 - 3)  melatonin 5 milliGRAM(s) Oral at bedtime PRN Insomnia  ondansetron Injectable 4 milliGRAM(s) IV Push every 6 hours PRN Nausea and/or Vomiting      Allergies    No Known Allergies    Intolerances        PHYSICAL EXAM:  Vital Signs Last 24 Hrs  T(F): 97.8 (09-17-20 @ 09:02)  HR: 96 (09-17-20 @ 08:00)  BP: 142/97 (09-17-20 @ 08:00)  RR: 32 (09-17-20 @ 08:00)    GENERAL: NAD, well-groomed, well-developed  HEAD:  Atraumatic, Normocephalic  Neuro:  Awake and alert. Able to state that she is in hospital. When asked how she feels she says, "Yippers". Able to follow commands on testing. Unable to name objects  CN:, EOMI, no nystagmus, no facial weakness, tongue protrudes in the midline  motor: normal tone, no focal weakness  coordination: no involuntary movements      LABS:                        12.5   14.67 )-----------( 159      ( 17 Sep 2020 06:38 )             35.7     09-17    126<L>  |  94<L>  |  21  ----------------------------<  107<H>  3.9   |  26  |  0.56    Ca    9.1      17 Sep 2020 06:38    TPro  7.0  /  Alb  3.2<L>  /  TBili  0.5  /  DBili  x   /  AST  32  /  ALT  39  /  AlkPhos  121<H>  09-16          RADIOLOGY & ADDITIONAL STUDIES:    Head CT   ct head 9/6/20:  No acute intracranial hemorrhage, mass effect, or acute osseous fracture.    mri brain without contrast 9/12/20:  1. No evidence of central pontine or extrapontine myelinolysis.  2. No recent infarct on DWI. 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 No blood products on gradient echo imaging.  3. No extra-axial collections, hydrocephalus, midline shift or space-occupying mass lesion.  4. Minimal predominantly periventricular chronic white matter microvascular ischemic changes.    Video EEG 9/16/20:  This is an abnormal prolonged EEG due to the presence of:  -Moderate generalized slowing  -Bursts of diffuse delta as well as FIRDA (frontal intermittent rhythmic delta activity)  -Admixed spikes with delta slowing    EEG Impression/Clinical Correlate:  The findings are most suggestive of encephalopathy.  In addition, there does appear to be evidence of diffuse cortical irritability and a risk for seizures.

## 2020-09-17 NOTE — PROGRESS NOTE ADULT - ASSESSMENT
64F with PMHx of depression/anxieity, brain aneurysm, hyponatremia (admission in June for Na 118/AMS) who presents to ED with 3-4day history of nausea/intermittent vomiting, wretching,  and progressive AMS. Evaluation in ED revealed Na 111. CTH without acute pathology.  PT with hx of saidh due to lexapro on june 2020.  Now with persistent low na value and renal consult requested.  MRI with contrast of brain was negative.      PLAN   - dc IVF   - continue with fluid restrict  - resent urine and serum studies and evaluate for siadh vs adh excess from nauseau  - nausea control  - etiology of nausea undefined with neg GI work-up  - MRA of brain with hx of cerebral aneurysm.  hx of 5mm?? 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 Course C/w SIADH.  Persistent with very minimal response to 3% NS.    SSRI d/brianna.    Noted with NSAIDS q 6 hours--D/c for its effect on water excretion.    3% NS today again.    9/17  Na level 127 last evening after 8 hr total 3% Sodium Chloride  Down to 124 today  will cont w 3% today   Lasix po 10 mg bid     64F with PMHx of depression/anxieity, brain aneurysm, hyponatremia (admission in June for Na 118/AMS) who presents to ED with 3-4day history of nausea/intermittent vomiting, wretching,  and progressive AMS. Evaluation in ED revealed Na 111. CTH without acute pathology.  PT with hx of saidh due to lexapro on june 2020.  Now with persistent low na value and renal consult requested.  MRI with contrast of brain was negative.      PLAN   - dc IVF   - continue with fluid restrict  - resent urine and serum studies and evaluate for siadh vs adh excess from nauseau  - nausea control  - etiology of nausea undefined with neg GI work-up  - MRA of brain with hx of cerebral aneurysm.  hx of 5mm?? 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 Course C/w SIADH.  Persistent with very minimal response to 3% NS.    SSRI d/brianna.    Noted with NSAIDS q 6 hours--D/c for its effect on water excretion.    3% NS today again.    9/17  Na level 127 last evening after 8 hr total 3% Sodium Chloride  Down to 124 today  will cont w 3% today   Lasix po 20 mg bid x 2 days

## 2020-09-17 NOTE — PROGRESS NOTE ADULT - ASSESSMENT
64F with PMHx of depression/anxieity, brain aneurysm, hyponatremia (admission in June for Na 118/AMS) who presents to ED with 3-4day history of nausea/intermittent vomiting, wretching,  and progressive AMS.   She has had intermittent speech and language difficulties which were improved on 9/13 and 9/14 but now have returned.  Hyponatremia continues to be a problem.  EEG showing encephalopathy as well as diffuse cortical irritability.  Will start Keppra 500 mg Q12.  Will plan for LP to look for other causes of encephalopathy as clinically she is not improving despite improvement in hyponatremia.

## 2020-09-17 NOTE — PROGRESS NOTE ADULT - SUBJECTIVE AND OBJECTIVE BOX
NEPHROLOGY INTERVAL HPI/OVERNIGHT EVENTS:    Date of Service: 09-16-20 @ 09:54    9/17  sitting OOB  Replying with one word answers   NAD, No complaints    9/16 SY  Sitting up in chair.  Appears comfortable.  Oriented to person only today.  Answers "I have a rash" to various questions.    HPI:  History obtained from pt and significant other at bedside  64F with PMHx of depression/anxieity, brain aneurysm, hyponatremia (admission in June for Na 118/AMS) who presents to ED with 3-4day history of nausea/intermittent vomiting, wretching,  and progressive AMS. Evaluation in ED revealed Na 111. CTH without acute pathology. eICU consulted and pt admitted to ICU. Upon arrival pt receiving 3% NaCL bolus, discontinued upon my arrival. Prior admission and workup revealed hyponatremia possibly SIADH vs SSRI vs excessive free water intake. She was discharged following correction on NaCl tabs as outpt. She is no longer taking these. She states they were stopped by nephro on her follow up visit as outpt. She was taken off her SSRI at last admission and placed on seroquel only. Her dosing has been escalated since last admission to 150mg at HS and 50mg 2x during the day. She reports no longer using her klonipin. (06 Sep 2020 20:29)    Patient is a 64y old  Female who presents with a chief complaint of confusion/N/V (14 Sep 2020 12:42)  --------------------------------------------------------------------------------  hx from  and friend, adamaris donaldson  pt was dc after ssri/lexapro related hyponatremia.  upon dc in june 2020 post dc na was normal and was dc from salt tablets by dr romano  pt as per  was doing well and eating but maintained fluid restriciton   progressively with more nauseau and inc lethargy  admitted with hyponatremia and was tx with hypertonic and nacl tabs  on ns, na with variability and drop   taking in po but Nauseau most disturbing sx  has been on seroquel to manage her anxiety.  has been off since admission  workup for nauseau with dr carson that was neg   no upto date mammogram/pap  sees dr raymond for moles   had cerebral aneurysm 5mm? as per  and was advised annual fu 5 years ago  her slow speech with flat affect atypical for her   no diarrhea or vomiting  has been trying to eat while here   over course of 3 years, has had 70lb weight loss       MEDICATIONS  (STANDING):  chlorhexidine 2% Cloths 1 Application(s) Topical <User Schedule>  influenza   Vaccine 0.5 milliLiter(s) IntraMuscular once  levETIRAcetam 500 milliGRAM(s) Oral two times a day  senna 2 Tablet(s) Oral at bedtime  sodium chloride 1 Gram(s) Oral two times a day  sodium chloride 3%. 500 milliLiter(s) (50 mL/Hr) IV Continuous <Continuous>  sodium chloride 3%. 500 milliLiter(s) (50 mL/Hr) IV Continuous <Continuous>  sodium chloride 3%. 500 milliLiter(s) (50 mL/Hr) IV Continuous <Continuous>  sodium chloride 3%. 500 milliLiter(s) (40 mL/Hr) IV Continuous <Continuous>  sodium chloride 3%. 500 milliLiter(s) (40 mL/Hr) IV Continuous <Continuous>    MEDICATIONS  (PRN):  acetaminophen   Tablet .. 650 milliGRAM(s) Oral every 6 hours PRN Temp greater or equal to 38.5C (101.3F), Mild Pain (1 - 3)  melatonin 5 milliGRAM(s) Oral at bedtime PRN Insomnia  ondansetron Injectable 4 milliGRAM(s) IV Push every 6 hours PRN Nausea and/or Vomiting      I&O's Detail    16 Sep 2020 07:01  -  17 Sep 2020 07:00  --------------------------------------------------------  IN:    sodium chloride 3%: 500 mL    sodium chloride 3%: 500 mL  Total IN: 1000 mL    OUT:    Voided (mL): 1350 mL  Total OUT: 1350 mL    Total NET: -350 mL      ICU Vital Signs Last 24 Hrs  T(C): 36.6 (17 Sep 2020 09:02), Max: 36.8 (16 Sep 2020 16:35)  T(F): 97.8 (17 Sep 2020 09:02), Max: 98.2 (16 Sep 2020 16:35)  HR: 92 (17 Sep 2020 10:00) (81 - 99)  BP: 149/87 (17 Sep 2020 10:00) (138/81 - 163/79)  BP(mean): 106 (17 Sep 2020 08:00) (94 - 109)  ABP: --  ABP(mean): --  RR: 43 (17 Sep 2020 10:00) (16 - 43)  SpO2: 99% (17 Sep 2020 10:00) (94% - 100%)        PHYSICAL EXAM: Alert, Confused  GENERAL: No distress  CHEST/LUNG: Clear to aus  HEART: S1S2 RRR  ABDOMEN: soft  EXTREMITIES: no edema  SKIN:     LABS:                        12.2   13.51 )-----------( 165      ( 16 Sep 2020 06:42 )             35.0     09-17    124<L>  |  94<L>  |  21  ----------------------------<  165<H>  3.9   |  23  |  0.64    Ca    9.1      17 Sep 2020 10:55    TPro  7.0  /  Alb  3.2<L>  /  TBili  0.5  /  DBili  x   /  AST  32  /  ALT  39  /  AlkPhos  121<H>  09-16      09-16    122<L>  |  90<L>  |  18  ----------------------------<  103<H>  3.8   |  24  |  0.54    Ca    8.7      16 Sep 2020 06:42    TPro  7.0  /  Alb  3.2<L>  /  TBili  0.5  /  DBili  x   /  AST  32  /  ALT  39  /  AlkPhos  121<H>  09-16                RADIOLOGY & ADDITIONAL TESTS:

## 2020-09-17 NOTE — CONSULT NOTE ADULT - ASSESSMENT
64y Female with depression/anxieity, Gastric metaplasia resulting in persistent nausea,  brain aneurysm, hyponatremia (admission in June for Na 118/AMS) admitted 9/8 w Na 111.  Pt was admitted to ICU and placed on NS. Treated for Toxic metabolic encephalopathy, despite improvement of Na. Pt had a CT chest /A/P today to look for occult malignancy and found to have a Left suprahilar/mediastinal mass compatible with neoplasm.     64y Female with depression/anxieity, Gastric metaplasia resulting in persistent nausea,  brain aneurysm, hyponatremia (admission in June for Na 118/AMS) admitted 9/8 w Na 111.  Pt was admitted to ICU and placed on NS. Treated for Toxic metabolic encephalopathy, despite improvement of Na. Pt had a CT chest /A/P today to look for occult malignancy and found to have a Left suprahilar/mediastinal mass compatible with neoplasm.    Dr. Doyle to review images

## 2020-09-17 NOTE — PROGRESS NOTE ADULT - ASSESSMENT
Assessment and Plan: 65 y/o female with depression/anxiety, Gastric Metaplasia resulting in persistent nausea, brain aneurysm, hyponatremia (admission in June for Na 118/AMS) who presents to ED with 2 weeks of Nausea and AMS.     Hyponatremia: persistent sec to SIADH  3% Saline, monitor BMP q8h  CT chest abd and pelvis r/o malignancy; case d/w  and consent obtained for the CT scan     Toxic metabolic encephalopathy  r/o paraneoplastic vs infection  pt needs LP; case d/w IR and Neurology, dc [facility]    Cerebral aneurysm - outpatient close monitoring

## 2020-09-17 NOTE — PROGRESS NOTE ADULT - SUBJECTIVE AND OBJECTIVE BOX
65 y/o female with depression/anxieity, Gastric metaplasia resulting in persistent nausea,  brain aneurysm, hyponatremia (admission in June for Na 118/AMS) who presents to ED with 2 weeks of Nausea unable to eat but continued to drink 8 8oz water daily and AMS found to have Na 111.  Pt was admitted to ICU and placed on .   patient had slurred speech and was intermittently treated with hypertonic saline, was on seroquel prior to admission.    9/17: Pt is disoriented, could not perform ant tasks on MMSE; unable to perform simple commands like finger to nose; unable to draw a clock indicating 3PM; when asked if she has any questions she answered " Yes, roast beef please." She thinks it is Sept. 20 1955. Case d/w , 2 weeks ago she was taking care of her 7 year old grand son. She is a daily smoker of cannabis; when she started having persistent nausea associated at times with vomiting she saw GI, was told to stop smoking and she did but the GI symptoms were persistent, so she resumed smoking.     Vital Signs Last 24 Hrs  T(C): 36.6 (17 Sep 2020 05:00), Max: 36.8 (16 Sep 2020 16:35)  T(F): 97.8 (17 Sep 2020 05:00), Max: 98.2 (16 Sep 2020 16:35)  HR: 96 (17 Sep 2020 08:00) (81 - 99)  BP: 142/97 (17 Sep 2020 08:00) (138/81 - 163/79)  BP(mean): 106 (17 Sep 2020 08:00) (94 - 109)  RR: 32 (17 Sep 2020 08:00) (16 - 37)  SpO2: 98% (17 Sep 2020 06:00) (94% - 100%)  PHYSICAL EXAM:    Constitutional: NAD, awake and alert, well-developed  HEENT: PERR, EOMI, Normal Hearing, MMM  Neck: Soft and supple  Respiratory: Breath sounds are clear bilaterally, No wheezing, rales or rhonchi  Cardiovascular: S1 and S2, regular rate and rhythm, no Murmurs, gallops or rubs  Gastrointestinal: Bowel Sounds present, soft, nontender, nondistended, no guarding, no rebound  Extremities: No peripheral edema  Neurological: see above; gait is preserved per nursing

## 2020-09-18 LAB
ANION GAP SERPL CALC-SCNC: 10 MMOL/L — SIGNIFICANT CHANGE UP (ref 5–17)
ANION GAP SERPL CALC-SCNC: 8 MMOL/L — SIGNIFICANT CHANGE UP (ref 5–17)
BUN SERPL-MCNC: 22 MG/DL — SIGNIFICANT CHANGE UP (ref 7–23)
BUN SERPL-MCNC: 24 MG/DL — HIGH (ref 7–23)
CALCIUM SERPL-MCNC: 9.2 MG/DL — SIGNIFICANT CHANGE UP (ref 8.5–10.1)
CALCIUM SERPL-MCNC: 9.5 MG/DL — SIGNIFICANT CHANGE UP (ref 8.5–10.1)
CHLORIDE SERPL-SCNC: 93 MMOL/L — LOW (ref 96–108)
CHLORIDE SERPL-SCNC: 97 MMOL/L — SIGNIFICANT CHANGE UP (ref 96–108)
CO2 SERPL-SCNC: 24 MMOL/L — SIGNIFICANT CHANGE UP (ref 22–31)
CO2 SERPL-SCNC: 24 MMOL/L — SIGNIFICANT CHANGE UP (ref 22–31)
CREAT SERPL-MCNC: 0.56 MG/DL — SIGNIFICANT CHANGE UP (ref 0.5–1.3)
CREAT SERPL-MCNC: 0.62 MG/DL — SIGNIFICANT CHANGE UP (ref 0.5–1.3)
GLUCOSE SERPL-MCNC: 116 MG/DL — HIGH (ref 70–99)
GLUCOSE SERPL-MCNC: 118 MG/DL — HIGH (ref 70–99)
HCT VFR BLD CALC: 37.3 % — SIGNIFICANT CHANGE UP (ref 34.5–45)
HGB BLD-MCNC: 13.2 G/DL — SIGNIFICANT CHANGE UP (ref 11.5–15.5)
MCHC RBC-ENTMCNC: 30.6 PG — SIGNIFICANT CHANGE UP (ref 27–34)
MCHC RBC-ENTMCNC: 35.4 GM/DL — SIGNIFICANT CHANGE UP (ref 32–36)
MCV RBC AUTO: 86.5 FL — SIGNIFICANT CHANGE UP (ref 80–100)
PLATELET # BLD AUTO: 159 K/UL — SIGNIFICANT CHANGE UP (ref 150–400)
POTASSIUM SERPL-MCNC: 3.8 MMOL/L — SIGNIFICANT CHANGE UP (ref 3.5–5.3)
POTASSIUM SERPL-MCNC: 4.1 MMOL/L — SIGNIFICANT CHANGE UP (ref 3.5–5.3)
POTASSIUM SERPL-SCNC: 3.8 MMOL/L — SIGNIFICANT CHANGE UP (ref 3.5–5.3)
POTASSIUM SERPL-SCNC: 4.1 MMOL/L — SIGNIFICANT CHANGE UP (ref 3.5–5.3)
RBC # BLD: 4.31 M/UL — SIGNIFICANT CHANGE UP (ref 3.8–5.2)
RBC # FLD: 12.1 % — SIGNIFICANT CHANGE UP (ref 10.3–14.5)
SODIUM SERPL-SCNC: 125 MMOL/L — LOW (ref 135–145)
SODIUM SERPL-SCNC: 131 MMOL/L — LOW (ref 135–145)
WBC # BLD: 16.29 K/UL — HIGH (ref 3.8–10.5)
WBC # FLD AUTO: 16.29 K/UL — HIGH (ref 3.8–10.5)

## 2020-09-18 PROCEDURE — 99233 SBSQ HOSP IP/OBS HIGH 50: CPT | Mod: 25

## 2020-09-18 PROCEDURE — 99497 ADVNCD CARE PLAN 30 MIN: CPT

## 2020-09-18 PROCEDURE — 99232 SBSQ HOSP IP/OBS MODERATE 35: CPT

## 2020-09-18 PROCEDURE — 99223 1ST HOSP IP/OBS HIGH 75: CPT | Mod: 25

## 2020-09-18 PROCEDURE — 99498 ADVNCD CARE PLAN ADDL 30 MIN: CPT

## 2020-09-18 PROCEDURE — 99233 SBSQ HOSP IP/OBS HIGH 50: CPT

## 2020-09-18 RX ORDER — FUROSEMIDE 40 MG
20 TABLET ORAL DAILY
Refills: 0 | Status: DISCONTINUED | OUTPATIENT
Start: 2020-09-18 | End: 2020-09-19

## 2020-09-18 RX ORDER — LEVETIRACETAM 250 MG/1
500 TABLET, FILM COATED ORAL EVERY 12 HOURS
Refills: 0 | Status: DISCONTINUED | OUTPATIENT
Start: 2020-09-18 | End: 2020-09-21

## 2020-09-18 RX ORDER — FUROSEMIDE 40 MG
20 TABLET ORAL ONCE
Refills: 0 | Status: COMPLETED | OUTPATIENT
Start: 2020-09-18 | End: 2020-09-18

## 2020-09-18 RX ORDER — LEVETIRACETAM 250 MG/1
500 TABLET, FILM COATED ORAL ONCE
Refills: 0 | Status: COMPLETED | OUTPATIENT
Start: 2020-09-18 | End: 2020-09-18

## 2020-09-18 RX ORDER — ENOXAPARIN SODIUM 100 MG/ML
40 INJECTION SUBCUTANEOUS AT BEDTIME
Refills: 0 | Status: DISCONTINUED | OUTPATIENT
Start: 2020-09-18 | End: 2020-09-24

## 2020-09-18 RX ORDER — SODIUM CHLORIDE 5 G/100ML
500 INJECTION, SOLUTION INTRAVENOUS
Refills: 0 | Status: DISCONTINUED | OUTPATIENT
Start: 2020-09-18 | End: 2020-09-19

## 2020-09-18 RX ADMIN — Medication 20 MILLIGRAM(S): at 02:52

## 2020-09-18 RX ADMIN — SODIUM CHLORIDE 50 MILLILITER(S): 5 INJECTION, SOLUTION INTRAVENOUS at 11:50

## 2020-09-18 RX ADMIN — LEVETIRACETAM 400 MILLIGRAM(S): 250 TABLET, FILM COATED ORAL at 22:36

## 2020-09-18 RX ADMIN — LEVETIRACETAM 400 MILLIGRAM(S): 250 TABLET, FILM COATED ORAL at 10:14

## 2020-09-18 RX ADMIN — ENOXAPARIN SODIUM 40 MILLIGRAM(S): 100 INJECTION SUBCUTANEOUS at 22:36

## 2020-09-18 RX ADMIN — LEVETIRACETAM 600 MILLIGRAM(S): 250 TABLET, FILM COATED ORAL at 02:59

## 2020-09-18 RX ADMIN — Medication 20 MILLIGRAM(S): at 10:14

## 2020-09-18 RX ADMIN — Medication 0.25 MILLIGRAM(S): at 18:17

## 2020-09-18 RX ADMIN — Medication 0.25 MILLIGRAM(S): at 11:50

## 2020-09-18 RX ADMIN — Medication 1 MILLIGRAM(S): at 20:33

## 2020-09-18 RX ADMIN — CHLORHEXIDINE GLUCONATE 1 APPLICATION(S): 213 SOLUTION TOPICAL at 05:11

## 2020-09-18 NOTE — PROGRESS NOTE ADULT - ASSESSMENT
64F with PMHx of depression/anxieity, brain aneurysm, hyponatremia (admission in June for Na 118/AMS) who presents to ED with 3-4day history of nausea/intermittent vomiting, wretching,  and progressive AMS. Evaluation in ED revealed Na 111. CTH without acute pathology.  PT with hx of saidh due to lexapro on june 2020.  Now with persistent low na value and renal consult requested.  MRI with contrast of brain was negative.      PLAN   - dc IVF   - continue with fluid restrict  - resent urine and serum studies and evaluate for siadh vs adh excess from nauseau  - nausea control  - etiology of nausea undefined with neg GI work-up  - MRA of brain with hx of cerebral aneurysm.  hx of 5mm?? 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 Course C/w SIADH.  Persistent with very minimal response to 3% NS.    SSRI d/brianna.    Noted with NSAIDS q 6 hours--D/c for its effect on water excretion.    3% NS today again.    9/17  Na level 127 last evening after 8 hr total 3% Sodium Chloride  Down to 124 today  will cont w 3% today   Lasix po 20 mg bid x 2 days    9/18  CT chest lung mass  will cont with Hypertonic saline  since sodium drops so quickly  tolvaptan not suitable since pt may not have the mentation to drink water freely

## 2020-09-18 NOTE — PROGRESS NOTE ADULT - SUBJECTIVE AND OBJECTIVE BOX
Interval History:  9/18/20: Patient has been agitated today.  Denies any headache    MEDICATIONS  (STANDING):  chlorhexidine 2% Cloths 1 Application(s) Topical <User Schedule>  enoxaparin Injectable 40 milliGRAM(s) SubCutaneous at bedtime  furosemide   Injectable 20 milliGRAM(s) IV Push daily  influenza   Vaccine 0.5 milliLiter(s) IntraMuscular once  levETIRAcetam  IVPB 500 milliGRAM(s) IV Intermittent every 12 hours  senna 2 Tablet(s) Oral at bedtime  sodium chloride 1 Gram(s) Oral two times a day  sodium chloride 3%. 500 milliLiter(s) (50 mL/Hr) IV Continuous <Continuous>  sodium chloride 3%. 500 milliLiter(s) (50 mL/Hr) IV Continuous <Continuous>  sodium chloride 3%. 500 milliLiter(s) (50 mL/Hr) IV Continuous <Continuous>  sodium chloride 3%. 500 milliLiter(s) (40 mL/Hr) IV Continuous <Continuous>  sodium chloride 3%. 500 milliLiter(s) (40 mL/Hr) IV Continuous <Continuous>    MEDICATIONS  (PRN):  acetaminophen   Tablet .. 650 milliGRAM(s) Oral every 6 hours PRN Temp greater or equal to 38.5C (101.3F), Mild Pain (1 - 3)  LORazepam   Injectable 0.25 milliGRAM(s) IV Push every 4 hours PRN Anxiety or agitation  melatonin 5 milliGRAM(s) Oral at bedtime PRN Insomnia  ondansetron Injectable 4 milliGRAM(s) IV Push every 6 hours PRN Nausea and/or Vomiting      Allergies    No Known Allergies    Intolerances        PHYSICAL EXAM:  Vital Signs Last 24 Hrs  T(F): 98.4 (09-18-20 @ 09:13)  HR: 89 (09-18-20 @ 08:00)  BP: 159/92 (09-18-20 @ 08:00)  RR: 19 (09-18-20 @ 08:00)    GENERAL: NAD, well-groomed, well-developed  HEAD:  Atraumatic, Normocephalic  Neuro:  Awake, alert, Oriented to person and place. Able to state her birthday. States she does not now the date. Knows that Latha is President and that he is running against Biden  CN: EOMI, no nystagmus, no facial weakness, tongue protrudes in the midline  motor: no focal weakness  sensory: intact to light touch  coordination: no tremors or involuntary movements      LABS:                        13.2   16.29 )-----------( 159      ( 18 Sep 2020 08:47 )             37.3     09-18    125<L>  |  93<L>  |  22  ----------------------------<  116<H>  4.1   |  24  |  0.56    Ca    9.5      18 Sep 2020 08:47      PT/INR - ( 17 Sep 2020 10:55 )   PT: 12.0 sec;   INR: 1.04 ratio         PTT - ( 17 Sep 2020 10:55 )  PTT:30.1 sec      RADIOLOGY & ADDITIONAL STUDIES:      Head CT   ct head 9/6/20:  No acute intracranial hemorrhage, mass effect, or acute osseous fracture.    mri brain without contrast 9/12/20:  1. No evidence of central pontine or extrapontine myelinolysis.  2. No recent infarct on DWI. 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 No blood products on gradient echo imaging.  3. No extra-axial collections, hydrocephalus, midline shift or space-occupying mass lesion.  4. Minimal predominantly periventricular chronic white matter microvascular ischemic changes.    Video EEG 9/16/20:  This is an abnormal prolonged EEG due to the presence of:  -Moderate generalized slowing  -Bursts of diffuse delta as well as FIRDA (frontal intermittent rhythmic delta activity)  -Admixed spikes with delta slowing    EEG Impression/Clinical Correlate:  The findings are most suggestive of encephalopathy.  In addition, there does appear to be evidence of diffuse cortical irritability and a risk for seizures.    CT chest/abdomen/pelvis with IV contrast 9/17/20:  Left suprahilar/mediastinal mass compatible with neoplasm.    Numerous tiny low-density liver lesions raising suspicion for metastatic disease. MRI recommended.

## 2020-09-18 NOTE — PROGRESS NOTE ADULT - ASSESSMENT
Assessment and Plan: 65 y/o female with depression/anxiety, Gastric Metaplasia resulting in persistent nausea, brain aneurysm, hyponatremia (admission in June for Na 118/AMS) who presents to ED with 2 weeks of Nausea and AMS.     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

## 2020-09-18 NOTE — GOALS OF CARE CONVERSATION - ADVANCED CARE PLANNING - CONVERSATION DETAILS
Team met with pts family to discuss goals of care, assist with planning and provide supportive counseling. Pt. was living home with her  prior to admission. Pts family discussed how pts mental status has declined significantly. Pt. is currently confused and cannot participate in decision making.     Pts current medical condition and goals of care discussed. Dr. Collado joined are meeting briefly to provide pts family with a medical update and explained the next step would be for pt. to be seen by Cardiothoracic Surgery to have a biopsy done to determine a diagnosis. It is presumed pt. may have small cell lung cancer but will need biopsy to confirm this. Pts family is in agreement with having the biopsy done.     Pts family asked about the process after biopsy is done. Team explained that once the results are in, which we explained can take some time, that Oncology will be consulted and they will then discuss if and what treatment options would be available for pt. Team explained that at that point they would have the option of pursing more aggressive treatment if it is available VS a comfort focused plan of care and hospice services. Pts family expressed understanding.     Advanced directives discussed. Pt. has HCP naming her  Tong. Pts  requests his daughter Stacey is contacted with any information and she will relay it to him. Team discussed pts wishes regarding resuscitation and intubation and explained their options. Pts family will think this over and expressed being more likely to be able to make a decision about this once they know the biopsy results and the plan moving forward. Pts family is aware that the default in the event of a emergency would be for pt. to receive resuscitation and intubation if needed. No limits set at this time. Pt. is full resuscitation.     Plan at this time is to be further determined. Emotional support provided. Our team will continue to follow.

## 2020-09-18 NOTE — PROGRESS NOTE ADULT - ASSESSMENT
64F with PMHx of depression/anxiety, brain aneurysm, hyponatremia (admission in June for Na 118/AMS) who presents to ED with 3-4day history of nausea/intermittent vomiting, wretching,  and progressive AMS.   She has had intermittent speech and language difficulties which were improved on 9/13 and 9/14 but now have returned.  Hyponatremia continues to be a problem.  EEG showing encephalopathy as well as diffuse cortical irritability.  She was also found to have a lung mass and likely mets in liver    Patient has fluctuating neurological and psychiatric symptoms  -Continue to treat hyponatremia  -Palliative is speaking with family today.  -If  is agreeable, suggest MRI brain to look for possible brain mets  -? possible paraneoplastic encephalitis as cause of symptoms. She has been on long standing psych meds which have been discontinued due to suspicion that they caused SIADH. Now that cancer is the likely cause of SIADH, can restart psych meds and see if behavior improves. If no clinical improvement can consider LP to evaluate for paraneoplastic Antibodies    -Continue Keppra for now. Would repeat EEG when sodium levels are corrected and if normalized may wean Keppra.      Dr. Field will take over neurology service tomorrow and will follow up as needed.

## 2020-09-18 NOTE — PROGRESS NOTE ADULT - SUBJECTIVE AND OBJECTIVE BOX
Subjective:  Pt seen, states not feeling well. Denies SOB.    Vital Signs:  Vital Signs Last 24 Hrs  T(C): 36.9 (09-18-20 @ 05:00), Max: 37 (09-17-20 @ 20:00)  T(F): 98.5 (09-18-20 @ 05:00), Max: 98.6 (09-17-20 @ 20:00)  HR: 102 (09-18-20 @ 06:00) (82 - 105)  BP: 162/99 (09-18-20 @ 06:00) (110/86 - 164/95)  RR: 20 (09-18-20 @ 06:00) (14 - 43)  SpO2: 99% (09-18-20 @ 06:00) (93% - 99%) on (O2)    Telemetry/Alarms:    Relevant labs, radiology and Medications reviewed                        12.5   14.67 )-----------( 159      ( 17 Sep 2020 06:38 )             35.7     09-17    127<L>  |  97  |  25<H>  ----------------------------<  111<H>  4.2   |  24  |  0.54    Ca    9.4      17 Sep 2020 22:59      PT/INR - ( 17 Sep 2020 10:55 )   PT: 12.0 sec;   INR: 1.04 ratio         PTT - ( 17 Sep 2020 10:55 )  PTT:30.1 sec  MEDICATIONS  (STANDING):  chlorhexidine 2% Cloths 1 Application(s) Topical <User Schedule>  furosemide    Tablet 20 milliGRAM(s) Oral two times a day  influenza   Vaccine 0.5 milliLiter(s) IntraMuscular once  levETIRAcetam 500 milliGRAM(s) Oral two times a day  senna 2 Tablet(s) Oral at bedtime  sodium chloride 1 Gram(s) Oral two times a day  sodium chloride 3%. 500 milliLiter(s) (50 mL/Hr) IV Continuous <Continuous>  sodium chloride 3%. 500 milliLiter(s) (50 mL/Hr) IV Continuous <Continuous>  sodium chloride 3%. 500 milliLiter(s) (50 mL/Hr) IV Continuous <Continuous>  sodium chloride 3%. 500 milliLiter(s) (40 mL/Hr) IV Continuous <Continuous>  sodium chloride 3%. 500 milliLiter(s) (40 mL/Hr) IV Continuous <Continuous>    MEDICATIONS  (PRN):  acetaminophen   Tablet .. 650 milliGRAM(s) Oral every 6 hours PRN Temp greater or equal to 38.5C (101.3F), Mild Pain (1 - 3)  melatonin 5 milliGRAM(s) Oral at bedtime PRN Insomnia  ondansetron Injectable 4 milliGRAM(s) IV Push every 6 hours PRN Nausea and/or Vomiting      Physical exam  Gen NAD  Neuro alert, follows commands, answers questions AAOx2  Card RRR  Pulm clear  Abd soft  Ext warm        I&O's Summary    17 Sep 2020 07:01  -  18 Sep 2020 07:00  --------------------------------------------------------  IN: 0 mL / OUT: 1050 mL / NET: -1050 mL        Assessment  64y Female  w/ PAST MEDICAL & SURGICAL HISTORY:  Hyponatremia    Brain aneurysm    Anxiety    Depression    History of neck surgery    admitted with complaints of Patient is a 64y old  Female who presents with a chief complaint of confusion/N/V (17 Sep 2020 16:53)  .  64y Female with depression/anxieity, Gastric metaplasia resulting in persistent nausea,  brain aneurysm, hyponatremia (admission in June for Na 118/AMS) admitted 9/8 w Na 111.  Pt was admitted to ICU and placed on NS. Treated for Toxic metabolic encephalopathy, despite improvement of Na. Pt had a CT chest /A/P today to look for occult malignancy and found to have a Left suprahilar/mediastinal mass compatible with neoplasm.    Dr. Doyle to review images today    Discussed with Cardiothoracic Team at AM rounds.

## 2020-09-18 NOTE — CONSULT NOTE ADULT - SUBJECTIVE AND OBJECTIVE BOX
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      2020 patient seen and examined with no family at bedside. Patient having difficulty with word finding, shaking her head yes no to questions but not verbalizing answer to questions.  Patient denies any pain at this time.  has increased anxiety at times.     PAIN: ( )Yes   (x )No  appears comfortable at this time     DYSPNEA: ( ) Yes  (x ) No    PAST MEDICAL & SURGICAL HISTORY:  Hyponatremia    Brain aneurysm    Anxiety    Depression    History of neck surgery        SOCIAL HX:    Hx opiate tolerance ( )YES  (x )NO    Baseline ADLs  (Prior to Admission)  (x ) Independent   ( )Dependent    FAMILY HISTORY:  lives at home with      Review of Systems:    Unable to obtain/Limited due to: unable to fully assess as patient only shaking head yes or no to questions       PHYSICAL EXAM:    Vital Signs Last 24 Hrs  T(C): 36.6 (18 Sep 2020 14:17), Max: 37 (17 Sep 2020 20:00)  T(F): 97.8 (18 Sep 2020 14:17), Max: 98.6 (17 Sep 2020 20:00)  HR: 100 (18 Sep 2020 15:00) (82 - 102)  BP: 127/77 (18 Sep 2020 15:00) (110/86 - 164/95)  BP(mean): 90 (18 Sep 2020 15:00) (87 - 116)  RR: 19 (18 Sep 2020 15:00) (14 - 36)  SpO2: 98% (18 Sep 2020 15:00) (93% - 100%)  Daily Height in cm: 170.18 (18 Sep 2020 09:00)    Daily Weight in k (18 Sep 2020 05:00)    PPSV2: 50  %    General: ill appearing female in bed, NAD   Mental Status: alert unable to assess how oriented, increased confusion   HEENT: dry oral mucosa, + temporal wasting   Lungs: Breath sounds are clear bilaterally, No wheezing, rales or rhonchi  Cardiac: S1S2 +   GI: Bowel Sounds present, soft, nontender, nondistended, no guarding, no rebound  : no suprapubic tenderness   Ext: no edema present   Neuro: weakness     LABS:                        13.2   16.29 )-----------( 159      ( 18 Sep 2020 08:47 )             37.3     09-18    125<L>  |  93<L>  |  22  ----------------------------<  116<H>  4.1   |  24  |  0.56    Ca    9.5      18 Sep 2020 08:47      PT/INR - ( 17 Sep 2020 10:55 )   PT: 12.0 sec;   INR: 1.04 ratio       PTT - ( 17 Sep 2020 10:55 )  PTT:30.1 sec  Albumin: Albumin, Serum: 3.2 g/dL ( @ 06:42)      Allergies    No Known Allergies    Intolerances      MEDICATIONS  (STANDING):  chlorhexidine 2% Cloths 1 Application(s) Topical <User Schedule>  enoxaparin Injectable 40 milliGRAM(s) SubCutaneous at bedtime  furosemide   Injectable 20 milliGRAM(s) IV Push daily  influenza   Vaccine 0.5 milliLiter(s) IntraMuscular once  levETIRAcetam  IVPB 500 milliGRAM(s) IV Intermittent every 12 hours  senna 2 Tablet(s) Oral at bedtime  sodium chloride 1 Gram(s) Oral two times a day  sodium chloride 3%. 500 milliLiter(s) (50 mL/Hr) IV Continuous <Continuous>  sodium chloride 3%. 500 milliLiter(s) (50 mL/Hr) IV Continuous <Continuous>  sodium chloride 3%. 500 milliLiter(s) (50 mL/Hr) IV Continuous <Continuous>  sodium chloride 3%. 500 milliLiter(s) (40 mL/Hr) IV Continuous <Continuous>  sodium chloride 3%. 500 milliLiter(s) (40 mL/Hr) IV Continuous <Continuous>  sodium chloride 3%. 500 milliLiter(s) (50 mL/Hr) IV Continuous <Continuous>    MEDICATIONS  (PRN):  acetaminophen   Tablet .. 650 milliGRAM(s) Oral every 6 hours PRN Temp greater or equal to 38.5C (101.3F), Mild Pain (1 - 3)  LORazepam   Injectable 0.25 milliGRAM(s) IV Push every 4 hours PRN Anxiety or agitation  melatonin 5 milliGRAM(s) Oral at bedtime PRN Insomnia  ondansetron Injectable 4 milliGRAM(s) IV Push every 6 hours PRN Nausea and/or Vomiting      RADIOLOGY/ADDITIONAL STUDIES:  < from: CT Chest w/ IV Cont (20 @ 13:43) >  EXAM:  CT ABDOMEN AND PELVIS OC IC                          EXAM:  CT CHEST IC                            PROCEDURE DATE:  2020          INTERPRETATION:  CLINICAL INFORMATION: Syndrome of inappropriate ADH secretion    COMPARISON: None.    PROCEDURE:  CT of the Chest, Abdomen and Pelvis was performed with intravenous contrast.  Intravenous contrast: 90 ml Omnipaque 350. 10 ml discarded.  Oral contrast: None.  Sagittal and coronal reformats were performed.    FINDINGS:  CHEST:  LUNGS AND LARGE AIRWAYS/MEDIASTINUM: 5.3 x 2.7 cm left mass in the left suprahilar region, extending into the mediastinum. Additional mediastinal lymph nodes noted, for example a 1.4 x 1.3 cm right lower paratracheal lymph node (2; 27).  PLEURA: No pleural effusion.  VESSELS: Atherosclerotic changes of the aorta and coronary arteries.  HEART: Heart size is normal. No pericardial effusion.  CHEST WALL AND LOWER NECK: Within normal limits.    ABDOMEN AND PELVIS:  LIVER: Numerous subcentimeter low-density lesions throughout the liver.  BILE DUCTS: Normal caliber.  GALLBLADDER: No visible gallstones. Fundal adenomyomatosis.  SPLEEN: Within normal limits.  PANCREAS: Within normal limits.  ADRENALS: Within normal limits.  KIDNEYS/URETERS: Within normal limits.    BLADDER: Within normal limits.  REPRODUCTIVE ORGANS: Uterus and adnexa within normal limits.    BOWEL: No bowel obstruction. Appendix is normal. Mild colonic diverticulosis.  PERITONEUM: No ascites.  VESSELS: Atherosclerotic changes.  RETROPERITONEUM/LYMPH NODES: No lymphadenopathy.  ABDOMINAL WALL: Within normal limits.  BONES: No lytic or blastic bony lesion. Status post lower cervical spine fusion.    IMPRESSION:  Left suprahilar/mediastinal mass compatible with neoplasm.    Numerous tiny low-density liver lesions raising suspicion for metastatic disease. MRI recommended.    < end of copied text >  < from: CT Abdomen and Pelvis w/ Oral Cont and w/ IV Cont (20 @ 13:43) >  EXAM:  CT ABDOMEN AND PELVIS OC IC                          EXAM:  CT CHEST IC                            PROCEDURE DATE:  2020          INTERPRETATION:  CLINICAL INFORMATION: Syndrome of inappropriate ADH secretion    COMPARISON: None.    PROCEDURE:  CT of the Chest, Abdomen and Pelvis was performed with intravenous contrast.  Intravenous contrast: 90 ml Omnipaque 350. 10 ml discarded.  Oral contrast: None.  Sagittal and coronal reformats were performed.    FINDINGS:  CHEST:  LUNGS AND LARGE AIRWAYS/MEDIASTINUM: 5.3 x 2.7 cm left mass in the left suprahilar region, extending into the mediastinum. Additional mediastinal lymph nodes noted, for example a 1.4 x 1.3 cm right lower paratracheal lymph node (2; 27).  PLEURA: No pleural effusion.  VESSELS: Atherosclerotic changes of the aorta and coronary arteries.  HEART: Heart size is normal. No pericardial effusion.  CHEST WALL AND LOWER NECK: Within normal limits.    ABDOMEN AND PELVIS:  LIVER: Numerous subcentimeter low-density lesions throughout the liver.  BILE DUCTS: Normal caliber.  GALLBLADDER: No visible gallstones. Fundal adenomyomatosis.  SPLEEN: Within normal limits.  PANCREAS: Within normal limits.  ADRENALS: Within normal limits.  KIDNEYS/URETERS: Within normal limits.    BLADDER: Within normal limits.  REPRODUCTIVE ORGANS: Uterus and adnexa within normal limits.    BOWEL: No bowel obstruction. Appendix is normal. Mild colonic diverticulosis.  PERITONEUM: No ascites.  VESSELS: Atherosclerotic changes.  < from: MR Head No Cont (20 @ 10:34) >  EXAM:  MR BRAIN                            PROCEDURE DATE:  2020          INTERPRETATION:  Exam    MRI Brain Without Contrast    History  64-year-old with dysarthria, hyponatremia, hypoosmolality.    Comparison  Head CT dated 2020. Older brain MRI dated 10/5/2016.    Technique  Multiplanar multisequence MR imaging of the brain was performed without contrast.    Findings  MRI Brain  Intra-axial: No abnormal signal intensity in the anterolateral silvano or extrapontine regions to suggest myelinolysis. No space-occupying mass lesion, focal or diffuse abnormal signal intensity or midline shift. Minimal predominantly periventricular chronic white matter microvascular ischemic changes, slightly progressed from older MRI of 10/5/2016. Smallold infarct in the inferior right cerebellum (series 10: Image 6), unchanged from older MRI dated 10/5/2016. Diffusion-weighted images show no evidence restricted diffusion to suggest a recent infarct. Gradient echo images show no evidence of blood products. Unremarkable morphology and brain volume for age.    Ventricles/extra-axial spaces: Unremarkable ventricular volume. Patent cerebral aqueduct. No extra-axial collections or masses.  Vascular: Expected intracranial vascular flow voids are grossly patent.  Calvarium: Unremarkable.    Orbits: Nondedicated images through the orbits reveal no gross orbital pathology.  Paranasal sinuses: The visualized paranasal sinuses are well aerated.  Mastoids/middle ears: The middle ear cavities and mastoid air cells are clear.  Suprahyoid neck: The visualized suprahyoid neck is unremarkable.    Impression    MRI Brain without contrast:    1. No evidence of central pontine or extrapontine myelinolysis.  2. No recent infarct on DWI. 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 No blood products on gradient echo imaging.  3. No extra-axia?radl collections, hydrocephalus, midline shift or space-occupying mass lesion.  4. Minimal predominantly periventricular chronic white matter microvascular ischemic changes.      < end of copied text >  RETROPERITONEUM/LYMPH NODES: No lymphadenopathy.  ABDOMINAL WALL: Within normal limits.  BONES: No lytic or blastic bony lesion. Status post lower cervical spine fusion.    IMPRESSION:  Left suprahilar/mediastinal mass compatible with neoplasm.    Numerous tiny low-density liver lesions raising suspicion for metastatic disease. MRI recommended.    < end of copied text >  < from: MR Angio Head No Cont (20 @ 22:40) >  EXAM:  MR ANGIO BRAIN                            PROCEDURE DATE:  2020          INTERPRETATION:  CLINICAL HISTORY: Hyponatremia, dysarthria and nausea.    TECHNIQUE: MRA of the brain without IV contrast.  MR angiography of intracranial circulation was performed using three-dimensional time of flight technique. Post processing angiographic reconstruction of images was performed. This data set was reconstructed as maximum intensity pixel images and displayed in multiple rotations.    COMPARISON: MRA of the brain from 10/5/2016    FINDINGS:  There is motion artifact present which degrades image quality.    The middle and anterior cerebral arteries demonstrate patency with symmetric arborization. No focal occlusion, hemodynamically significant stenosis or aneurysm.    There is decreased flow related signal in the intradural right vertebral artery as compared to the prior study, however, this is felt to be technical in etiology as overall decreased signal is seen in the vessels as compared to the prior exam. The left intradural vertebral artery remains dominant. The basilar artery and its tributaries are patent. Fusiform dilatation of the basilar tip measuring 5 mm is unchanged. No hemodynamically significant stenosis or focal occlusion the posterior circulation.    IMPRESSION:  Limited exam.  Stable fusiform dilatation of the basilar tip 5 mm. No major vessel occlusion or hemodynamically significant stenosis.    < end of copied text >

## 2020-09-18 NOTE — PROGRESS NOTE ADULT - SUBJECTIVE AND OBJECTIVE BOX
63 y/o female with depression/anxieity, Gastric metaplasia resulting in persistent nausea,  brain aneurysm, hyponatremia (admission in June for Na 118/AMS) who presents to ED with 2 weeks of Nausea unable to eat but continued to drink 8 8oz water daily and AMS found to have Na 111.  Pt was admitted to ICU and placed on .   patient had slurred speech and was intermittently treated with hypertonic saline, was on seroquel prior to admission.    9/18: pt paranoid this am, refusing meds and food, seems frightened, continues to believe we are Sept. 1955    Vital Signs Last 24 Hrs  T(C): 36.9 (18 Sep 2020 05:00), Max: 37 (17 Sep 2020 20:00)  T(F): 98.5 (18 Sep 2020 05:00), Max: 98.6 (17 Sep 2020 20:00)  HR: 89 (18 Sep 2020 08:00) (82 - 105)  BP: 159/92 (18 Sep 2020 08:00) (110/86 - 164/95)  BP(mean): 109 (18 Sep 2020 08:00) (87 - 116)  RR: 19 (18 Sep 2020 08:00) (14 - 43)  SpO2: 100% (18 Sep 2020 08:00) (93% - 100%)    PHYSICAL EXAM:  Constitutional: NAD, awake and alert, well-developed  HEENT: PERR, EOMI, Normal Hearing, MMM  Neck: Soft and supple  Respiratory: Breath sounds are clear bilaterally, No wheezing, rales or rhonchi  Cardiovascular: S1 and S2, regular rate and rhythm, no Murmurs, gallops or rubs  Gastrointestinal: Bowel Sounds present, soft, nontender, nondistended, no guarding, no rebound  Extremities: No peripheral edema  Neurological: see above; gait is preserved per nursing

## 2020-09-18 NOTE — CONSULT NOTE ADULT - ASSESSMENT
Pt is a 64y old Female with hx depression and anxiety, Gastric metaplasia resulting in persistent nausea,  brain aneurysm, hyponatremia (admission in June for Na 118/AMS) who presents to ED on 9/8 with 2 weeks of Nausea unable to eat but continued to drink 8 8oz water daily and AMS found to have Na 111.      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

## 2020-09-18 NOTE — PROGRESS NOTE ADULT - SUBJECTIVE AND OBJECTIVE BOX
NEPHROLOGY INTERVAL HPI/OVERNIGHT EVENTS:        9/17  eyes fixed on tv  transiently makes eye contact   but does not answer  CT reports noted lung mass    9/17  sitting OOB  Replying with one word answers   NAD, No complaints    9/16 SY  Sitting up in chair.  Appears comfortable.  Oriented to person only today.  Answers "I have a rash" to various questions.    HPI:  History obtained from pt and significant other at bedside  64F with PMHx of depression / anxiety, brain aneurysm, hyponatremia (admission in June for Na 118/AMS) who presents to ED with 3-4day history of nausea/intermittent vomiting, wretching,  and progressive AMS. Evaluation in ED revealed Na 111. CTH without acute pathology. eICU consulted and pt admitted to ICU. Upon arrival pt receiving 3% NaCL bolus, discontinued upon my arrival. Prior admission and workup revealed hyponatremia possibly SIADH vs SSRI vs excessive free water intake. She was discharged following correction on NaCl tabs as outpt. She is no longer taking these. She states they were stopped by nephro on her follow up visit as outpt. She was taken off her SSRI at last admission and placed on seroquel only. Her dosing has been escalated since last admission to 150mg at HS and 50mg 2x during the day. She reports no longer using her Klonopin (06 Sep 2020 20:29)    Patient is a 64y old  Female who presents with a chief complaint of confusion/N/V (14 Sep 2020 12:42)  --------------------------------------------------------------------------------  hx from  and friend, adamaris donaldson  pt was dc after ssri/lexapro related hyponatremia.  upon dc in june 2020 post dc na was normal and was dc from salt tablets by dr romano  pt as per  was doing well and eating but maintained fluid restriciton   progressively with more nauseau and inc lethargy  admitted with hyponatremia and was tx with hypertonic and nacl tabs  on ns, na with variability and drop   taking in po but Nauseau most disturbing sx  has been on seroquel to manage her anxiety.  has been off since admission  workup for nauseau with dr carson that was neg   no upto date mammogram/pap  sees dr raymond for moles   had cerebral aneurysm 5mm? as per  and was advised annual fu 5 years ago  her slow speech with flat affect atypical for her   no diarrhea or vomiting  has been trying to eat while here   over course of 3 years, has had 70lb weight loss       MEDICATIONS  (STANDING):  chlorhexidine 2% Cloths 1 Application(s) Topical <User Schedule>  influenza   Vaccine 0.5 milliLiter(s) IntraMuscular once  levETIRAcetam 500 milliGRAM(s) Oral two times a day  senna 2 Tablet(s) Oral at bedtime  sodium chloride 1 Gram(s) Oral two times a day  sodium chloride 3%. 500 milliLiter(s) (50 mL/Hr) IV Continuous <Continuous>  sodium chloride 3%. 500 milliLiter(s) (50 mL/Hr) IV Continuous <Continuous>  sodium chloride 3%. 500 milliLiter(s) (50 mL/Hr) IV Continuous <Continuous>  sodium chloride 3%. 500 milliLiter(s) (40 mL/Hr) IV Continuous <Continuous>  sodium chloride 3%. 500 milliLiter(s) (40 mL/Hr) IV Continuous <Continuous>    MEDICATIONS  (PRN):  acetaminophen   Tablet .. 650 milliGRAM(s) Oral every 6 hours PRN Temp greater or equal to 38.5C (101.3F), Mild Pain (1 - 3)  melatonin 5 milliGRAM(s) Oral at bedtime PRN Insomnia  ondansetron Injectable 4 milliGRAM(s) IV Push every 6 hours PRN Nausea and/or Vomiting      I&O's Detail    16 Sep 2020 07:01  -  17 Sep 2020 07:00  --------------------------------------------------------  IN:    sodium chloride 3%: 500 mL    sodium chloride 3%: 500 mL  Total IN: 1000 mL    OUT:    Voided (mL): 1350 mL  Total OUT: 1350 mL    Total NET: -350 mL      Vital Signs Last 24 Hrs  T(C): 36.9 (18 Sep 2020 09:13), Max: 37 (17 Sep 2020 20:00)  T(F): 98.4 (18 Sep 2020 09:13), Max: 98.6 (17 Sep 2020 20:00)  HR: 85 (18 Sep 2020 10:12) (82 - 105)  BP: 151/77 (18 Sep 2020 10:12) (110/86 - 164/95)  BP(mean): 96 (18 Sep 2020 10:12) (87 - 116)  RR: 18 (18 Sep 2020 10:12) (14 - 36)  SpO2: 98% (18 Sep 2020 10:12) (93% - 100%)      PHYSICAL EXAM: Alert, nonverbal, nad  GENERAL: No distress  CHEST/LUNG: Clear to aus  HEART: S1S2 RRR  ABDOMEN: soft  EXTREMITIES: no edema  SKIN:     LABS:                        13.2   16.29 )-----------( 159      ( 18 Sep 2020 08:47 )             37.3                           12.2   13.51 )-----------( 165      ( 16 Sep 2020 06:42 )             35.0     09-17    124<L>  |  94<L>  |  21  ----------------------------<  165<H>  3.9   |  23  |  0.64    Ca    9.1      17 Sep 2020 10:55    TPro  7.0  /  Alb  3.2<L>  /  TBili  0.5  /  DBili  x   /  AST  32  /  ALT  39  /  AlkPhos  121<H>  09-16    09-18    125<L>  |  93<L>  |  22  ----------------------------<  116<H>  4.1   |  24  |  0.56    Ca    9.5      18 Sep 2020 08:47          09-16    122<L>  |  90<L>  |  18  ----------------------------<  103<H>  3.8   |  24  |  0.54    Ca    8.7      16 Sep 2020 06:42    TPro  7.0  /  Alb  3.2<L>  /  TBili  0.5  /  DBili  x   /  AST  32  /  ALT  39  /  AlkPhos  121<H>  09-16                RADIOLOGY & ADDITIONAL TESTS:

## 2020-09-18 NOTE — CONSULT NOTE ADULT - CONVERSATION DETAILS
Team met with pts family to discuss goals of care, assist with planning and provide supportive counseling. Pt. was living home with her  prior to admission. Pts family discussed how pts mental status has declined significantly. Pt. is currently confused and cannot participate in decision making.     Pts current medical condition and goals of care discussed. Dr. Collado joined are meeting briefly to provide pts family with a medical update and explained the next step would be for pt. to be seen by Cardiothoracic Surgery to have a biopsy done to determine a diagnosis. It is presumed pt. may have small cell lung cancer but will need biopsy to confirm this. Pts family is in agreement with having the biopsy done.     Pts family asked about the process after biopsy is done. Team explained that once the results are in, which we explained can take some time, that Oncology will be consulted and they will then discuss if and what treatment options would be available for pt. Team explained that at that point they would have the option of pursing more aggressive treatment if it is available VS a comfort focused plan of care and hospice services. Pts family expressed understanding.     Advanced directives discussed. Pt. has HCP naming her  Tong. Pts  requests his daughter is contacted with any information and she will relay it to him. Team discussed pts wishes regarding resuscitation and intubation and explained their options. Pts family will think this over and expressed being more likely to be able to make a decision about this once they know the biopsy results and the plan moving forward. Pts family is aware that the default in the event of a emergency would be for pt. to receive resuscitation and intubation if needed. No limits set at this time. Pt. is full resuscitation.     Plan at this time is to be further determined. Emotional support provided. Our team will continue to follow.

## 2020-09-19 LAB
ANION GAP SERPL CALC-SCNC: 5 MMOL/L — SIGNIFICANT CHANGE UP (ref 5–17)
ANION GAP SERPL CALC-SCNC: 8 MMOL/L — SIGNIFICANT CHANGE UP (ref 5–17)
BUN SERPL-MCNC: 26 MG/DL — HIGH (ref 7–23)
BUN SERPL-MCNC: 30 MG/DL — HIGH (ref 7–23)
CALCIUM SERPL-MCNC: 9.1 MG/DL — SIGNIFICANT CHANGE UP (ref 8.5–10.1)
CALCIUM SERPL-MCNC: 9.6 MG/DL — SIGNIFICANT CHANGE UP (ref 8.5–10.1)
CHLORIDE SERPL-SCNC: 95 MMOL/L — LOW (ref 96–108)
CHLORIDE SERPL-SCNC: 98 MMOL/L — SIGNIFICANT CHANGE UP (ref 96–108)
CO2 SERPL-SCNC: 25 MMOL/L — SIGNIFICANT CHANGE UP (ref 22–31)
CO2 SERPL-SCNC: 28 MMOL/L — SIGNIFICANT CHANGE UP (ref 22–31)
CREAT SERPL-MCNC: 0.54 MG/DL — SIGNIFICANT CHANGE UP (ref 0.5–1.3)
CREAT SERPL-MCNC: 0.63 MG/DL — SIGNIFICANT CHANGE UP (ref 0.5–1.3)
GLUCOSE SERPL-MCNC: 120 MG/DL — HIGH (ref 70–99)
GLUCOSE SERPL-MCNC: 99 MG/DL — SIGNIFICANT CHANGE UP (ref 70–99)
POTASSIUM SERPL-MCNC: 3.6 MMOL/L — SIGNIFICANT CHANGE UP (ref 3.5–5.3)
POTASSIUM SERPL-MCNC: 4.5 MMOL/L — SIGNIFICANT CHANGE UP (ref 3.5–5.3)
POTASSIUM SERPL-SCNC: 3.6 MMOL/L — SIGNIFICANT CHANGE UP (ref 3.5–5.3)
POTASSIUM SERPL-SCNC: 4.5 MMOL/L — SIGNIFICANT CHANGE UP (ref 3.5–5.3)
SODIUM SERPL-SCNC: 128 MMOL/L — LOW (ref 135–145)
SODIUM SERPL-SCNC: 131 MMOL/L — LOW (ref 135–145)

## 2020-09-19 PROCEDURE — 99233 SBSQ HOSP IP/OBS HIGH 50: CPT

## 2020-09-19 PROCEDURE — 99232 SBSQ HOSP IP/OBS MODERATE 35: CPT

## 2020-09-19 PROCEDURE — 70553 MRI BRAIN STEM W/O & W/DYE: CPT | Mod: 26

## 2020-09-19 RX ORDER — QUETIAPINE FUMARATE 200 MG/1
50 TABLET, FILM COATED ORAL DAILY
Refills: 0 | Status: DISCONTINUED | OUTPATIENT
Start: 2020-09-19 | End: 2020-09-19

## 2020-09-19 RX ORDER — DEMECLOCYCLINE HCL 150 MG
150 TABLET ORAL
Refills: 0 | Status: DISCONTINUED | OUTPATIENT
Start: 2020-09-19 | End: 2020-09-20

## 2020-09-19 RX ORDER — ALPRAZOLAM 0.25 MG
0.5 TABLET ORAL ONCE
Refills: 0 | Status: DISCONTINUED | OUTPATIENT
Start: 2020-09-19 | End: 2020-09-19

## 2020-09-19 RX ORDER — SODIUM CHLORIDE 9 MG/ML
1 INJECTION INTRAMUSCULAR; INTRAVENOUS; SUBCUTANEOUS EVERY 8 HOURS
Refills: 0 | Status: DISCONTINUED | OUTPATIENT
Start: 2020-09-19 | End: 2020-09-24

## 2020-09-19 RX ADMIN — LEVETIRACETAM 400 MILLIGRAM(S): 250 TABLET, FILM COATED ORAL at 08:41

## 2020-09-19 RX ADMIN — SENNA PLUS 2 TABLET(S): 8.6 TABLET ORAL at 21:41

## 2020-09-19 RX ADMIN — CHLORHEXIDINE GLUCONATE 1 APPLICATION(S): 213 SOLUTION TOPICAL at 05:52

## 2020-09-19 RX ADMIN — Medication 20 MILLIGRAM(S): at 08:39

## 2020-09-19 RX ADMIN — Medication 5 MILLIGRAM(S): at 21:41

## 2020-09-19 RX ADMIN — LEVETIRACETAM 400 MILLIGRAM(S): 250 TABLET, FILM COATED ORAL at 21:42

## 2020-09-19 RX ADMIN — SODIUM CHLORIDE 1 GRAM(S): 9 INJECTION INTRAMUSCULAR; INTRAVENOUS; SUBCUTANEOUS at 21:41

## 2020-09-19 RX ADMIN — ENOXAPARIN SODIUM 40 MILLIGRAM(S): 100 INJECTION SUBCUTANEOUS at 21:42

## 2020-09-19 RX ADMIN — Medication 0.5 MILLIGRAM(S): at 08:38

## 2020-09-19 RX ADMIN — SODIUM CHLORIDE 1 GRAM(S): 9 INJECTION INTRAMUSCULAR; INTRAVENOUS; SUBCUTANEOUS at 12:48

## 2020-09-19 RX ADMIN — Medication 150 MILLIGRAM(S): at 21:41

## 2020-09-19 NOTE — PROGRESS NOTE ADULT - ASSESSMENT
64F with PMHx of depression/anxieity, brain aneurysm, hyponatremia (admission in June for Na 118/AMS) who presents to ED with 3-4day history of nausea/intermittent vomiting, wretching,  and progressive AMS. Evaluation in ED revealed Na 111. CTH without acute pathology.  PT with hx of saidh due to lexapro on june 2020.  Now with persistent low na value and renal consult requested.  MRI with contrast of brain was negative.      PLAN   - dc IVF   - continue with fluid restrict  - resent urine and serum studies and evaluate for siadh vs adh excess from nauseau  - nausea control  - etiology of nausea undefined with neg GI work-up  - MRA of brain with hx of cerebral aneurysm.  hx of 5mm?? 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 Course C/w SIADH.  Persistent with very minimal response to 3% NS.    SSRI d/brianna.    Noted with NSAIDS q 6 hours--D/c for its effect on water excretion.    3% NS today again.    9/17  Na level 127 last evening after 8 hr total 3% Sodium Chloride  Down to 124 today  will cont w 3% today   Lasix po 20 mg bid x 2 days    9/18  CT chest lung mass  will cont with Hypertonic saline  since sodium drops so quickly  tolvaptan not suitable since pt may not have the mentation to drink water freely    9/19 SY  --Hyponatremia: c/w SIADH, now with likely dx of Lung CA   For lung bx.     Post several 3% NS infusions.     Remains hyponatremic.     Attempt to increase sodium level with po NACL tabs and Demeclocycline.     Will hold further lasix.     64F with PMHx of depression/anxieity, brain aneurysm, hyponatremia (admission in June for Na 118/AMS) who presents to ED with 3-4day history of nausea/intermittent vomiting, wretching,  and progressive AMS. Evaluation in ED revealed Na 111. CTH without acute pathology.  PT with hx of saidh due to lexapro on june 2020.  Now with persistent low na value and renal consult requested.  MRI with contrast of brain was negative.      PLAN   - dc IVF   - continue with fluid restrict  - resent urine and serum studies and evaluate for siadh vs adh excess from nauseau  - nausea control  - etiology of nausea undefined with neg GI work-up  - MRA of brain with hx of cerebral aneurysm.  hx of 5mm?? 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 Course C/w SIADH.  Persistent with very minimal response to 3% NS.    SSRI d/brianna.    Noted with NSAIDS q 6 hours--D/c for its effect on water excretion.    3% NS today again.    9/17  Na level 127 last evening after 8 hr total 3% Sodium Chloride  Down to 124 today  will cont w 3% today   Lasix po 20 mg bid x 2 days    9/18  CT chest lung mass  will cont with Hypertonic saline  since sodium drops so quickly  tolvaptan not suitable since pt may not have the mentation to drink water freely    9/19 SY  --Hyponatremia: c/w SIADH, now with likely dx of Lung CA   For lung bx.     Post several 3% NS infusions.     Remains hyponatremic.     Attempt to increase sodium level with po NACL tabs and Demeclocycline.     Will hold further lasix.  --Will hold off on restarting Seroquel until serum sodium is stabilized.

## 2020-09-19 NOTE — PROGRESS NOTE ADULT - SUBJECTIVE AND OBJECTIVE BOX
Subjective:  Pt for MRA brain today.    Vital Signs:  Vital Signs Last 24 Hrs  T(C): 36.9 (09-19-20 @ 09:06), Max: 36.9 (09-19-20 @ 05:00)  T(F): 98.5 (09-19-20 @ 09:06), Max: 98.5 (09-19-20 @ 09:06)  HR: 109 (09-19-20 @ 11:00) (83 - 136)  BP: 103/67 (09-19-20 @ 11:00) (103/67 - 159/84)  RR: 25 (09-19-20 @ 11:00) (14 - 44)  SpO2: 92% (09-19-20 @ 11:00) (92% - 99%) on (O2)    Telemetry/Alarms:    Relevant labs, radiology and Medications reviewed                        13.2   16.29 )-----------( 159      ( 18 Sep 2020 08:47 )             37.3     09-19    128<L>  |  95<L>  |  26<H>  ----------------------------<  120<H>  3.6   |  25  |  0.54    Ca    9.1      19 Sep 2020 07:24        MEDICATIONS  (STANDING):  chlorhexidine 2% Cloths 1 Application(s) Topical <User Schedule>  demeclocycline 150 milliGRAM(s) Oral two times a day  enoxaparin Injectable 40 milliGRAM(s) SubCutaneous at bedtime  influenza   Vaccine 0.5 milliLiter(s) IntraMuscular once  levETIRAcetam  IVPB 500 milliGRAM(s) IV Intermittent every 12 hours  senna 2 Tablet(s) Oral at bedtime  sodium chloride 1 Gram(s) Oral every 8 hours    MEDICATIONS  (PRN):  acetaminophen   Tablet .. 650 milliGRAM(s) Oral every 6 hours PRN Temp greater or equal to 38.5C (101.3F), Mild Pain (1 - 3)  LORazepam   Injectable 0.25 milliGRAM(s) IV Push every 4 hours PRN Anxiety or agitation  melatonin 5 milliGRAM(s) Oral at bedtime PRN Insomnia  ondansetron Injectable 4 milliGRAM(s) IV Push every 6 hours PRN Nausea and/or Vomiting      Physical exam  General: WD, WN, NAD                                                         Neuro: alert, follows commands and answers questions  Neck: supple, no JVD, trachea midline   Chest: CTA, equal bilaterally, normal excursion, no accessory muscle use note  CV: RRR,   GI: soft, NT, ND, obese  Extremities: warm    I&O's Summary    18 Sep 2020 07:01  -  19 Sep 2020 07:00  --------------------------------------------------------  IN: 300 mL / OUT: 1675 mL / NET: -1375 mL        Assessment  64y Female  w/ PAST MEDICAL & SURGICAL HISTORY:  Hyponatremia    Brain aneurysm    Anxiety    Depression    History of neck surgery    admitted with complaints of Patient is a 64y old  Female who presents with a chief complaint of confusion/N/V (19 Sep 2020 11:30)  .  64y Female with depression/anxieity, Gastric metaplasia resulting in persistent nausea,  brain aneurysm, hyponatremia (admission in June for Na 118/AMS) admitted 9/8 w Na 111.  Pt was admitted to ICU and placed on NS. Treated for Toxic metabolic encephalopathy, despite improvement of Na. Pt had a CT chest /A/P today to look for occult malignancy and found to have a Left suprahilar/mediastinal mass compatible with neoplasm.    Pt scheduled to have mediastinoscopy, possible VATS on Tuesday, 9/22.   As per medicine, Dr. Collado to obtain IR consult prior to OR to see if IR can do minimally invasive biopsy of lesion. If not, will procede w OR Tuesday.  Preop labs ordered for Monday  will need medical clearance for OR    Discussed with Cardiothoracic Team at AM rounds.

## 2020-09-19 NOTE — PROGRESS NOTE ADULT - SUBJECTIVE AND OBJECTIVE BOX
63 y/o female with depression/anxieity, Gastric metaplasia resulting in persistent nausea,  brain aneurysm, hyponatremia (admission in June for Na 118/AMS) who presents to ED with 2 weeks of Nausea unable to eat but continued to drink 8 8oz water daily and AMS found to have Na 111.  Pt was admitted to ICU and placed on .   patient had slurred speech and was intermittently treated with hypertonic saline, was on seroquel prior to admission.    9/19 MS seems to be back to baseline, alert  and oriented dramatic change when compared to the past; has no c/o; requested xanax before MRI    Vital Signs Last 24 Hrs  T(C): 36.9 (19 Sep 2020 09:06), Max: 36.9 (19 Sep 2020 05:00)  T(F): 98.5 (19 Sep 2020 09:06), Max: 98.5 (19 Sep 2020 09:06)  HR: 109 (19 Sep 2020 12:00) (83 - 136)  BP: 103/67 (19 Sep 2020 11:00) (103/67 - 159/84)  BP(mean): 76 (19 Sep 2020 11:00) (76 - 118)  RR: 31 (19 Sep 2020 12:00) (14 - 44)  SpO2: 98% (19 Sep 2020 12:00) (92% - 99%)    PHYSICAL EXAM:  Constitutional: NAD, awake and alert, well-developed  HEENT: PERR, EOMI, Normal Hearing, MMM  Neck: Soft and supple  Respiratory: Breath sounds are clear bilaterally, No wheezing, rales or rhonchi  Cardiovascular: S1 and S2, regular rate and rhythm, no Murmurs, gallops or rubs  Gastrointestinal: Bowel Sounds present, soft, nontender, nondistended, no guarding, no rebound  Extremities: No peripheral edema  Neurological: nonfocal, encephalopathy resolved                            13.2   16.29 )-----------( 159      ( 18 Sep 2020 08:47 )             37.3   09-19    128<L>  |  95<L>  |  26<H>  ----------------------------<  120<H>  3.6   |  25  |  0.54    Ca    9.1      19 Sep 2020 07:24

## 2020-09-19 NOTE — PROGRESS NOTE ADULT - ASSESSMENT
Assessment and Plan: 65 y/o female with depression/anxiety, Gastric Metaplasia resulting in persistent nausea, brain aneurysm, hyponatremia (admission in June for Na 118/AMS) who presents to ED with 2 weeks of Nausea and AMS.     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

## 2020-09-19 NOTE — PROGRESS NOTE ADULT - SUBJECTIVE AND OBJECTIVE BOX
NEPHROLOGY INTERVAL HPI/OVERNIGHT EVENTS:    Date of Service: 20 @ 11:31   SY  Alert and conversant today.  Difficult to assess full cognition.      eyes fixed on tv  transiently makes eye contact   but does not answer  CT reports noted lung mass      sitting OOB  Replying with one word answers   NAD, No complaints     SY  Sitting up in chair.  Appears comfortable.  Oriented to person only today.  Answers "I have a rash" to various questions.    HPI:  History obtained from pt and significant other at bedside  64F with PMHx of depression / anxiety, brain aneurysm, hyponatremia (admission in  for Na 118/AMS) who presents to ED with 3-4day history of nausea/intermittent vomiting, wretching,  and progressive AMS. Evaluation in ED revealed Na 111. CTH without acute pathology. eICU consulted and pt admitted to ICU. Upon arrival pt receiving 3% NaCL bolus, discontinued upon my arrival. Prior admission and workup revealed hyponatremia possibly SIADH vs SSRI vs excessive free water intake. She was discharged following correction on NaCl tabs as outpt. She is no longer taking these. She states they were stopped by nephro on her follow up visit as outpt. She was taken off her SSRI at last admission and placed on seroquel only. Her dosing has been escalated since last admission to 150mg at HS and 50mg 2x during the day. She reports no longer using her Klonopin (06 Sep 2020 20:29)    Patient is a 64y old  Female who presents with a chief complaint of confusion/N/V (14 Sep 2020 12:42)  --------------------------------------------------------------------------------  hx from  and friend, adamaris donaldson  pt was dc after ssri/lexapro related hyponatremia.  upon dc in 2020 post dc na was normal and was dc from salt tablets by dr romano  pt as per  was doing well and eating but maintained fluid restriciton   progressively with more nauseau and inc lethargy  admitted with hyponatremia and was tx with hypertonic and nacl tabs  on ns, na with variability and drop   taking in po but Nauseau most disturbing sx  has been on seroquel to manage her anxiety.  has been off since admission  workup for nauseau with dr carson that was neg   no upto date mammogram/pap  sees dr raymond for moles   had cerebral aneurysm 5mm? as per  and was advised annual fu 5 years ago  her slow speech with flat affect atypical for her   no diarrhea or vomiting  has been trying to eat while here   over course of 3 years, has had 70lb weight loss     MEDICATIONS  (STANDING):  chlorhexidine 2% Cloths 1 Application(s) Topical <User Schedule>  enoxaparin Injectable 40 milliGRAM(s) SubCutaneous at bedtime  influenza   Vaccine 0.5 milliLiter(s) IntraMuscular once  levETIRAcetam  IVPB 500 milliGRAM(s) IV Intermittent every 12 hours  QUEtiapine 50 milliGRAM(s) Oral daily  senna 2 Tablet(s) Oral at bedtime  sodium chloride 1 Gram(s) Oral two times a day    MEDICATIONS  (PRN):  acetaminophen   Tablet .. 650 milliGRAM(s) Oral every 6 hours PRN Temp greater or equal to 38.5C (101.3F), Mild Pain (1 - 3)  LORazepam   Injectable 0.25 milliGRAM(s) IV Push every 4 hours PRN Anxiety or agitation  melatonin 5 milliGRAM(s) Oral at bedtime PRN Insomnia  ondansetron Injectable 4 milliGRAM(s) IV Push every 6 hours PRN Nausea and/or Vomiting    Vital Signs Last 24 Hrs  T(C): 36.9 (19 Sep 2020 09:06), Max: 36.9 (19 Sep 2020 05:00)  T(F): 98.5 (19 Sep 2020 09:06), Max: 98.5 (19 Sep 2020 09:06)  HR: 109 (19 Sep 2020 11:00) (83 - 136)  BP: 103/67 (19 Sep 2020 11:00) (103/67 - 159/84)  BP(mean): 76 (19 Sep 2020 11:00) (76 - 118)  RR: 25 (19 Sep 2020 11:00) (14 - 44)  SpO2: 92% (19 Sep 2020 11:00) (92% - 99%)  Daily     Daily Weight in k.5 (19 Sep 2020 05:00)    -18 @ 07:01  -   @ 07:00  --------------------------------------------------------  IN: 300 mL / OUT: 1675 mL / NET: -1375 mL    PHYSICAL EXAM: Alert and responsive  GENERAL: No distress  CHEST/LUNG: Fair air entry  HEART: S1S2 RRR  ABDOMEN: soft  EXTREMITIES: no edema  SKIN:     LABS:                        13.2   16.29 )-----------( 159      ( 18 Sep 2020 08:47 )             37.3     09-19    128<L>  |  95<L>  |  26<H>  ----------------------------<  120<H>  3.6   |  25  |  0.54    Ca    9.1      19 Sep 2020 07:24                  RADIOLOGY & ADDITIONAL TESTS:

## 2020-09-20 LAB
ANION GAP SERPL CALC-SCNC: 5 MMOL/L — SIGNIFICANT CHANGE UP (ref 5–17)
BUN SERPL-MCNC: 34 MG/DL — HIGH (ref 7–23)
CALCIUM SERPL-MCNC: 9.2 MG/DL — SIGNIFICANT CHANGE UP (ref 8.5–10.1)
CHLORIDE SERPL-SCNC: 95 MMOL/L — LOW (ref 96–108)
CO2 SERPL-SCNC: 28 MMOL/L — SIGNIFICANT CHANGE UP (ref 22–31)
CREAT SERPL-MCNC: 0.52 MG/DL — SIGNIFICANT CHANGE UP (ref 0.5–1.3)
GLUCOSE SERPL-MCNC: 102 MG/DL — HIGH (ref 70–99)
POTASSIUM SERPL-MCNC: 3.6 MMOL/L — SIGNIFICANT CHANGE UP (ref 3.5–5.3)
POTASSIUM SERPL-SCNC: 3.6 MMOL/L — SIGNIFICANT CHANGE UP (ref 3.5–5.3)
SODIUM SERPL-SCNC: 128 MMOL/L — LOW (ref 135–145)

## 2020-09-20 PROCEDURE — 99233 SBSQ HOSP IP/OBS HIGH 50: CPT

## 2020-09-20 RX ORDER — DEMECLOCYCLINE HCL 150 MG
300 TABLET ORAL
Refills: 0 | Status: DISCONTINUED | OUTPATIENT
Start: 2020-09-20 | End: 2020-09-23

## 2020-09-20 RX ADMIN — SODIUM CHLORIDE 1 GRAM(S): 9 INJECTION INTRAMUSCULAR; INTRAVENOUS; SUBCUTANEOUS at 12:22

## 2020-09-20 RX ADMIN — LEVETIRACETAM 400 MILLIGRAM(S): 250 TABLET, FILM COATED ORAL at 09:12

## 2020-09-20 RX ADMIN — Medication 300 MILLIGRAM(S): at 21:30

## 2020-09-20 RX ADMIN — Medication 650 MILLIGRAM(S): at 06:30

## 2020-09-20 RX ADMIN — ENOXAPARIN SODIUM 40 MILLIGRAM(S): 100 INJECTION SUBCUTANEOUS at 21:30

## 2020-09-20 RX ADMIN — LEVETIRACETAM 400 MILLIGRAM(S): 250 TABLET, FILM COATED ORAL at 21:30

## 2020-09-20 RX ADMIN — SODIUM CHLORIDE 1 GRAM(S): 9 INJECTION INTRAMUSCULAR; INTRAVENOUS; SUBCUTANEOUS at 06:01

## 2020-09-20 RX ADMIN — Medication 5 MILLIGRAM(S): at 21:30

## 2020-09-20 RX ADMIN — Medication 650 MILLIGRAM(S): at 06:01

## 2020-09-20 RX ADMIN — Medication 0.25 MILLIGRAM(S): at 18:39

## 2020-09-20 RX ADMIN — SODIUM CHLORIDE 1 GRAM(S): 9 INJECTION INTRAMUSCULAR; INTRAVENOUS; SUBCUTANEOUS at 21:30

## 2020-09-20 RX ADMIN — ONDANSETRON 4 MILLIGRAM(S): 8 TABLET, FILM COATED ORAL at 22:10

## 2020-09-20 RX ADMIN — CHLORHEXIDINE GLUCONATE 1 APPLICATION(S): 213 SOLUTION TOPICAL at 06:02

## 2020-09-20 RX ADMIN — Medication 150 MILLIGRAM(S): at 09:13

## 2020-09-20 NOTE — PROGRESS NOTE ADULT - ASSESSMENT
Assessment and Plan: 63 y/o female with depression/anxiety, Gastric Metaplasia resulting in persistent nausea, brain aneurysm, hyponatremia (admission in June for Na 118/AMS) who presents to ED with 2 weeks of Nausea and AMS.     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

## 2020-09-20 NOTE — PROGRESS NOTE ADULT - SUBJECTIVE AND OBJECTIVE BOX
65 y/o female with depression/anxieity, Gastric metaplasia resulting in persistent nausea,  brain aneurysm, hyponatremia (admission in June for Na 118/AMS) who presents to ED with 2 weeks of Nausea unable to eat but continued to drink 8 8oz water daily and AMS found to have Na 111.  Pt was admitted to ICU and placed on .   patient had slurred speech and was intermittently treated with hypertonic saline, was on seroquel prior to admission.     MS seems to be back to baseline, alert  and oriented no c/o    PHYSICAL EXAM:  Constitutional: NAD, awake and alert, well-developed  HEENT: PERR, EOMI, Normal Hearing, MMM  Neck: Soft and supple  Respiratory: Breath sounds are clear bilaterally, No wheezing, rales or rhonchi  Cardiovascular: S1 and S2, regular rate and rhythm, no Murmurs, gallops or rubs  Gastrointestinal: Bowel Sounds present, soft, nontender, nondistended, no guarding, no rebound  Extremities: No peripheral edema  Neurological: nonfocal, encephalopathy resolved                            13.2   16.29 )-----------( 159      ( 18 Sep 2020 08:47 )             37.3   09-19    128<L>  |  95<L>  |  26<H>  ----------------------------<  120<H>  3.6   |  25  |  0.54    Ca    9.1      19 Sep 2020 07:24

## 2020-09-20 NOTE — PROGRESS NOTE ADULT - ASSESSMENT
64F with PMHx of depression/anxieity, brain aneurysm, hyponatremia (admission in June for Na 118/AMS) who presents to ED with 3-4day history of nausea/intermittent vomiting, wretching,  and progressive AMS. Evaluation in ED revealed Na 111. CTH without acute pathology.  PT with hx of saidh due to lexapro on june 2020.  Now with persistent low na value and renal consult requested.  MRI with contrast of brain was negative.      PLAN   - dc IVF   - continue with fluid restrict  - resent urine and serum studies and evaluate for siadh vs adh excess from nauseau  - nausea control  - etiology of nausea undefined with neg GI work-up  - MRA of brain with hx of cerebral aneurysm.  hx of 5mm?? 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 Course C/w SIADH.  Persistent with very minimal response to 3% NS.    SSRI d/brianna.    Noted with NSAIDS q 6 hours--D/c for its effect on water excretion.    3% NS today again.    9/17  Na level 127 last evening after 8 hr total 3% Sodium Chloride  Down to 124 today  will cont w 3% today   Lasix po 20 mg bid x 2 days    9/18  CT chest lung mass  will cont with Hypertonic saline  since sodium drops so quickly  tolvaptan not suitable since pt may not have the mentation to drink water freely    9/19 SY  --Hyponatremia: c/w SIADH, now with likely dx of Lung CA   For lung bx.     Post several 3% NS infusions.     Remains hyponatremic.     Attempt to increase sodium level with po NACL tabs and Demeclocycline.     Will hold further lasix.  --Will hold off on restarting Seroquel until serum sodium is stabilized.    9/20 SY  --Hyponatremia c/w SIADH.    Continue NACL tabs and increase Demeclocycline dose.    Continue fluid restriction.  --Lung mass : awaiting for bx.  --Continue to hold SSRI until serum sodium stabilized.

## 2020-09-20 NOTE — PROGRESS NOTE ADULT - SUBJECTIVE AND OBJECTIVE BOX
As per discussion with Dr. Collado, pt is improving, does not need f/u with neurology    Please call neuro if needed

## 2020-09-20 NOTE — PROGRESS NOTE ADULT - SUBJECTIVE AND OBJECTIVE BOX
NEPHROLOGY INTERVAL HPI/OVERNIGHT EVENTS:    Date of Service: 09-20-20 @ 11:00  9/20 SY  Sitting up in chair.  No new complaints.  Alert and able to answer questions appropriately.  Aware of lung mass and asking about Bx.  Now taking all po meds.    9/19 SY  Alert and conversant today.  Difficult to assess full cognition.    9/18  eyes fixed on tv  transiently makes eye contact   but does not answer  CT reports noted lung mass    9/17  sitting OOB  Replying with one word answers   NAD, No complaints    9/16 SY  Sitting up in chair.  Appears comfortable.  Oriented to person only today.  Answers "I have a rash" to various questions.    HPI:  History obtained from pt and significant other at bedside  64F with PMHx of depression / anxiety, brain aneurysm, hyponatremia (admission in June for Na 118/AMS) who presents to ED with 3-4day history of nausea/intermittent vomiting, wretching,  and progressive AMS. Evaluation in ED revealed Na 111. CTH without acute pathology. eICU consulted and pt admitted to ICU. Upon arrival pt receiving 3% NaCL bolus, discontinued upon my arrival. Prior admission and workup revealed hyponatremia possibly SIADH vs SSRI vs excessive free water intake. She was discharged following correction on NaCl tabs as outpt. She is no longer taking these. She states they were stopped by nephro on her follow up visit as outpt. She was taken off her SSRI at last admission and placed on seroquel only. Her dosing has been escalated since last admission to 150mg at HS and 50mg 2x during the day. She reports no longer using her Klonopin (06 Sep 2020 20:29)    Patient is a 64y old  Female who presents with a chief complaint of confusion/N/V (14 Sep 2020 12:42)  --------------------------------------------------------------------------------  hx from  and friend, adamaris donaldson  pt was dc after ssri/lexapro related hyponatremia.  upon dc in june 2020 post dc na was normal and was dc from salt tablets by dr romano  pt as per  was doing well and eating but maintained fluid restriciton   progressively with more nauseau and inc lethargy  admitted with hyponatremia and was tx with hypertonic and nacl tabs  on ns, na with variability and drop   taking in po but Nauseau most disturbing sx  has been on seroquel to manage her anxiety.  has been off since admission  workup for nauseau with dr carson that was neg   no upto date mammogram/pap  sees dr raymond for moles   had cerebral aneurysm 5mm? as per  and was advised annual fu 5 years ago  her slow speech with flat affect atypical for her   no diarrhea or vomiting  has been trying to eat while here   over course of 3 years, has had 70lb weight loss     MEDICATIONS  (STANDING):  chlorhexidine 2% Cloths 1 Application(s) Topical <User Schedule>  demeclocycline 150 milliGRAM(s) Oral two times a day  enoxaparin Injectable 40 milliGRAM(s) SubCutaneous at bedtime  influenza   Vaccine 0.5 milliLiter(s) IntraMuscular once  levETIRAcetam  IVPB 500 milliGRAM(s) IV Intermittent every 12 hours  senna 2 Tablet(s) Oral at bedtime  sodium chloride 1 Gram(s) Oral every 8 hours    MEDICATIONS  (PRN):  acetaminophen   Tablet .. 650 milliGRAM(s) Oral every 6 hours PRN Temp greater or equal to 38.5C (101.3F), Mild Pain (1 - 3)  LORazepam   Injectable 0.25 milliGRAM(s) IV Push every 4 hours PRN Anxiety or agitation  melatonin 5 milliGRAM(s) Oral at bedtime PRN Insomnia  ondansetron Injectable 4 milliGRAM(s) IV Push every 6 hours PRN Nausea and/or Vomiting    Vital Signs Last 24 Hrs  T(C): 36.4 (20 Sep 2020 09:31), Max: 37.3 (19 Sep 2020 20:47)  T(F): 97.6 (20 Sep 2020 09:31), Max: 99.1 (19 Sep 2020 20:47)  HR: 101 (20 Sep 2020 10:29) (92 - 120)  BP: 111/60 (20 Sep 2020 10:29) (99/53 - 156/85)  BP(mean): 67 (20 Sep 2020 10:29) (63 - 131)  RR: 21 (20 Sep 2020 10:29) (18 - 33)  SpO2: 100% (20 Sep 2020 09:31) (95% - 100%)    09-19 @ 07:01  -  09-20 @ 07:00  --------------------------------------------------------  IN: 0 mL / OUT: 650 mL / NET: -650 mL    PHYSICAL EXAM: Alert and comfortable  GENERAL: No distress  CHEST/LUNG: clear to aus  HEART: S1S2 RRR  ABDOMEN: soft  EXTREMITIES: no edema  SKIN:     LABS:    09-20    128<L>  |  95<L>  |  34<H>  ----------------------------<  102<H>  3.6   |  28  |  0.52    Ca    9.2      20 Sep 2020 05:53                  RADIOLOGY & ADDITIONAL TESTS:

## 2020-09-21 ENCOUNTER — APPOINTMENT (OUTPATIENT)
Dept: THORACIC SURGERY | Facility: HOSPITAL | Age: 65
End: 2020-09-21

## 2020-09-21 LAB
ANION GAP SERPL CALC-SCNC: 7 MMOL/L — SIGNIFICANT CHANGE UP (ref 5–17)
ANION GAP SERPL CALC-SCNC: 7 MMOL/L — SIGNIFICANT CHANGE UP (ref 5–17)
APTT BLD: 27 SEC — LOW (ref 27.5–35.5)
APTT BLD: 27.6 SEC — SIGNIFICANT CHANGE UP (ref 27.5–35.5)
BUN SERPL-MCNC: 20 MG/DL — SIGNIFICANT CHANGE UP (ref 7–23)
BUN SERPL-MCNC: 20 MG/DL — SIGNIFICANT CHANGE UP (ref 7–23)
CALCIUM SERPL-MCNC: 9.1 MG/DL — SIGNIFICANT CHANGE UP (ref 8.5–10.1)
CALCIUM SERPL-MCNC: 9.2 MG/DL — SIGNIFICANT CHANGE UP (ref 8.5–10.1)
CHLORIDE SERPL-SCNC: 96 MMOL/L — SIGNIFICANT CHANGE UP (ref 96–108)
CHLORIDE SERPL-SCNC: 97 MMOL/L — SIGNIFICANT CHANGE UP (ref 96–108)
CO2 SERPL-SCNC: 25 MMOL/L — SIGNIFICANT CHANGE UP (ref 22–31)
CO2 SERPL-SCNC: 25 MMOL/L — SIGNIFICANT CHANGE UP (ref 22–31)
CREAT SERPL-MCNC: 0.58 MG/DL — SIGNIFICANT CHANGE UP (ref 0.5–1.3)
CREAT SERPL-MCNC: 0.65 MG/DL — SIGNIFICANT CHANGE UP (ref 0.5–1.3)
GLUCOSE SERPL-MCNC: 113 MG/DL — HIGH (ref 70–99)
GLUCOSE SERPL-MCNC: 121 MG/DL — HIGH (ref 70–99)
HCT VFR BLD CALC: 35.3 % — SIGNIFICANT CHANGE UP (ref 34.5–45)
HCT VFR BLD CALC: 36.3 % — SIGNIFICANT CHANGE UP (ref 34.5–45)
HGB BLD-MCNC: 12.2 G/DL — SIGNIFICANT CHANGE UP (ref 11.5–15.5)
HGB BLD-MCNC: 12.7 G/DL — SIGNIFICANT CHANGE UP (ref 11.5–15.5)
INR BLD: 1.07 RATIO — SIGNIFICANT CHANGE UP (ref 0.88–1.16)
INR BLD: 1.07 RATIO — SIGNIFICANT CHANGE UP (ref 0.88–1.16)
MCHC RBC-ENTMCNC: 30.5 PG — SIGNIFICANT CHANGE UP (ref 27–34)
MCHC RBC-ENTMCNC: 30.8 PG — SIGNIFICANT CHANGE UP (ref 27–34)
MCHC RBC-ENTMCNC: 34.6 GM/DL — SIGNIFICANT CHANGE UP (ref 32–36)
MCHC RBC-ENTMCNC: 35 GM/DL — SIGNIFICANT CHANGE UP (ref 32–36)
MCV RBC AUTO: 87.9 FL — SIGNIFICANT CHANGE UP (ref 80–100)
MCV RBC AUTO: 88.3 FL — SIGNIFICANT CHANGE UP (ref 80–100)
PLATELET # BLD AUTO: 151 K/UL — SIGNIFICANT CHANGE UP (ref 150–400)
PLATELET # BLD AUTO: 160 K/UL — SIGNIFICANT CHANGE UP (ref 150–400)
POTASSIUM SERPL-MCNC: 3.7 MMOL/L — SIGNIFICANT CHANGE UP (ref 3.5–5.3)
POTASSIUM SERPL-MCNC: 3.8 MMOL/L — SIGNIFICANT CHANGE UP (ref 3.5–5.3)
POTASSIUM SERPL-SCNC: 3.7 MMOL/L — SIGNIFICANT CHANGE UP (ref 3.5–5.3)
POTASSIUM SERPL-SCNC: 3.8 MMOL/L — SIGNIFICANT CHANGE UP (ref 3.5–5.3)
PROTHROM AB SERPL-ACNC: 12.4 SEC — SIGNIFICANT CHANGE UP (ref 10.6–13.6)
PROTHROM AB SERPL-ACNC: 12.4 SEC — SIGNIFICANT CHANGE UP (ref 10.6–13.6)
RBC # BLD: 4 M/UL — SIGNIFICANT CHANGE UP (ref 3.8–5.2)
RBC # BLD: 4.13 M/UL — SIGNIFICANT CHANGE UP (ref 3.8–5.2)
RBC # FLD: 12.3 % — SIGNIFICANT CHANGE UP (ref 10.3–14.5)
RBC # FLD: 12.3 % — SIGNIFICANT CHANGE UP (ref 10.3–14.5)
SODIUM SERPL-SCNC: 128 MMOL/L — LOW (ref 135–145)
SODIUM SERPL-SCNC: 129 MMOL/L — LOW (ref 135–145)
WBC # BLD: 12.67 K/UL — HIGH (ref 3.8–10.5)
WBC # BLD: 13.22 K/UL — HIGH (ref 3.8–10.5)
WBC # FLD AUTO: 12.67 K/UL — HIGH (ref 3.8–10.5)
WBC # FLD AUTO: 13.22 K/UL — HIGH (ref 3.8–10.5)

## 2020-09-21 PROCEDURE — 99232 SBSQ HOSP IP/OBS MODERATE 35: CPT

## 2020-09-21 PROCEDURE — 99233 SBSQ HOSP IP/OBS HIGH 50: CPT | Mod: 57

## 2020-09-21 PROCEDURE — 99233 SBSQ HOSP IP/OBS HIGH 50: CPT

## 2020-09-21 PROCEDURE — 39402 MEDIASTINOSCPY W/LMPH NOD BX: CPT | Mod: 53

## 2020-09-21 RX ORDER — OXYCODONE HYDROCHLORIDE 5 MG/1
5 TABLET ORAL ONCE
Refills: 0 | Status: DISCONTINUED | OUTPATIENT
Start: 2020-09-21 | End: 2020-09-21

## 2020-09-21 RX ORDER — ONDANSETRON 8 MG/1
4 TABLET, FILM COATED ORAL ONCE
Refills: 0 | Status: DISCONTINUED | OUTPATIENT
Start: 2020-09-21 | End: 2020-09-21

## 2020-09-21 RX ORDER — ALPRAZOLAM 0.25 MG
0.25 TABLET ORAL EVERY 6 HOURS
Refills: 0 | Status: DISCONTINUED | OUTPATIENT
Start: 2020-09-21 | End: 2020-09-23

## 2020-09-21 RX ORDER — LEVETIRACETAM 250 MG/1
500 TABLET, FILM COATED ORAL
Refills: 0 | Status: DISCONTINUED | OUTPATIENT
Start: 2020-09-21 | End: 2020-09-24

## 2020-09-21 RX ORDER — OXYCODONE HYDROCHLORIDE 5 MG/1
10 TABLET ORAL ONCE
Refills: 0 | Status: DISCONTINUED | OUTPATIENT
Start: 2020-09-21 | End: 2020-09-21

## 2020-09-21 RX ORDER — PROCHLORPERAZINE MALEATE 5 MG
10 TABLET ORAL ONCE
Refills: 0 | Status: COMPLETED | OUTPATIENT
Start: 2020-09-21 | End: 2020-09-21

## 2020-09-21 RX ORDER — FENTANYL CITRATE 50 UG/ML
50 INJECTION INTRAVENOUS
Refills: 0 | Status: DISCONTINUED | OUTPATIENT
Start: 2020-09-21 | End: 2020-09-21

## 2020-09-21 RX ADMIN — FENTANYL CITRATE 50 MICROGRAM(S): 50 INJECTION INTRAVENOUS at 18:00

## 2020-09-21 RX ADMIN — Medication 650 MILLIGRAM(S): at 21:01

## 2020-09-21 RX ADMIN — ONDANSETRON 4 MILLIGRAM(S): 8 TABLET, FILM COATED ORAL at 06:24

## 2020-09-21 RX ADMIN — ENOXAPARIN SODIUM 40 MILLIGRAM(S): 100 INJECTION SUBCUTANEOUS at 21:05

## 2020-09-21 RX ADMIN — Medication 0.25 MILLIGRAM(S): at 17:48

## 2020-09-21 RX ADMIN — LEVETIRACETAM 500 MILLIGRAM(S): 250 TABLET, FILM COATED ORAL at 10:19

## 2020-09-21 RX ADMIN — Medication 0.25 MILLIGRAM(S): at 08:31

## 2020-09-21 RX ADMIN — Medication 300 MILLIGRAM(S): at 21:02

## 2020-09-21 RX ADMIN — ONDANSETRON 4 MILLIGRAM(S): 8 TABLET, FILM COATED ORAL at 17:29

## 2020-09-21 RX ADMIN — Medication 650 MILLIGRAM(S): at 06:24

## 2020-09-21 RX ADMIN — LEVETIRACETAM 500 MILLIGRAM(S): 250 TABLET, FILM COATED ORAL at 21:02

## 2020-09-21 RX ADMIN — SODIUM CHLORIDE 1 GRAM(S): 9 INJECTION INTRAMUSCULAR; INTRAVENOUS; SUBCUTANEOUS at 13:48

## 2020-09-21 RX ADMIN — Medication 300 MILLIGRAM(S): at 10:20

## 2020-09-21 RX ADMIN — SODIUM CHLORIDE 1 GRAM(S): 9 INJECTION INTRAMUSCULAR; INTRAVENOUS; SUBCUTANEOUS at 21:02

## 2020-09-21 RX ADMIN — Medication 650 MILLIGRAM(S): at 22:00

## 2020-09-21 RX ADMIN — Medication 10 MILLIGRAM(S): at 17:32

## 2020-09-21 RX ADMIN — CHLORHEXIDINE GLUCONATE 1 APPLICATION(S): 213 SOLUTION TOPICAL at 07:24

## 2020-09-21 RX ADMIN — Medication 5 MILLIGRAM(S): at 21:05

## 2020-09-21 RX ADMIN — FENTANYL CITRATE 50 MICROGRAM(S): 50 INJECTION INTRAVENOUS at 17:43

## 2020-09-21 RX ADMIN — SODIUM CHLORIDE 1 GRAM(S): 9 INJECTION INTRAMUSCULAR; INTRAVENOUS; SUBCUTANEOUS at 06:24

## 2020-09-21 RX ADMIN — Medication 650 MILLIGRAM(S): at 07:00

## 2020-09-21 NOTE — PROGRESS NOTE ADULT - ASSESSMENT
Pt is a 64y old Female with hx depression and anxiety, Gastric metaplasia resulting in persistent nausea,  brain aneurysm, hyponatremia (admission in June for Na 118/AMS) who presents to ED on 9/8 with 2 weeks of Nausea unable to eat but continued to drink 8 8oz water daily and AMS found to have Na 111.      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

## 2020-09-21 NOTE — PROGRESS NOTE ADULT - ASSESSMENT
Assessment and Plan: 63 y/o female with depression/anxiety, Gastric Metaplasia resulting in persistent nausea, brain aneurysm, hyponatremia (admission in June for Na 118/AMS) who presents to ED with 2 weeks of Nausea and AMS.     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

## 2020-09-21 NOTE — PROGRESS NOTE ADULT - ASSESSMENT
64F with PMHx of depression/anxieity, brain aneurysm, hyponatremia (admission in June for Na 118/AMS) who presents to ED with 3-4day history of nausea/intermittent vomiting, wretching,  and progressive AMS. Evaluation in ED revealed Na 111. CTH without acute pathology.  PT with hx of saidh due to lexapro on june 2020.  Now with persistent low na value and renal consult requested.  MRI with contrast of brain was negative.      PLAN   - dc IVF   - continue with fluid restrict  - resent urine and serum studies and evaluate for siadh vs adh excess from nauseau  - nausea control  - etiology of nausea undefined with neg GI work-up  - MRA of brain with hx of cerebral aneurysm.  hx of 5mm?? 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 Course C/w SIADH.  Persistent with very minimal response to 3% NS.    SSRI d/brianna.    Noted with NSAIDS q 6 hours--D/c for its effect on water excretion.    3% NS today again.    9/17  Na level 127 last evening after 8 hr total 3% Sodium Chloride  Down to 124 today  will cont w 3% today   Lasix po 20 mg bid x 2 days    9/18  CT chest lung mass  will cont with Hypertonic saline  since sodium drops so quickly  tolvaptan not suitable since pt may not have the mentation to drink water freely    9/19 SY  --Hyponatremia: c/w SIADH, now with likely dx of Lung CA   For lung bx.     Post several 3% NS infusions.     Remains hyponatremic.     Attempt to increase sodium level with po NACL tabs and Demeclocycline.     Will hold further lasix.  --Will hold off on restarting Seroquel until serum sodium is stabilized.    9/20 SY  --Hyponatremia c/w SIADH.    Continue NACL tabs and increase Demeclocycline dose.    Continue fluid restriction.  --Lung mass : awaiting for bx.  --Continue to hold SSRI until serum sodium stabilized.    9/21 MK   - hyponatremia/ siadh     continue with nacl tabs and demeclocycline     maintain fluid restriction for now   - lung mass for bx today   - anxiety disorder: continue to hold SSRI until more stable na value   dw rn

## 2020-09-21 NOTE — PROGRESS NOTE ADULT - SUBJECTIVE AND OBJECTIVE BOX
HPI: Pt is a 64y old Female with hx depression and anxiety, Gastric metaplasia resulting in persistent nausea,  brain aneurysm, hyponatremia (admission in June for Na 118/AMS) who presents to ED on 9/8 with 2 weeks of Nausea unable to eat but continued to drink 8 8oz water daily and AMS found to have Na 111.      9/18/2020 patient seen and examined with no family at bedside. Patient having difficulty with word finding, shaking her head yes no to questions but not verbalizing answer to questions.  Patient denies any pain at this time.  has increased anxiety at times.   9/21/2020 patient seen and examined with no family at bedside. Patient alert and oriented at this time, denies any pain but states feels anxious at this time. Patient aware that has a spot that needs to be biopsied and will be going to surgery.      PAIN: ( )Yes   (x )No  appears comfortable at this time     DYSPNEA: ( ) Yes  (x ) No    Review of Systems:    Anxiety- severe, agreed to take xanax   Depression- feels sad   Physical Discomfort- denies   Dyspnea- denies   Constipation- denies   Diarrhea- denies   Anorexia- decreased appetite   Weight Loss- yes unsure how much   Cough- at times   Secretions- denies   Fatigue- moderate   Weakness- moderate   Delirium- resolved at this time     All other systems reviewed and negative    PHYSICAL EXAM:    Vital Signs Last 24 Hrs  T(C): 36.7 (21 Sep 2020 08:56), Max: 36.9 (20 Sep 2020 20:38)  T(F): 98.1 (21 Sep 2020 08:56), Max: 98.5 (20 Sep 2020 20:38)  HR: 92 (21 Sep 2020 13:53) (84 - 107)  BP: 138/83 (21 Sep 2020 13:53) (98/80 - 156/86)  BP(mean): 94 (21 Sep 2020 13:53) (78 - 105)  RR: 37 (21 Sep 2020 13:53) (16 - 40)  SpO2: 98% (21 Sep 2020 07:00) (93% - 99%)    PPSV2: 40  %    General: pleasant anxious appearing female in bed, NAD   Mental Status: alert and oriented  HEENT: dry oral mucosa, + temporal wasting   Lungs: Breath sounds are clear bilaterally, No wheezing, rales or rhonchi  Cardiac: S1S2 +   GI: Bowel Sounds present, soft, nontender, nondistended, no guarding, no rebound  : no suprapubic tenderness   Ext: no edema present   Neuro: weakness     LABS:                        12.7   12.67 )-----------( 160      ( 21 Sep 2020 08:23 )             36.3     09-21    129<L>  |  97  |  20  ----------------------------<  121<H>  3.7   |  25  |  0.65    Ca    9.1      21 Sep 2020 08:23      PT/INR - ( 21 Sep 2020 08:23 )   PT: 12.4 sec;   INR: 1.07 ratio         PTT - ( 21 Sep 2020 08:23 )  PTT:27.6 sec  Albumin: Albumin, Serum: 3.2 g/dL (09-16 @ 06:42)      Allergies    No Known Allergies    Intolerances      MEDICATIONS  (STANDING):  chlorhexidine 2% Cloths 1 Application(s) Topical <User Schedule>  demeclocycline 300 milliGRAM(s) Oral two times a day  enoxaparin Injectable 40 milliGRAM(s) SubCutaneous at bedtime  influenza   Vaccine 0.5 milliLiter(s) IntraMuscular once  levETIRAcetam 500 milliGRAM(s) Oral two times a day  senna 2 Tablet(s) Oral at bedtime  sodium chloride 1 Gram(s) Oral every 8 hours    MEDICATIONS  (PRN):  acetaminophen   Tablet .. 650 milliGRAM(s) Oral every 6 hours PRN Temp greater or equal to 38.5C (101.3F), Mild Pain (1 - 3)  ALPRAZolam 0.25 milliGRAM(s) Oral every 6 hours PRN anxiety  LORazepam   Injectable 0.25 milliGRAM(s) IV Push every 4 hours PRN Anxiety or agitation  melatonin 5 milliGRAM(s) Oral at bedtime PRN Insomnia  ondansetron Injectable 4 milliGRAM(s) IV Push every 6 hours PRN Nausea and/or Vomiting      RADIOLOGY:  < from: MR Head w/wo IV Cont (09.19.20 @ 10:40) >  EXAM:  MR BRAIN WAW IC                            PROCEDURE DATE:  09/19/2020          INTERPRETATION:  CLINICAL INDICATIONS: r/o mets, limbic encephalitis    COMPARISON: MRI brain dated 9/2/2020    TECHNIQUE: MRI brain: Multiplanar, multisequence MR imaging of the brain are obtained with and without the administration of 7.5 cc intravenous Gadavist contrast.    FINDINGS: No abnormal leptomeningeal or parenchymal enhancement. A few punctate nonspecific left frontal lobe subcortical white matterFLAIR hyperintensities on image 18, series 10. These findings likely represent the sequela of microvascular changes. Tiny chronic right cerebellar hemisphere lacunar infarcts on images 6 and 7 of series 11.  There is no abnormal restricted diffusion to suggest acute infarction. No other abnormal signal is demonstrated throughout the brain parenchyma. Normal T2 flow-voids are seen within  the intracranial vasculature. The lateral ventricles and cortical sulci are age-appropriate in size and configuration. There is no mass, mass effect, or extra-axial fluid collection. There is no susceptibility artifact to suggest hemorrhage. Midline structures are normal.  The visualized paranasal sinuses, mastoid air cells and orbits are unremarkable.      IMPRESSION: No abnormal enhancement to suggest intracranial metastatic disease.    < end of copied text >

## 2020-09-21 NOTE — CONSULT NOTE ADULT - SUBJECTIVE AND OBJECTIVE BOX
Chief Complaint:  Patient is a 64y old  Female who presents with a chief complaint of hilar mass    HPI:  64y Female with depression/anxieity, Gastric metaplasia resulting in persistent nausea,  brain aneurysm, hyponatremia (admission in June for Na 118/AMS) admitted 9/8 w Na 111.  Pt was admitted to ICU and placed on NS. Treated for Toxic metabolic encephalopathy, despite improvement of Na. Pt had a CT chest /A/P today to look for occult malignancy and found to have a Left suprahilar/mediastinal mass compatible with neoplasm. IR was consulted for biopsy.    Allergies  No Known Allergies    MEDICATIONS  (STANDING):  chlorhexidine 2% Cloths 1 Application(s) Topical <User Schedule>  demeclocycline 300 milliGRAM(s) Oral two times a day  enoxaparin Injectable 40 milliGRAM(s) SubCutaneous at bedtime  influenza   Vaccine 0.5 milliLiter(s) IntraMuscular once  levETIRAcetam 500 milliGRAM(s) Oral two times a day  senna 2 Tablet(s) Oral at bedtime  sodium chloride 1 Gram(s) Oral every 8 hours    MEDICATIONS  (PRN):  acetaminophen   Tablet .. 650 milliGRAM(s) Oral every 6 hours PRN Temp greater or equal to 38.5C (101.3F), Mild Pain (1 - 3)  ALPRAZolam 0.25 milliGRAM(s) Oral every 6 hours PRN anxiety  LORazepam   Injectable 0.25 milliGRAM(s) IV Push every 4 hours PRN Anxiety or agitation  melatonin 5 milliGRAM(s) Oral at bedtime PRN Insomnia  ondansetron Injectable 4 milliGRAM(s) IV Push every 6 hours PRN Nausea and/or Vomiting      PAST MEDICAL & SURGICAL HISTORY:  Hyponatremia    Brain aneurysm    Anxiety    Depression    History of neck surgery      Vital Signs Last 24 Hrs  T(C): 36.6 (21 Sep 2020 05:41), Max: 36.9 (20 Sep 2020 20:38)  T(F): 97.9 (21 Sep 2020 05:41), Max: 98.5 (20 Sep 2020 20:38)  HR: 95 (21 Sep 2020 06:00) (84 - 107)  BP: 139/80 (21 Sep 2020 06:00) (107/67 - 156/86)  BP(mean): 93 (21 Sep 2020 06:00) (67 - 105)  RR: 20 (21 Sep 2020 06:00) (17 - 25)  SpO2: 97% (21 Sep 2020 02:00) (93% - 100%)      CBC                        12.2   13.22 )-----------( 151      ( 21 Sep 2020 06:38 )             35.3       Chemistry  09-21    128<L>  |  96  |  20  ----------------------------<  113<H>  3.8   |  25  |  0.58    Ca    9.2      21 Sep 2020 06:38        PT/INR - ( 21 Sep 2020 06:38 )   PT: 12.4 sec;   INR: 1.07 ratio         PTT - ( 21 Sep 2020 06:38 )  PTT:27.0 sec    r< from: CT Chest w/ IV Cont (09.17.20 @ 13:43) >  IMPRESSION:  Left suprahilar/mediastinal mass compatible with neoplasm.    Numerous tiny low-density liver lesions raising suspicion for metastatic disease. MRI recommended.    < end of copied text >

## 2020-09-21 NOTE — PROGRESS NOTE ADULT - SUBJECTIVE AND OBJECTIVE BOX
Subjective:  No events overnight.  Evaluated by IR, unsafe to access, will move ahead with mediastinoscopy.    Vital Signs:  Vital Signs Last 24 Hrs  T(C): 36.6 (09-21-20 @ 05:41), Max: 36.9 (09-20-20 @ 20:38)  T(F): 97.9 (09-21-20 @ 05:41), Max: 98.5 (09-20-20 @ 20:38)  HR: 87 (09-21-20 @ 08:00) (84 - 107)  BP: 98/80 (09-21-20 @ 08:00) (98/80 - 156/86)  RR: 16 (09-21-20 @ 08:00) (16 - 40)  SpO2: 98% (09-21-20 @ 07:00) (93% - 100%) on room air    Relevant labs, radiology and Medications reviewed                        12.7   12.67 )-----------( 160      ( 21 Sep 2020 08:23 )             36.3     09-21    129<L>  |  97  |  20  ----------------------------<  121<H>  3.7   |  25  |  0.65    Ca    9.1      21 Sep 2020 08:23      PT/INR - ( 21 Sep 2020 08:23 )   PT: 12.4 sec;   INR: 1.07 ratio         PTT - ( 21 Sep 2020 08:23 )  PTT:27.6 sec  MEDICATIONS  (STANDING):  chlorhexidine 2% Cloths 1 Application(s) Topical <User Schedule>  demeclocycline 300 milliGRAM(s) Oral two times a day  enoxaparin Injectable 40 milliGRAM(s) SubCutaneous at bedtime  influenza   Vaccine 0.5 milliLiter(s) IntraMuscular once  levETIRAcetam 500 milliGRAM(s) Oral two times a day  senna 2 Tablet(s) Oral at bedtime  sodium chloride 1 Gram(s) Oral every 8 hours    MEDICATIONS  (PRN):  acetaminophen   Tablet .. 650 milliGRAM(s) Oral every 6 hours PRN Temp greater or equal to 38.5C (101.3F), Mild Pain (1 - 3)  ALPRAZolam 0.25 milliGRAM(s) Oral every 6 hours PRN anxiety  LORazepam   Injectable 0.25 milliGRAM(s) IV Push every 4 hours PRN Anxiety or agitation  melatonin 5 milliGRAM(s) Oral at bedtime PRN Insomnia  ondansetron Injectable 4 milliGRAM(s) IV Push every 6 hours PRN Nausea and/or Vomiting      Physical Exam:  Gen:  NAD, breathing comfortably  Neuro: AO x 3, speech clear  Card:  S1 S2  Pulm:  CTA bilaterally, no accessory muscle use  Abd:  soft NT ND  Ext:  no edema      I&O's Summary    20 Sep 2020 07:01  -  21 Sep 2020 07:00  --------------------------------------------------------  IN: 0 mL / OUT: 1200 mL / NET: -1200 mL        Assessment  64y Female  w/ PAST MEDICAL & SURGICAL HISTORY:  Hyponatremia    Brain aneurysm    Anxiety    Depression    History of neck surgery    Patient is a 64y old  Female who presents with a chief complaint of confusion/N/V (21 Sep 2020 08:31)    H/O Depression/anxiety, gastric metaplasia, brain aneurysm, hyponatremia, admitted with hyponatremia and AMS, found to have a left suprahilar/mediastinal mass suspicious for neoplasm.      PLAN    Plan for mediastinoscopy today.  NPO.  2 PRBC on call to OR.    Discussed with Cardiothoracic Team at AM rounds.

## 2020-09-21 NOTE — CONSULT NOTE ADULT - ASSESSMENT
63yo F with hilar mass referred to IR for possible biopsy  - Imaging was reviewed by Dr. Mckeon. There is no safe percutaneous window for biopsy.   - Dr. Collado aware  - Thank you for the courtesy of this consult

## 2020-09-21 NOTE — PROGRESS NOTE ADULT - SUBJECTIVE AND OBJECTIVE BOX
63 y/o female with depression/anxieity, Gastric metaplasia resulting in persistent nausea,  brain aneurysm, hyponatremia (admission in June for Na 118/AMS) who presents to ED with 2 weeks of Nausea unable to eat but continued to drink 8 8oz water daily and AMS found to have Na 111.  Pt was admitted to ICU and placed on .   patient had slurred speech and was intermittently treated with hypertonic saline, was on seroquel prior to admission.    MS seems to be back to baseline, alert  and oriented no c/o; for OR today hilar mass not accessible for IR    Vital Signs Last 24 Hrs  T(C): 36.6 (21 Sep 2020 05:41), Max: 36.9 (20 Sep 2020 20:38)  T(F): 97.9 (21 Sep 2020 05:41), Max: 98.5 (20 Sep 2020 20:38)  HR: 87 (21 Sep 2020 08:00) (84 - 107)  BP: 98/80 (21 Sep 2020 08:00) (98/80 - 156/86)  BP(mean): 84 (21 Sep 2020 08:00) (67 - 105)  RR: 16 (21 Sep 2020 08:00) (16 - 40)  SpO2: 98% (21 Sep 2020 07:00) (93% - 99%)    PHYSICAL EXAM:  Constitutional: NAD, awake and alert, well-developed  HEENT: PERR, EOMI, Normal Hearing, MMM  Neck: Soft and supple  Respiratory: Breath sounds are clear bilaterally, No wheezing, rales or rhonchi  Cardiovascular: S1 and S2, regular rate and rhythm, no Murmurs, gallops or rubs  Gastrointestinal: Bowel Sounds present, soft, nontender, nondistended, no guarding, no rebound  Extremities: No peripheral edema  Neurological: nonfocal, encephalopathy resolved

## 2020-09-21 NOTE — PROGRESS NOTE ADULT - SUBJECTIVE AND OBJECTIVE BOX
NEPHROLOGY INTERVAL HPI/OVERNIGHT EVENTS:  09-21-20 @ 13:39  HPI:  History obtained from pt and significant other at bedside  64F with PMHx of depression/anxieity, brain aneurysm, hyponatremia (admission in June for Na 118/AMS) who presents to ED with 3-4day history of nausea/intermittent vomiting, wretching,  and progressive AMS. Evaluation in ED revealed Na 111. CTH without acute pathology. eICU consulted and pt admitted to ICU. Upon arrival pt receiving 3% NaCL bolus, discontinued upon my arrival. Prior admission and workup revealed hyponatremia possibly SIADH vs SSRI vs excessive free water intake. She was discharged following correction on NaCl tabs as outpt. She is no longer taking these. She states they were stopped by nephro on her follow up visit as outpt. She was taken off her SSRI at last admission and placed on seroquel only. Her dosing has been escalated since last admission to 150mg at HS and 50mg 2x during the day. She reports no longer using her klonipin. (06 Sep 2020 20:29)      Patient is a 64y old  Female who presents with a chief complaint of confusion/N/V (21 Sep 2020 10:24)      MEDICATIONS  (STANDING):  chlorhexidine 2% Cloths 1 Application(s) Topical <User Schedule>  demeclocycline 300 milliGRAM(s) Oral two times a day  enoxaparin Injectable 40 milliGRAM(s) SubCutaneous at bedtime  influenza   Vaccine 0.5 milliLiter(s) IntraMuscular once  levETIRAcetam 500 milliGRAM(s) Oral two times a day  senna 2 Tablet(s) Oral at bedtime  sodium chloride 1 Gram(s) Oral every 8 hours    MEDICATIONS  (PRN):  acetaminophen   Tablet .. 650 milliGRAM(s) Oral every 6 hours PRN Temp greater or equal to 38.5C (101.3F), Mild Pain (1 - 3)  ALPRAZolam 0.25 milliGRAM(s) Oral every 6 hours PRN anxiety  LORazepam   Injectable 0.25 milliGRAM(s) IV Push every 4 hours PRN Anxiety or agitation  melatonin 5 milliGRAM(s) Oral at bedtime PRN Insomnia  ondansetron Injectable 4 milliGRAM(s) IV Push every 6 hours PRN Nausea and/or Vomiting      Allergies    No Known Allergies    Intolerances        I&O's Detail    20 Sep 2020 07:01  -  21 Sep 2020 07:00  --------------------------------------------------------  IN:  Total IN: 0 mL    OUT:    Voided (mL): 1200 mL  Total OUT: 1200 mL    Total NET: -1200 mL      21 Sep 2020 07:01  -  21 Sep 2020 13:39  --------------------------------------------------------  IN:  Total IN: 0 mL    OUT:    Voided (mL): 850 mL  Total OUT: 850 mL    Total NET: -850 mL          .    Patient was seen and evaluated on dialysis.   Patient is tolerating the procedure well.   Continue dialysis:   Dialyzer:          QB:        QD:   Goal UF ___ over ___ Hours       Vital Signs Last 24 Hrs  T(C): 36.7 (21 Sep 2020 08:56), Max: 36.9 (20 Sep 2020 20:38)  T(F): 98.1 (21 Sep 2020 08:56), Max: 98.5 (20 Sep 2020 20:38)  HR: 107 (21 Sep 2020 12:15) (84 - 107)  BP: 118/74 (21 Sep 2020 12:15) (98/80 - 156/86)  BP(mean): 86 (21 Sep 2020 12:15) (78 - 105)  RR: 19 (21 Sep 2020 12:15) (16 - 40)  SpO2: 98% (21 Sep 2020 07:00) (93% - 99%)  Daily     Daily     PHYSICAL EXAM:  General: alert. awake Ox3  HEENT: MMM  CV: s1s2 rrr  LUNGS: B/L CTA  EXT: no edema    LABS:                        12.7   12.67 )-----------( 160      ( 21 Sep 2020 08:23 )             36.3     09-21    129<L>  |  97  |  20  ----------------------------<  121<H>  3.7   |  25  |  0.65    Ca    9.1      21 Sep 2020 08:23      PT/INR - ( 21 Sep 2020 08:23 )   PT: 12.4 sec;   INR: 1.07 ratio         PTT - ( 21 Sep 2020 08:23 )  PTT:27.6 sec

## 2020-09-21 NOTE — PROGRESS NOTE ADULT - ATTENDING COMMENTS
Patient seen and examined. CT chest reviewed. She has mediastinal lymphadenopathy, R4, L4 region in addition to the mass near the ascending aorta and AP window. The mass near the AP window is concerning for lung cancer. I am not sure if the mediastinal nodes are going to be pathological.     Patient also has an anterior/posterior fixation of the cervical spine. This is going to make the procedure challenging has neck mobility and extension will be limited.  I told the Patient, we are likely not going to know until we try. Flex Bronch EBUS is also an option, however Patient does not want to be transferred to [facility]. Left VATS biopsy would be the last option.     Risks of the procedure discussed with the Patient were: bleeding, postop pain, and non diagnostic tissue.

## 2020-09-22 DIAGNOSIS — E87.1 HYPO-OSMOLALITY AND HYPONATREMIA: ICD-10-CM

## 2020-09-22 DIAGNOSIS — R91.8 OTHER NONSPECIFIC ABNORMAL FINDING OF LUNG FIELD: ICD-10-CM

## 2020-09-22 DIAGNOSIS — C78.7 SECONDARY MALIGNANT NEOPLASM OF LIVER AND INTRAHEPATIC BILE DUCT: ICD-10-CM

## 2020-09-22 LAB
ANION GAP SERPL CALC-SCNC: 4 MMOL/L — LOW (ref 5–17)
BUN SERPL-MCNC: 17 MG/DL — SIGNIFICANT CHANGE UP (ref 7–23)
CALCIUM SERPL-MCNC: 8.8 MG/DL — SIGNIFICANT CHANGE UP (ref 8.5–10.1)
CHLORIDE SERPL-SCNC: 97 MMOL/L — SIGNIFICANT CHANGE UP (ref 96–108)
CO2 SERPL-SCNC: 30 MMOL/L — SIGNIFICANT CHANGE UP (ref 22–31)
CORTIS AM PEAK SERPL-MCNC: 26.1 UG/DL — HIGH (ref 6–18.4)
CREAT SERPL-MCNC: 0.6 MG/DL — SIGNIFICANT CHANGE UP (ref 0.5–1.3)
GLUCOSE SERPL-MCNC: 120 MG/DL — HIGH (ref 70–99)
POTASSIUM SERPL-MCNC: 4 MMOL/L — SIGNIFICANT CHANGE UP (ref 3.5–5.3)
POTASSIUM SERPL-SCNC: 4 MMOL/L — SIGNIFICANT CHANGE UP (ref 3.5–5.3)
SODIUM SERPL-SCNC: 131 MMOL/L — LOW (ref 135–145)

## 2020-09-22 PROCEDURE — 99232 SBSQ HOSP IP/OBS MODERATE 35: CPT

## 2020-09-22 PROCEDURE — 99233 SBSQ HOSP IP/OBS HIGH 50: CPT | Mod: 57

## 2020-09-22 PROCEDURE — 99233 SBSQ HOSP IP/OBS HIGH 50: CPT

## 2020-09-22 RX ADMIN — SODIUM CHLORIDE 1 GRAM(S): 9 INJECTION INTRAMUSCULAR; INTRAVENOUS; SUBCUTANEOUS at 22:58

## 2020-09-22 RX ADMIN — Medication 300 MILLIGRAM(S): at 10:07

## 2020-09-22 RX ADMIN — SODIUM CHLORIDE 1 GRAM(S): 9 INJECTION INTRAMUSCULAR; INTRAVENOUS; SUBCUTANEOUS at 05:09

## 2020-09-22 RX ADMIN — LEVETIRACETAM 500 MILLIGRAM(S): 250 TABLET, FILM COATED ORAL at 22:58

## 2020-09-22 RX ADMIN — SODIUM CHLORIDE 1 GRAM(S): 9 INJECTION INTRAMUSCULAR; INTRAVENOUS; SUBCUTANEOUS at 14:20

## 2020-09-22 RX ADMIN — Medication 5 MILLIGRAM(S): at 22:58

## 2020-09-22 RX ADMIN — CHLORHEXIDINE GLUCONATE 1 APPLICATION(S): 213 SOLUTION TOPICAL at 05:09

## 2020-09-22 RX ADMIN — Medication 0.25 MILLIGRAM(S): at 18:23

## 2020-09-22 RX ADMIN — LEVETIRACETAM 500 MILLIGRAM(S): 250 TABLET, FILM COATED ORAL at 10:07

## 2020-09-22 RX ADMIN — ENOXAPARIN SODIUM 40 MILLIGRAM(S): 100 INJECTION SUBCUTANEOUS at 22:57

## 2020-09-22 RX ADMIN — Medication 300 MILLIGRAM(S): at 22:57

## 2020-09-22 RX ADMIN — Medication 0.25 MILLIGRAM(S): at 09:19

## 2020-09-22 NOTE — CONSULT NOTE ADULT - PROBLEM SELECTOR RECOMMENDATION 9
Patient is a former smoker, and in the context of SIADH, bronchogenic carcinoma, specifically small cell cancer is high in the differential. Currently his sternal mass difficult to biopsy. Therefore patient may be scheduled for either EBUS or VATS   procedure/ discussed with Dr. Doyle    Her eventual treatment will most likely consistent chemotherapy as a component of treatment, therefore would recommend consideration of a MediPort while an inpatient

## 2020-09-22 NOTE — PROGRESS NOTE ADULT - ATTENDING COMMENTS
Patient seen and examined. Mediastinoscopy aborted yesterday due to inability to extend the neck under general anesthesia.   Patient with hx of cervical spine fusion.     She does not want to be transferred to [place] for EBUS either. The last option is a left VATS with biopsy, will add on for Thursday.   Risks of the procedure explained to the Patient are: post op pain, bleeding. She understands that she will have a chest tube after the procedure.

## 2020-09-22 NOTE — PROGRESS NOTE ADULT - SUBJECTIVE AND OBJECTIVE BOX
NEPHROLOGY INTERVAL HPI/OVERNIGHT EVENTS:    Date of Service: 20 @ 10:37   SY  Feeling well.  No new complaints.  Attempted bx of lung lesion via mediastinoscopy but unsuccessful due to inability to flex her neck.      SY  Sitting up in chair.  No new complaints.  Alert and able to answer questions appropriately.  Aware of lung mass and asking about Bx.  Now taking all po meds.     SY  Alert and conversant today.  Difficult to assess full cognition.      eyes fixed on tv  transiently makes eye contact   but does not answer  CT reports noted lung mass      sitting OOB  Replying with one word answers   NAD, No complaints     SY  Sitting up in chair.  Appears comfortable.  Oriented to person only today.  Answers "I have a rash" to various questions.    HPI:  History obtained from pt and significant other at bedside  64F with PMHx of depression / anxiety, brain aneurysm, hyponatremia (admission in  for Na 118/AMS) who presents to ED with 3-4day history of nausea/intermittent vomiting, wretching,  and progressive AMS. Evaluation in ED revealed Na 111. CTH without acute pathology. eICU consulted and pt admitted to ICU. Upon arrival pt receiving 3% NaCL bolus, discontinued upon my arrival. Prior admission and workup revealed hyponatremia possibly SIADH vs SSRI vs excessive free water intake. She was discharged following correction on NaCl tabs as outpt. She is no longer taking these. She states they were stopped by nephro on her follow up visit as outpt. She was taken off her SSRI at last admission and placed on seroquel only. Her dosing has been escalated since last admission to 150mg at HS and 50mg 2x during the day. She reports no longer using her Klonopin (06 Sep 2020 20:29)    Patient is a 64y old  Female who presents with a chief complaint of confusion/N/V (14 Sep 2020 12:42)  --------------------------------------------------------------------------------  hx from  and friend, adamaris donaldson  pt was dc after ssri/lexapro related hyponatremia.  upon dc in 2020 post dc na was normal and was dc from salt tablets by dr romano  pt as per  was doing well and eating but maintained fluid restriciton   progressively with more nauseau and inc lethargy  admitted with hyponatremia and was tx with hypertonic and nacl tabs  on ns, na with variability and drop   taking in po but Nauseau most disturbing sx  has been on seroquel to manage her anxiety.  has been off since admission  workup for nauseau with dr carson that was neg   no upto date mammogram/pap  sees dr raymond for moles   had cerebral aneurysm 5mm? as per  and was advised annual fu 5 years ago  her slow speech with flat affect atypical for her   no diarrhea or vomiting  has been trying to eat while here   over course of 3 years, has had 70lb weight loss     MEDICATIONS  (STANDING):  chlorhexidine 2% Cloths 1 Application(s) Topical <User Schedule>  demeclocycline 300 milliGRAM(s) Oral two times a day  enoxaparin Injectable 40 milliGRAM(s) SubCutaneous at bedtime  influenza   Vaccine 0.5 milliLiter(s) IntraMuscular once  levETIRAcetam 500 milliGRAM(s) Oral two times a day  senna 2 Tablet(s) Oral at bedtime  sodium chloride 1 Gram(s) Oral every 8 hours    MEDICATIONS  (PRN):  acetaminophen   Tablet .. 650 milliGRAM(s) Oral every 6 hours PRN Temp greater or equal to 38.5C (101.3F), Mild Pain (1 - 3)  ALPRAZolam 0.25 milliGRAM(s) Oral every 6 hours PRN anxiety  LORazepam   Injectable 0.25 milliGRAM(s) IV Push every 4 hours PRN Anxiety or agitation  melatonin 5 milliGRAM(s) Oral at bedtime PRN Insomnia  ondansetron Injectable 4 milliGRAM(s) IV Push every 6 hours PRN Nausea and/or Vomiting    Vital Signs Last 24 Hrs  T(C): 36.8 (22 Sep 2020 09:06), Max: 37 (21 Sep 2020 14:32)  T(F): 98.3 (22 Sep 2020 09:06), Max: 98.6 (21 Sep 2020 14:32)  HR: 96 (22 Sep 2020 10:00) (79 - 107)  BP: 156/88 (22 Sep 2020 10:00) (118/74 - 173/84)  BP(mean): 106 (22 Sep 2020 10:00) (71 - 112)  RR: 22 (22 Sep 2020 10:00) (16 - 37)  SpO2: 99% (22 Sep 2020 10:00) (85% - 100%)  Daily     Daily Weight in k.5 (22 Sep 2020 05:00)     @ 07:01  -   @ 07:00  --------------------------------------------------------  IN: 820 mL / OUT: 1450 mL / NET: -630 mL    PHYSICAL EXAM: Alert and comfortable.  GENERAL: NO distress  CHEST/LUNG: Clear to aus  HEART: S1S2 RRR  ABDOMEN: soft  EXTREMITIES: no edema  SKIN:     LABS:                        12.7   12.67 )-----------( 160      ( 21 Sep 2020 08:23 )             36.3         131<L>  |  97  |  17  ----------------------------<  120<H>  4.0   |  30  |  0.60    Ca    8.8      22 Sep 2020 06:39      PT/INR - ( 21 Sep 2020 08:23 )   PT: 12.4 sec;   INR: 1.07 ratio         PTT - ( 21 Sep 2020 08:23 )  PTT:27.6 sec            RADIOLOGY & ADDITIONAL TESTS:

## 2020-09-22 NOTE — CONSULT NOTE ADULT - SUBJECTIVE AND OBJECTIVE BOX
HPI:  History obtained from pt and significant other at bedside    64F  With a history of symptomatic anemia since June of this year at which time serum sodium was 118 associated with altered mental status. Etiology of the hyponatremia unclear and I am she was readmitted for 4 day history of nausea/emesis/altered mental status. Serum sodium is 111. Imaging studies of the CNS were unremarkable. admitted to the  ICU where she was placed on 3% normal saline. Current diagnosis is SIADH or. Imaging studies were performed as part of her workup and revealed a left upper lobe mediastinal mass, small liver lesions suggestive of metastases. She underwent attempted mediastinoscopy however because of technical issues the procedure was aborted. Now being considered for either VATS or a EBUS    Clinically stable. Mental status is much improved. Sodiums have improved    Patient is a former smoker  No history of emphysema/COPD/altered dependence    Appetite is been stable. No weight loss  Denies any headaches  No visual disturbances  No neck or back pain  No abdominal discomfort  No change in bowel habits  Full review of systems as outlined below  PMHx of depression/anxieity, brain aneurysm, hyponatremia (admission in June for Na 118/AMS) who presents to ED with 3-4day history of nausea/intermittent vomiting, wretching,  and progressive AMS. Evaluation in ED revealed Na 111. CTH without acute pathology. eICU consulted and pt admitted to ICU. Upon arrival pt receiving 3% NaCL bolus, discontinued upon my arrival. Prior admission and workup revealed hyponatremia possibly SIADH vs SSRI vs excessive free water intake. She was discharged following correction on NaCl tabs as outpt. She is no longer taking these. She states they were stopped by nephro on her follow up visit as outpt. She was taken off her SSRI at last admission and placed on seroquel only. Her dosing has been escalated since last admission to 150mg at HS and 50mg 2x during the day. She reports no longer using her klonipin. (06 Sep 2020 20:29)      PAST MEDICAL & SURGICAL HISTORY:  Hyponatremia    Brain aneurysm    Anxiety    Depression    History of neck surgery        MEDICATIONS  (STANDING):  chlorhexidine 2% Cloths 1 Application(s) Topical <User Schedule>  demeclocycline 300 milliGRAM(s) Oral two times a day  enoxaparin Injectable 40 milliGRAM(s) SubCutaneous at bedtime  influenza   Vaccine 0.5 milliLiter(s) IntraMuscular once  levETIRAcetam 500 milliGRAM(s) Oral two times a day  senna 2 Tablet(s) Oral at bedtime  sodium chloride 1 Gram(s) Oral every 8 hours    MEDICATIONS  (PRN):  acetaminophen   Tablet .. 650 milliGRAM(s) Oral every 6 hours PRN Temp greater or equal to 38.5C (101.3F), Mild Pain (1 - 3)  ALPRAZolam 0.25 milliGRAM(s) Oral every 6 hours PRN anxiety  LORazepam   Injectable 0.25 milliGRAM(s) IV Push every 4 hours PRN Anxiety or agitation  melatonin 5 milliGRAM(s) Oral at bedtime PRN Insomnia  ondansetron Injectable 4 milliGRAM(s) IV Push every 6 hours PRN Nausea and/or Vomiting      Allergies    No Known Allergies    Intolerances        SOCIAL HISTORY:    FAMILY HISTORY:      Vital Signs Last 24 Hrs  T(C): 37.3 (22 Sep 2020 14:09), Max: 37.3 (22 Sep 2020 14:09)  T(F): 99.1 (22 Sep 2020 14:09), Max: 99.1 (22 Sep 2020 14:09)  HR: 99 (22 Sep 2020 14:00) (79 - 103)  BP: 147/76 (22 Sep 2020 14:00) (120/56 - 173/84)  BP(mean): 95 (22 Sep 2020 14:00) (71 - 112)  RR: 22 (22 Sep 2020 14:00) (16 - 29)  SpO2: 100% (22 Sep 2020 14:00) (85% - 100%)    PHYSICAL EXAM:      Constitutional: Appears comfortable, in  NAD, A/O X 3     Eyes: EOMI, PERRLA ;No icterus    ENMT:  No oral lesions, thrush; pharynx not injected    Neck:  Supple; No masses, lymph nodes, no bruits    Breasts: NA    Back: No tenderness     Respiratory:  Clear to A/P, No wheezes, rhonchi, rales. No dullness to percussion    Cardiovascular: Normal rate and rhythm; normal S1 and S2; No murmurs. No gallop    Gastrointestinal: No distension, normal bowl sounds; No tendeness , guarding, rebound;  no masses, no hepatosplenimegaly, no fluid wave    Genitourinary: No flank pains, no inguninal adenopathy    Rectal: NA    Extremities:  No phlebitis, no edema    Vascular: No acrocyanosis, no edema    Neurological: Alert and oriented. Cranial nerves II-XII WNL, Upper extremity / lower extremity  strength WNL;  Reflexes WNL, Plantar downgoing ; No cerebellar signs    Skin: No rash, petichae, purpura, echymoses    Lymph Nodes: No cervical, supraclavicular, axillary, inguinal adenopathy    Musculoskeletal: No joint swelling    Psychiatric: Alert, oriented, normal affect              LABS:                        12.7   12.67 )-----------( 160      ( 21 Sep 2020 08:23 )             36.3     09-22    131<L>  |  97  |  17  ----------------------------<  120<H>  4.0   |  30  |  0.60    Ca    8.8      22 Sep 2020 06:39      PT/INR - ( 21 Sep 2020 08:23 )   PT: 12.4 sec;   INR: 1.07 ratio         PTT - ( 21 Sep 2020 08:23 )  PTT:27.6 sec      RADIOLOGY & ADDITIONAL STUDIES: HPI:  History obtained from pt and significant other at bedside    64F  With a history of symptomatic anemia since June of this year at which time serum sodium was 118 associated with altered mental status. Etiology of the hyponatremia unclear and I am she was readmitted for 4 day history of nausea/emesis/altered mental status. Serum sodium is 111. Imaging studies of the CNS were unremarkable. admitted to the  ICU where she was placed on 3% normal saline. Current diagnosis is SIADH or. Imaging studies were performed as part of her workup and revealed a left upper lobe mediastinal mass, small liver lesions suggestive of metastases. She underwent attempted mediastinoscopy however because of technical issues the procedure was aborted. Now being considered for either VATS or a EBUS    Clinically stable. Mental status is much improved. Sodiums have improved    Patient is a former smoker  No history of emphysema/COPD/altered dependence    Appetite is been stable. No weight loss  Denies any headaches  No visual disturbances  No neck or back pain  No abdominal discomfort  No change in bowel habits  Full review of systems as outlined below  PMHx of depression/anxieity, brain aneurysm, hyponatremia (admission in June for Na 118/AMS) who presents to ED with 3-4day history of nausea/intermittent vomiting, wretching,  and progressive AMS. Evaluation in ED revealed Na 111. CTH without acute pathology. eICU consulted and pt admitted to ICU. Upon arrival pt receiving 3% NaCL bolus, discontinued upon my arrival. Prior admission and workup revealed hyponatremia possibly SIADH vs SSRI vs excessive free water intake. She was discharged following correction on NaCl tabs as outpt. She is no longer taking these. She states they were stopped by nephro on her follow up visit as outpt. She was taken off her SSRI at last admission and placed on seroquel only. Her dosing has been escalated since last admission to 150mg at HS and 50mg 2x during the day. She reports no longer using her klonipin. (06 Sep 2020 20:29)      PAST MEDICAL & SURGICAL HISTORY:  Hyponatremia    Brain aneurysm    Anxiety    Depression    History of neck surgery        MEDICATIONS  (STANDING):  chlorhexidine 2% Cloths 1 Application(s) Topical <User Schedule>  demeclocycline 300 milliGRAM(s) Oral two times a day  enoxaparin Injectable 40 milliGRAM(s) SubCutaneous at bedtime  influenza   Vaccine 0.5 milliLiter(s) IntraMuscular once  levETIRAcetam 500 milliGRAM(s) Oral two times a day  senna 2 Tablet(s) Oral at bedtime  sodium chloride 1 Gram(s) Oral every 8 hours    MEDICATIONS  (PRN):  acetaminophen   Tablet .. 650 milliGRAM(s) Oral every 6 hours PRN Temp greater or equal to 38.5C (101.3F), Mild Pain (1 - 3)  ALPRAZolam 0.25 milliGRAM(s) Oral every 6 hours PRN anxiety  LORazepam   Injectable 0.25 milliGRAM(s) IV Push every 4 hours PRN Anxiety or agitation  melatonin 5 milliGRAM(s) Oral at bedtime PRN Insomnia  ondansetron Injectable 4 milliGRAM(s) IV Push every 6 hours PRN Nausea and/or Vomiting      Allergies    No Known Allergies    Intolerances        SOCIAL HISTORY:    FAMILY HISTORY:      Vital Signs Last 24 Hrs  T(C): 37.3 (22 Sep 2020 14:09), Max: 37.3 (22 Sep 2020 14:09)  T(F): 99.1 (22 Sep 2020 14:09), Max: 99.1 (22 Sep 2020 14:09)  HR: 99 (22 Sep 2020 14:00) (79 - 103)  BP: 147/76 (22 Sep 2020 14:00) (120/56 - 173/84)  BP(mean): 95 (22 Sep 2020 14:00) (71 - 112)  RR: 22 (22 Sep 2020 14:00) (16 - 29)  SpO2: 100% (22 Sep 2020 14:00) (85% - 100%)    PHYSICAL EXAM:      Constitutional: Appears comfortable, in  NAD, A/O X 3     Eyes: EOMI, PERRLA ;No icterus    ENMT:  No oral lesions, thrush; pharynx not injected    Neck:  Supple; No masses, lymph nodes, no bruits    Breasts: NA    Back: No tenderness     Respiratory:  Clear to A/P, No wheezes, rhonchi, rales. No dullness to percussion    Cardiovascular: Normal rate and rhythm; normal S1 and S2; No murmurs. No gallop    Gastrointestinal: No distension, normal bowl sounds; No tendeness , guarding, rebound;  no masses, no hepatosplenimegaly, no fluid wave    Genitourinary: No flank pains, no inguninal adenopathy    Rectal: NA    Extremities:  No phlebitis, no edema    Vascular: No acrocyanosis, no edema    Neurological: Alert and oriented. Cranial nerves II-XII WNL, Upper extremity / lower extremity  strength WNL;  Reflexes WNL, Plantar downgoing ; No cerebellar signs    Skin: No rash, petichae, purpura, echymoses    Lymph Nodes: No cervical, supraclavicular, axillary, inguinal adenopathy    Musculoskeletal: No joint swelling    Psychiatric: Alert, oriented, normal affect              LABS:                        12.7   12.67 )-----------( 160      ( 21 Sep 2020 08:23 )             36.3     09-22    131<L>  |  97  |  17  ----------------------------<  120<H>  4.0   |  30  |  0.60    Ca    8.8      22 Sep 2020 06:39      PT/INR - ( 21 Sep 2020 08:23 )   PT: 12.4 sec;   INR: 1.07 ratio         PTT - ( 21 Sep 2020 08:23 )  PTT:27.6 sec      RADIOLOGY & ADDITIONAL STUDIES:      < from: CT Chest w/ IV Cont (09.17.20 @ 13:43) >  INDINGS:  CHEST:  LUNGS AND LARGE AIRWAYS/MEDIASTINUM: 5.3 x 2.7 cm left mass in the left suprahilar region, extending into the mediastinum. Additional mediastinal lymph nodes noted, for example a 1.4 x 1.3 cm right lower paratracheal lymph node (2; 27).  PLEURA: No pleural effusion.  VESSELS: Atherosclerotic changes of the aorta and coronary arteries.  HEART: Heart size is normal. No pericardial effusion.  CHEST WALL AND LOWER NECK: Within normal limits.    ABDOMEN AND PELVIS:  LIVER: Numerous subcentimeter low-density lesions throughout the liver.  BILE DUCTS: Normal caliber.  GALLBLADDER: No visible gallstones. Fundal adenomyomatosis.  SPLEEN: Within normal limits.  PANCREAS: Within normal limits.  ADRENALS: Within normal limits.  KIDNEYS/URETERS: Within normal limits.    BLADDER: Within normal limits.  REPRODUCTIVE ORGANS: Uterus and adnexa within normal limits.    BOWEL: No bowel obstruction. Appendix is normal. Mild colonic diverticulosis.  PERITONEUM: No ascites.  VESSELS: Atherosclerotic changes.  RETROPERITONEUM/LYMPH NODES: No lymphadenopathy.  ABDOMINAL WALL: Within normal limits.  BONES: No lytic or blastic bony lesion. Status post lower cervical spine fusion.    IMPRESSION:  Left suprahilar/mediastinal mass compatible with neoplasm.    Numerous tiny low-density liver lesions raising suspicion for metastatic disease. MRI recommended.        < end of copied text >  ra

## 2020-09-22 NOTE — CONSULT NOTE ADULT - PROVIDER SPECIALTY LIST ADULT
Heme/Onc
TeleCritical Care
Neurology
Thoracic Surgery
Palliative Care
Intervent Radiology
Nephrology

## 2020-09-22 NOTE — PROGRESS NOTE ADULT - SUBJECTIVE AND OBJECTIVE BOX
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      2020 patient seen and examined with no family at bedside. Patient having difficulty with word finding, shaking her head yes no to questions but not verbalizing answer to questions.  Patient denies any pain at this time.  has increased anxiety at times.   2020 patient seen and examined with no family at bedside. Patient alert and oriented at this time, denies any pain but states feels anxious at this time. Patient aware that has a spot that needs to be biopsied and will be going to surgery.    2020 patient seen and examined with no family at bedside. Patient states that she is a little sore around incision site on neck but tolerable at this time.  Patient is stating that she feels very anxious at this time.      PAIN: (x )Yes   ( )No  Level: mild  Location: neck   Intensity:   3 /10  Quality: sore  Aggravating Factors: touching   Alleviating Factors: not at this time   Radiation: denies   Duration/Timing: intermittent   Impact on ADLs: denies     DYSPNEA: ( ) Yes  (x ) No    Review of Systems:    Anxiety- severe, agreed to take xanax   Depression- feels sad   Physical Discomfort- denies   Dyspnea- denies   Constipation- denies   Diarrhea- denies   Anorexia- decreased appetite   Weight Loss- yes unsure how much   Cough- at times   Secretions- denies   Fatigue- moderate   Weakness- moderate   Delirium- resolved at this time     All other systems reviewed and negative    PHYSICAL EXAM:    Vital Signs Last 24 Hrs  T(C): 36.8 (22 Sep 2020 09:06), Max: 37 (21 Sep 2020 14:32)  T(F): 98.3 (22 Sep 2020 09:06), Max: 98.6 (21 Sep 2020 14:32)  HR: 103 (22 Sep 2020 12:00) (79 - 107)  BP: 132/99 (22 Sep 2020 12:00) (118/74 - 173/84)  BP(mean): 107 (22 Sep 2020 12:00) (71 - 112)  RR: 29 (22 Sep 2020 12:00) (16 - 37)  SpO2: 98% (22 Sep 2020 12:00) (85% - 100%)  Daily Weight in k.5 (22 Sep 2020 05:00)    PPSV2: 50  %    General: pleasant anxious appearing female in bed, NAD   Mental Status: alert and oriented anxious at this time   HEENT: dry oral mucosa, + temporal wasting   Lungs: Breath sounds are clear bilaterally, No wheezing, rales or rhonchi  Cardiac: S1S2 +   GI: Bowel Sounds present, soft, nontender, nondistended, no guarding, no rebound  : no suprapubic tenderness   Ext: no edema present   Neuro: weakness     LABS:                        12.7   12.67 )-----------( 160      ( 21 Sep 2020 08:23 )             36.3     -    131<L>  |  97  |  17  ----------------------------<  120<H>  4.0   |  30  |  0.60    Ca    8.8      22 Sep 2020 06:39      PT/INR - ( 21 Sep 2020 08:23 )   PT: 12.4 sec;   INR: 1.07 ratio         PTT - ( 21 Sep 2020 08:23 )  PTT:27.6 sec  Albumin: Albumin, Serum: 3.2 g/dL ( @ 06:42)      Allergies    No Known Allergies    Intolerances      MEDICATIONS  (STANDING):  chlorhexidine 2% Cloths 1 Application(s) Topical <User Schedule>  demeclocycline 300 milliGRAM(s) Oral two times a day  enoxaparin Injectable 40 milliGRAM(s) SubCutaneous at bedtime  influenza   Vaccine 0.5 milliLiter(s) IntraMuscular once  levETIRAcetam 500 milliGRAM(s) Oral two times a day  senna 2 Tablet(s) Oral at bedtime  sodium chloride 1 Gram(s) Oral every 8 hours    MEDICATIONS  (PRN):  acetaminophen   Tablet .. 650 milliGRAM(s) Oral every 6 hours PRN Temp greater or equal to 38.5C (101.3F), Mild Pain (1 - 3)  ALPRAZolam 0.25 milliGRAM(s) Oral every 6 hours PRN anxiety  LORazepam   Injectable 0.25 milliGRAM(s) IV Push every 4 hours PRN Anxiety or agitation  melatonin 5 milliGRAM(s) Oral at bedtime PRN Insomnia  ondansetron Injectable 4 milliGRAM(s) IV Push every 6 hours PRN Nausea and/or Vomiting      RADIOLOGY:

## 2020-09-22 NOTE — CONSULT NOTE ADULT - PROBLEM SELECTOR RECOMMENDATION 2
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 Her parameters are improved with 3% saline. If indeed patient has bronchogenic carcinoma treatment should improve her symptoms. for persistence/refractory hyponatremia consider doxycycline

## 2020-09-22 NOTE — PROGRESS NOTE ADULT - ASSESSMENT
Assessment and Plan: 63 y/o female with depression/anxiety, Gastric Metaplasia resulting in persistent nausea, brain aneurysm, hyponatremia (admission in June for Na 118/AMS) who presents to ED with 2 weeks of Nausea and AMS.     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

## 2020-09-22 NOTE — PROGRESS NOTE ADULT - SUBJECTIVE AND OBJECTIVE BOX
Subjective:  No new complaints.  POD 1 from attempted mediastinoscopy - aborted due to poor neck flexion (from previous spinal fusion).    Vital Signs:  Vital Signs Last 24 Hrs  T(C): 36.8 (09-22-20 @ 09:06), Max: 37 (09-21-20 @ 14:32)  T(F): 98.3 (09-22-20 @ 09:06), Max: 98.6 (09-21-20 @ 14:32)  HR: 96 (09-22-20 @ 08:00) (79 - 107)  BP: 154/102 (09-22-20 @ 08:00) (118/74 - 173/84)  RR: 18 (09-22-20 @ 08:00) (16 - 37)  SpO2: 98% (09-22-20 @ 05:00) (85% - 100%) on RA    Telemetry/Alarms:  Sinus rhythm    Relevant labs, radiology and Medications reviewed                        12.7   12.67 )-----------( 160      ( 21 Sep 2020 08:23 )             36.3     09-22    131<L>  |  97  |  17  ----------------------------<  120<H>  4.0   |  30  |  0.60    Ca    8.8      22 Sep 2020 06:39      PT/INR - ( 21 Sep 2020 08:23 )   PT: 12.4 sec;   INR: 1.07 ratio         PTT - ( 21 Sep 2020 08:23 )  PTT:27.6 sec  MEDICATIONS  (STANDING):  chlorhexidine 2% Cloths 1 Application(s) Topical <User Schedule>  demeclocycline 300 milliGRAM(s) Oral two times a day  enoxaparin Injectable 40 milliGRAM(s) SubCutaneous at bedtime  influenza   Vaccine 0.5 milliLiter(s) IntraMuscular once  levETIRAcetam 500 milliGRAM(s) Oral two times a day  senna 2 Tablet(s) Oral at bedtime  sodium chloride 1 Gram(s) Oral every 8 hours    MEDICATIONS  (PRN):  acetaminophen   Tablet .. 650 milliGRAM(s) Oral every 6 hours PRN Temp greater or equal to 38.5C (101.3F), Mild Pain (1 - 3)  ALPRAZolam 0.25 milliGRAM(s) Oral every 6 hours PRN anxiety  LORazepam   Injectable 0.25 milliGRAM(s) IV Push every 4 hours PRN Anxiety or agitation  melatonin 5 milliGRAM(s) Oral at bedtime PRN Insomnia  ondansetron Injectable 4 milliGRAM(s) IV Push every 6 hours PRN Nausea and/or Vomiting    Physical Exam:  Gen:  NAD, breathing comfortably  Neuro: AO x 3, speech clear  Card:  S1 S2  Pulm:  CTA bilaterally, no accessory muscle use  Abd:  soft NT ND  Ext:  no edema  Surgical incision c/d/i    I&O's Summary    21 Sep 2020 07:01  -  22 Sep 2020 07:00  --------------------------------------------------------  IN: 820 mL / OUT: 1450 mL / NET: -630 mL        Assessment  64y Female  w/ PAST MEDICAL & SURGICAL HISTORY:  Hyponatremia    Brain aneurysm    Anxiety    Depression    History of neck surgery    Patient is a 64y old  Female who presents with a chief complaint of confusion/N/V (21 Sep 2020 14:16)    H/O Depression/anxiety, gastric metaplasia, brain aneurysm, hyponatremia, admitted with hyponatremia and AMS, found to have a left suprahilar/mediastinal mass suspicious for neoplasm (lung).  9/21/20 attempted mediastinoscopy - aborted due to poor cervical neck flexion due to previous neck fusion.  Dr. Doyle spoke in length with patient and family regarding their wishes.      They choose to stay at [facility] and undergo a Left VATS, mediastinal lymphadectomy for tissue diagnosis.  Will preop patient for Thursday or Friday.    PLAN    Pain management.  Medical management of hyponatremia and neurological symptoms.  Heme/Onc to comment.  Preop for later this week, left VATS, MLND.    Discussed with Cardiothoracic Team at AM rounds.

## 2020-09-22 NOTE — PROGRESS NOTE ADULT - ASSESSMENT
64F with PMHx of depression/anxieity, brain aneurysm, hyponatremia (admission in June for Na 118/AMS) who presents to ED with 3-4day history of nausea/intermittent vomiting, wretching,  and progressive AMS. Evaluation in ED revealed Na 111. CTH without acute pathology.  PT with hx of saidh due to lexapro on june 2020.  Now with persistent low na value and renal consult requested.  MRI with contrast of brain was negative.      PLAN   - dc IVF   - continue with fluid restrict  - resent urine and serum studies and evaluate for siadh vs adh excess from nauseau  - nausea control  - etiology of nausea undefined with neg GI work-up  - MRA of brain with hx of cerebral aneurysm.  hx of 5mm?? 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 Course C/w SIADH.  Persistent with very minimal response to 3% NS.    SSRI d/brianna.    Noted with NSAIDS q 6 hours--D/c for its effect on water excretion.    3% NS today again.    9/17  Na level 127 last evening after 8 hr total 3% Sodium Chloride  Down to 124 today  will cont w 3% today   Lasix po 20 mg bid x 2 days    9/18  CT chest lung mass  will cont with Hypertonic saline  since sodium drops so quickly  tolvaptan not suitable since pt may not have the mentation to drink water freely    9/19 SY  --Hyponatremia: c/w SIADH, now with likely dx of Lung CA   For lung bx.     Post several 3% NS infusions.     Remains hyponatremic.     Attempt to increase sodium level with po NACL tabs and Demeclocycline.     Will hold further lasix.  --Will hold off on restarting Seroquel until serum sodium is stabilized.    9/20 SY  --Hyponatremia c/w SIADH.    Continue NACL tabs and increase Demeclocycline dose.    Continue fluid restriction.  --Lung mass : awaiting for bx.  --Continue to hold SSRI until serum sodium stabilized.    9/21 MK   - hyponatremia/ siadh     continue with nacl tabs and demeclocycline     maintain fluid restriction for now   - lung mass for bx today   - anxiety disorder: continue to hold SSRI until more stable na value   dw rn     9/22 SY  --Hyponatremia /SIADH due to lung pathology.     Continue NACL tabs and Demeclocycline.  Continue fluid restriction.  --Lung mass : for VATS for lung lesion bx.  --Continue to hold SSRI  until sodium level maintained stable.

## 2020-09-22 NOTE — CONSULT NOTE ADULT - CONSULT REQUESTED DATE/TIME
13-Sep-2020 13:23
14-Sep-2020 15:21
17-Sep-2020 16:53
18-Sep-2020 15:50
21-Sep-2020 08:32
22-Sep-2020 14:43
06-Sep-2020

## 2020-09-22 NOTE — CHART NOTE - NSCHARTNOTEFT_GEN_A_CORE
Assessment:   *pt found to have abnormal CT chest, suspected SCLC, possible biopsy next week.  toxic metabolic encephalopathy (improved).  anxiety/agitation (improved).  hyponatremia, SIADH (improved).    *labs reviewed; hyponatremia continues, however, improved.    *no edema.  BM (+) 9/21.    *pt with improved PO intake as mental status has improved.  pt now able to carry out conversation.  Pt and RN report pt consuming 100% of breakfast.  if current PO intake continues, pt will meet 100% of estimated nutr needs.      Recommendations:  1) c/w current diet Rx and encourage gelatein  2) weekly wt checks to track/trend changes  3) add MVI with minerals daily to ensure 100% RDI met      Diet Prescription: Diet, Regular:   800mL Fluid Restriction ([id number]) (09-21-20 @ 17:09)      Wt Hx:  78.5Kg (9/22)  73.7Kg (9/17)  Height (cm): 170.2 (09-18-20 @ 09:00)  Weight (kg): 79 (09-18-20 @ 09:00)  BMI (kg/m2): 27.3 (09-18-20 @ 09:00)      09-21-20 @ 07:01  -  09-22-20 @ 07:00  --------------------------------------------------------  IN: 820 mL / OUT: 1450 mL / NET: -630 mL        Estimated Needs:   · Weight Used for Calculation	adjusted  64Kg     Estimated Energy Needs (25-30 calories/kg):  · Weight  (lbs)	141 lb  · Weight (kg)	64 kg  · From (25cal/kg)	1600· To (30cal/kg)	1920     Other Calculation:  · Other Calculation	Ht. 65 "     Wt. 83.6 Kg       31 BMI       IBW 57  Kg      Pt is at  147  %  IBW     Estimated Protein Needs (1.4-1.6 g/kg):  · Weight  (lbs)	141  · Weight (kg)	64 kg  · From (1.4 g/kg)	89.6 · To (1.6 g/kg)	102.4     Estimated Fluid Needs (25-30 ml/kg):  · Weight  (lbs)	141  · Weight (kg)	64  · From (25 ml/kg)	1600 · To (30 ml/kg) 1920        Pertinent Labs: 09-22 Na131 mmol/L<L> Glu 120 mg/dL<H> K+ 4.0 mmol/L Cr  0.60 mg/dL BUN 17 mg/dL 09-16 Alb 3.2 g/dL<L>      Skin: nunu score = 19  no PU documented      Monitoring and Evaluation:   [x] PO intake/Nutr support infusion [ x ] Tolerance to Nutr [ x ] weights [ x ] labs[ x ] follow up per protocol  [ ] other:

## 2020-09-22 NOTE — CONSULT NOTE ADULT - PROBLEM SELECTOR RECOMMENDATION 3
while these are present on the imaging, they're too small to biopsy. Further characterization as outpatient perhaps by way of CT PET; Need to be certain of diagnosis since the stage will alter management and prognosis

## 2020-09-22 NOTE — PROGRESS NOTE ADULT - SUBJECTIVE AND OBJECTIVE BOX
63 y/o female with depression/anxieity, Gastric metaplasia resulting in persistent nausea,  brain aneurysm, hyponatremia (admission in June for Na 118/AMS) who presents to ED with 2 weeks of Nausea unable to eat but continued to drink 8 8oz water daily and AMS found to have Na 111.  Pt was admitted to ICU and placed on .   patient had slurred speech and was intermittently treated with hypertonic saline, was on seroquel prior to admission.    9/22: Pt seen and examined. chart reviewed. pt resting comfortably. Ambulating in room. Biopsy unsuccessful yesterday as pt could not flex neck secondary to prior cervical fusion. Plan for VATS on thursday or friday for tissue sample. No complaints today. mental status improved per patient and staff.     Vital Signs Last 24 Hrs  T(C): 36.8 (22 Sep 2020 09:06), Max: 37 (21 Sep 2020 14:32)  T(F): 98.3 (22 Sep 2020 09:06), Max: 98.6 (21 Sep 2020 14:32)  HR: 103 (22 Sep 2020 12:00) (79 - 103)  BP: 132/99 (22 Sep 2020 12:00) (120/56 - 173/84)  BP(mean): 107 (22 Sep 2020 12:00) (71 - 112)  RR: 29 (22 Sep 2020 12:00) (16 - 37)  SpO2: 98% (22 Sep 2020 12:00) (85% - 100%)    PHYSICAL EXAM:  Constitutional: NAD, awake and alert, well-developed  HEENT: PERR, EOMI, Normal Hearing, MMM  Neck: Soft and supple  Respiratory: Breath sounds are clear bilaterally, No wheezing, rales or rhonchi  Cardiovascular: S1 and S2, regular rate and rhythm, no Murmurs, gallops or rubs  Gastrointestinal: Bowel Sounds present, soft, nontender, nondistended, no guarding, no rebound  Extremities: No peripheral edema  Neurological: nonfocal, encephalopathy resolved      Labs                              12.7   12.67 )-----------( 160      ( 21 Sep 2020 08:23 )             36.3     22 Sep 2020 06:39    131    |  97     |  17     ----------------------------<  120    4.0     |  30     |  0.60     Ca    8.8        22 Sep 2020 06:39      PT/INR - ( 21 Sep 2020 08:23 )   PT: 12.4 sec;   INR: 1.07 ratio         PTT - ( 21 Sep 2020 08:23 )  PTT:27.6 sec  CAPILLARY BLOOD GLUCOSE      MEDICATIONS  (STANDING):  chlorhexidine 2% Cloths 1 Application(s) Topical <User Schedule>  demeclocycline 300 milliGRAM(s) Oral two times a day  enoxaparin Injectable 40 milliGRAM(s) SubCutaneous at bedtime  influenza   Vaccine 0.5 milliLiter(s) IntraMuscular once  levETIRAcetam 500 milliGRAM(s) Oral two times a day  senna 2 Tablet(s) Oral at bedtime  sodium chloride 1 Gram(s) Oral every 8 hours    MEDICATIONS  (PRN):  acetaminophen   Tablet .. 650 milliGRAM(s) Oral every 6 hours PRN Temp greater or equal to 38.5C (101.3F), Mild Pain (1 - 3)  ALPRAZolam 0.25 milliGRAM(s) Oral every 6 hours PRN anxiety  LORazepam   Injectable 0.25 milliGRAM(s) IV Push every 4 hours PRN Anxiety or agitation  melatonin 5 milliGRAM(s) Oral at bedtime PRN Insomnia  ondansetron Injectable 4 milliGRAM(s) IV Push every 6 hours PRN Nausea and/or Vomiting

## 2020-09-22 NOTE — PROGRESS NOTE ADULT - ASSESSMENT
Pt is a 64y old Female with hx depression and anxiety, Gastric metaplasia resulting in persistent nausea,  brain aneurysm, hyponatremia (admission in June for Na 118/AMS) who presents to ED on 9/8 with 2 weeks of Nausea unable to eat but continued to drink 8 8oz water daily and AMS found to have Na 111.      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

## 2020-09-23 LAB
ANION GAP SERPL CALC-SCNC: 6 MMOL/L — SIGNIFICANT CHANGE UP (ref 5–17)
APTT BLD: 26.5 SEC — LOW (ref 27.5–35.5)
BUN SERPL-MCNC: 20 MG/DL — SIGNIFICANT CHANGE UP (ref 7–23)
CALCIUM SERPL-MCNC: 9.1 MG/DL — SIGNIFICANT CHANGE UP (ref 8.5–10.1)
CHLORIDE SERPL-SCNC: 98 MMOL/L — SIGNIFICANT CHANGE UP (ref 96–108)
CO2 SERPL-SCNC: 27 MMOL/L — SIGNIFICANT CHANGE UP (ref 22–31)
CREAT SERPL-MCNC: 0.53 MG/DL — SIGNIFICANT CHANGE UP (ref 0.5–1.3)
GLUCOSE SERPL-MCNC: 115 MG/DL — HIGH (ref 70–99)
HCT VFR BLD CALC: 35.3 % — SIGNIFICANT CHANGE UP (ref 34.5–45)
HGB BLD-MCNC: 12.2 G/DL — SIGNIFICANT CHANGE UP (ref 11.5–15.5)
INR BLD: 1.09 RATIO — SIGNIFICANT CHANGE UP (ref 0.88–1.16)
MCHC RBC-ENTMCNC: 30.7 PG — SIGNIFICANT CHANGE UP (ref 27–34)
MCHC RBC-ENTMCNC: 34.6 GM/DL — SIGNIFICANT CHANGE UP (ref 32–36)
MCV RBC AUTO: 88.9 FL — SIGNIFICANT CHANGE UP (ref 80–100)
PLATELET # BLD AUTO: 152 K/UL — SIGNIFICANT CHANGE UP (ref 150–400)
POTASSIUM SERPL-MCNC: 3.3 MMOL/L — LOW (ref 3.5–5.3)
POTASSIUM SERPL-SCNC: 3.3 MMOL/L — LOW (ref 3.5–5.3)
PROTHROM AB SERPL-ACNC: 12.6 SEC — SIGNIFICANT CHANGE UP (ref 10.6–13.6)
RBC # BLD: 3.97 M/UL — SIGNIFICANT CHANGE UP (ref 3.8–5.2)
RBC # FLD: 12.5 % — SIGNIFICANT CHANGE UP (ref 10.3–14.5)
SODIUM SERPL-SCNC: 131 MMOL/L — LOW (ref 135–145)
WBC # BLD: 11.16 K/UL — HIGH (ref 3.8–10.5)
WBC # FLD AUTO: 11.16 K/UL — HIGH (ref 3.8–10.5)

## 2020-09-23 PROCEDURE — 99233 SBSQ HOSP IP/OBS HIGH 50: CPT

## 2020-09-23 PROCEDURE — 99232 SBSQ HOSP IP/OBS MODERATE 35: CPT

## 2020-09-23 RX ORDER — POTASSIUM CHLORIDE 20 MEQ
40 PACKET (EA) ORAL ONCE
Refills: 0 | Status: COMPLETED | OUTPATIENT
Start: 2020-09-23 | End: 2020-09-23

## 2020-09-23 RX ORDER — DEMECLOCYCLINE HCL 150 MG
600 TABLET ORAL
Refills: 0 | Status: DISCONTINUED | OUTPATIENT
Start: 2020-09-23 | End: 2020-09-24

## 2020-09-23 RX ORDER — CLONAZEPAM 1 MG
0.5 TABLET ORAL
Refills: 0 | Status: DISCONTINUED | OUTPATIENT
Start: 2020-09-23 | End: 2020-09-24

## 2020-09-23 RX ORDER — CLONAZEPAM 1 MG
0.5 TABLET ORAL ONCE
Refills: 0 | Status: DISCONTINUED | OUTPATIENT
Start: 2020-09-23 | End: 2020-09-23

## 2020-09-23 RX ADMIN — Medication 5 MILLIGRAM(S): at 22:47

## 2020-09-23 RX ADMIN — SODIUM CHLORIDE 1 GRAM(S): 9 INJECTION INTRAMUSCULAR; INTRAVENOUS; SUBCUTANEOUS at 22:47

## 2020-09-23 RX ADMIN — CHLORHEXIDINE GLUCONATE 1 APPLICATION(S): 213 SOLUTION TOPICAL at 04:22

## 2020-09-23 RX ADMIN — Medication 0.25 MILLIGRAM(S): at 07:01

## 2020-09-23 RX ADMIN — SODIUM CHLORIDE 1 GRAM(S): 9 INJECTION INTRAMUSCULAR; INTRAVENOUS; SUBCUTANEOUS at 13:43

## 2020-09-23 RX ADMIN — Medication 650 MILLIGRAM(S): at 06:22

## 2020-09-23 RX ADMIN — LEVETIRACETAM 500 MILLIGRAM(S): 250 TABLET, FILM COATED ORAL at 10:16

## 2020-09-23 RX ADMIN — Medication 0.5 MILLIGRAM(S): at 21:52

## 2020-09-23 RX ADMIN — SODIUM CHLORIDE 1 GRAM(S): 9 INJECTION INTRAMUSCULAR; INTRAVENOUS; SUBCUTANEOUS at 06:23

## 2020-09-23 RX ADMIN — Medication 300 MILLIGRAM(S): at 10:14

## 2020-09-23 RX ADMIN — LEVETIRACETAM 500 MILLIGRAM(S): 250 TABLET, FILM COATED ORAL at 22:46

## 2020-09-23 RX ADMIN — Medication 40 MILLIEQUIVALENT(S): at 10:17

## 2020-09-23 RX ADMIN — Medication 600 MILLIGRAM(S): at 18:31

## 2020-09-23 RX ADMIN — Medication 0.5 MILLIGRAM(S): at 13:43

## 2020-09-23 RX ADMIN — ONDANSETRON 4 MILLIGRAM(S): 8 TABLET, FILM COATED ORAL at 13:55

## 2020-09-23 NOTE — PROGRESS NOTE ADULT - PROBLEM SELECTOR PLAN 1
possible lung ca  may be small cell given SIADH  for VATS for diagnosis  RX depending on path and stage

## 2020-09-23 NOTE — PROGRESS NOTE ADULT - ASSESSMENT
64F with PMHx of depression/anxieity, brain aneurysm, hyponatremia (admission in June for Na 118/AMS) who presents to ED with 3-4day history of nausea/intermittent vomiting, wretching,  and progressive AMS. Evaluation in ED revealed Na 111. CTH without acute pathology.  PT with hx of saidh due to lexapro on june 2020.  Now with persistent low na value and renal consult requested.  MRI with contrast of brain was negative.      PLAN   - dc IVF   - continue with fluid restrict  - resent urine and serum studies and evaluate for siadh vs adh excess from nauseau  - nausea control  - etiology of nausea undefined with neg GI work-up  - MRA of brain with hx of cerebral aneurysm.  hx of 5mm?? 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 Course C/w SIADH.  Persistent with very minimal response to 3% NS.    SSRI d/brianna.    Noted with NSAIDS q 6 hours--D/c for its effect on water excretion.    3% NS today again.    9/17  Na level 127 last evening after 8 hr total 3% Sodium Chloride  Down to 124 today  will cont w 3% today   Lasix po 20 mg bid x 2 days    9/18  CT chest lung mass  will cont with Hypertonic saline  since sodium drops so quickly  tolvaptan not suitable since pt may not have the mentation to drink water freely    9/19 SY  --Hyponatremia: c/w SIADH, now with likely dx of Lung CA   For lung bx.     Post several 3% NS infusions.     Remains hyponatremic.     Attempt to increase sodium level with po NACL tabs and Demeclocycline.     Will hold further lasix.  --Will hold off on restarting Seroquel until serum sodium is stabilized.    9/20 SY  --Hyponatremia c/w SIADH.    Continue NACL tabs and increase Demeclocycline dose.    Continue fluid restriction.  --Lung mass : awaiting for bx.  --Continue to hold SSRI until serum sodium stabilized.    9/21 MK   - hyponatremia/ siadh     continue with nacl tabs and demeclocycline     maintain fluid restriction for now   - lung mass for bx today   - anxiety disorder: continue to hold SSRI until more stable na value   dw rn     9/22 SY  --Hyponatremia /SIADH due to lung pathology.     Continue NACL tabs and Demeclocycline.  Continue fluid restriction.  --Lung mass : for VATS for lung lesion bx.  --Continue to hold SSRI  until sodium level maintained stable.    9/23 SY  --Hyponatremia /SIADH : due to lung mass.  Stable serum sodium level now.     Continue NACL tabs.  Increase Demeclocycline dose.  --Lung mass : for VATS tomorrow.  --Continue fluid restriction.

## 2020-09-23 NOTE — PROGRESS NOTE ADULT - SUBJECTIVE AND OBJECTIVE BOX
65 y/o female with depression/anxieity, Gastric metaplasia resulting in persistent nausea,  brain aneurysm, hyponatremia (admission in June for Na 118/AMS) who presents to ED with 2 weeks of Nausea unable to eat but continued to drink 8 8oz water daily and AMS found to have Na 111.  Pt was admitted to ICU and placed on .   patient had slurred speech and was intermittently treated with hypertonic saline, was on seroquel prior to admission.    No change in her status; for VATS in am    Vital Signs Last 24 Hrs  T(C): 37.5 (23 Sep 2020 08:49), Max: 37.5 (23 Sep 2020 08:49)  T(F): 99.5 (23 Sep 2020 08:49), Max: 99.5 (23 Sep 2020 08:49)  HR: 88 (23 Sep 2020 09:09) (85 - 103)  BP: 142/85 (23 Sep 2020 09:09) (132/99 - 170/86)  BP(mean): 98 (23 Sep 2020 09:09) (95 - 143)  RR: 18 (23 Sep 2020 09:09) (18 - 32)  SpO2: 97% (23 Sep 2020 06:00) (95% - 100%)    PHYSICAL EXAM:  Constitutional: NAD, awake and alert, well-developed  HEENT: PERR, EOMI, Normal Hearing, MMM  Neck: Soft and supple  Respiratory: Breath sounds are clear bilaterally, No wheezing, rales or rhonchi  Cardiovascular: S1 and S2, regular rate and rhythm, no Murmurs, gallops or rubs  Gastrointestinal: Bowel Sounds present, soft, nontender, nondistended, no guarding, no rebound  Extremities: No peripheral edema  Neurological: nonfocal, encephalopathy resolved      Labs    09-23    131<L>  |  98  |  20  ----------------------------<  115<H>  3.3<L>   |  27  |  0.53    Ca    9.1      23 Sep 2020 06:44

## 2020-09-23 NOTE — PROGRESS NOTE ADULT - SUBJECTIVE AND OBJECTIVE BOX
64F  With a history of symptomatic anemia since June of this year at which time serum sodium was 118 associated with altered mental status. Etiology of the hyponatremia.   she was readmitted for 4 day history of nausea/emesis/altered mental status. Serum sodium is 111. Imaging studies of the CNS were unremarkable. admitted to the  ICU where she was placed on 3% normal saline. Current diagnosis is SIADH or. Imaging studies were performed as part of her workup and revealed a left upper lobe mediastinal mass, small liver lesions suggestive of metastases. She underwent attempted mediastinoscopy however because of technical issues the procedure was aborted. Now due for VATS tomorrow.    Clinically stable. Mental status is much improved. Sodiums have improved    Patient is a former smoker  No history of emphysema/COPD/altered dependence    Appetite is been stable. No weight loss  Denies any headaches  No visual disturbances  No neck or back pain  No abdominal discomfort  No change in bowel habits  Full review of systems as outlined below    PMHx of depression/anxieity, brain aneurysm, hyponatremia (admission in June for Na 118/AMS) who presents to ED with 3-4day history of nausea/intermittent vomiting, wretching,  and progressive AMS. Evaluation in ED revealed Na 111. CTH without acute pathology. eICU consulted and pt admitted to ICU. Upon arrival pt receiving 3% NaCL bolus, discontinued upon my arrival. Prior admission and workup revealed hyponatremia possibly SIADH vs SSRI vs excessive free water intake. She was discharged following correction on NaCl tabs as outpt. She is no longer taking these. She states they were stopped by nephro on her follow up visit as outpt. She was taken off her SSRI at last admission and placed on seroquel only. Her dosing has been escalated since last admission to 150mg at HS and 50mg 2x during the day. She reports no longer using her klonipin. (06 Sep 2020 20:29)      PAST MEDICAL & SURGICAL HISTORY:  Hyponatremia    Brain aneurysm    Anxiety    Depression    History of neck surgery      MEDICATIONS  (STANDING):  chlorhexidine 2% Cloths 1 Application(s) Topical <User Schedule>  clonazePAM  Tablet 0.5 milliGRAM(s) Oral two times a day  demeclocycline 600 milliGRAM(s) Oral two times a day  enoxaparin Injectable 40 milliGRAM(s) SubCutaneous at bedtime  influenza   Vaccine 0.5 milliLiter(s) IntraMuscular once  levETIRAcetam 500 milliGRAM(s) Oral two times a day  senna 2 Tablet(s) Oral at bedtime  sodium chloride 1 Gram(s) Oral every 8 hours    MEDICATIONS  (PRN):  acetaminophen   Tablet .. 650 milliGRAM(s) Oral every 6 hours PRN Temp greater or equal to 38.5C (101.3F), Mild Pain (1 - 3)  LORazepam   Injectable 0.25 milliGRAM(s) IV Push every 4 hours PRN Anxiety or agitation  melatonin 5 milliGRAM(s) Oral at bedtime PRN Insomnia  ondansetron Injectable 4 milliGRAM(s) IV Push every 6 hours PRN Nausea and/or Vomiting    Vital Signs Last 24 Hrs  T(C): 36.9 (23 Sep 2020 21:00), Max: 37.5 (23 Sep 2020 08:49)  T(F): 98.5 (23 Sep 2020 21:00), Max: 99.5 (23 Sep 2020 08:49)  HR: 88 (23 Sep 2020 22:56) (85 - 105)  BP: 145/82 (23 Sep 2020 22:56) (96/38 - 170/85)  BP(mean): 97 (23 Sep 2020 22:56) (54 - 141)  RR: 19 (23 Sep 2020 22:56) (12 - 28)  SpO2: 100% (23 Sep 2020 22:56) (95% - 100%)      PHYSICAL EXAM:      Constitutional: Appears comfortable, in  NAD, A/O X 3     Eyes: EOMI, PERRLA ;No icterus    ENMT:  No oral lesions, thrush; pharynx not injected    Neck:  Supple; No masses, lymph nodes, no bruits    Breasts: NA    Back: No tenderness     Respiratory:  Clear to A/P, No wheezes, rhonchi, rales. No dullness to percussion    Cardiovascular: Normal rate and rhythm; normal S1 and S2; No murmurs. No gallop    Gastrointestinal: No distension, normal bowl sounds; No tendeness , guarding, rebound;  no masses, no hepatosplenimegaly, no fluid wave    Genitourinary: No flank pains, no inguninal adenopathy    Rectal: NA    Extremities:  No phlebitis, no edema    Vascular: No acrocyanosis, no edema    Neurological: Alert and oriented. Cranial nerves II-XII WNL, Upper extremity / lower extremity  strength WNL;  Reflexes WNL, Plantar downgoing ; No cerebellar signs    Skin: No rash, petichae, purpura, echymoses    Lymph Nodes: No cervical, supraclavicular, axillary, inguinal adenopathy    Musculoskeletal: No joint swelling    Psychiatric: Alert, oriented, normal affect                                    12.2   11.16 )-----------( 152      ( 23 Sep 2020 11:47 )             35.3     09-23    131<L>  |  98  |  20  ----------------------------<  115<H>  3.3<L>   |  27  |  0.53    Ca    9.1      23 Sep 2020 06:44        PT/INR - ( 23 Sep 2020 11:47 )   PT: 12.6 sec;   INR: 1.09 ratio         PTT - ( 23 Sep 2020 11:47 )  PTT:26.5 sec        RADIOLOGY & ADDITIONAL STUDIES:      < from: CT Chest w/ IV Cont (09.17.20 @ 13:43) >  INDINGS:  CHEST:  LUNGS AND LARGE AIRWAYS/MEDIASTINUM: 5.3 x 2.7 cm left mass in the left suprahilar region, extending into the mediastinum. Additional mediastinal lymph nodes noted, for example a 1.4 x 1.3 cm right lower paratracheal lymph node (2; 27).  PLEURA: No pleural effusion.  VESSELS: Atherosclerotic changes of the aorta and coronary arteries.  HEART: Heart size is normal. No pericardial effusion.  CHEST WALL AND LOWER NECK: Within normal limits.    ABDOMEN AND PELVIS:  LIVER: Numerous subcentimeter low-density lesions throughout the liver.  BILE DUCTS: Normal caliber.  GALLBLADDER: No visible gallstones. Fundal adenomyomatosis.  SPLEEN: Within normal limits.  PANCREAS: Within normal limits.  ADRENALS: Within normal limits.  KIDNEYS/URETERS: Within normal limits.    BLADDER: Within normal limits.  REPRODUCTIVE ORGANS: Uterus and adnexa within normal limits.    BOWEL: No bowel obstruction. Appendix is normal. Mild colonic diverticulosis.  PERITONEUM: No ascites.  VESSELS: Atherosclerotic changes.  RETROPERITONEUM/LYMPH NODES: No lymphadenopathy.  ABDOMINAL WALL: Within normal limits.  BONES: No lytic or blastic bony lesion. Status post lower cervical spine fusion.    IMPRESSION:  Left suprahilar/mediastinal mass compatible with neoplasm.    Numerous tiny low-density liver lesions raising suspicion for metastatic disease. MRI recommended.        < end of copied text >  ra

## 2020-09-23 NOTE — PROGRESS NOTE ADULT - SUBJECTIVE AND OBJECTIVE BOX
HPI: HPI: Pt is a 64y old Female with hx depression and anxiety, Gastric metaplasia resulting in persistent nausea,  brain aneurysm, hyponatremia (admission in  for Na 118/AMS) who presents to ED on  with 2 weeks of Nausea unable to eat but continued to drink 8 8oz water daily and AMS found to have Na 111.      2020 patient seen and examined with no family at bedside. Patient having difficulty with word finding, shaking her head yes no to questions but not verbalizing answer to questions.  Patient denies any pain at this time.  has increased anxiety at times.   2020 patient seen and examined with no family at bedside. Patient alert and oriented at this time, denies any pain but states feels anxious at this time. Patient aware that has a spot that needs to be biopsied and will be going to surgery.    2020 patient seen and examined with no family at bedside. Patient states that she is a little sore around incision site on neck but tolerable at this time.  Patient is stating that she feels very anxious at this time.    2020 patient seen and examined with , patient denies any complaints at this time, Patient to have VATS tomorrow for biopsy.        PAIN: (x )Yes   ( )No  Level: mild  Location: neck   Intensity:   3 /10  Quality: sore  Aggravating Factors: touching   Alleviating Factors: not at this time   Radiation: denies   Duration/Timing: intermittent   Impact on ADLs: denies     DYSPNEA: ( ) Yes  (x ) No    Review of Systems:    Anxiety- severe, agreed to take xanax   Depression- feels sad   Physical Discomfort- denies   Dyspnea- denies   Constipation- denies   Diarrhea- denies   Anorexia- decreased appetite   Weight Loss- yes unsure how much   Cough- at times   Secretions- denies   Fatigue- moderate   Weakness- moderate   Delirium- resolved at this time     All other systems reviewed and negative    PHYSICAL EXAM:    Vital Signs Last 24 Hrs  T(C): 36.9 (23 Sep 2020 14:25), Max: 37.5 (23 Sep 2020 08:49)  T(F): 98.5 (23 Sep 2020 14:25), Max: 99.5 (23 Sep 2020 08:49)  HR: 105 (23 Sep 2020 12:05) (85 - 105)  BP: 118/78 (23 Sep 2020 12:05) (118/78 - 170/86)  BP(mean): 87 (23 Sep 2020 12:05) (87 - 143)  RR: 28 (23 Sep 2020 12:05) (18 - 32)  SpO2: 98% (23 Sep 2020 12:05) (95% - 98%)  Daily Weight in k (23 Sep 2020 05:00)    PPSV2: 50  %    General: pleasant anxious appearing female in bed, NAD   Mental Status: alert and oriented anxious at this time   HEENT: dry oral mucosa, + temporal wasting   Lungs: Breath sounds are clear bilaterally, No wheezing, rales or rhonchi  Cardiac: S1S2 +   GI: Bowel Sounds present, soft, nontender, nondistended, no guarding, no rebound  : no suprapubic tenderness   Ext: no edema present   Neuro: weakness       LABS:                        12.2   11.16 )-----------( 152      ( 23 Sep 2020 11:47 )             35.3     09-23    131<L>  |  98  |  20  ----------------------------<  115<H>  3.3<L>   |  27  |  0.53    Ca    9.1      23 Sep 2020 06:44      PT/INR - ( 23 Sep 2020 11:47 )   PT: 12.6 sec;   INR: 1.09 ratio         PTT - ( 23 Sep 2020 11:47 )  PTT:26.5 sec  Albumin:     Allergies    No Known Allergies    Intolerances      MEDICATIONS  (STANDING):  chlorhexidine 2% Cloths 1 Application(s) Topical <User Schedule>  clonazePAM  Tablet 0.5 milliGRAM(s) Oral two times a day  demeclocycline 600 milliGRAM(s) Oral two times a day  enoxaparin Injectable 40 milliGRAM(s) SubCutaneous at bedtime  influenza   Vaccine 0.5 milliLiter(s) IntraMuscular once  levETIRAcetam 500 milliGRAM(s) Oral two times a day  senna 2 Tablet(s) Oral at bedtime  sodium chloride 1 Gram(s) Oral every 8 hours    MEDICATIONS  (PRN):  acetaminophen   Tablet .. 650 milliGRAM(s) Oral every 6 hours PRN Temp greater or equal to 38.5C (101.3F), Mild Pain (1 - 3)  LORazepam   Injectable 0.25 milliGRAM(s) IV Push every 4 hours PRN Anxiety or agitation  melatonin 5 milliGRAM(s) Oral at bedtime PRN Insomnia  ondansetron Injectable 4 milliGRAM(s) IV Push every 6 hours PRN Nausea and/or Vomiting      RADIOLOGY:

## 2020-09-23 NOTE — PROGRESS NOTE ADULT - ASSESSMENT
Pt is a 64y old Female with hx depression and anxiety, Gastric metaplasia resulting in persistent nausea,  brain aneurysm, hyponatremia (admission in June for Na 118/AMS) who presents to ED on 9/8 with 2 weeks of Nausea unable to eat but continued to drink 8 8oz water daily and AMS found to have Na 111.      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   - since the mass was found on the CT chest Neurology decided not to go for the CSF since we now have a possible  possible explanation for her cognitive decline  - continue to monitor sodium     3) Anxiety/agitation   - ativan 0.25mg Q4hrs as needed for anxiety if not able to tolerate PO   - xanax 0.25mg q6hrs PRN     4) Advance care planning   -  [facility] HCP   - please call daughter Stacey First with medical updates as per [facility] patient HCP   - FULL CODE   - will scheduled follow up GOC after biopsy done - patient to go to OR on Thursday for biopsy            Pt is a 64y old Female with hx depression and anxiety, Gastric metaplasia resulting in persistent nausea,  brain aneurysm, hyponatremia (admission in June for Na 118/AMS) who presents to ED on 9/8 with 2 weeks of Nausea unable to eat but continued to drink 8 8oz water daily and AMS found to have Na 111.      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

## 2020-09-23 NOTE — PROGRESS NOTE ADULT - SUBJECTIVE AND OBJECTIVE BOX
Subjective: No new complaints.  Patient is agreeable for left VATS biopsy tomorrow.    Vital Signs:  Vital Signs Last 24 Hrs  T(C): 37.5 (09-23-20 @ 08:49), Max: 37.5 (09-23-20 @ 08:49)  T(F): 99.5 (09-23-20 @ 08:49), Max: 99.5 (09-23-20 @ 08:49)  HR: 86 (09-23-20 @ 06:00) (85 - 103)  BP: 156/79 (09-23-20 @ 06:00) (132/99 - 170/86)  RR: 24 (09-23-20 @ 06:00) (21 - 32)  SpO2: 97% (09-23-20 @ 06:00) (95% - 100%) on RA    Telemetry/Alarms:  sinus rhythm    Relevant labs, radiology and Medications reviewed    09-23    131<L>  |  98  |  20  ----------------------------<  115<H>  3.3<L>   |  27  |  0.53    Ca    9.1      23 Sep 2020 06:44        MEDICATIONS  (STANDING):  chlorhexidine 2% Cloths 1 Application(s) Topical <User Schedule>  demeclocycline 300 milliGRAM(s) Oral two times a day  enoxaparin Injectable 40 milliGRAM(s) SubCutaneous at bedtime  influenza   Vaccine 0.5 milliLiter(s) IntraMuscular once  levETIRAcetam 500 milliGRAM(s) Oral two times a day  senna 2 Tablet(s) Oral at bedtime  sodium chloride 1 Gram(s) Oral every 8 hours    MEDICATIONS  (PRN):  acetaminophen   Tablet .. 650 milliGRAM(s) Oral every 6 hours PRN Temp greater or equal to 38.5C (101.3F), Mild Pain (1 - 3)  ALPRAZolam 0.25 milliGRAM(s) Oral every 6 hours PRN anxiety  LORazepam   Injectable 0.25 milliGRAM(s) IV Push every 4 hours PRN Anxiety or agitation  melatonin 5 milliGRAM(s) Oral at bedtime PRN Insomnia  ondansetron Injectable 4 milliGRAM(s) IV Push every 6 hours PRN Nausea and/or Vomiting      Physical Exam:  Gen:  NAD, breathing comfortably  Neuro: AO x 3, speech clear  Card:  S1 S2  Pulm:  CTA bilaterally, no accessory muscle use  Abd:  soft NT ND  Ext:  no edema  Neck surgical site is c/d/i with ecchymosis on superior aspect.    I&O's Summary    22 Sep 2020 07:01  -  23 Sep 2020 07:00  --------------------------------------------------------  IN: 240 mL / OUT: 1000 mL / NET: -760 mL        Assessment  64y Female  w/ PAST MEDICAL & SURGICAL HISTORY:  Hyponatremia    Brain aneurysm    Anxiety    Depression    History of neck surgery    Patient is a 64y old  Female who presents with a chief complaint of confusion/N/V (22 Sep 2020 14:43)    H/O Depression/anxiety, gastric metaplasia, brain aneurysm, hyponatremia, admitted with hyponatremia and AMS, found to have a left suprahilar/mediastinal mass suspicious for neoplasm (lung).  9/21/20 attempted mediastinoscopy - aborted due to poor cervical neck flexion due to previous neck fusion.  Dr. Doyle spoke in length with patient and family regarding their wishes.      They choose to stay at [facility] and undergo a Left VATS, mediastinal lymphadectomy for tissue diagnosis.    PLAN    NPO p MN.  Preop labs ordered.  Continue medical management of hyponatremia.    Discussed with Cardiothoracic Team at AM rounds.

## 2020-09-23 NOTE — PROGRESS NOTE ADULT - SUBJECTIVE AND OBJECTIVE BOX
NEPHROLOGY INTERVAL HPI/OVERNIGHT EVENTS:    Date of Service: 20 @ 12:17   SY  Sitting up in chair.  Comfortable.  No distress.  Pt understands re : VATS tomorrow.     SY  Feeling well.  No new complaints.  Attempted bx of lung lesion via mediastinoscopy but unsuccessful due to inability to flex her neck.      SY  Sitting up in chair.  No new complaints.  Alert and able to answer questions appropriately.  Aware of lung mass and asking about Bx.  Now taking all po meds.     SY  Alert and conversant today.  Difficult to assess full cognition.      eyes fixed on tv  transiently makes eye contact   but does not answer  CT reports noted lung mass      sitting OOB  Replying with one word answers   NAD, No complaints     SY  Sitting up in chair.  Appears comfortable.  Oriented to person only today.  Answers "I have a rash" to various questions.    HPI:  History obtained from pt and significant other at bedside  64F with PMHx of depression / anxiety, brain aneurysm, hyponatremia (admission in  for Na 118/AMS) who presents to ED with 3-4day history of nausea/intermittent vomiting, wretching,  and progressive AMS. Evaluation in ED revealed Na 111. CTH without acute pathology. eICU consulted and pt admitted to ICU. Upon arrival pt receiving 3% NaCL bolus, discontinued upon my arrival. Prior admission and workup revealed hyponatremia possibly SIADH vs SSRI vs excessive free water intake. She was discharged following correction on NaCl tabs as outpt. She is no longer taking these. She states they were stopped by nephro on her follow up visit as outpt. She was taken off her SSRI at last admission and placed on seroquel only. Her dosing has been escalated since last admission to 150mg at HS and 50mg 2x during the day. She reports no longer using her Klonopin (06 Sep 2020 20:29)    Patient is a 64y old  Female who presents with a chief complaint of confusion/N/V (14 Sep 2020 12:42)  --------------------------------------------------------------------------------  hx from  and friend, adamaris mendoza  pt was dc after ssri/lexapro related hyponatremia.  upon dc in 2020 post dc na was normal and was dc from salt tablets by dr romano  pt as per  was doing well and eating but maintained fluid restriciton   progressively with more nauseau and inc lethargy  admitted with hyponatremia and was tx with hypertonic and nacl tabs  on ns, na with variability and drop   taking in po but Nauseau most disturbing sx  has been on seroquel to manage her anxiety.  has been off since admission  workup for nauseau with dr carson that was neg   no upto date mammogram/pap  sees dr raymond for moles   had cerebral aneurysm 5mm? as per  and was advised annual fu 5 years ago  her slow speech with flat affect atypical for her   no diarrhea or vomiting  has been trying to eat while here   over course of 3 years, has had 70lb weight loss     MEDICATIONS  (STANDING):  chlorhexidine 2% Cloths 1 Application(s) Topical <User Schedule>  clonazePAM  Tablet 0.5 milliGRAM(s) Oral two times a day  demeclocycline 300 milliGRAM(s) Oral two times a day  enoxaparin Injectable 40 milliGRAM(s) SubCutaneous at bedtime  influenza   Vaccine 0.5 milliLiter(s) IntraMuscular once  levETIRAcetam 500 milliGRAM(s) Oral two times a day  senna 2 Tablet(s) Oral at bedtime  sodium chloride 1 Gram(s) Oral every 8 hours    MEDICATIONS  (PRN):  acetaminophen   Tablet .. 650 milliGRAM(s) Oral every 6 hours PRN Temp greater or equal to 38.5C (101.3F), Mild Pain (1 - 3)  LORazepam   Injectable 0.25 milliGRAM(s) IV Push every 4 hours PRN Anxiety or agitation  melatonin 5 milliGRAM(s) Oral at bedtime PRN Insomnia  ondansetron Injectable 4 milliGRAM(s) IV Push every 6 hours PRN Nausea and/or Vomiting    Vital Signs Last 24 Hrs  T(C): 37.5 (23 Sep 2020 08:49), Max: 37.5 (23 Sep 2020 08:49)  T(F): 99.5 (23 Sep 2020 08:49), Max: 99.5 (23 Sep 2020 08:49)  HR: 88 (23 Sep 2020 09:09) (85 - 101)  BP: 142/85 (23 Sep 2020 09:09) (142/85 - 170/86)  BP(mean): 98 (23 Sep 2020 09:09) (95 - 143)  RR: 18 (23 Sep 2020 09:09) (18 - 32)  SpO2: 97% (23 Sep 2020 06:00) (95% - 100%)  Daily     Daily Weight in k (23 Sep 2020 05:00)     @ 07:01  -   @ 07:00  --------------------------------------------------------  IN: 240 mL / OUT: 1000 mL / NET: -760 mL    PHYSICAL EXAM: Alert and comfortable.  GENERAL: No distress  CHEST/LUNG: Clear to aus  HEART: S1S2 RRR  ABDOMEN: soft  EXTREMITIES: no edema  SKIN:     LABS:                        12.2   11.16 )-----------( 152      ( 23 Sep 2020 11:47 )             35.3     09-23    131<L>  |  98  |  20  ----------------------------<  115<H>  3.3<L>   |  27  |  0.53    Ca    9.1      23 Sep 2020 06:44                  RADIOLOGY & ADDITIONAL TESTS:

## 2020-09-24 ENCOUNTER — APPOINTMENT (OUTPATIENT)
Dept: THORACIC SURGERY | Facility: HOSPITAL | Age: 65
End: 2020-09-24

## 2020-09-24 ENCOUNTER — RESULT REVIEW (OUTPATIENT)
Age: 65
End: 2020-09-24

## 2020-09-24 LAB
ANION GAP SERPL CALC-SCNC: 5 MMOL/L — SIGNIFICANT CHANGE UP (ref 5–17)
BUN SERPL-MCNC: 21 MG/DL — SIGNIFICANT CHANGE UP (ref 7–23)
CALCIUM SERPL-MCNC: 9.2 MG/DL — SIGNIFICANT CHANGE UP (ref 8.5–10.1)
CHLORIDE SERPL-SCNC: 103 MMOL/L — SIGNIFICANT CHANGE UP (ref 96–108)
CO2 SERPL-SCNC: 27 MMOL/L — SIGNIFICANT CHANGE UP (ref 22–31)
CREAT SERPL-MCNC: 0.52 MG/DL — SIGNIFICANT CHANGE UP (ref 0.5–1.3)
GLUCOSE SERPL-MCNC: 110 MG/DL — HIGH (ref 70–99)
POTASSIUM SERPL-MCNC: 3.7 MMOL/L — SIGNIFICANT CHANGE UP (ref 3.5–5.3)
POTASSIUM SERPL-SCNC: 3.7 MMOL/L — SIGNIFICANT CHANGE UP (ref 3.5–5.3)
SODIUM SERPL-SCNC: 135 MMOL/L — SIGNIFICANT CHANGE UP (ref 135–145)

## 2020-09-24 PROCEDURE — 88341 IMHCHEM/IMCYTCHM EA ADD ANTB: CPT | Mod: 26

## 2020-09-24 PROCEDURE — 99233 SBSQ HOSP IP/OBS HIGH 50: CPT

## 2020-09-24 PROCEDURE — 88342 IMHCHEM/IMCYTCHM 1ST ANTB: CPT | Mod: 26

## 2020-09-24 PROCEDURE — 88331 PATH CONSLTJ SURG 1 BLK 1SPC: CPT | Mod: 26

## 2020-09-24 PROCEDURE — 88307 TISSUE EXAM BY PATHOLOGIST: CPT | Mod: 26

## 2020-09-24 PROCEDURE — 71045 X-RAY EXAM CHEST 1 VIEW: CPT | Mod: 26

## 2020-09-24 PROCEDURE — 39402 MEDIASTINOSCPY W/LMPH NOD BX: CPT

## 2020-09-24 PROCEDURE — 99232 SBSQ HOSP IP/OBS MODERATE 35: CPT

## 2020-09-24 RX ORDER — FENTANYL CITRATE 50 UG/ML
50 INJECTION INTRAVENOUS
Refills: 0 | Status: DISCONTINUED | OUTPATIENT
Start: 2020-09-24 | End: 2020-09-24

## 2020-09-24 RX ORDER — CLONAZEPAM 1 MG
0.5 TABLET ORAL
Refills: 0 | Status: DISCONTINUED | OUTPATIENT
Start: 2020-09-24 | End: 2020-09-25

## 2020-09-24 RX ORDER — ONDANSETRON 8 MG/1
4 TABLET, FILM COATED ORAL EVERY 6 HOURS
Refills: 0 | Status: DISCONTINUED | OUTPATIENT
Start: 2020-09-24 | End: 2020-09-25

## 2020-09-24 RX ORDER — ENOXAPARIN SODIUM 100 MG/ML
40 INJECTION SUBCUTANEOUS AT BEDTIME
Refills: 0 | Status: DISCONTINUED | OUTPATIENT
Start: 2020-09-24 | End: 2020-09-25

## 2020-09-24 RX ORDER — ALBUTEROL 90 UG/1
2 AEROSOL, METERED ORAL EVERY 6 HOURS
Refills: 0 | Status: DISCONTINUED | OUTPATIENT
Start: 2020-09-24 | End: 2020-09-25

## 2020-09-24 RX ORDER — ACETAMINOPHEN 500 MG
650 TABLET ORAL EVERY 6 HOURS
Refills: 0 | Status: DISCONTINUED | OUTPATIENT
Start: 2020-09-24 | End: 2020-09-25

## 2020-09-24 RX ORDER — LANOLIN ALCOHOL/MO/W.PET/CERES
5 CREAM (GRAM) TOPICAL AT BEDTIME
Refills: 0 | Status: DISCONTINUED | OUTPATIENT
Start: 2020-09-24 | End: 2020-09-25

## 2020-09-24 RX ORDER — HYDROMORPHONE HYDROCHLORIDE 2 MG/ML
0.5 INJECTION INTRAMUSCULAR; INTRAVENOUS; SUBCUTANEOUS
Refills: 0 | Status: DISCONTINUED | OUTPATIENT
Start: 2020-09-24 | End: 2020-09-24

## 2020-09-24 RX ORDER — OXYCODONE HYDROCHLORIDE 5 MG/1
5 TABLET ORAL ONCE
Refills: 0 | Status: DISCONTINUED | OUTPATIENT
Start: 2020-09-24 | End: 2020-09-24

## 2020-09-24 RX ORDER — LIDOCAINE 4 G/100G
1 CREAM TOPICAL EVERY 24 HOURS
Refills: 0 | Status: DISCONTINUED | OUTPATIENT
Start: 2020-09-24 | End: 2020-09-25

## 2020-09-24 RX ORDER — OXYCODONE HYDROCHLORIDE 5 MG/1
10 TABLET ORAL EVERY 4 HOURS
Refills: 0 | Status: DISCONTINUED | OUTPATIENT
Start: 2020-09-24 | End: 2020-09-25

## 2020-09-24 RX ORDER — POLYETHYLENE GLYCOL 3350 17 G/17G
17 POWDER, FOR SOLUTION ORAL DAILY
Refills: 0 | Status: DISCONTINUED | OUTPATIENT
Start: 2020-09-24 | End: 2020-09-25

## 2020-09-24 RX ORDER — SODIUM CHLORIDE 9 MG/ML
1 INJECTION INTRAMUSCULAR; INTRAVENOUS; SUBCUTANEOUS EVERY 8 HOURS
Refills: 0 | Status: DISCONTINUED | OUTPATIENT
Start: 2020-09-24 | End: 2020-09-25

## 2020-09-24 RX ORDER — LEVETIRACETAM 250 MG/1
500 TABLET, FILM COATED ORAL
Refills: 0 | Status: DISCONTINUED | OUTPATIENT
Start: 2020-09-24 | End: 2020-09-25

## 2020-09-24 RX ORDER — SENNA PLUS 8.6 MG/1
2 TABLET ORAL AT BEDTIME
Refills: 0 | Status: DISCONTINUED | OUTPATIENT
Start: 2020-09-24 | End: 2020-09-25

## 2020-09-24 RX ORDER — MORPHINE SULFATE 50 MG/1
2 CAPSULE, EXTENDED RELEASE ORAL EVERY 4 HOURS
Refills: 0 | Status: DISCONTINUED | OUTPATIENT
Start: 2020-09-24 | End: 2020-09-25

## 2020-09-24 RX ORDER — SODIUM CHLORIDE 9 MG/ML
1000 INJECTION, SOLUTION INTRAVENOUS
Refills: 0 | Status: DISCONTINUED | OUTPATIENT
Start: 2020-09-24 | End: 2020-09-24

## 2020-09-24 RX ORDER — ACETAMINOPHEN 500 MG
650 TABLET ORAL EVERY 6 HOURS
Refills: 0 | Status: DISCONTINUED | OUTPATIENT
Start: 2020-09-24 | End: 2020-09-24

## 2020-09-24 RX ORDER — ONDANSETRON 8 MG/1
4 TABLET, FILM COATED ORAL ONCE
Refills: 0 | Status: DISCONTINUED | OUTPATIENT
Start: 2020-09-24 | End: 2020-09-24

## 2020-09-24 RX ORDER — DEMECLOCYCLINE HCL 150 MG
600 TABLET ORAL
Refills: 0 | Status: DISCONTINUED | OUTPATIENT
Start: 2020-09-24 | End: 2020-09-25

## 2020-09-24 RX ADMIN — LIDOCAINE 1 PATCH: 4 CREAM TOPICAL at 17:27

## 2020-09-24 RX ADMIN — SODIUM CHLORIDE 1 GRAM(S): 9 INJECTION INTRAMUSCULAR; INTRAVENOUS; SUBCUTANEOUS at 06:13

## 2020-09-24 RX ADMIN — MORPHINE SULFATE 2 MILLIGRAM(S): 50 CAPSULE, EXTENDED RELEASE ORAL at 17:30

## 2020-09-24 RX ADMIN — Medication 650 MILLIGRAM(S): at 23:15

## 2020-09-24 RX ADMIN — Medication 650 MILLIGRAM(S): at 23:51

## 2020-09-24 RX ADMIN — ENOXAPARIN SODIUM 40 MILLIGRAM(S): 100 INJECTION SUBCUTANEOUS at 21:20

## 2020-09-24 RX ADMIN — Medication 0.5 MILLIGRAM(S): at 10:33

## 2020-09-24 RX ADMIN — CHLORHEXIDINE GLUCONATE 1 APPLICATION(S): 213 SOLUTION TOPICAL at 06:10

## 2020-09-24 RX ADMIN — ONDANSETRON 4 MILLIGRAM(S): 8 TABLET, FILM COATED ORAL at 17:30

## 2020-09-24 RX ADMIN — HYDROMORPHONE HYDROCHLORIDE 0.5 MILLIGRAM(S): 2 INJECTION INTRAMUSCULAR; INTRAVENOUS; SUBCUTANEOUS at 14:01

## 2020-09-24 RX ADMIN — LIDOCAINE 1 PATCH: 4 CREAM TOPICAL at 19:00

## 2020-09-24 RX ADMIN — LEVETIRACETAM 500 MILLIGRAM(S): 250 TABLET, FILM COATED ORAL at 21:20

## 2020-09-24 RX ADMIN — SODIUM CHLORIDE 75 MILLILITER(S): 9 INJECTION, SOLUTION INTRAVENOUS at 14:36

## 2020-09-24 RX ADMIN — Medication 0.5 MILLIGRAM(S): at 21:20

## 2020-09-24 RX ADMIN — OXYCODONE HYDROCHLORIDE 10 MILLIGRAM(S): 5 TABLET ORAL at 17:28

## 2020-09-24 RX ADMIN — Medication 600 MILLIGRAM(S): at 21:20

## 2020-09-24 RX ADMIN — Medication 600 MILLIGRAM(S): at 06:14

## 2020-09-24 RX ADMIN — SODIUM CHLORIDE 1 GRAM(S): 9 INJECTION INTRAMUSCULAR; INTRAVENOUS; SUBCUTANEOUS at 16:29

## 2020-09-24 RX ADMIN — HYDROMORPHONE HYDROCHLORIDE 0.5 MILLIGRAM(S): 2 INJECTION INTRAMUSCULAR; INTRAVENOUS; SUBCUTANEOUS at 14:15

## 2020-09-24 RX ADMIN — Medication 5 MILLIGRAM(S): at 21:19

## 2020-09-24 RX ADMIN — LEVETIRACETAM 500 MILLIGRAM(S): 250 TABLET, FILM COATED ORAL at 10:33

## 2020-09-24 RX ADMIN — ALBUTEROL 2 PUFF(S): 90 AEROSOL, METERED ORAL at 20:50

## 2020-09-24 RX ADMIN — Medication 650 MILLIGRAM(S): at 18:30

## 2020-09-24 RX ADMIN — SODIUM CHLORIDE 1 GRAM(S): 9 INJECTION INTRAMUSCULAR; INTRAVENOUS; SUBCUTANEOUS at 23:15

## 2020-09-24 RX ADMIN — OXYCODONE HYDROCHLORIDE 10 MILLIGRAM(S): 5 TABLET ORAL at 18:30

## 2020-09-24 RX ADMIN — MORPHINE SULFATE 2 MILLIGRAM(S): 50 CAPSULE, EXTENDED RELEASE ORAL at 16:27

## 2020-09-24 RX ADMIN — Medication 650 MILLIGRAM(S): at 17:28

## 2020-09-24 NOTE — PROGRESS NOTE ADULT - ASSESSMENT
Assessment and Plan: 65 y/o female with depression/anxiety, Gastric Metaplasia resulting in persistent nausea, brain aneurysm, hyponatremia (admission in June for Na 118/AMS) who presents to ED with 2 weeks of Nausea and AMS.     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

## 2020-09-24 NOTE — PROGRESS NOTE ADULT - SUBJECTIVE AND OBJECTIVE BOX
65 y/o female with depression/anxieity, Gastric metaplasia resulting in persistent nausea,  brain aneurysm, hyponatremia (admission in June for Na 118/AMS) who presents to ED with 2 weeks of Nausea unable to eat but continued to drink 8 8oz water daily and AMS found to have Na 111.  Pt was admitted to ICU and placed on .   patient had slurred speech and was intermittently treated with hypertonic saline, was on seroquel prior to admission.    Interval HPI: denies any complaints, not confused, praying  Other ROS reviewed and neg     Vital Signs Last 24 Hrs  T(C): 36.5 (24 Sep 2020 08:58), Max: 37.1 (23 Sep 2020 16:48)  T(F): 97.7 (24 Sep 2020 08:58), Max: 98.7 (23 Sep 2020 16:48)  HR: 88 (24 Sep 2020 10:00) (83 - 105)  BP: 153/87 (24 Sep 2020 10:00) (96/38 - 159/95)  BP(mean): 102 (24 Sep 2020 10:00) (54 - 141)  RR: 20 (24 Sep 2020 10:00) (12 - 45)  SpO2: 100% (24 Sep 2020 07:00) (97% - 100%)    I&O's Detail    23 Sep 2020 07:01  -  24 Sep 2020 07:00  --------------------------------------------------------  IN:  Total IN: 0 mL    OUT:    Voided (mL): 750 mL  Total OUT: 750 mL    Total NET: -750 mL                        12.2   11.16 )-----------( 152      ( 23 Sep 2020 11:47 )             35.3     24 Sep 2020 06:06    135    |  103    |  21     ----------------------------<  110    3.7     |  27     |  0.52     Ca    9.2        24 Sep 2020 06:06      PT/INR - ( 23 Sep 2020 11:47 )   PT: 12.6 sec;   INR: 1.09 ratio         PTT - ( 23 Sep 2020 11:47 )  PTT:26.5 sec    MEDICATIONS  (STANDING):  chlorhexidine 2% Cloths 1 Application(s) Topical <User Schedule>  clonazePAM  Tablet 0.5 milliGRAM(s) Oral two times a day  demeclocycline 600 milliGRAM(s) Oral two times a day  enoxaparin Injectable 40 milliGRAM(s) SubCutaneous at bedtime  influenza   Vaccine 0.5 milliLiter(s) IntraMuscular once  levETIRAcetam 500 milliGRAM(s) Oral two times a day  senna 2 Tablet(s) Oral at bedtime  sodium chloride 1 Gram(s) Oral every 8 hours    MEDICATIONS  (PRN):  acetaminophen   Tablet .. 650 milliGRAM(s) Oral every 6 hours PRN Temp greater or equal to 38.5C (101.3F), Mild Pain (1 - 3)  LORazepam   Injectable 0.25 milliGRAM(s) IV Push every 4 hours PRN Anxiety or agitation  melatonin 5 milliGRAM(s) Oral at bedtime PRN Insomnia  ondansetron Injectable 4 milliGRAM(s) IV Push every 6 hours PRN Nausea and/or Vomiting    PHYSICAL EXAM:  Constitutional: NAD, awake and alert, well-developed  HEENT: PERR, EOMI, Normal Hearing, MMM  Neck: Soft and supple, small area of ecchymoses at the site of biopsy  Respiratory: Breath sounds are clear bilaterally, No wheezing, rales or rhonchi  Cardiovascular: S1 and S2, regular rate and rhythm, no Murmurs, gallops or rubs  Gastrointestinal: Bowel Sounds present, soft, nontender, nondistended, no guarding, no rebound  Extremities: No peripheral edema  Neurological: nonfocal, encephalopathy resolved      RADIOLOGY: personally visualized

## 2020-09-24 NOTE — BRIEF OPERATIVE NOTE - NSICDXBRIEFPROCEDURE_GEN_ALL_CORE_FT
PROCEDURES:  Video-assisted thoracoscopic surgery (VATS) on left side 24-Sep-2020 14:08:29 L VATS, mediastinal lymph node biopsy Sienna Harris   PROCEDURES:  Video-assisted thoracoscopic surgery (VATS) on left side 24-Sep-2020 14:08:29 FBSHREE VATS, mediastinal lymph node biopsy Sienna Harris

## 2020-09-24 NOTE — BRIEF OPERATIVE NOTE - NSICDXBRIEFPREOP_GEN_ALL_CORE_FT
PRE-OP DIAGNOSIS:  Mediastinal lymphadenopathy 21-Sep-2020 17:11:42  Shelia Carranza  
PRE-OP DIAGNOSIS:  Mediastinal lymphadenopathy 21-Sep-2020 17:11:42  Shelia Carranza

## 2020-09-24 NOTE — PROGRESS NOTE ADULT - ASSESSMENT
64F with PMHx of depression/anxieity, brain aneurysm, hyponatremia (admission in June for Na 118/AMS) who presents to ED with 3-4day history of nausea/intermittent vomiting, wretching,  and progressive AMS. Evaluation in ED revealed Na 111. CTH without acute pathology.  PT with hx of saidh due to lexapro on june 2020.  Now with persistent low na value and renal consult requested.  MRI with contrast of brain was negative.      PLAN   - dc IVF   - continue with fluid restrict  - resent urine and serum studies and evaluate for siadh vs adh excess from nauseau  - nausea control  - etiology of nausea undefined with neg GI work-up  - MRA of brain with hx of cerebral aneurysm.  hx of 5mm?? 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 Course C/w SIADH.  Persistent with very minimal response to 3% NS.    SSRI d/brianna.    Noted with NSAIDS q 6 hours--D/c for its effect on water excretion.    3% NS today again.    9/17  Na level 127 last evening after 8 hr total 3% Sodium Chloride  Down to 124 today  will cont w 3% today   Lasix po 20 mg bid x 2 days    9/18  CT chest lung mass  will cont with Hypertonic saline  since sodium drops so quickly  tolvaptan not suitable since pt may not have the mentation to drink water freely    9/19 SY  --Hyponatremia: c/w SIADH, now with likely dx of Lung CA   For lung bx.     Post several 3% NS infusions.     Remains hyponatremic.     Attempt to increase sodium level with po NACL tabs and Demeclocycline.     Will hold further lasix.  --Will hold off on restarting Seroquel until serum sodium is stabilized.    9/20 SY  --Hyponatremia c/w SIADH.    Continue NACL tabs and increase Demeclocycline dose.    Continue fluid restriction.  --Lung mass : awaiting for bx.  --Continue to hold SSRI until serum sodium stabilized.    9/21 MK   - hyponatremia/ siadh     continue with nacl tabs and demeclocycline     maintain fluid restriction for now   - lung mass for bx today   - anxiety disorder: continue to hold SSRI until more stable na value   dw rn     9/22 SY  --Hyponatremia /SIADH due to lung pathology.     Continue NACL tabs and Demeclocycline.  Continue fluid restriction.  --Lung mass : for VATS for lung lesion bx.  --Continue to hold SSRI  until sodium level maintained stable.    9/23 SY  --Hyponatremia /SIADH : due to lung mass.  Stable serum sodium level now.     Continue NACL tabs.  Increase Demeclocycline dose.  --Lung mass : for VATS tomorrow.  --Continue fluid restriction.    9/24 SY  --Hyponatremia much improved and stable.     Continue NACL tabs and Demeclocycline at 600 mg BID.  --Follow up VATS for lung mass.

## 2020-09-24 NOTE — PROGRESS NOTE ADULT - SUBJECTIVE AND OBJECTIVE BOX
Subjective:  Pt seen, NPO for procedure today.    Vital Signs:  Vital Signs Last 24 Hrs  T(C): 36.5 (09-24-20 @ 08:58), Max: 37.1 (09-23-20 @ 16:48)  T(F): 97.7 (09-24-20 @ 08:58), Max: 98.7 (09-23-20 @ 16:48)  HR: 88 (09-24-20 @ 10:00) (83 - 105)  BP: 153/87 (09-24-20 @ 10:00) (96/38 - 159/95)  RR: 20 (09-24-20 @ 10:00) (12 - 45)  SpO2: 100% (09-24-20 @ 07:00) (97% - 100%) on (O2)    Telemetry/Alarms:    Relevant labs, radiology and Medications reviewed                        12.2   11.16 )-----------( 152      ( 23 Sep 2020 11:47 )             35.3     09-24    135  |  103  |  21  ----------------------------<  110<H>  3.7   |  27  |  0.52    Ca    9.2      24 Sep 2020 06:06      PT/INR - ( 23 Sep 2020 11:47 )   PT: 12.6 sec;   INR: 1.09 ratio         PTT - ( 23 Sep 2020 11:47 )  PTT:26.5 sec  MEDICATIONS  (STANDING):  chlorhexidine 2% Cloths 1 Application(s) Topical <User Schedule>  clonazePAM  Tablet 0.5 milliGRAM(s) Oral two times a day  demeclocycline 600 milliGRAM(s) Oral two times a day  enoxaparin Injectable 40 milliGRAM(s) SubCutaneous at bedtime  influenza   Vaccine 0.5 milliLiter(s) IntraMuscular once  levETIRAcetam 500 milliGRAM(s) Oral two times a day  senna 2 Tablet(s) Oral at bedtime  sodium chloride 1 Gram(s) Oral every 8 hours    MEDICATIONS  (PRN):  acetaminophen   Tablet .. 650 milliGRAM(s) Oral every 6 hours PRN Temp greater or equal to 38.5C (101.3F), Mild Pain (1 - 3)  LORazepam   Injectable 0.25 milliGRAM(s) IV Push every 4 hours PRN Anxiety or agitation  melatonin 5 milliGRAM(s) Oral at bedtime PRN Insomnia  ondansetron Injectable 4 milliGRAM(s) IV Push every 6 hours PRN Nausea and/or Vomiting      Physical exam  Gen NAD  Neuro AAox3  Card RRR  Pulm clear  Abd soft  Ext warm        I&O's Summary    23 Sep 2020 07:01  -  24 Sep 2020 07:00  --------------------------------------------------------  IN: 0 mL / OUT: 750 mL / NET: -750 mL        Assessment  64y Female  w/ PAST MEDICAL & SURGICAL HISTORY:  Hyponatremia    Brain aneurysm    Anxiety    Depression    History of neck surgery    admitted with complaints of Patient is a 64y old  Female who presents with a chief complaint of confusion/N/V (23 Sep 2020 23:10)  .  63 yo F w H/O Depression/anxiety, gastric metaplasia, brain aneurysm, hyponatremia, admitted with hyponatremia and AMS, found to have a left suprahilar/mediastinal mass suspicious for neoplasm (lung).  9/21/20 attempted mediastinoscopy - aborted due to poor cervical neck flexion due to previous neck fusion. Planned for Left VATS, mediastinal lymphadectomy for tissue diagnosis today.    NPO for OR    Discussed with Cardiothoracic Team at AM rounds.

## 2020-09-24 NOTE — PROGRESS NOTE ADULT - SUBJECTIVE AND OBJECTIVE BOX
NEPHROLOGY INTERVAL HPI/OVERNIGHT EVENTS:    Date of Service: 20 @ 14:53   SY  Seen earlier. For VATS today.  Comfortable.  No distress.     SY  Sitting up in chair.  Comfortable.  No distress.  Pt understands re : VATS tomorrow.     SY  Feeling well.  No new complaints.  Attempted bx of lung lesion via mediastinoscopy but unsuccessful due to inability to flex her neck.      SY  Sitting up in chair.  No new complaints.  Alert and able to answer questions appropriately.  Aware of lung mass and asking about Bx.  Now taking all po meds.     SY  Alert and conversant today.  Difficult to assess full cognition.      eyes fixed on tv  transiently makes eye contact   but does not answer  CT reports noted lung mass      sitting OOB  Replying with one word answers   NAD, No complaints     SY  Sitting up in chair.  Appears comfortable.  Oriented to person only today.  Answers "I have a rash" to various questions.    HPI:  History obtained from pt and significant other at bedside  64F with PMHx of depression / anxiety, brain aneurysm, hyponatremia (admission in  for Na 118/AMS) who presents to ED with 3-4day history of nausea/intermittent vomiting, wretching,  and progressive AMS. Evaluation in ED revealed Na 111. CTH without acute pathology. eICU consulted and pt admitted to ICU. Upon arrival pt receiving 3% NaCL bolus, discontinued upon my arrival. Prior admission and workup revealed hyponatremia possibly SIADH vs SSRI vs excessive free water intake. She was discharged following correction on NaCl tabs as outpt. She is no longer taking these. She states they were stopped by nephro on her follow up visit as outpt. She was taken off her SSRI at last admission and placed on seroquel only. Her dosing has been escalated since last admission to 150mg at HS and 50mg 2x during the day. She reports no longer using her Klonopin (06 Sep 2020 20:29)    Patient is a 64y old  Female who presents with a chief complaint of confusion/N/V (14 Sep 2020 12:42)  --------------------------------------------------------------------------------  hx from  and friend, adamaris donaldson  pt was dc after ssri/lexapro related hyponatremia.  upon dc in 2020 post dc na was normal and was dc from salt tablets by dr romano  pt as per  was doing well and eating but maintained fluid restriciton   progressively with more nauseau and inc lethargy  admitted with hyponatremia and was tx with hypertonic and nacl tabs  on ns, na with variability and drop   taking in po but Nauseau most disturbing sx  has been on seroquel to manage her anxiety.  has been off since admission  workup for nauseau with dr carson that was neg   no upto date mammogram/pap  sees dr raymond for moles   had cerebral aneurysm 5mm? as per  and was advised annual fu 5 years ago  her slow speech with flat affect atypical for her   no diarrhea or vomiting  has been trying to eat while here   over course of 3 years, has had 70lb weight loss     MEDICATIONS  (STANDING):  acetaminophen   Tablet .. 650 milliGRAM(s) Oral every 6 hours  ALBUTerol    90 MICROgram(s) HFA Inhaler 2 Puff(s) Inhalation every 6 hours  clonazePAM  Tablet 0.5 milliGRAM(s) Oral two times a day  demeclocycline 600 milliGRAM(s) Oral two times a day  enoxaparin Injectable 40 milliGRAM(s) SubCutaneous at bedtime  influenza   Vaccine 0.5 milliLiter(s) IntraMuscular once  lactated ringers. 1000 milliLiter(s) (75 mL/Hr) IV Continuous <Continuous>  levETIRAcetam 500 milliGRAM(s) Oral two times a day  senna 2 Tablet(s) Oral at bedtime  sodium chloride 1 Gram(s) Oral every 8 hours    MEDICATIONS  (PRN):  fentaNYL    Injectable 50 MICROGram(s) IV Push every 10 minutes PRN Severe Pain (7 - 10)  HYDROmorphone  Injectable 0.5 milliGRAM(s) IV Push every 10 minutes PRN Moderate Pain (4 - 6)  LORazepam   Injectable 0.25 milliGRAM(s) IV Push every 4 hours PRN Anxiety or agitation  melatonin 5 milliGRAM(s) Oral at bedtime PRN Insomnia  morphine  - Injectable 2 milliGRAM(s) IV Push every 4 hours PRN Severe Pain (7 - 10)  ondansetron Injectable 4 milliGRAM(s) IV Push once PRN Nausea and/or Vomiting  ondansetron Injectable 4 milliGRAM(s) IV Push every 6 hours PRN Nausea and/or Vomiting  oxyCODONE    IR 5 milliGRAM(s) Oral once PRN Mild Pain (1 - 3)  oxyCODONE    IR 10 milliGRAM(s) Oral every 4 hours PRN Moderate Pain (4 - 6)  polyethylene glycol 3350 17 Gram(s) Oral daily PRN Constipation          Vital Signs Last 24 Hrs  T(C): 36.6 (24 Sep 2020 14:45), Max: 37.1 (23 Sep 2020 16:48)  T(F): 97.8 (24 Sep 2020 14:45), Max: 98.7 (23 Sep 2020 16:48)  HR: 86 (24 Sep 2020 14:45) (80 - 104)  BP: 163/92 (24 Sep 2020 14:45) (96/38 - 167/91)  BP(mean): 102 (24 Sep 2020 10:00) (54 - 141)  RR: 12 (24 Sep 2020 14:45) (12 - 45)  SpO2: 100% (24 Sep 2020 14:45) (97% - 100%)  Daily     Daily Weight in k.8 (24 Sep 2020 05:00)     @ :  -   @ 07:00  --------------------------------------------------------  IN: 0 mL / OUT: 750 mL / NET: -750 mL     @ :  -   @ 14:53  --------------------------------------------------------  IN: 475 mL / OUT: 0 mL / NET: 475 mL    PHYSICAL EXAM: Alert and comfortable  GENERAL: No distress.  CHEST/LUNG: Clear to aus  HEART: S1S2 RRR  ABDOMEN: soft  EXTREMITIES: no edema  SKIN:     LABS:                        12.2   11.16 )-----------( 152      ( 23 Sep 2020 11:47 )             35.3     09-24    135  |  103  |  21  ----------------------------<  110<H>  3.7   |  27  |  0.52    Ca    9.2      24 Sep 2020 06:06      PT/INR - ( 23 Sep 2020 11:47 )   PT: 12.6 sec;   INR: 1.09 ratio         PTT - ( 23 Sep 2020 11:47 )  PTT:26.5 sec            RADIOLOGY & ADDITIONAL TESTS:

## 2020-09-24 NOTE — CHART NOTE - NSCHARTNOTEFT_GEN_A_CORE
Patient is a 64y old  Female who presents with a chief complaint of confusion/N/V (24 Sep 2020 14:53)    Pt is S/P  FB, L VATS, mediastinal lymph node biopsy POD 0.   C/o 6/10 pain.     Vital Signs Last 24 Hrs  T(C): 36.6 (24 Sep 2020 14:45), Max: 37.1 (23 Sep 2020 16:48)  T(F): 97.8 (24 Sep 2020 14:45), Max: 98.7 (23 Sep 2020 16:48)  HR: 86 (24 Sep 2020 14:45) (80 - 104)  BP: 163/92 (24 Sep 2020 14:45) (109/58 - 167/91)  BP(mean): 102 (24 Sep 2020 10:00) (87 - 141)  RR: 12 (24 Sep 2020 14:45) (12 - 45)  SpO2: 100% (24 Sep 2020 14:45) (97% - 100%)  I&O's Detail    23 Sep 2020 07:01  -  24 Sep 2020 07:00  --------------------------------------------------------  IN:  Total IN: 0 mL    OUT:    Voided (mL): 750 mL  Total OUT: 750 mL    Total NET: -750 mL      24 Sep 2020 07:01  -  24 Sep 2020 16:17  --------------------------------------------------------  IN:    Lactated Ringers: 75 mL    Other (mL): 400 mL  Total IN: 475 mL    OUT:  Total OUT: 0 mL    Total NET: 475 mL        demeclocycline 600  demeclocycline 600  enoxaparin Injectable 40    PAST MEDICAL & SURGICAL HISTORY:  Hyponatremia    Brain aneurysm    Anxiety    Depression    History of neck surgery      Physical Exam:  General: NAD, resting comfortably in bed  Pulmonary: Nonlabored breathing, no respiratory distress, CT to suction, no AL  Cardiovascular: NSR  Abdominal: soft, NT/ND  Extremities: WWP    LABS:                        12.2   11.16 )-----------( 152      ( 23 Sep 2020 11:47 )             35.3     09-24    135  |  103  |  21  ----------------------------<  110<H>  3.7   |  27  |  0.52    Ca    9.2      24 Sep 2020 06:06      PT/INR - ( 23 Sep 2020 11:47 )   PT: 12.6 sec;   INR: 1.09 ratio         PTT - ( 23 Sep 2020 11:47 )  PTT:26.5 sec  CAPILLARY BLOOD GLUCOSE          Radiology and Additional Studies:    Assessment:64yFemaleHPI:  History obtained from pt and significant other at bedside  64F with PMHx of depression/anxieity, brain aneurysm, hyponatremia (admission in June for Na 118/AMS) who presents to ED with 3-4day history of nausea/intermittent vomiting, wretching,  and progressive AMS. Workup revealed hyponatremia possibly SIADH. On CT found to have mediastinal mass.    S/P  FB, L VATS, mediastinal lymph node biopsy POD 0.    Plan:  Continue CT to suction  AM CXR  pain control  OOB/Ambulate/PT in am  advance diet as tolerated  wean O2 as tolerated    Seen and discussed with Dr. Alicea

## 2020-09-25 ENCOUNTER — TRANSCRIPTION ENCOUNTER (OUTPATIENT)
Age: 65
End: 2020-09-25

## 2020-09-25 VITALS — SYSTOLIC BLOOD PRESSURE: 132 MMHG | DIASTOLIC BLOOD PRESSURE: 86 MMHG | OXYGEN SATURATION: 96 %

## 2020-09-25 LAB
ANION GAP SERPL CALC-SCNC: 5 MMOL/L — SIGNIFICANT CHANGE UP (ref 5–17)
BASOPHILS # BLD AUTO: 0 K/UL — SIGNIFICANT CHANGE UP (ref 0–0.2)
BASOPHILS NFR BLD AUTO: 0 % — SIGNIFICANT CHANGE UP (ref 0–2)
BUN SERPL-MCNC: 24 MG/DL — HIGH (ref 7–23)
CALCIUM SERPL-MCNC: 9.5 MG/DL — SIGNIFICANT CHANGE UP (ref 8.5–10.1)
CHLORIDE SERPL-SCNC: 101 MMOL/L — SIGNIFICANT CHANGE UP (ref 96–108)
CO2 SERPL-SCNC: 30 MMOL/L — SIGNIFICANT CHANGE UP (ref 22–31)
CREAT SERPL-MCNC: 0.71 MG/DL — SIGNIFICANT CHANGE UP (ref 0.5–1.3)
EOSINOPHIL # BLD AUTO: 0 K/UL — SIGNIFICANT CHANGE UP (ref 0–0.5)
EOSINOPHIL NFR BLD AUTO: 0 % — SIGNIFICANT CHANGE UP (ref 0–6)
GLUCOSE SERPL-MCNC: 114 MG/DL — HIGH (ref 70–99)
HCT VFR BLD CALC: 36 % — SIGNIFICANT CHANGE UP (ref 34.5–45)
HGB BLD-MCNC: 12 G/DL — SIGNIFICANT CHANGE UP (ref 11.5–15.5)
LYMPHOCYTES # BLD AUTO: 1.23 K/UL — SIGNIFICANT CHANGE UP (ref 1–3.3)
LYMPHOCYTES # BLD AUTO: 11 % — LOW (ref 13–44)
MCHC RBC-ENTMCNC: 30.1 PG — SIGNIFICANT CHANGE UP (ref 27–34)
MCHC RBC-ENTMCNC: 33.3 GM/DL — SIGNIFICANT CHANGE UP (ref 32–36)
MCV RBC AUTO: 90.2 FL — SIGNIFICANT CHANGE UP (ref 80–100)
MONOCYTES # BLD AUTO: 0.67 K/UL — SIGNIFICANT CHANGE UP (ref 0–0.9)
MONOCYTES NFR BLD AUTO: 6 % — SIGNIFICANT CHANGE UP (ref 2–14)
NEUTROPHILS # BLD AUTO: 8.92 K/UL — HIGH (ref 1.8–7.4)
NEUTROPHILS NFR BLD AUTO: 80 % — HIGH (ref 43–77)
NRBC # BLD: SIGNIFICANT CHANGE UP /100 WBCS (ref 0–0)
PLATELET # BLD AUTO: 151 K/UL — SIGNIFICANT CHANGE UP (ref 150–400)
POTASSIUM SERPL-MCNC: 3.7 MMOL/L — SIGNIFICANT CHANGE UP (ref 3.5–5.3)
POTASSIUM SERPL-SCNC: 3.7 MMOL/L — SIGNIFICANT CHANGE UP (ref 3.5–5.3)
RBC # BLD: 3.99 M/UL — SIGNIFICANT CHANGE UP (ref 3.8–5.2)
RBC # FLD: 12.7 % — SIGNIFICANT CHANGE UP (ref 10.3–14.5)
SODIUM SERPL-SCNC: 136 MMOL/L — SIGNIFICANT CHANGE UP (ref 135–145)
WBC # BLD: 11.15 K/UL — HIGH (ref 3.8–10.5)
WBC # FLD AUTO: 11.15 K/UL — HIGH (ref 3.8–10.5)

## 2020-09-25 PROCEDURE — 99232 SBSQ HOSP IP/OBS MODERATE 35: CPT

## 2020-09-25 PROCEDURE — 99239 HOSP IP/OBS DSCHRG MGMT >30: CPT

## 2020-09-25 PROCEDURE — 71045 X-RAY EXAM CHEST 1 VIEW: CPT | Mod: 26

## 2020-09-25 PROCEDURE — 71045 X-RAY EXAM CHEST 1 VIEW: CPT | Mod: 26,77

## 2020-09-25 RX ORDER — CLONAZEPAM 1 MG
1 TABLET ORAL
Qty: 0 | Refills: 0 | DISCHARGE
Start: 2020-09-25 | End: 2020-10-04

## 2020-09-25 RX ORDER — ONDANSETRON 8 MG/1
1 TABLET, FILM COATED ORAL
Qty: 15 | Refills: 0
Start: 2020-09-25 | End: 2020-09-29

## 2020-09-25 RX ORDER — CLONAZEPAM 1 MG
1 TABLET ORAL
Qty: 20 | Refills: 0
Start: 2020-09-25 | End: 2020-10-04

## 2020-09-25 RX ORDER — LEVETIRACETAM 250 MG/1
1 TABLET, FILM COATED ORAL
Qty: 60 | Refills: 0
Start: 2020-09-25 | End: 2020-10-24

## 2020-09-25 RX ORDER — ACETAMINOPHEN 500 MG
2 TABLET ORAL
Qty: 0 | Refills: 0 | DISCHARGE
Start: 2020-09-25

## 2020-09-25 RX ORDER — SENNA PLUS 8.6 MG/1
2 TABLET ORAL
Qty: 0 | Refills: 0 | DISCHARGE
Start: 2020-09-25

## 2020-09-25 RX ORDER — OXYCODONE HYDROCHLORIDE 5 MG/1
1 TABLET ORAL
Qty: 30 | Refills: 0
Start: 2020-09-25 | End: 2020-09-29

## 2020-09-25 RX ORDER — SODIUM CHLORIDE 9 MG/ML
1 INJECTION INTRAMUSCULAR; INTRAVENOUS; SUBCUTANEOUS
Qty: 90 | Refills: 0
Start: 2020-09-25 | End: 2020-10-24

## 2020-09-25 RX ORDER — POLYETHYLENE GLYCOL 3350 17 G/17G
17 POWDER, FOR SOLUTION ORAL
Qty: 170 | Refills: 0
Start: 2020-09-25 | End: 2020-10-04

## 2020-09-25 RX ORDER — DEMECLOCYCLINE HCL 150 MG
2 TABLET ORAL
Qty: 40 | Refills: 0
Start: 2020-09-25 | End: 2020-10-04

## 2020-09-25 RX ORDER — ALBUTEROL 90 UG/1
2 AEROSOL, METERED ORAL
Qty: 1 | Refills: 0
Start: 2020-09-25 | End: 2020-10-04

## 2020-09-25 RX ADMIN — OXYCODONE HYDROCHLORIDE 10 MILLIGRAM(S): 5 TABLET ORAL at 03:56

## 2020-09-25 RX ADMIN — Medication 650 MILLIGRAM(S): at 14:06

## 2020-09-25 RX ADMIN — ALBUTEROL 2 PUFF(S): 90 AEROSOL, METERED ORAL at 08:38

## 2020-09-25 RX ADMIN — Medication 0.5 MILLIGRAM(S): at 05:35

## 2020-09-25 RX ADMIN — SODIUM CHLORIDE 1 GRAM(S): 9 INJECTION INTRAMUSCULAR; INTRAVENOUS; SUBCUTANEOUS at 14:06

## 2020-09-25 RX ADMIN — Medication 650 MILLIGRAM(S): at 15:30

## 2020-09-25 RX ADMIN — SODIUM CHLORIDE 1 GRAM(S): 9 INJECTION INTRAMUSCULAR; INTRAVENOUS; SUBCUTANEOUS at 05:35

## 2020-09-25 RX ADMIN — ALBUTEROL 2 PUFF(S): 90 AEROSOL, METERED ORAL at 14:01

## 2020-09-25 RX ADMIN — ALBUTEROL 2 PUFF(S): 90 AEROSOL, METERED ORAL at 01:39

## 2020-09-25 RX ADMIN — LIDOCAINE 1 PATCH: 4 CREAM TOPICAL at 05:32

## 2020-09-25 RX ADMIN — Medication 600 MILLIGRAM(S): at 11:47

## 2020-09-25 RX ADMIN — Medication 650 MILLIGRAM(S): at 05:35

## 2020-09-25 RX ADMIN — Medication 0.25 MILLIGRAM(S): at 10:28

## 2020-09-25 RX ADMIN — ONDANSETRON 4 MILLIGRAM(S): 8 TABLET, FILM COATED ORAL at 14:05

## 2020-09-25 RX ADMIN — LEVETIRACETAM 500 MILLIGRAM(S): 250 TABLET, FILM COATED ORAL at 10:20

## 2020-09-25 NOTE — DISCHARGE NOTE NURSING/CASE MANAGEMENT/SOCIAL WORK - PATIENT PORTAL LINK FT
You can access the FollowMyHealth Patient Portal offered by [facility] by registering at the following website: [email or web address]. By joining [facility]’s FollowMyHealth portal, you will also be able to view your health information using other applications (apps) compatible with our system.

## 2020-09-25 NOTE — DISCHARGE NOTE PROVIDER - PROVIDER TOKENS
PROVIDER:[TOKEN:[[id number]]] PROVIDER:[TOKEN:[[id number]]],PROVIDER:[TOKEN:[[id number]]],PROVIDER:[TOKEN:[[id number]]]

## 2020-09-25 NOTE — PROVIDER CONTACT NOTE (CRITICAL VALUE NOTIFICATION) - NAME OF MD/NP/PA/DO NOTIFIED:
FOCUS/GOAL  Medical management    ASSESSMENT, INTERVENTIONS AND CONTINUING PLAN FOR GOAL:  Patient slept better tonight, she declined to take Melatonin at HS, she said it has not been effective. She called for assistance and communicated her needs. She used the bathroom 4 times to void, PVR around 0300 was 288 ml and 350 ml at 0430, unable to measure urine output because she missed the hut in the toilet. She sat at the EOB and transferred to bed with supervision,  Stood up and ambulated with minimal assistance, needed total assistance with saqib cares and minimal assistance with clothing management. Redness in groin and under abdominal folds is clearing, continue to apply powder. She has non accessed PAC right upper chest. Dressing on coccyx/sacrium is intact, she was encouraged to turn to her sides and she did with minimal assistance only to place a pillow behind her back. Heals are intact.     Dr. Betancourt

## 2020-09-25 NOTE — PHYSICAL THERAPY INITIAL EVALUATION ADULT - DIAGNOSIS, PT EVAL
Symptomatic Hyponatremia, metabolic encephalopathy
Symptomatic Hyponatremia, metabolic encephalopathy. Sx as above

## 2020-09-25 NOTE — DISCHARGE NOTE PROVIDER - CARE PROVIDER_API CALL
Nina Doyle  THORACIC AND CARDIAC SURGERY  [address] [place] [id number]  Phone: [phone]  Fax: [phone]  Follow Up Time:    Nina Doyle  THORACIC AND CARDIAC SURGERY  [address] [place] [address]  Phone: [phone]  Fax: [phone]  Follow Up Time:     Marcy Martin  [address], [address] [place] [address]  Phone: [phone]  Fax: [phone]  Follow Up Time:     Pérez Savage  NEPHROLOGY  [address] [facility], [address]  [place] [address]  Phone: [phone]  Fax: [phone]  Follow Up Time:

## 2020-09-25 NOTE — PHYSICAL THERAPY INITIAL EVALUATION ADULT - PERTINENT HX OF CURRENT PROBLEM, REHAB EVAL
presents to ED with 2 weeks of Nausea unable to eat but continued to drink 8 8oz water daily and AMS found to have Na 111.  Pt was admitted to ICU and placed on .  Na++ rising slowly. today 128. pt w/ intermittent dysarthria this adm, MRI head (-) ac. findings
presents to ED with 2 weeks of Nausea unable to eat but continued to drink 8 8oz water daily and AMS found to have Na 111.  Pt was admitted to ICU and placed on .  Na++ rising slowly. today 128. pt w/ intermittent dysarthria this adm, MRI head (-) ac. findings. Pt D/C from PT secondary to OR, received new order 9/24 as above.

## 2020-09-25 NOTE — DISCHARGE NOTE PROVIDER - NSDCMRMEDTOKEN_GEN_ALL_CORE_FT
acetaminophen 325 mg oral tablet: 2 tab(s) orally every 6 hours  albuterol 90 mcg/inh inhalation aerosol: 2 puff(s) inhaled every 6 hours  clonazePAM 0.5 mg oral tablet: 1 tab(s) orally 2 times a day MDD:2 tabs  demeclocycline 300 mg oral tablet: 2 tab(s) orally 2 times a day  levETIRAcetam 500 mg oral tablet: 1 tab(s) orally 2 times a day  oxyCODONE 10 mg oral tablet: 1 tab(s) orally every 4 hours, As needed, Moderate Pain (4 - 6) MDD:6 tabs  polyethylene glycol 3350 oral powder for reconstitution: 17 gram(s) orally once a day, As needed, Constipation  senna oral tablet: 2 tab(s) orally once a day (at bedtime)  sodium chloride 1 g oral tablet: 1 tab(s) orally every 8 hours

## 2020-09-25 NOTE — DISCHARGE NOTE PROVIDER - CARE PROVIDERS DIRECT ADDRESSES
,[email or web address],DirectAddress_Unknown,DirectAddress_Unknown

## 2020-09-25 NOTE — DISCHARGE NOTE NURSING/CASE MANAGEMENT/SOCIAL WORK - CONTRAINDICATIONS & PRECAUTIONS (SELECT ALL THAT APPLY)
This visit was completed via telephone due to the restrictions of COVID-19 pandemic. All issues as below were discussed and addressed, but no physical exam was performed due to the limitations of an audio-only modality.  If it was felt that the patient shou
Patient/surrogate refused vaccine...

## 2020-09-25 NOTE — PHYSICAL THERAPY INITIAL EVALUATION ADULT - ADDITIONAL COMMENTS
2 level house, stays ground floor except upstairs to shower.
2 level house, stays ground floor except upstairs to shower.

## 2020-09-25 NOTE — PROGRESS NOTE ADULT - ASSESSMENT
Assessment and Plan: 65 y/o female with depression/anxiety, Gastric Metaplasia resulting in persistent nausea, brain aneurysm, hyponatremia (admission in June for Na 118/AMS) who presents to ED with 2 weeks of Nausea and AMS.     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

## 2020-09-25 NOTE — PHYSICAL THERAPY INITIAL EVALUATION ADULT - ACTIVE RANGE OF MOTION EXAMINATION, REHAB EVAL
bilateral upper extremity Active ROM was WFL (within functional limits)/bilateral  lower extremity Active ROM was WFL (within functional limits)
Left UE Active ROM was WFL (within functional limits)/Right UE Active ROM was WFL (within functional limits)/Left LE Active ROM was WFL (within functional limits)/Right LE Active ROM was WFL (within functional limits)

## 2020-09-25 NOTE — PROGRESS NOTE ADULT - PROVIDER SPECIALTY LIST ADULT
Critical Care
Hospitalist
Nephrology
Neurology
Palliative Care
Thoracic Surgery
Neurology
Heme/Onc
Nephrology
Hospitalist
Critical Care
Heme/Onc

## 2020-09-25 NOTE — PROGRESS NOTE ADULT - SUBJECTIVE AND OBJECTIVE BOX
Subjective:  Patient has no new complaints.    Vital Signs:  Vital Signs Last 24 Hrs  T(C): 36.8 (09-25-20 @ 10:00), Max: 36.8 (09-24-20 @ 17:00)  T(F): 98.2 (09-25-20 @ 10:00), Max: 98.3 (09-25-20 @ 05:00)  HR: 111 (09-25-20 @ 11:00) (80 - 111)  BP: 126/72 (09-25-20 @ 11:00) (102/77 - 167/91)  RR: 32 (09-25-20 @ 11:00) (12 - 39)  SpO2: 96% (09-25-20 @ 11:00) (91% - 100%) on room air    Telemetry/Alarms:  sinus rhythm    Relevant labs, radiology and Medications reviewed                        12.0   11.15 )-----------( 151      ( 25 Sep 2020 06:52 )             36.0     09-25    136  |  101  |  24<H>  ----------------------------<  114<H>  3.7   |  30  |  0.71    Ca    9.5      25 Sep 2020 06:52        MEDICATIONS  (STANDING):  acetaminophen   Tablet .. 650 milliGRAM(s) Oral every 6 hours  ALBUTerol    90 MICROgram(s) HFA Inhaler 2 Puff(s) Inhalation every 6 hours  clonazePAM  Tablet 0.5 milliGRAM(s) Oral two times a day  demeclocycline 600 milliGRAM(s) Oral two times a day  enoxaparin Injectable 40 milliGRAM(s) SubCutaneous at bedtime  levETIRAcetam 500 milliGRAM(s) Oral two times a day  lidocaine   Patch 1 Patch Transdermal every 24 hours  senna 2 Tablet(s) Oral at bedtime  sodium chloride 1 Gram(s) Oral every 8 hours    MEDICATIONS  (PRN):  LORazepam   Injectable 0.25 milliGRAM(s) IV Push every 4 hours PRN Anxiety or agitation  melatonin 5 milliGRAM(s) Oral at bedtime PRN Insomnia  morphine  - Injectable 2 milliGRAM(s) IV Push every 4 hours PRN Severe Pain (7 - 10)  ondansetron Injectable 4 milliGRAM(s) IV Push every 6 hours PRN Nausea and/or Vomiting  oxyCODONE    IR 10 milliGRAM(s) Oral every 4 hours PRN Moderate Pain (4 - 6)  polyethylene glycol 3350 17 Gram(s) Oral daily PRN Constipation      Physical Exam:  Gen:  NAD, breathing comfortably  Neuro: AO x 3, speech clear  Card:  S1 S2  Pulm:  CTA bilaterally, no accessory muscle use  Abd:  soft NT ND  Ext:  no edema    Left CT to water seal, no air leak, minimal drainage.   CT d/c'd at bedside, tolerated it well.    I&O's Summary    24 Sep 2020 07:01  -  25 Sep 2020 07:00  --------------------------------------------------------  IN: 686 mL / OUT: 505 mL / NET: 181 mL        Assessment  64y Female  w/ PAST MEDICAL & SURGICAL HISTORY:  Hyponatremia    Brain aneurysm    Anxiety    Depression    History of neck surgery    Patient is a 64y old  Female who presents with a chief complaint of confusion/N/V (25 Sep 2020 10:59)    63 yo F w H/O Depression/anxiety, gastric metaplasia, brain aneurysm, hyponatremia, admitted with hyponatremia and AMS, found to have a left suprahilar/mediastinal mass suspicious for neoplasm (lung).  9/21/20 attempted mediastinoscopy - aborted due to poor cervical neck flexion due to previous neck fusion.   Underwent left VATS, mediastinal lymph node biopsy, positive for small cell carcinoma.    PLAN    Repeat CXR s/p CT removal.  D/C planning.  Will get port and MRI as outpatient as per patient and Dr. Martin.      Discussed with Cardiothoracic Team at AM rounds.

## 2020-09-25 NOTE — PROGRESS NOTE ADULT - ASSESSMENT
Small cell lung cancer / with liver lesions this would be stage IV  SIADH  S/P VATS  Depression/ Anxiety    A/P    Recommend Port placement prior to discharge if possible; or make arrangements with IR  for out patient Placement  Recommend MRI liver: characterize the liver lesions  Will need chemotherapy ( Carboplatin-Etoposide) + Immune therapy  If patient remains in the Hospital : In patient therapy  If she is DC home: FU [id number] for Rx

## 2020-09-25 NOTE — PROGRESS NOTE ADULT - SUBJECTIVE AND OBJECTIVE BOX
65 y/o female with depression/anxieity, Gastric metaplasia resulting in persistent nausea,  brain aneurysm, hyponatremia (admission in June for Na 118/AMS) who presents to ED with 2 weeks of Nausea unable to eat but continued to drink 8 8oz water daily and AMS found to have Na 111.  Pt was admitted to ICU and placed on .   patient had slurred speech and was intermittently treated with hypertonic saline, was on seroquel prior to admission.    Interval HPI: CT removed, no distress, wants to go home, refused PORT and MRI while inpatient  Other ROS reviewed and neg     Vital Signs Last 24 Hrs  T(C): 36.8 (25 Sep 2020 10:00), Max: 36.8 (24 Sep 2020 17:00)  T(F): 98.2 (25 Sep 2020 10:00), Max: 98.3 (25 Sep 2020 05:00)  HR: 111 (25 Sep 2020 11:00) (80 - 111)  BP: 126/72 (25 Sep 2020 11:00) (102/77 - 167/91)  BP(mean): 86 (25 Sep 2020 11:00) (83 - 125)  RR: 32 (25 Sep 2020 11:00) (12 - 39)  SpO2: 96% (25 Sep 2020 11:00) (91% - 100%)  I&O's Detail    24 Sep 2020 07:01  -  25 Sep 2020 07:00  --------------------------------------------------------  IN:    Lactated Ringers: 286 mL    Other (mL): 400 mL  Total IN: 686 mL    OUT:    Chest Tube (mL): 5 mL    Voided (mL): 500 mL  Total OUT: 505 mL    Total NET: 181 mL                       12.0   11.15 )-----------( 151      ( 25 Sep 2020 06:52 )             36.0     25 Sep 2020 06:52    136    |  101    |  24     ----------------------------<  114    3.7     |  30     |  0.71     Ca    9.5        25 Sep 2020 06:52    MEDICATIONS  (STANDING):  acetaminophen   Tablet .. 650 milliGRAM(s) Oral every 6 hours  ALBUTerol    90 MICROgram(s) HFA Inhaler 2 Puff(s) Inhalation every 6 hours  clonazePAM  Tablet 0.5 milliGRAM(s) Oral two times a day  demeclocycline 600 milliGRAM(s) Oral two times a day  enoxaparin Injectable 40 milliGRAM(s) SubCutaneous at bedtime  levETIRAcetam 500 milliGRAM(s) Oral two times a day  lidocaine   Patch 1 Patch Transdermal every 24 hours  senna 2 Tablet(s) Oral at bedtime  sodium chloride 1 Gram(s) Oral every 8 hours    MEDICATIONS  (PRN):  LORazepam   Injectable 0.25 milliGRAM(s) IV Push every 4 hours PRN Anxiety or agitation  melatonin 5 milliGRAM(s) Oral at bedtime PRN Insomnia  morphine  - Injectable 2 milliGRAM(s) IV Push every 4 hours PRN Severe Pain (7 - 10)  ondansetron Injectable 4 milliGRAM(s) IV Push every 6 hours PRN Nausea and/or Vomiting  oxyCODONE    IR 10 milliGRAM(s) Oral every 4 hours PRN Moderate Pain (4 - 6)  polyethylene glycol 3350 17 Gram(s) Oral daily PRN Constipation    PHYSICAL EXAM:  Constitutional: NAD, awake and alert, well-developed  HEENT: PERR, EOMI, Normal Hearing, MMM  Neck: Soft and supple, small area of ecchymoses at the site of biopsy  Respiratory: Breath sounds are clear bilaterally, No wheezing, rales or rhonchi  Cardiovascular: S1 and S2, regular rate and rhythm, no Murmurs, gallops or rubs  Gastrointestinal: Bowel Sounds present, soft, nontender, nondistended, no guarding, no rebound  Extremities: No peripheral edema  Neurological: nonfocal, encephalopathy resolved      RADIOLOGY: personally visualized

## 2020-09-25 NOTE — PROGRESS NOTE ADULT - SUBJECTIVE AND OBJECTIVE BOX
INTERVAL HISTORY:    64F  With a history of symptomatic hyponatremia  since June of this year at which time serum sodium was 118 associated with altered mental status. Etiology of the hyponatremia unclear and she was readmitted for 4 day history of nausea/emesis/altered mental status. Serum sodium  111. Imaging studies of the CNS were unremarkable. admitted to the  ICU where she was placed on 3% normal saline.  Imaging studies  left upper lobe mediastinal mass, small liver lesions suggestive of metastases ( too small to biopsy) . She underwent attempted mediastinoscopy however because of technical issues the procedure was aborted. Now S/P VATS : Small cell cancer    CT Chest w/ IV Cont (09.17.20 @ 13:43) >  LUNGS AND LARGE AIRWAYS/MEDIASTINUM: 5.3 x 2.7 cm left mass in the left suprahilar region, extending into the mediastinum. Additional mediastinal lymph nodes noted, for example a 1.4 x 1.3 cm right lower paratracheal lymph node (2; 27).  PLEURA: No pleural effusion.  VESSELS: Atherosclerotic changes of the aorta and coronary arteries.  HEART: Heart size is normal. No pericardial effusion.  CHEST WALL AND LOWER NECK: Within normal limits.    ABDOMEN AND PELVIS:  LIVER: Numerous subcentimeter low-density lesions throughout the liver.  BILE DUCTS: Normal caliber.  GALLBLADDER: No visible gallstones. Fundal adenomyomatosis.  SPLEEN: Within normal limits.  PANCREAS: Within normal limits.  ADRENALS: Within normal limits.  KIDNEYS/URETERS: Within normal limits.    BLADDER: Within normal limits.  REPRODUCTIVE ORGANS: Uterus and adnexa within normal limits.    BOWEL: No bowel obstruction. Appendix is normal. Mild colonic diverticulosis.  PERITONEUM: No ascites.  VESSELS: Atherosclerotic changes.  RETROPERITONEUM/LYMPH NODES: No lymphadenopathy.  ABDOMINAL WALL: Within normal limits.  BONES: No lytic or blastic bony lesion. Status post lower cervical spine fusion.    IMPRESSION:  Left suprahilar/mediastinal mass compatible with neoplasm.    Numerous tiny low-density liver lesions raising suspicion for metastatic disease.   MRI recommended.      PAST MEDICAL & SURGICAL HISTORY:  Hyponatremia  Brain aneurysm  Anxiety  Depression      MEDICATIONS  (STANDING):  acetaminophen   Tablet .. 650 milliGRAM(s) Oral every 6 hours  ALBUTerol    90 MICROgram(s) HFA Inhaler 2 Puff(s) Inhalation every 6 hours  clonazePAM  Tablet 0.5 milliGRAM(s) Oral two times a day  demeclocycline 600 milliGRAM(s) Oral two times a day  enoxaparin Injectable 40 milliGRAM(s) SubCutaneous at bedtime  influenza   Vaccine 0.5 milliLiter(s) IntraMuscular once  levETIRAcetam 500 milliGRAM(s) Oral two times a day  lidocaine   Patch 1 Patch Transdermal every 24 hours  senna 2 Tablet(s) Oral at bedtime  sodium chloride 1 Gram(s) Oral every 8 hours    MEDICATIONS  (PRN):  LORazepam   Injectable 0.25 milliGRAM(s) IV Push every 4 hours PRN Anxiety or agitation  melatonin 5 milliGRAM(s) Oral at bedtime PRN Insomnia  morphine  - Injectable 2 milliGRAM(s) IV Push every 4 hours PRN Severe Pain (7 - 10)  ondansetron Injectable 4 milliGRAM(s) IV Push every 6 hours PRN Nausea and/or Vomiting  oxyCODONE    IR 10 milliGRAM(s) Oral every 4 hours PRN Moderate Pain (4 - 6)  polyethylene glycol 3350 17 Gram(s) Oral daily PRN Constipation      Vital Signs Last 24 Hrs  T(C): 36.8 (25 Sep 2020 10:00), Max: 36.8 (24 Sep 2020 17:00)  T(F): 98.2 (25 Sep 2020 10:00), Max: 98.3 (25 Sep 2020 05:00)  HR: 100 (25 Sep 2020 09:00) (80 - 101)  BP: 130/103 (25 Sep 2020 06:00) (102/77 - 167/91)  BP(mean): 109 (25 Sep 2020 06:00) (83 - 125)  RR: 17 (25 Sep 2020 09:00) (12 - 39)  SpO2: 97% (25 Sep 2020 08:38) (91% - 100%)    PHYSICAL EXAM:    GENERAL: NAD,   HEAD:  Atraumatic, Normocephalic  EYES: EOMI, PERRLA, conjunctiva and sclera clear    NECK: Supple, No JVD, Normal thyroid  NERVOUS SYSTEM:    CHEST/LUNG: Clear to percussion bilaterally; No rales, rhonchi, L chest tube  HEART: Regular rate and rhythm;   ABDOMEN: Soft, Nontender.  EXTREMITIES:   edema:-  LYMPH: No lymphadenopathy noted        LABS:                        12.0   11.15 )-----------( 151      ( 25 Sep 2020 06:52 )             36.0     09-25    136  |  101  |  24<H>  ----------------------------<  114<H>  3.7   |  30  |  0.71    Ca    9.5      25 Sep 2020 06:52      PT/INR - ( 23 Sep 2020 11:47 )   PT: 12.6 sec;   INR: 1.09 ratio         PTT - ( 23 Sep 2020 11:47 )  PTT:26.5 sec

## 2020-09-25 NOTE — DISCHARGE NOTE PROVIDER - HOSPITAL COURSE
63 y/o female with depression/anxiety, Gastric Metaplasia resulting in persistent nausea, brain aneurysm, hyponatremia (admission in June for Na 118/AMS) who presents to ED with 2 weeks of Nausea and AMS.  Pt was found severe hyponatremia with metabolic encephalopathy - was difficult to control requiring admission to ICU and IV 3% saline administration. Eventually pt underwent CT chest abdomen and was suspected to have SCLC + liver lesions with paraneoplastic syndrome as a cause of hyponatremia. Underwent biopsy suggestive SCLC and planned to f/u with Dr. Martin for further evaluation and treatment as below. Hyponatremia resolved with demeclocycline and sodium tabs - pt will f/u with Dr. Savage.    Now medically stable for DC as CT removed and lung remains expanded.    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

## 2020-09-25 NOTE — PROGRESS NOTE ADULT - REASON FOR ADMISSION
confusion/N/V

## 2020-09-25 NOTE — PHYSICAL THERAPY INITIAL EVALUATION ADULT - GENERAL OBSERVATIONS, REHAB EVAL
pt rec'd sitting in Bs chair in SDU, eager to amb. spouse present
Pt found supine in bed with primafit, SD/ICU monitors, CT, bilateral venodynes, and bed alarm activated. Pt c/o pain by CT site 5/10.

## 2020-09-25 NOTE — DISCHARGE NOTE PROVIDER - NSDCCPTREATMENT_GEN_ALL_CORE_FT
PRINCIPAL PROCEDURE  Procedure: Video-assisted thoracoscopic surgery (VATS) on left side  Findings and Treatment: FB, L VATS, mediastinal lymph node biopsy  Sponge bathe only for the next 48 hours.  After 48 hours, please removal all tape and dressings.  You may take a shower with soap and water daily, pat dry.  Do not use any ointments or lotions over surgical sites.  Cover sutures with dry gauze and tape as needed for comfort.  Skin glue will become flakey and fall off on its own.  DO NOT SUBMERGE YOUR WOUNDS (no bath, ocean, lake, hot tub).  Please call for a follow up appointment with your primary care medical doctor upon discharge regarding your recent surgery and hospitalization.   Please follow up with your surgeon in 1-2 weeks. Contact information provided. Your remaining stitch will be removed.   NOTIFY YOUR SURGEON IF: You have any bleeding that does not stop, any pus draining from your wound, any fever (over 100.4 F) or chills, shortness of breath, chest pain, or if your pain is not controlled on your discharge pain medications.        SECONDARY PROCEDURE  Procedure: Mediastinoscopy  Findings and Treatment:

## 2020-09-25 NOTE — DISCHARGE NOTE PROVIDER - NSDCCPCAREPLAN_GEN_ALL_CORE_FT
PRINCIPAL DISCHARGE DIAGNOSIS  Diagnosis: Hyponatremia  Assessment and Plan of Treatment:       SECONDARY DISCHARGE DIAGNOSES  Diagnosis: Altered mental status  Assessment and Plan of Treatment:

## 2020-09-28 ENCOUNTER — OUTPATIENT (OUTPATIENT)
Dept: OUTPATIENT SERVICES | Facility: HOSPITAL | Age: 65
LOS: 1 days | End: 2020-09-28
Payer: COMMERCIAL

## 2020-09-28 DIAGNOSIS — Z98.890 OTHER SPECIFIED POSTPROCEDURAL STATES: Chronic | ICD-10-CM

## 2020-09-28 DIAGNOSIS — Z11.59 ENCOUNTER FOR SCREENING FOR OTHER VIRAL DISEASES: ICD-10-CM

## 2020-09-28 PROBLEM — E87.1 HYPO-OSMOLALITY AND HYPONATREMIA: Chronic | Status: ACTIVE | Noted: 2020-09-06

## 2020-09-28 PROCEDURE — U0003: CPT

## 2020-09-29 DIAGNOSIS — Z11.59 ENCOUNTER FOR SCREENING FOR OTHER VIRAL DISEASES: ICD-10-CM

## 2020-09-29 LAB — SARS-COV-2 RNA SPEC QL NAA+PROBE: SIGNIFICANT CHANGE UP

## 2020-09-30 ENCOUNTER — INPATIENT (INPATIENT)
Facility: HOSPITAL | Age: 65
LOS: 2 days | Discharge: HOME HEALTH | DRG: 180 | End: 2020-10-03
Attending: INTERNAL MEDICINE | Admitting: INTERNAL MEDICINE
Payer: COMMERCIAL

## 2020-09-30 VITALS
HEART RATE: 117 BPM | DIASTOLIC BLOOD PRESSURE: 80 MMHG | OXYGEN SATURATION: 97 % | RESPIRATION RATE: 17 BRPM | TEMPERATURE: 98 F | SYSTOLIC BLOOD PRESSURE: 132 MMHG

## 2020-09-30 DIAGNOSIS — Z98.890 OTHER SPECIFIED POSTPROCEDURAL STATES: Chronic | ICD-10-CM

## 2020-09-30 DIAGNOSIS — F41.9 ANXIETY DISORDER, UNSPECIFIED: ICD-10-CM

## 2020-09-30 DIAGNOSIS — C78.7 SECONDARY MALIGNANT NEOPLASM OF LIVER AND INTRAHEPATIC BILE DUCT: ICD-10-CM

## 2020-09-30 DIAGNOSIS — R47.01 APHASIA: ICD-10-CM

## 2020-09-30 DIAGNOSIS — F32.9 MAJOR DEPRESSIVE DISORDER, SINGLE EPISODE, UNSPECIFIED: ICD-10-CM

## 2020-09-30 DIAGNOSIS — D72.829 ELEVATED WHITE BLOOD CELL COUNT, UNSPECIFIED: ICD-10-CM

## 2020-09-30 DIAGNOSIS — C34.90 MALIGNANT NEOPLASM OF UNSPECIFIED PART OF UNSPECIFIED BRONCHUS OR LUNG: ICD-10-CM

## 2020-09-30 DIAGNOSIS — E22.2 SYNDROME OF INAPPROPRIATE SECRETION OF ANTIDIURETIC HORMONE: ICD-10-CM

## 2020-09-30 DIAGNOSIS — R47.1 DYSARTHRIA AND ANARTHRIA: ICD-10-CM

## 2020-09-30 DIAGNOSIS — E83.39 OTHER DISORDERS OF PHOSPHORUS METABOLISM: ICD-10-CM

## 2020-09-30 DIAGNOSIS — G92 TOXIC ENCEPHALOPATHY: ICD-10-CM

## 2020-09-30 DIAGNOSIS — I67.1 CEREBRAL ANEURYSM, NONRUPTURED: ICD-10-CM

## 2020-09-30 DIAGNOSIS — Z79.899 OTHER LONG TERM (CURRENT) DRUG THERAPY: ICD-10-CM

## 2020-09-30 DIAGNOSIS — Z87.891 PERSONAL HISTORY OF NICOTINE DEPENDENCE: ICD-10-CM

## 2020-09-30 DIAGNOSIS — G13.0 PARANEOPLASTIC NEUROMYOPATHY AND NEUROPATHY: ICD-10-CM

## 2020-09-30 DIAGNOSIS — C38.3 MALIGNANT NEOPLASM OF MEDIASTINUM, PART UNSPECIFIED: ICD-10-CM

## 2020-09-30 DIAGNOSIS — K31.89 OTHER DISEASES OF STOMACH AND DUODENUM: ICD-10-CM

## 2020-09-30 DIAGNOSIS — E83.42 HYPOMAGNESEMIA: ICD-10-CM

## 2020-09-30 LAB
ALBUMIN SERPL ELPH-MCNC: 2.4 G/DL — LOW (ref 3.3–5)
ALP SERPL-CCNC: 422 U/L — HIGH (ref 40–120)
ALT FLD-CCNC: 167 U/L — HIGH (ref 12–78)
ANION GAP SERPL CALC-SCNC: 8 MMOL/L — SIGNIFICANT CHANGE UP (ref 5–17)
AST SERPL-CCNC: 201 U/L — HIGH (ref 15–37)
BASOPHILS # BLD AUTO: 0.03 K/UL — SIGNIFICANT CHANGE UP (ref 0–0.2)
BASOPHILS NFR BLD AUTO: 0.3 % — SIGNIFICANT CHANGE UP (ref 0–2)
BILIRUB SERPL-MCNC: 0.6 MG/DL — SIGNIFICANT CHANGE UP (ref 0.2–1.2)
BUN SERPL-MCNC: 28 MG/DL — HIGH (ref 7–23)
CALCIUM SERPL-MCNC: 8.9 MG/DL — SIGNIFICANT CHANGE UP (ref 8.5–10.1)
CHLORIDE SERPL-SCNC: 101 MMOL/L — SIGNIFICANT CHANGE UP (ref 96–108)
CO2 SERPL-SCNC: 30 MMOL/L — SIGNIFICANT CHANGE UP (ref 22–31)
CREAT SERPL-MCNC: 1 MG/DL — SIGNIFICANT CHANGE UP (ref 0.5–1.3)
EOSINOPHIL # BLD AUTO: 0 K/UL — SIGNIFICANT CHANGE UP (ref 0–0.5)
EOSINOPHIL NFR BLD AUTO: 0 % — SIGNIFICANT CHANGE UP (ref 0–6)
GLUCOSE SERPL-MCNC: 111 MG/DL — HIGH (ref 70–99)
HCT VFR BLD CALC: 34.8 % — SIGNIFICANT CHANGE UP (ref 34.5–45)
HGB BLD-MCNC: 11.5 G/DL — SIGNIFICANT CHANGE UP (ref 11.5–15.5)
IMM GRANULOCYTES NFR BLD AUTO: 6.7 % — HIGH (ref 0–1.5)
LYMPHOCYTES # BLD AUTO: 1.88 K/UL — SIGNIFICANT CHANGE UP (ref 1–3.3)
LYMPHOCYTES # BLD AUTO: 20 % — SIGNIFICANT CHANGE UP (ref 13–44)
MAGNESIUM SERPL-MCNC: 2.6 MG/DL — SIGNIFICANT CHANGE UP (ref 1.6–2.6)
MCHC RBC-ENTMCNC: 30.4 PG — SIGNIFICANT CHANGE UP (ref 27–34)
MCHC RBC-ENTMCNC: 33 GM/DL — SIGNIFICANT CHANGE UP (ref 32–36)
MCV RBC AUTO: 92.1 FL — SIGNIFICANT CHANGE UP (ref 80–100)
MONOCYTES # BLD AUTO: 0.85 K/UL — SIGNIFICANT CHANGE UP (ref 0–0.9)
MONOCYTES NFR BLD AUTO: 9 % — SIGNIFICANT CHANGE UP (ref 2–14)
NEUTROPHILS # BLD AUTO: 6.01 K/UL — SIGNIFICANT CHANGE UP (ref 1.8–7.4)
NEUTROPHILS NFR BLD AUTO: 64 % — SIGNIFICANT CHANGE UP (ref 43–77)
PLATELET # BLD AUTO: 154 K/UL — SIGNIFICANT CHANGE UP (ref 150–400)
POTASSIUM SERPL-MCNC: 3.3 MMOL/L — LOW (ref 3.5–5.3)
POTASSIUM SERPL-SCNC: 3.3 MMOL/L — LOW (ref 3.5–5.3)
PROT SERPL-MCNC: 6.8 GM/DL — SIGNIFICANT CHANGE UP (ref 6–8.3)
RBC # BLD: 3.78 M/UL — LOW (ref 3.8–5.2)
RBC # FLD: 13.2 % — SIGNIFICANT CHANGE UP (ref 10.3–14.5)
SODIUM SERPL-SCNC: 139 MMOL/L — SIGNIFICANT CHANGE UP (ref 135–145)
WBC # BLD: 9.4 K/UL — SIGNIFICANT CHANGE UP (ref 3.8–10.5)
WBC # FLD AUTO: 9.4 K/UL — SIGNIFICANT CHANGE UP (ref 3.8–10.5)

## 2020-09-30 PROCEDURE — 87086 URINE CULTURE/COLONY COUNT: CPT

## 2020-09-30 PROCEDURE — C1769: CPT

## 2020-09-30 PROCEDURE — 71045 X-RAY EXAM CHEST 1 VIEW: CPT | Mod: 26

## 2020-09-30 PROCEDURE — 74183 MRI ABD W/O CNTR FLWD CNTR: CPT

## 2020-09-30 PROCEDURE — C1894: CPT

## 2020-09-30 PROCEDURE — 83735 ASSAY OF MAGNESIUM: CPT

## 2020-09-30 PROCEDURE — 84134 ASSAY OF PREALBUMIN: CPT

## 2020-09-30 PROCEDURE — 85730 THROMBOPLASTIN TIME PARTIAL: CPT

## 2020-09-30 PROCEDURE — 97116 GAIT TRAINING THERAPY: CPT | Mod: GP

## 2020-09-30 PROCEDURE — 82248 BILIRUBIN DIRECT: CPT

## 2020-09-30 PROCEDURE — 99223 1ST HOSP IP/OBS HIGH 75: CPT

## 2020-09-30 PROCEDURE — A9579: CPT

## 2020-09-30 PROCEDURE — 80053 COMPREHEN METABOLIC PANEL: CPT

## 2020-09-30 PROCEDURE — 36415 COLL VENOUS BLD VENIPUNCTURE: CPT

## 2020-09-30 PROCEDURE — 82746 ASSAY OF FOLIC ACID SERUM: CPT

## 2020-09-30 PROCEDURE — 83540 ASSAY OF IRON: CPT

## 2020-09-30 PROCEDURE — 77001 FLUOROGUIDE FOR VEIN DEVICE: CPT

## 2020-09-30 PROCEDURE — 84100 ASSAY OF PHOSPHORUS: CPT

## 2020-09-30 PROCEDURE — U0003: CPT

## 2020-09-30 PROCEDURE — 82607 VITAMIN B-12: CPT

## 2020-09-30 PROCEDURE — 84443 ASSAY THYROID STIM HORMONE: CPT

## 2020-09-30 PROCEDURE — C1788: CPT

## 2020-09-30 PROCEDURE — 93010 ELECTROCARDIOGRAM REPORT: CPT

## 2020-09-30 PROCEDURE — 81001 URINALYSIS AUTO W/SCOPE: CPT

## 2020-09-30 PROCEDURE — 82140 ASSAY OF AMMONIA: CPT

## 2020-09-30 PROCEDURE — 97161 PT EVAL LOW COMPLEX 20 MIN: CPT | Mod: GP

## 2020-09-30 PROCEDURE — 76937 US GUIDE VASCULAR ACCESS: CPT

## 2020-09-30 PROCEDURE — 36561 INSERT TUNNELED CV CATH: CPT

## 2020-09-30 PROCEDURE — 84439 ASSAY OF FREE THYROXINE: CPT

## 2020-09-30 PROCEDURE — 85025 COMPLETE CBC W/AUTO DIFF WBC: CPT

## 2020-09-30 PROCEDURE — 83550 IRON BINDING TEST: CPT

## 2020-09-30 PROCEDURE — 85610 PROTHROMBIN TIME: CPT

## 2020-09-30 RX ORDER — ONDANSETRON 8 MG/1
4 TABLET, FILM COATED ORAL ONCE
Refills: 0 | Status: DISCONTINUED | OUTPATIENT
Start: 2020-09-30 | End: 2020-09-30

## 2020-09-30 RX ORDER — SODIUM CHLORIDE 9 MG/ML
1000 INJECTION, SOLUTION INTRAVENOUS
Refills: 0 | Status: COMPLETED | OUTPATIENT
Start: 2020-09-30 | End: 2020-10-01

## 2020-09-30 RX ORDER — POLYETHYLENE GLYCOL 3350 17 G/17G
17 POWDER, FOR SOLUTION ORAL DAILY
Refills: 0 | Status: DISCONTINUED | OUTPATIENT
Start: 2020-09-30 | End: 2020-10-03

## 2020-09-30 RX ORDER — CLONAZEPAM 1 MG
0.5 TABLET ORAL THREE TIMES A DAY
Refills: 0 | Status: DISCONTINUED | OUTPATIENT
Start: 2020-09-30 | End: 2020-10-03

## 2020-09-30 RX ORDER — ONDANSETRON 8 MG/1
8 TABLET, FILM COATED ORAL THREE TIMES A DAY
Refills: 0 | Status: DISCONTINUED | OUTPATIENT
Start: 2020-09-30 | End: 2020-10-03

## 2020-09-30 RX ORDER — SODIUM CHLORIDE 9 MG/ML
1 INJECTION INTRAMUSCULAR; INTRAVENOUS; SUBCUTANEOUS EVERY 8 HOURS
Refills: 0 | Status: DISCONTINUED | OUTPATIENT
Start: 2020-09-30 | End: 2020-10-03

## 2020-09-30 RX ORDER — ALBUTEROL 90 UG/1
2 AEROSOL, METERED ORAL EVERY 6 HOURS
Refills: 0 | Status: DISCONTINUED | OUTPATIENT
Start: 2020-09-30 | End: 2020-10-03

## 2020-09-30 RX ORDER — ENOXAPARIN SODIUM 100 MG/ML
40 INJECTION SUBCUTANEOUS AT BEDTIME
Refills: 0 | Status: DISCONTINUED | OUTPATIENT
Start: 2020-10-01 | End: 2020-10-03

## 2020-09-30 RX ORDER — OXYCODONE HYDROCHLORIDE 5 MG/1
10 TABLET ORAL EVERY 4 HOURS
Refills: 0 | Status: DISCONTINUED | OUTPATIENT
Start: 2020-09-30 | End: 2020-10-03

## 2020-09-30 RX ORDER — DEMECLOCYCLINE HCL 150 MG
600 TABLET ORAL
Refills: 0 | Status: DISCONTINUED | OUTPATIENT
Start: 2020-09-30 | End: 2020-10-03

## 2020-09-30 RX ORDER — SODIUM CHLORIDE 9 MG/ML
1000 INJECTION INTRAMUSCULAR; INTRAVENOUS; SUBCUTANEOUS ONCE
Refills: 0 | Status: COMPLETED | OUTPATIENT
Start: 2020-09-30 | End: 2020-09-30

## 2020-09-30 RX ORDER — ONDANSETRON 8 MG/1
8 TABLET, FILM COATED ORAL THREE TIMES A DAY
Refills: 0 | Status: DISCONTINUED | OUTPATIENT
Start: 2020-09-30 | End: 2020-09-30

## 2020-09-30 RX ORDER — LEVETIRACETAM 250 MG/1
500 TABLET, FILM COATED ORAL
Refills: 0 | Status: DISCONTINUED | OUTPATIENT
Start: 2020-09-30 | End: 2020-10-03

## 2020-09-30 RX ORDER — POTASSIUM CHLORIDE 20 MEQ
40 PACKET (EA) ORAL ONCE
Refills: 0 | Status: COMPLETED | OUTPATIENT
Start: 2020-09-30 | End: 2020-09-30

## 2020-09-30 RX ORDER — SENNA PLUS 8.6 MG/1
2 TABLET ORAL AT BEDTIME
Refills: 0 | Status: DISCONTINUED | OUTPATIENT
Start: 2020-09-30 | End: 2020-10-03

## 2020-09-30 RX ADMIN — OXYCODONE HYDROCHLORIDE 10 MILLIGRAM(S): 5 TABLET ORAL at 21:32

## 2020-09-30 RX ADMIN — Medication 40 MILLIEQUIVALENT(S): at 19:22

## 2020-09-30 RX ADMIN — ALBUTEROL 2 PUFF(S): 90 AEROSOL, METERED ORAL at 22:55

## 2020-09-30 RX ADMIN — SODIUM CHLORIDE 1000 MILLILITER(S): 9 INJECTION INTRAMUSCULAR; INTRAVENOUS; SUBCUTANEOUS at 18:50

## 2020-09-30 RX ADMIN — SODIUM CHLORIDE 1 GRAM(S): 9 INJECTION INTRAMUSCULAR; INTRAVENOUS; SUBCUTANEOUS at 22:55

## 2020-09-30 RX ADMIN — Medication 600 MILLIGRAM(S): at 23:19

## 2020-09-30 RX ADMIN — LEVETIRACETAM 500 MILLIGRAM(S): 250 TABLET, FILM COATED ORAL at 22:55

## 2020-09-30 RX ADMIN — Medication 0.5 MILLIGRAM(S): at 21:34

## 2020-09-30 RX ADMIN — SODIUM CHLORIDE 2000 MILLILITER(S): 9 INJECTION INTRAMUSCULAR; INTRAVENOUS; SUBCUTANEOUS at 17:50

## 2020-09-30 NOTE — ED PROVIDER NOTE - CARE PLAN
Principal Discharge DX:	Small cell lung cancer in adult  Secondary Diagnosis:	Failure to thrive in adult  Secondary Diagnosis:	Dehydration  Secondary Diagnosis:	Hypokalemia

## 2020-09-30 NOTE — H&P ADULT - REASON FOR ADMISSION
Acute progression of chronic disease in setting of SCLC with concern for mets to liver given transaminitis, Failure to thrive

## 2020-09-30 NOTE — ED PROVIDER NOTE - MUSCULOSKELETAL, MLM
Spine appears normal, range of motion is not limited, no muscle or joint tenderness Spine appears normal, range of motion is not limited, no muscle or joint tenderness. COLBY x4.

## 2020-09-30 NOTE — ED PROVIDER NOTE - SKIN, MLM
Skin normal color for race, warm, dry and intact. No evidence of rash. Skin normal color for race, warm, dry and intact. No evidence of rash. no tactile warmth no rash, biopsy site left lateral thorax +small ecchymosis no bleeding, no induration, no evidence of infection. lower biopsy site number 2 suprasternal notch surrounding ecchymosis glue in place, no signs of infection.

## 2020-09-30 NOTE — H&P ADULT - NEGATIVE GASTROINTESTINAL SYMPTOMS
no diarrhea/no hematochezia/no steatorrhea/no hiccoughs/no melena/no jaundice/no flatulence/no vomiting

## 2020-09-30 NOTE — ED PROVIDER NOTE - CLINICAL SUMMARY MEDICAL DECISION MAKING FREE TEXT BOX
64 y/o female with PMHx of newly diagnosed small cell lung CA, hyponatremia, brain aneurysm, anxiety, depression presents to the ED c/o weakness with associated  PO, failure to thrive. Will be admitted to 1N. 64 y/o female, newly diagnosed small cell lung CA, scheduled for port placement tomorrow. Referred per oncology office  for 1N admission for failure to thrive; poor PO intake, weakness. Will be admitted to . admission ekg, chest XR, labs, urine, iv hydration. Potasium low at 3.3 will supplement orally. Admit medical hospitalist to  with palliative care consult.

## 2020-09-30 NOTE — ED STATDOCS - PROGRESS NOTE DETAILS
Rio SCANLON for ED attending, Dr. Linn: 66 y/o female with PMHx of newly diagnosed small cell lung CA, hyponatremia, brain aneurysm, anxiety, depression presents to the ED c/o weakness. Associated  PO. Pt follows with Dr. Mckeon, here for admission. Denies fever. Will send pt to main ED for further evaluation.

## 2020-09-30 NOTE — H&P ADULT - NSHPSOCIALHISTORY_GEN_ALL_CORE
smokes medical marijuana daily  no illicit drug use  no etoh abuse  ADL dependent, using walker and assistance for ambulation  has home health aides

## 2020-09-30 NOTE — ED PROVIDER NOTE - GASTROINTESTINAL, MLM
Abdomen soft, non-tender, no guarding. Abdomen soft, non-tender, no guarding. differed. no cva tenderness. bowel sounds hypoactive, normal pitch.

## 2020-09-30 NOTE — ED PROVIDER NOTE - CONSTITUTIONAL, MLM
normal... Well appearing, awake, alert, oriented to person, place, time/situation and in no apparent distress. elderly appearing female, awake, alert, oriented to person, place, time/situation and in no apparent distress. ill appearing, nontoxic

## 2020-09-30 NOTE — H&P ADULT - ATTENDING COMMENTS
THIS IS AN H&P VIA TELEHEALTH WHICH IS LIMITED DUE TO INABILITY TO PERFORM BEDSIDE PHYSICAL EXAM WHICH WILL BE PERFORMED BY BEDSIDE HOSPITALIST. EKG WILL ALSO NEED TO BE REVIEWED BY HOSPITALIST IN PERSON AS UNABLE TO SEE VIA TELEHEALTH.    REDCAP SURVEY OFFERED AND ACCEPTED.

## 2020-09-30 NOTE — ED ADULT NURSE NOTE - NSIMPLEMENTINTERV_GEN_ALL_ED
Implemented All Universal Safety Interventions:  [place] to call system. Call bell, personal items and telephone within reach. Instruct patient to call for assistance. Room bathroom lighting operational. Non-slip footwear when patient is off stretcher. Physically safe environment: no spills, clutter or unnecessary equipment. Stretcher in lowest position, wheels locked, appropriate side rails in place.

## 2020-09-30 NOTE — ED PROVIDER NOTE - EYES, MLM
Clear bilaterally, pupils equal, round and reactive to light. Clear bilaterally, pupils equal, round and reactive to light. PERLIOMI.

## 2020-09-30 NOTE — ED PROVIDER NOTE - OBJECTIVE STATEMENT
66 y/o female with PMHx of newly diagnosed small cell lung CA, hyponatremia, brain aneurysm, anxiety, depression presents to the ED c/o weakness. Associated  PO. Pt follows with Dr. Mckeon, here for admission. Denies fever. 66 y/o female with PMHx of newly diagnosed small cell lung CA, hyponatremia, brain aneurysm, anxiety, depression presents to the ED c/o weakness, associated  PO intake, failure to thrive. +constipation last BM was today.  Pt was seen here in  on the  and was diagnosed with small cell lung CA. Pt is due to get a port here tomorrow morning, but due to pts symptoms she was told to come in to the ED to get admitted. Pt follows with Dr. Mckeon, here for admission. Denies fever, SOB, cough, no known sick contacts. known drug allergy to Codeine. Recent elevated LFTs pt was advised to avoid Tylenol.

## 2020-09-30 NOTE — ED PROVIDER NOTE - ENMT, MLM
Airway patent, Nasal mucosa clear. Mouth with normal mucosa. Throat has no vesicles, no oropharyngeal exudates and uvula is midline. Airway patent, Nasal mucosa clear. Mouth with normal mucosa. Throat has no vesicles, no oropharyngeal exudates and uvula is midline. oropharynx clear, mucus membranes mild to moderately dry.

## 2020-09-30 NOTE — H&P ADULT - ADDITIONAL PE
PHYSICAL EXAM LIMITED DUE TO TELEHEALTH INTERVIEW, BEDSIDE HOSPITALIST TO COMPLETE FULL PHYSICAL EXAM

## 2020-09-30 NOTE — ED PROVIDER NOTE - PROGRESS NOTE DETAILS
Scribe Jaclyn Casale for attending Dr Berger: hospitalist paged for 1N admission. Phone message left.

## 2020-09-30 NOTE — ED PROVIDER NOTE - CARDIAC, MLM
Normal rate, regular rhythm.  Heart sounds S1, S2.  No murmurs, rubs or gallops. Normal rate, regular rhythm.  Heart sounds S1, S2.  No murmurs, rubs or gallops. normal radial pulses

## 2020-09-30 NOTE — H&P ADULT - ASSESSMENT
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     Intractable pain with failure to thrive due to acute progression of chronic disease in setting of SCLC and transaminitis.   -admit to medicine  -cont current medications: will need to bring tigan from home  -IVF for gentle hydration given anorexia  -Heme/onc consult placed   -lovenox for dvt ppx  -pain control with oxycodone and bowel regimen  -f/u AM CBC, CMP, Mg, Phos, coags  -Regular diet  -Avoid tylenol and other hepatotoxic medicatioins  -zofran/tigan prn n/v-> pt states has been taking as standing doses  -Bedside hospitalist to review EKG and complete physical exam   -NPO after midnight for port placement  -IR consult placed, was to have IR procedure in AM    Hypokalemia  -repleted in ED with KLOR    Hyperglycemia  -monitor on serum glucose    Constipation  -resolved, cont miralax and senna    Protein calorie malnutrition   -Nutrition consult  -prealbumin  -PO intake promoted  -MVN added

## 2020-09-30 NOTE — ED ADULT NURSE NOTE - NS ED NURSE REPORT GIVEN TO FT
[FreeTextEntry1] : no sob, unlimited ex tolerance. cardiac rehab tomorrow.\par saw dr alvarado who felt PE unlikely and has recommended 3 months AC, d/c aug 13th. \par saw neurology for parathesias in hands and feet. MRI of right shoulder showed no brach inury. MRI lumbar spine showed disc prolapse. has been referred neurosurgeon. \par meds: entresto 100 bid, Toprol 125, eloquis, no diuretics. \par 101/61 78 207.\par Last labs 7.12: normal renal funciton. \par increase entresto 200 bid, NP visits 2 and 4 weeks see me 6. increase toprol and add aldactone on furher visits. defer repeat TTE for 2 months after max dose entresto. Start ASA 81 when off eloquis. \par Rell Vila  Soo

## 2020-09-30 NOTE — ED ADULT NURSE NOTE - OBJECTIVE STATEMENT
66 y/o F presents to the ED c/o weakness. Pt was recently diagnosed with small cell carcinoma. Pt to be admitted to the hospital for IR procedure tomorrow and to get a port placed.

## 2020-10-01 LAB
ADD ON TEST-SPECIMEN IN LAB: SIGNIFICANT CHANGE UP
ALBUMIN SERPL ELPH-MCNC: 2.2 G/DL — LOW (ref 3.3–5)
ALP SERPL-CCNC: 388 U/L — HIGH (ref 40–120)
ALT FLD-CCNC: 149 U/L — HIGH (ref 12–78)
ANION GAP SERPL CALC-SCNC: 6 MMOL/L — SIGNIFICANT CHANGE UP (ref 5–17)
APPEARANCE UR: CLEAR — SIGNIFICANT CHANGE UP
APTT BLD: 23.5 SEC — LOW (ref 27.5–35.5)
AST SERPL-CCNC: 158 U/L — HIGH (ref 15–37)
BASOPHILS # BLD AUTO: 0.03 K/UL — SIGNIFICANT CHANGE UP (ref 0–0.2)
BASOPHILS NFR BLD AUTO: 0.4 % — SIGNIFICANT CHANGE UP (ref 0–2)
BILIRUB SERPL-MCNC: 0.7 MG/DL — SIGNIFICANT CHANGE UP (ref 0.2–1.2)
BILIRUB UR-MCNC: NEGATIVE — SIGNIFICANT CHANGE UP
BUN SERPL-MCNC: 25 MG/DL — HIGH (ref 7–23)
CALCIUM SERPL-MCNC: 8.9 MG/DL — SIGNIFICANT CHANGE UP (ref 8.5–10.1)
CHLORIDE SERPL-SCNC: 109 MMOL/L — HIGH (ref 96–108)
CO2 SERPL-SCNC: 28 MMOL/L — SIGNIFICANT CHANGE UP (ref 22–31)
COLOR SPEC: YELLOW — SIGNIFICANT CHANGE UP
CREAT SERPL-MCNC: 0.93 MG/DL — SIGNIFICANT CHANGE UP (ref 0.5–1.3)
DIFF PNL FLD: ABNORMAL
EOSINOPHIL # BLD AUTO: 0 K/UL — SIGNIFICANT CHANGE UP (ref 0–0.5)
EOSINOPHIL NFR BLD AUTO: 0 % — SIGNIFICANT CHANGE UP (ref 0–6)
GLUCOSE SERPL-MCNC: 118 MG/DL — HIGH (ref 70–99)
GLUCOSE UR QL: NEGATIVE MG/DL — SIGNIFICANT CHANGE UP
HCT VFR BLD CALC: 33 % — LOW (ref 34.5–45)
HGB BLD-MCNC: 10.5 G/DL — LOW (ref 11.5–15.5)
IMM GRANULOCYTES NFR BLD AUTO: 7.1 % — HIGH (ref 0–1.5)
INR BLD: 1.17 RATIO — HIGH (ref 0.88–1.16)
KETONES UR-MCNC: NEGATIVE — SIGNIFICANT CHANGE UP
LEUKOCYTE ESTERASE UR-ACNC: NEGATIVE — SIGNIFICANT CHANGE UP
LYMPHOCYTES # BLD AUTO: 1.61 K/UL — SIGNIFICANT CHANGE UP (ref 1–3.3)
LYMPHOCYTES # BLD AUTO: 19.1 % — SIGNIFICANT CHANGE UP (ref 13–44)
MAGNESIUM SERPL-MCNC: 2.6 MG/DL — SIGNIFICANT CHANGE UP (ref 1.6–2.6)
MCHC RBC-ENTMCNC: 29.9 PG — SIGNIFICANT CHANGE UP (ref 27–34)
MCHC RBC-ENTMCNC: 31.8 GM/DL — LOW (ref 32–36)
MCV RBC AUTO: 94 FL — SIGNIFICANT CHANGE UP (ref 80–100)
MONOCYTES # BLD AUTO: 0.72 K/UL — SIGNIFICANT CHANGE UP (ref 0–0.9)
MONOCYTES NFR BLD AUTO: 8.5 % — SIGNIFICANT CHANGE UP (ref 2–14)
NEUTROPHILS # BLD AUTO: 5.47 K/UL — SIGNIFICANT CHANGE UP (ref 1.8–7.4)
NEUTROPHILS NFR BLD AUTO: 64.9 % — SIGNIFICANT CHANGE UP (ref 43–77)
NITRITE UR-MCNC: NEGATIVE — SIGNIFICANT CHANGE UP
PH UR: 7 — SIGNIFICANT CHANGE UP (ref 5–8)
PHOSPHATE SERPL-MCNC: 2.5 MG/DL — SIGNIFICANT CHANGE UP (ref 2.5–4.5)
PLATELET # BLD AUTO: 160 K/UL — SIGNIFICANT CHANGE UP (ref 150–400)
POTASSIUM SERPL-MCNC: 3.6 MMOL/L — SIGNIFICANT CHANGE UP (ref 3.5–5.3)
POTASSIUM SERPL-SCNC: 3.6 MMOL/L — SIGNIFICANT CHANGE UP (ref 3.5–5.3)
PREALB SERPL-MCNC: 12 MG/DL — LOW (ref 20–40)
PROT SERPL-MCNC: 6.4 GM/DL — SIGNIFICANT CHANGE UP (ref 6–8.3)
PROT UR-MCNC: 15 MG/DL
PROTHROM AB SERPL-ACNC: 13.5 SEC — SIGNIFICANT CHANGE UP (ref 10.6–13.6)
RBC # BLD: 3.51 M/UL — LOW (ref 3.8–5.2)
RBC # FLD: 13.3 % — SIGNIFICANT CHANGE UP (ref 10.3–14.5)
SARS-COV-2 IGG SERPL QL IA: NEGATIVE — SIGNIFICANT CHANGE UP
SARS-COV-2 IGM SERPL IA-ACNC: <0.1 INDEX — SIGNIFICANT CHANGE UP
SARS-COV-2 RNA SPEC QL NAA+PROBE: SIGNIFICANT CHANGE UP
SODIUM SERPL-SCNC: 143 MMOL/L — SIGNIFICANT CHANGE UP (ref 135–145)
SP GR SPEC: 1 — LOW (ref 1.01–1.02)
UROBILINOGEN FLD QL: NEGATIVE MG/DL — SIGNIFICANT CHANGE UP
WBC # BLD: 8.43 K/UL — SIGNIFICANT CHANGE UP (ref 3.8–10.5)
WBC # FLD AUTO: 8.43 K/UL — SIGNIFICANT CHANGE UP (ref 3.8–10.5)

## 2020-10-01 PROCEDURE — 76937 US GUIDE VASCULAR ACCESS: CPT | Mod: 26

## 2020-10-01 PROCEDURE — 36561 INSERT TUNNELED CV CATH: CPT

## 2020-10-01 PROCEDURE — 77001 FLUOROGUIDE FOR VEIN DEVICE: CPT | Mod: 26

## 2020-10-01 PROCEDURE — 99233 SBSQ HOSP IP/OBS HIGH 50: CPT

## 2020-10-01 RX ORDER — ONDANSETRON 8 MG/1
8 TABLET, FILM COATED ORAL EVERY 8 HOURS
Refills: 0 | Status: DISCONTINUED | OUTPATIENT
Start: 2020-10-01 | End: 2020-10-02

## 2020-10-01 RX ORDER — LANOLIN ALCOHOL/MO/W.PET/CERES
5 CREAM (GRAM) TOPICAL ONCE
Refills: 0 | Status: COMPLETED | OUTPATIENT
Start: 2020-10-01 | End: 2020-10-01

## 2020-10-01 RX ORDER — ONDANSETRON 8 MG/1
4 TABLET, FILM COATED ORAL EVERY 6 HOURS
Refills: 0 | Status: DISCONTINUED | OUTPATIENT
Start: 2020-10-01 | End: 2020-10-02

## 2020-10-01 RX ORDER — ETOPOSIDE 20 MG/ML
193 VIAL (ML) INTRAVENOUS DAILY
Refills: 0 | Status: DISCONTINUED | OUTPATIENT
Start: 2020-10-01 | End: 2020-10-01

## 2020-10-01 RX ORDER — SODIUM CHLORIDE 9 MG/ML
10 INJECTION INTRAMUSCULAR; INTRAVENOUS; SUBCUTANEOUS
Refills: 0 | Status: DISCONTINUED | OUTPATIENT
Start: 2020-10-01 | End: 2020-10-03

## 2020-10-01 RX ORDER — ONDANSETRON 8 MG/1
4 TABLET, FILM COATED ORAL ONCE
Refills: 0 | Status: DISCONTINUED | OUTPATIENT
Start: 2020-10-01 | End: 2020-10-01

## 2020-10-01 RX ORDER — APREPITANT 80 MG/1
80 CAPSULE ORAL DAILY
Refills: 0 | Status: COMPLETED | OUTPATIENT
Start: 2020-10-02 | End: 2020-10-03

## 2020-10-01 RX ORDER — DEXAMETHASONE 0.5 MG/5ML
12 ELIXIR ORAL ONCE
Refills: 0 | Status: COMPLETED | OUTPATIENT
Start: 2020-10-01 | End: 2020-10-01

## 2020-10-01 RX ORDER — INFLUENZA VIRUS VACCINE 15; 15; 15; 15 UG/.5ML; UG/.5ML; UG/.5ML; UG/.5ML
0.5 SUSPENSION INTRAMUSCULAR ONCE
Refills: 0 | Status: DISCONTINUED | OUTPATIENT
Start: 2020-10-01 | End: 2020-10-03

## 2020-10-01 RX ORDER — FENTANYL CITRATE 50 UG/ML
50 INJECTION INTRAVENOUS
Refills: 0 | Status: DISCONTINUED | OUTPATIENT
Start: 2020-10-01 | End: 2020-10-01

## 2020-10-01 RX ORDER — CHLORHEXIDINE GLUCONATE 213 G/1000ML
1 SOLUTION TOPICAL
Refills: 0 | Status: DISCONTINUED | OUTPATIENT
Start: 2020-10-01 | End: 2020-10-03

## 2020-10-01 RX ORDER — APREPITANT 80 MG/1
125 CAPSULE ORAL ONCE
Refills: 0 | Status: COMPLETED | OUTPATIENT
Start: 2020-10-01 | End: 2020-10-01

## 2020-10-01 RX ORDER — ETOPOSIDE 20 MG/ML
193 VIAL (ML) INTRAVENOUS EVERY 24 HOURS
Refills: 0 | Status: COMPLETED | OUTPATIENT
Start: 2020-10-01 | End: 2020-10-03

## 2020-10-01 RX ORDER — CARBOPLATIN 50 MG
474 VIAL (EA) INTRAVENOUS ONCE
Refills: 0 | Status: COMPLETED | OUTPATIENT
Start: 2020-10-01 | End: 2020-10-01

## 2020-10-01 RX ORDER — FENTANYL CITRATE 50 UG/ML
25 INJECTION INTRAVENOUS
Refills: 0 | Status: DISCONTINUED | OUTPATIENT
Start: 2020-10-01 | End: 2020-10-01

## 2020-10-01 RX ADMIN — ONDANSETRON 8 MILLIGRAM(S): 8 TABLET, FILM COATED ORAL at 13:33

## 2020-10-01 RX ADMIN — ONDANSETRON 4 MILLIGRAM(S): 8 TABLET, FILM COATED ORAL at 18:24

## 2020-10-01 RX ADMIN — APREPITANT 125 MILLIGRAM(S): 80 CAPSULE ORAL at 15:20

## 2020-10-01 RX ADMIN — Medication 106 MILLIGRAM(S): at 15:20

## 2020-10-01 RX ADMIN — SODIUM CHLORIDE 1 GRAM(S): 9 INJECTION INTRAMUSCULAR; INTRAVENOUS; SUBCUTANEOUS at 05:19

## 2020-10-01 RX ADMIN — Medication 0.5 MILLIGRAM(S): at 20:18

## 2020-10-01 RX ADMIN — ONDANSETRON 4 MILLIGRAM(S): 8 TABLET, FILM COATED ORAL at 08:09

## 2020-10-01 RX ADMIN — Medication 509.65 MILLIGRAM(S): at 18:08

## 2020-10-01 RX ADMIN — ONDANSETRON 8 MILLIGRAM(S): 8 TABLET, FILM COATED ORAL at 20:18

## 2020-10-01 RX ADMIN — Medication 5 MILLIGRAM(S): at 20:52

## 2020-10-01 RX ADMIN — SODIUM CHLORIDE 80 MILLILITER(S): 9 INJECTION, SOLUTION INTRAVENOUS at 04:27

## 2020-10-01 RX ADMIN — SODIUM CHLORIDE 1 GRAM(S): 9 INJECTION INTRAMUSCULAR; INTRAVENOUS; SUBCUTANEOUS at 20:19

## 2020-10-01 RX ADMIN — SODIUM CHLORIDE 1 GRAM(S): 9 INJECTION INTRAMUSCULAR; INTRAVENOUS; SUBCUTANEOUS at 13:33

## 2020-10-01 RX ADMIN — Medication 600 MILLIGRAM(S): at 20:19

## 2020-10-01 RX ADMIN — Medication 0.5 MILLIGRAM(S): at 13:33

## 2020-10-01 RX ADMIN — LEVETIRACETAM 500 MILLIGRAM(S): 250 TABLET, FILM COATED ORAL at 20:18

## 2020-10-01 RX ADMIN — OXYCODONE HYDROCHLORIDE 10 MILLIGRAM(S): 5 TABLET ORAL at 15:28

## 2020-10-01 RX ADMIN — ENOXAPARIN SODIUM 40 MILLIGRAM(S): 100 INJECTION SUBCUTANEOUS at 20:19

## 2020-10-01 RX ADMIN — Medication 297.4 MILLIGRAM(S): at 16:56

## 2020-10-01 RX ADMIN — Medication 0.5 MILLIGRAM(S): at 05:19

## 2020-10-01 RX ADMIN — LEVETIRACETAM 500 MILLIGRAM(S): 250 TABLET, FILM COATED ORAL at 05:18

## 2020-10-01 RX ADMIN — CHLORHEXIDINE GLUCONATE 1 APPLICATION(S): 213 SOLUTION TOPICAL at 13:34

## 2020-10-01 RX ADMIN — ONDANSETRON 8 MILLIGRAM(S): 8 TABLET, FILM COATED ORAL at 04:26

## 2020-10-01 RX ADMIN — Medication 600 MILLIGRAM(S): at 05:18

## 2020-10-01 NOTE — CONSULT NOTE ADULT - SUBJECTIVE AND OBJECTIVE BOX
Please see notes from prior hospital admission/  's consultation September 22 and note September 25 for complete history    64-year-old female with long history of tobacco use  Recurrent episodes of hyponatremia , Intermittent mental status changes Since June 2020  Initial imaging of the brain was negative  Recent imaging revealed left upper lobe mass and small liver lesions  September 24, 2020 mediastinal biopsy: Small cell carcinoma    Patient was scheduled to have outpatient chemotherapy  Has become progressively weaker   and is now admitted for evaluation and therapeutic intervention    Review of systems  NauseaPain,Partially alleviated by Tylenol marijuana oxycodone  Weakness anorexia decreased p.o. intake    Past medical history depression anxiety brain aneurysm    Social history previously worked as a LPN in [place]  Long history tobacco use    Physical examination fatigued looking weak 65-year-old female looking older than her stated age  HEENT normocephalic  Neck supple  Lungs clear decreased breath sounds bilaterally   Abdomen soft nontender  Extremities without edema  Lymph nodes nonpalpable    CBC Full  -  ( 01 Oct 2020 08:11 )  WBC Count : 8.43 K/uL  RBC Count : 3.51 M/uL  Hemoglobin : 10.5 g/dL  Hematocrit : 33.0 %  Platelet Count - Automated : 160 K/uL  Mean Cell Volume : 94.0 fl  Mean Cell Hemoglobin : 29.9 pg  Mean Cell Hemoglobin Concentration : 31.8 gm/dL  Auto Neutrophil # : 5.47 K/uL  Auto Lymphocyte # : 1.61 K/uL  Auto Monocyte # : 0.72 K/uL  Auto Eosinophil # : 0.00 K/uL  Auto Basophil # : 0.03 K/uL  Auto Neutrophil % : 64.9 %  Auto Lymphocyte % : 19.1 %  Auto Monocyte % : 8.5 %  Auto Eosinophil % : 0.0 %  Auto Basophil % : 0.4 %      10-01    143  |  109<H>  |  25<H>  ----------------------------<  118<H>  3.6   |  28  |  0.93    Ca    8.9      01 Oct 2020 08:11  Phos  2.5     10-01  Mg     2.6     10-01    TPro  6.4  /  Alb  2.2<L>  /  TBili  0.7  /  DBili  x   /  AST  158<H>  /  ALT  149<H>  /  AlkPhos  388<H>  10-01          PT/INR - ( 01 Oct 2020 08:11 )   PT: 13.5 sec;   INR: 1.17 ratio         PTT - ( 01 Oct 2020 08:11 )  PTT:23.5 sec    Vital Signs Last 24 Hrs  T(C): 36.4 (01 Oct 2020 15:43), Max: 37.4 (01 Oct 2020 00:36)  T(F): 97.6 (01 Oct 2020 15:43), Max: 99.3 (01 Oct 2020 00:36)  HR: 95 (01 Oct 2020 15:43) (95 - 111)  BP: 155/84 (01 Oct 2020 15:43) (136/74 - 157/82)  BP(mean): 95 (01 Oct 2020 03:27) (95 - 104)  RR: 18 (01 Oct 2020 15:43) (16 - 24)  SpO2: 94% (01 Oct 2020 15:43) (94% - 100%)

## 2020-10-01 NOTE — DIETITIAN INITIAL EVALUATION ADULT. - NUTRITIONGOAL OUTCOME1
pt meet >80% of estimated nutr needs with tolerance as diet is advanced Pt will consume and tolerate > 80% nutritional needs

## 2020-10-01 NOTE — PATIENT PROFILE ADULT - FALL HARM RISK
coagulation(Bleeding disorder R/T clinical cond/anti-coags)/other coagulation(Bleeding disorder R/T clinical cond/anti-coags)

## 2020-10-01 NOTE — DIETITIAN INITIAL EVALUATION ADULT. - ADD RECOMMEND
1) advance diet as tolerated to consistent carb and monitor for tolerance and intake adequacy (add glucerna prn) 2) add MVI with minerals daily to ensure 100% RDI met 3) consider checking vitamin D level 4) daily wt checks to track./trend changes Record PO intake in EMR after each meal (nursing.) supplements as above.  Bowel regimen PRN.  Monitor PO intake, tolerance, labs and weight.

## 2020-10-01 NOTE — DIETITIAN INITIAL EVALUATION ADULT. - MALNUTRITION
Pt meets criteria for severe protein-calorie malnutrition in context of chronic disease.  PO intake < 75% nutritional needs > one month.  Wt loss of 20% of UBW in last year.  NFPE reveals moderate muscle wasting  (temples, shoulders, clavicles, scapula, interosseus, thighs, calves) moderate/severe fat wasting (triceps). Pt meets criteria for severe protein-calorie malnutrition in context of chronic disease

## 2020-10-01 NOTE — DIETITIAN INITIAL EVALUATION ADULT. - NAME AND PHONE
Ann Dee MA, RDN, CDN, [facility]  [phone] (office number)  [phone] (pager number) Ann-Marie Vásquez RDN, CDN, CDE   [phone]   [email or web address]

## 2020-10-01 NOTE — PROGRESS NOTE ADULT - SUBJECTIVE AND OBJECTIVE BOX
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 Pt prior to last admission was ADL independent , since discharge was usually ambulating with walker but less so over this time, used to be able to walk to bathroom now using bedside commode, unable to tolerate food because of nausea and is on medical marijuana which she has been smoking despite d/o lung ca for pain and appetite. Of note, pt with elevated liver enzymes noted in office yesterday. Pt had been taking tylenol around the clock for pain which she describes as constant, diffuse, cannot quantify on scale, alleviated by tylenol/marijuana/oxycodone only partially . Labs were performed to f/u on sodium levels post discharge. Pt was to have port placed in AM for chemo as outpatient at  with IR. Due to weakness, transaminitis, and anorexia, pt advised to go to ED by oncology for evaluation and admitted for intractable pain with failure to thrive due to acute progression of chronic disease in setting of SCLC and transaminitis.  (30 Sep 2020 21:18)         Patient seen and examined at bedside earlier today,     Review of system- Rest of the review of system are negative except mentioned in HPI    OBJECTIVE:   T(C): 36.4 (10-01-20 @ 15:43), Max: 37.4 (10-01-20 @ 00:36)  HR: 95 (10-01-20 @ 15:43) (95 - 111)  BP: 155/84 (10-01-20 @ 15:43) (136/74 - 157/82)  RR: 18 (10-01-20 @ 15:43) (16 - 24)  SpO2: 94% (10-01-20 @ 15:43) (94% - 100%)  Wt(kg): --  Daily Height in cm: 165.1 (01 Oct 2020 13:37)    Daily Weight in k.7 (01 Oct 2020 13:37)  CAPILLARY BLOOD GLUCOSE        PHYSICAL EXAM:  GENERAL: NAD  NERVOUS SYSTEM:  Alert & Oriented X2, non- focal exam,  Motor Strength 5/5 B/L upper and lower extremities; DTRs 2+ intact and symmetric  HEAD:  Atraumatic, Normocephalic  EYES: EOMI, PERRLA, conjunctiva and sclera clear  HEENT: Moist mucous membranes, Right mediport  NECK: Supple, No JVD  CHEST/LUNG: Clear to auscultation bilaterally; No rales, no rhonchi, no wheezing  HEART: Regular rate and rhythm; No murmurs, no rubs or gallops  ABDOMEN: Soft, Nontender, Nondistended; Bowel sounds present  GENITOURINARY- Voiding, no suprapubic tenderness  EXTREMITIES:  2+ Peripheral Pulses, No clubbing, cyanosis,   edema  MUSCULOSKELETAL:- No muscle tenderness, Muscle tone normal, No joint tenderness, no Joint swelling,  Joint ROM -normal  SKIN-no rash, no lesion    LABS: all reviewed                        10.5   8.43  )-----------( 160      ( 01 Oct 2020 08:11 )             33.0     10-    143  |  109<H>  |  25<H>  ----------------------------<  118<H>  3.6   |  28  |  0.93    Ca    8.9      01 Oct 2020 08:11  Phos  2.5     10-  Mg     2.6     10-    TPro  6.4  /  Alb  2.2<L>  /  TBili  0.7  /  DBili  x   /  AST  158<H>  /  ALT  149<H>  /  AlkPhos  388<H>  10    PT/INR - ( 01 Oct 2020 08:11 )   PT: 13.5 sec;   INR: 1.17 ratio         PTT - ( 01 Oct 2020 08:11 )  PTT:23.5 sec      RECENT CULTURES:    RADIOLOGY & ADDITIONAL TESTS: all reviewed   < from: IR Procedure (10.01.20 @ 12:01) >  IMPRESSION:    Insertion of right-sided power-injectable single-lumen tunneled chest port, with catheter tip in the expected location of the cavoatrial junction.      < end of copied text >  < from: Xray Chest 1 View- PORTABLE-Urgent (20 @ 17:44) >  INTERPRETATION:  AP erect chest on 2020 at 5:35 PM. Patient has weakness. Covid virus test was negative on . Patient has altered mental status, patient has small cell lung cancer and hyponatremia. Patient has a brain aneurysm.    Heart suggest normal size.    Left hilar prominence elevated left hemidiaphragm again noted.    No peripheral infiltrates are seen.    Extensive cervical spine hardware again noted.    Chest is similar to .    IMPRESSION: Unchanged chest as above. No sense of acute infiltrate.        < end of copied text >            Current medications:  ALBUTerol    90 MICROgram(s) HFA Inhaler 2 Puff(s) Inhalation every 6 hours PRN  chlorhexidine 4% Liquid 1 Application(s) Topical <User Schedule>  clonazePAM  Tablet 0.5 milliGRAM(s) Oral three times a day  demeclocycline 600 milliGRAM(s) Oral two times a day  enoxaparin Injectable 40 milliGRAM(s) SubCutaneous at bedtime  etoposide (TOPOSAR) IVPB (eMAR) 193 milliGRAM(s) IV Intermittent every 24 hours  influenza   Vaccine 0.5 milliLiter(s) IntraMuscular once  levETIRAcetam 500 milliGRAM(s) Oral two times a day  multivitamin 1 Tablet(s) Oral daily  ondansetron   Disintegrating Tablet 8 milliGRAM(s) Oral three times a day  ondansetron   Disintegrating Tablet 8 milliGRAM(s) Oral every 8 hours PRN  ondansetron Injectable 4 milliGRAM(s) IV Push every 6 hours PRN  oxyCODONE    IR 10 milliGRAM(s) Oral every 4 hours PRN  polyethylene glycol 3350 17 Gram(s) Oral daily  senna 2 Tablet(s) Oral at bedtime  sodium chloride 1 Gram(s) Oral every 8 hours  sodium chloride 0.9% lock flush 10 milliLiter(s) IV Push every 1 hour PRN

## 2020-10-01 NOTE — DIETITIAN INITIAL EVALUATION ADULT. - OTHER INFO
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   Intractable pain with failure to thrive due to acute progression of chronic disease in setting of SCLC and transaminitis.   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   Intractable pain with failure to thrive due to acute progression of chronic disease in setting of SCLC and transaminitis.   -admit to medicine  -cont current medications: will need to bring tigan from home  -IVF for gentle hydration given anorexia  bowel regimen  Pt is lethargic, seems to be an effort to answer questions.  Pt reports decreased appetite.  Wt loss since last visit on 9/7 - 5 kg.  PO intake fair/poor.

## 2020-10-01 NOTE — DIETITIAN INITIAL EVALUATION ADULT. - PERTINENT LABORATORY DATA
09-30 Na139 mmol/L Glu 111 mg/dL<H> K+ 3.3 mmol/L<L> Cr  1.00 mg/dL BUN 28 mg/dL<H> Phos n/a   Alb 2.4 g/dL<L> PAB n/a

## 2020-10-01 NOTE — PROGRESS NOTE ADULT - NUTRITIONAL ASSESSMENT
This patient has been assessed with a concern for Malnutrition and has been determined to have a diagnosis/diagnoses of Severe protein-calorie malnutrition.    This patient is being managed with:   Diet Regular-  Entered: Sep 30 2020  9:10PM

## 2020-10-01 NOTE — CHART NOTE - NSCHARTNOTEFT_GEN_A_CORE
Pt meets criteria for severe protein-calorie malnutrition in context of chronic disease.    PO intake < 75% nutritional needs > one month.    Wt loss of 20% of UBW in last year.   NFPE reveals moderate muscle wasting  (temples, shoulders, clavicles, scapula, interosseus, thighs, calves)   moderate/severe fat wasting (triceps).

## 2020-10-01 NOTE — CHART NOTE - FINDINGS AS BASED ON:
Comprehensive nutrition assessment and consultation/Food acceptance and intake status from observations by staff

## 2020-10-01 NOTE — CHART NOTE - TREATMENT: THE FOLLOWING DIET HAS BEEN RECOMMENDED
Diet, NPO after Midnight:      NPO Start Date: 30-Sep-2020,   NPO Start Time: 23:59  Except Medications (09-30-20 @ 21:12) [Active]  Diet, Regular (09-30-20 @ 21:12) [Active]  Add Ensure Enlive 8 oz tid  add Gelatein bid  Record PO intake in EMR after each meal (nursing.)

## 2020-10-01 NOTE — PROGRESS NOTE ADULT - ASSESSMENT
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     Intractable pain with failure to thrive due to acute progression of chronic disease  SCLC  with liver lesions, transaminitis. s/p VATS   -cont current medications: will need to bring tigan from home  -IVF for gentle hydration given anorexia  -Heme/onc consult placed   -pain control with oxycodone and bowel regimen  -Avoid tylenol and other hepatotoxic medicatioins  -zofran/tigan prn n/v-> pt states has been taking as standing doses  -s/p mediport placement   - MRI of the liver for better evaluation  - oncology consult- chemo as per onco team  SIADH - c/w salt tabs , demeclocycline  Hypokalemia - replace, monitor, check Mg  Constipation - cont  senna, miralax  Depression - c/w Clonazepam  Severe Protein calorie malnutrition   -Nutrition consult, prealbumin, supplements    DVt proph - lovenox  Advanced directives - full code  Stacey - [phone]    Dispo - chemo as per onco, PT evaluation

## 2020-10-01 NOTE — DIETITIAN INITIAL EVALUATION ADULT. - PERTINENT MEDS FT
MEDICATIONS  (STANDING):  clonazePAM  Tablet 0.5 milliGRAM(s) Oral three times a day  demeclocycline 600 milliGRAM(s) Oral two times a day  enoxaparin Injectable 40 milliGRAM(s) SubCutaneous at bedtime  influenza   Vaccine 0.5 milliLiter(s) IntraMuscular once  levETIRAcetam 500 milliGRAM(s) Oral two times a day  multivitamin 1 Tablet(s) Oral daily  ondansetron   Disintegrating Tablet 8 milliGRAM(s) Oral three times a day  polyethylene glycol 3350 17 Gram(s) Oral daily  senna 2 Tablet(s) Oral at bedtime  sodium chloride 1 Gram(s) Oral every 8 hours    MEDICATIONS  (PRN):  ALBUTerol    90 MICROgram(s) HFA Inhaler 2 Puff(s) Inhalation every 6 hours PRN Shortness of Breath and/or Wheezing  oxyCODONE    IR 10 milliGRAM(s) Oral every 4 hours PRN Moderate Pain (4 - 6)

## 2020-10-01 NOTE — DIETITIAN INITIAL EVALUATION ADULT. - ENERGY NEEDS
Ht.    65  "        Wt.   78     kg               BMI   28               IBW   67    kg               Pt is at  139   %  IBW

## 2020-10-02 ENCOUNTER — TRANSCRIPTION ENCOUNTER (OUTPATIENT)
Age: 65
End: 2020-10-02

## 2020-10-02 DIAGNOSIS — F43.10 POST-TRAUMATIC STRESS DISORDER, UNSPECIFIED: ICD-10-CM

## 2020-10-02 DIAGNOSIS — F33.40 MAJOR DEPRESSIVE DISORDER, RECURRENT, IN REMISSION, UNSPECIFIED: ICD-10-CM

## 2020-10-02 LAB
AMMONIA BLD-MCNC: 32 UMOL/L — SIGNIFICANT CHANGE UP (ref 11–32)
APPEARANCE UR: CLEAR — SIGNIFICANT CHANGE UP
BASOPHILS # BLD AUTO: 0.02 K/UL — SIGNIFICANT CHANGE UP (ref 0–0.2)
BASOPHILS NFR BLD AUTO: 0.3 % — SIGNIFICANT CHANGE UP (ref 0–2)
BILIRUB UR-MCNC: NEGATIVE — SIGNIFICANT CHANGE UP
COLOR SPEC: YELLOW — SIGNIFICANT CHANGE UP
DIFF PNL FLD: NEGATIVE — SIGNIFICANT CHANGE UP
EOSINOPHIL # BLD AUTO: 0 K/UL — SIGNIFICANT CHANGE UP (ref 0–0.5)
EOSINOPHIL NFR BLD AUTO: 0 % — SIGNIFICANT CHANGE UP (ref 0–6)
GLUCOSE UR QL: NEGATIVE MG/DL — SIGNIFICANT CHANGE UP
HCT VFR BLD CALC: 33 % — LOW (ref 34.5–45)
HGB BLD-MCNC: 10.7 G/DL — LOW (ref 11.5–15.5)
IMM GRANULOCYTES NFR BLD AUTO: 7.8 % — HIGH (ref 0–1.5)
KETONES UR-MCNC: NEGATIVE — SIGNIFICANT CHANGE UP
LEUKOCYTE ESTERASE UR-ACNC: NEGATIVE — SIGNIFICANT CHANGE UP
LYMPHOCYTES # BLD AUTO: 1.24 K/UL — SIGNIFICANT CHANGE UP (ref 1–3.3)
LYMPHOCYTES # BLD AUTO: 15.8 % — SIGNIFICANT CHANGE UP (ref 13–44)
MCHC RBC-ENTMCNC: 30.3 PG — SIGNIFICANT CHANGE UP (ref 27–34)
MCHC RBC-ENTMCNC: 32.4 GM/DL — SIGNIFICANT CHANGE UP (ref 32–36)
MCV RBC AUTO: 93.5 FL — SIGNIFICANT CHANGE UP (ref 80–100)
MONOCYTES # BLD AUTO: 0.28 K/UL — SIGNIFICANT CHANGE UP (ref 0–0.9)
MONOCYTES NFR BLD AUTO: 3.6 % — SIGNIFICANT CHANGE UP (ref 2–14)
NEUTROPHILS # BLD AUTO: 5.69 K/UL — SIGNIFICANT CHANGE UP (ref 1.8–7.4)
NEUTROPHILS NFR BLD AUTO: 72.5 % — SIGNIFICANT CHANGE UP (ref 43–77)
NITRITE UR-MCNC: NEGATIVE — SIGNIFICANT CHANGE UP
PH UR: 6 — SIGNIFICANT CHANGE UP (ref 5–8)
PLATELET # BLD AUTO: 163 K/UL — SIGNIFICANT CHANGE UP (ref 150–400)
PROT UR-MCNC: 30 MG/DL
RBC # BLD: 3.53 M/UL — LOW (ref 3.8–5.2)
RBC # FLD: 13.3 % — SIGNIFICANT CHANGE UP (ref 10.3–14.5)
SP GR SPEC: 1.01 — SIGNIFICANT CHANGE UP (ref 1.01–1.02)
T4 FREE SERPL-MCNC: 1.43 NG/DL — SIGNIFICANT CHANGE UP (ref 0.76–1.46)
UROBILINOGEN FLD QL: NEGATIVE MG/DL — SIGNIFICANT CHANGE UP
WBC # BLD: 7.84 K/UL — SIGNIFICANT CHANGE UP (ref 3.8–10.5)
WBC # FLD AUTO: 7.84 K/UL — SIGNIFICANT CHANGE UP (ref 3.8–10.5)

## 2020-10-02 PROCEDURE — 99233 SBSQ HOSP IP/OBS HIGH 50: CPT

## 2020-10-02 PROCEDURE — 90792 PSYCH DIAG EVAL W/MED SRVCS: CPT

## 2020-10-02 PROCEDURE — 74183 MRI ABD W/O CNTR FLWD CNTR: CPT | Mod: 26

## 2020-10-02 RX ORDER — SODIUM CHLORIDE 9 MG/ML
1000 INJECTION, SOLUTION INTRAVENOUS
Refills: 0 | Status: DISCONTINUED | OUTPATIENT
Start: 2020-10-02 | End: 2020-10-03

## 2020-10-02 RX ORDER — ACETAMINOPHEN 500 MG
650 TABLET ORAL EVERY 6 HOURS
Refills: 0 | Status: DISCONTINUED | OUTPATIENT
Start: 2020-10-02 | End: 2020-10-03

## 2020-10-02 RX ORDER — MIRTAZAPINE 45 MG/1
7.5 TABLET, ORALLY DISINTEGRATING ORAL AT BEDTIME
Refills: 0 | Status: COMPLETED | OUTPATIENT
Start: 2020-10-02 | End: 2020-10-02

## 2020-10-02 RX ADMIN — ONDANSETRON 8 MILLIGRAM(S): 8 TABLET, FILM COATED ORAL at 22:14

## 2020-10-02 RX ADMIN — Medication 0.5 MILLIGRAM(S): at 15:01

## 2020-10-02 RX ADMIN — Medication 509.65 MILLIGRAM(S): at 13:17

## 2020-10-02 RX ADMIN — Medication 1 TABLET(S): at 11:21

## 2020-10-02 RX ADMIN — LEVETIRACETAM 500 MILLIGRAM(S): 250 TABLET, FILM COATED ORAL at 22:14

## 2020-10-02 RX ADMIN — POLYETHYLENE GLYCOL 3350 17 GRAM(S): 17 POWDER, FOR SOLUTION ORAL at 11:21

## 2020-10-02 RX ADMIN — Medication 0.5 MILLIGRAM(S): at 22:14

## 2020-10-02 RX ADMIN — SODIUM CHLORIDE 75 MILLILITER(S): 9 INJECTION, SOLUTION INTRAVENOUS at 18:53

## 2020-10-02 RX ADMIN — SODIUM CHLORIDE 1 GRAM(S): 9 INJECTION INTRAMUSCULAR; INTRAVENOUS; SUBCUTANEOUS at 15:01

## 2020-10-02 RX ADMIN — Medication 650 MILLIGRAM(S): at 13:04

## 2020-10-02 RX ADMIN — Medication 600 MILLIGRAM(S): at 11:21

## 2020-10-02 RX ADMIN — APREPITANT 80 MILLIGRAM(S): 80 CAPSULE ORAL at 11:21

## 2020-10-02 RX ADMIN — ONDANSETRON 8 MILLIGRAM(S): 8 TABLET, FILM COATED ORAL at 15:01

## 2020-10-02 RX ADMIN — SODIUM CHLORIDE 1 GRAM(S): 9 INJECTION INTRAMUSCULAR; INTRAVENOUS; SUBCUTANEOUS at 22:15

## 2020-10-02 RX ADMIN — MIRTAZAPINE 7.5 MILLIGRAM(S): 45 TABLET, ORALLY DISINTEGRATING ORAL at 22:15

## 2020-10-02 RX ADMIN — OXYCODONE HYDROCHLORIDE 10 MILLIGRAM(S): 5 TABLET ORAL at 01:05

## 2020-10-02 RX ADMIN — ONDANSETRON 8 MILLIGRAM(S): 8 TABLET, FILM COATED ORAL at 06:03

## 2020-10-02 RX ADMIN — LEVETIRACETAM 500 MILLIGRAM(S): 250 TABLET, FILM COATED ORAL at 11:21

## 2020-10-02 RX ADMIN — SODIUM CHLORIDE 1 GRAM(S): 9 INJECTION INTRAMUSCULAR; INTRAVENOUS; SUBCUTANEOUS at 06:02

## 2020-10-02 RX ADMIN — Medication 0.5 MILLIGRAM(S): at 06:03

## 2020-10-02 RX ADMIN — ENOXAPARIN SODIUM 40 MILLIGRAM(S): 100 INJECTION SUBCUTANEOUS at 22:14

## 2020-10-02 RX ADMIN — Medication 600 MILLIGRAM(S): at 22:15

## 2020-10-02 RX ADMIN — ONDANSETRON 4 MILLIGRAM(S): 8 TABLET, FILM COATED ORAL at 01:49

## 2020-10-02 NOTE — BEHAVIORAL HEALTH ASSESSMENT NOTE - NSBHCHARTREVIEWINVESTIGATE_PSY_A_CORE FT
Ventricular Rate 97 BPM    Atrial Rate 97 BPM    P-R Interval 124 ms    QRS Duration 68 ms    Q-T Interval 352 ms    QTC Calculation(Bazett) 447 ms    P Axis 59 degrees    R Axis 56 degrees    T Axis 48 degrees    Diagnosis Line Normal sinus rhythm  Possible Left atrial enlargement  Nonspecific ST abnormality  Abnormal ECG  When compared with ECG of 06-SEP-2020 17:41,

## 2020-10-02 NOTE — PROGRESS NOTE ADULT - ASSESSMENT
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

## 2020-10-02 NOTE — PROGRESS NOTE ADULT - SUBJECTIVE AND OBJECTIVE BOX
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 Pt prior to last admission was ADL independent , since discharge was usually ambulating with walker but less so over this time, used to be able to walk to bathroom now using bedside commode, unable to tolerate food because of nausea and is on medical marijuana which she has been smoking despite d/o lung ca for pain and appetite. Of note, pt with elevated liver enzymes noted in office yesterday. Pt had been taking tylenol around the clock for pain which she describes as constant, diffuse, cannot quantify on scale, alleviated by tylenol/marijuana/oxycodone only partially . Labs were performed to f/u on sodium levels post discharge. Pt was to have port placed in AM for chemo as outpatient at  with IR. Due to weakness, transaminitis, and anorexia, pt advised to go to ED by oncology for evaluation and admitted for intractable pain with failure to thrive due to acute progression of chronic disease in setting of SCLC and transaminitis.     10/1 -  Patient seen and examined at bedside earlier today, feels well , denies cp, dyspnea, abdominal pain  10/2 - pt seen and examined, afebrile, eager to go home, denies cp, dyspnea, POC discussed     Review of system- Rest of the review of system are negative except mentioned in HPI    OBJECTIVE:   T(C): 36.3 (10-02-20 @ 13:21), Max: 36.4 (10-01-20 @ 15:43)  T(F): 97.4 (10-02-20 @ 13:21), Max: 97.6 (10-01-20 @ 15:43)  HR: 91 (10-02-20 @ 13:21) (91 - 112)  BP: 172/80 (10-02-20 @ 13:35) (148/80 - 172/80)  RR: 18 (10-02-20 @ 13:21) (18 - 20)  SpO2: 91% (10-02-20 @ 13:21) (91% - 98%)  Wt(kg): --  Daily Height in cm: 165.1 (01 Oct 2020 13:37)    Daily Weight in k.7 (01 Oct 2020 13:37)  CAPILLARY BLOOD GLUCOSE        PHYSICAL EXAM:  GENERAL: NAD  NERVOUS SYSTEM:  Alert & Oriented X2, non- focal exam,  Motor Strength 5/5 B/L upper and lower extremities; DTRs 2+ intact and symmetric  HEAD:  Atraumatic, Normocephalic  EYES: EOMI, PERRLA, conjunctiva and sclera clear  HEENT: Moist mucous membranes, Right mediport  NECK: Supple, No JVD  CHEST/LUNG: Clear to auscultation bilaterally; No rales, no rhonchi, no wheezing  HEART: Regular rate and rhythm; No murmurs, no rubs or gallops  ABDOMEN: Soft, Nontender, Nondistended; Bowel sounds present  GENITOURINARY- Voiding, no suprapubic tenderness  EXTREMITIES:  2+ Peripheral Pulses, No clubbing, cyanosis,   edema  MUSCULOSKELETAL:- No muscle tenderness, Muscle tone normal, No joint tenderness, no Joint swelling,  Joint ROM -normal  SKIN-no rash, no lesion    LABS: all reviewed  10-02    139  |  105  |  28<H>  ----------------------------<  184<H>  3.8   |  24  |  1.03    Ca    9.3      02 Oct 2020 06:30  Phos  2.5     10-  Mg     2.6     10    TPro  7.0  /  Alb  2.4<L>  /  TBili  0.7  /  DBili  0.3<H>  /  AST  162<H>  /  ALT  164<H>  /  AlkPhos  486<H>  10-02                            10.7   7.84  )-----------( 163      ( 02 Oct 2020 06:30 )             33.0       LIVER FUNCTIONS - ( 02 Oct 2020 06:30 )  Alb: 2.4 g/dL / Pro: 7.0 gm/dL / ALK PHOS: 486 U/L / ALT: 164 U/L / AST: 162 U/L / GGT: x             PT/INR - ( 01 Oct 2020 08:11 )   PT: 13.5 sec;   INR: 1.17 ratio         PTT - ( 01 Oct 2020 08:11 )  PTT:23.5 sec    Urinalysis Basic - ( 02 Oct 2020 05:18 )    Color: Yellow / Appearance: Clear / S.015 / pH: x  Gluc: x / Ketone: Negative  / Bili: Negative / Urobili: Negative mg/dL   Blood: x / Protein: 30 mg/dL / Nitrite: Negative   Leuk Esterase: Negative / RBC: 0-2 /HPF / WBC Negative   Sq Epi: x / Non Sq Epi: Few / Bacteria: Negative                            10.5   8.43  )-----------( 160      ( 01 Oct 2020 08:11 )             33.0     10-    143  |  109<H>  |  25<H>  ----------------------------<  118<H>  3.6   |  28  |  0.93    Ca    8.9      01 Oct 2020 08:11  Phos  2.5     10-  Mg     2.6     10-    TPro  6.4  /  Alb  2.2<L>  /  TBili  0.7  /  DBili  x   /  AST  158<H>  /  ALT  149<H>  /  AlkPhos  388<H>  10-01    PT/INR - ( 01 Oct 2020 08:11 )   PT: 13.5 sec;   INR: 1.17 ratio         PTT - ( 01 Oct 2020 08:11 )  PTT:23.5 sec      RECENT CULTURES:    RADIOLOGY & ADDITIONAL TESTS: all reviewed   < from: IR Procedure (10.01.20 @ 12:01) >  IMPRESSION:    Insertion of right-sided power-injectable single-lumen tunneled chest port, with catheter tip in the expected location of the cavoatrial junction.      < end of copied text >  < from: Xray Chest 1 View- PORTABLE-Urgent (20 @ 17:44) >  INTERPRETATION:  AP erect chest on 2020 at 5:35 PM. Patient has weakness. Covid virus test was negative on . Patient has altered mental status, patient has small cell lung cancer and hyponatremia. Patient has a brain aneurysm.    Heart suggest normal size.    Left hilar prominence elevated left hemidiaphragm again noted.    No peripheral infiltrates are seen.    Extensive cervical spine hardware again noted.    Chest is similar to September 25.    IMPRESSION: Unchanged chest as above. No sense of acute infiltrate.        < end of copied text >    < from: MR Abdomen w/wo IV Cont (10.02.20 @ 10:51) >  FINDINGS:  LOWER CHEST: Within normal limits.    LIVER: Innumerable T2 bright lesions throughout the liverwith diffusion restriction. Steatosis.  BILE DUCTS: Normal caliber.  GALLBLADDER: Within normal limits.  SPLEEN: Within normal limits.  PANCREAS: Within normal limits.  ADRENALS: Within normal limits.  KIDNEYS/URETERS: Within normal limits.    VISUALIZED PORTIONS:  BOWEL: Within normal limits.  PERITONEUM: No ascites.  VESSELS: Within normal limits.  RETROPERITONEUM/LYMPH NODES: No lymphadenopathy.  ABDOMINAL WALL: Within normal limits.  BONES: Heterogeneous enhancement and diffusion restriction throughout the spine and ribs.    IMPRESSION:  Innumerable liver lesions suspect for metastatic disease.  Findings raising suspicion for diffuse bony metastases.    < end of copied text >          Current medications:  ALBUTerol    90 MICROgram(s) HFA Inhaler 2 Puff(s) Inhalation every 6 hours PRN  chlorhexidine 4% Liquid 1 Application(s) Topical <User Schedule>  clonazePAM  Tablet 0.5 milliGRAM(s) Oral three times a day  demeclocycline 600 milliGRAM(s) Oral two times a day  enoxaparin Injectable 40 milliGRAM(s) SubCutaneous at bedtime  etoposide (TOPOSAR) IVPB (eMAR) 193 milliGRAM(s) IV Intermittent every 24 hours  influenza   Vaccine 0.5 milliLiter(s) IntraMuscular once  levETIRAcetam 500 milliGRAM(s) Oral two times a day  multivitamin 1 Tablet(s) Oral daily  ondansetron   Disintegrating Tablet 8 milliGRAM(s) Oral three times a day  ondansetron   Disintegrating Tablet 8 milliGRAM(s) Oral every 8 hours PRN  ondansetron Injectable 4 milliGRAM(s) IV Push every 6 hours PRN  oxyCODONE    IR 10 milliGRAM(s) Oral every 4 hours PRN  polyethylene glycol 3350 17 Gram(s) Oral daily  senna 2 Tablet(s) Oral at bedtime  sodium chloride 1 Gram(s) Oral every 8 hours  sodium chloride 0.9% lock flush 10 milliLiter(s) IV Push every 1 hour PRN
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
## 2020-10-02 NOTE — BEHAVIORAL HEALTH ASSESSMENT NOTE - NSBHCHARTREVIEWIMAGING_PSY_A_CORE FT
NTERPRETATION:  CLINICAL INFORMATION: Lung mass and suspicion for liver metastases on CT scan.    COMPARISON: CT September 17, 2020    PROCEDURE:  MRI of the abdomen was performed with and without intravenous contrast.  IV Contrast: Gadavist. 10 cc administered, 0 cc discarded.  MRCP was performed.    FINDINGS:  LOWER CHEST: Within normal limits.    LIVER: Innumerable T2 bright lesions throughout the liver with diffusion restriction. Steatosis.  BILE DUCTS: Normal caliber.  GALLBLADDER: Within normal limits.  SPLEEN: Within normal limits.  PANCREAS: Within normal limits.  ADRENALS: Within normal limits.  KIDNEYS/URETERS: Within normal limits.    VISUALIZED PORTIONS:  BOWEL: Within normal limits.  PERITONEUM: No ascites.  VESSELS: Within normal limits.  RETROPERITONEUM/LYMPH NODES: No lymphadenopathy.  ABDOMINAL WALL: Within normal limits.  BONES: Heterogeneous enhancement and diffusion restriction throughout the spine and ribs.    IMPRESSION:  Innumerable liver lesions suspect for metastatic disease.  Findings raising suspicion for diffuse bony metastases.

## 2020-10-02 NOTE — BEHAVIORAL HEALTH ASSESSMENT NOTE - BEHAVIOR
Cooperative Ear Wedge Repair Text: A wedge excision was completed by carrying down an excision through the full thickness of the ear and cartilage with an inward facing Burow's triangle. The wound was then closed in a layered fashion.

## 2020-10-02 NOTE — PHYSICAL THERAPY INITIAL EVALUATION ADULT - DIAGNOSIS, PT EVAL
Intractable pain with failure to thrive due to acute progression of chronic disease, SCLC  with liver lesions, transaminitis.

## 2020-10-02 NOTE — BEHAVIORAL HEALTH ASSESSMENT NOTE - SUICIDE PROTECTIVE FACTORS
Identifies reasons for living/Has future plans/Supportive social network of family or friends/Ability to cope with stress

## 2020-10-02 NOTE — PHYSICAL THERAPY INITIAL EVALUATION ADULT - PERTINENT HX OF CURRENT PROBLEM, REHAB EVAL
Acute progression of chronic disease in setting of SCLC with concern for mets to liver given transaminitis, Failure to thrive;  s/p RIJ port placed

## 2020-10-02 NOTE — BEHAVIORAL HEALTH ASSESSMENT NOTE - NSBHCHARTREVIEWVS_PSY_A_CORE FT
Vital Signs Last 24 Hrs  T(C): 36.3 (02 Oct 2020 13:21), Max: 36.4 (01 Oct 2020 20:06)  T(F): 97.4 (02 Oct 2020 13:21), Max: 97.5 (01 Oct 2020 20:06)  HR: 91 (02 Oct 2020 13:21) (91 - 112)  BP: 172/80 (02 Oct 2020 13:35) (148/80 - 172/80)  BP(mean): --  RR: 18 (02 Oct 2020 13:21) (18 - 20)  SpO2: 91% (02 Oct 2020 13:21) (91% - 98%)

## 2020-10-02 NOTE — DISCHARGE NOTE NURSING/CASE MANAGEMENT/SOCIAL WORK - PATIENT PORTAL LINK FT
You can access the FollowMyHealth Patient Portal offered by [facility] by registering at the following website: [email or web address]. By joining [employer]’s FollowMyHealth portal, you will also be able to view your health information using other applications (apps) compatible with our system.

## 2020-10-02 NOTE — PROGRESS NOTE ADULT - SUBJECTIVE AND OBJECTIVE BOX
INTERVAL HISTORY:    Patient with small cell lung cancer and SIADH. Started on systemic therapy yesterday. Carboplatin/etoposide. We'll complete chemotherapy tomorrow. Very eager to go home. So far has tolerated therapy well  Denies nausea/emesis  Appetite fair  No headaches  No abdominal pain        < from: MR Abdomen w/wo IV Cont (10.02.20 @ 10:51) >  EXAM:  MR ABDOMEN WAW IC                            PROCEDURE DATE:  10/02/2020          INTERPRETATION:  CLINICAL INFORMATION: Lung mass and suspicion for liver metastases on CT scan.    COMPARISON: CT 2020    PROCEDURE:  MRI of the abdomen was performed with and without intravenous contrast.  IV Contrast: Gadavist. 10 cc administered, 0 cc discarded.  MRCP was performed.    FINDINGS:  LOWER CHEST: Within normal limits.    LIVER: Innumerable T2 bright lesions throughout the liverwith diffusion restriction. Steatosis.  BILE DUCTS: Normal caliber.  GALLBLADDER: Within normal limits.  SPLEEN: Within normal limits.  PANCREAS: Within normal limits.  ADRENALS: Within normal limits.  KIDNEYS/URETERS: Within normal limits.    VISUALIZED PORTIONS:  BOWEL: Within normal limits.  PERITONEUM: No ascites.  VESSELS: Within normal limits.  RETROPERITONEUM/LYMPH NODES: No lymphadenopathy.  ABDOMINAL WALL: Within normal limits.  BONES: Heterogeneous enhancement and diffusion restriction throughout the spine and ribs.    IMPRESSION:  Innumerable liver lesions suspect for metastatic disease.  Findings raising suspicion for diffuse bony metastases.        RICHARD ESCALONA M.D.,ATTENDING RADIOLOGIST  This document has be    < end of copied text >        REVIEW OF SYSTEMS:      Allergies    codeine (Unknown)    Intolerances        MEDICATIONS  (STANDING):  aprepitant 80 milliGRAM(s) Oral daily  chlorhexidine 4% Liquid 1 Application(s) Topical <User Schedule>  clonazePAM  Tablet 0.5 milliGRAM(s) Oral three times a day  demeclocycline 600 milliGRAM(s) Oral two times a day  enoxaparin Injectable 40 milliGRAM(s) SubCutaneous at bedtime  etoposide (TOPOSAR) IVPB (eMAR) 193 milliGRAM(s) IV Intermittent every 24 hours  influenza   Vaccine 0.5 milliLiter(s) IntraMuscular once  lactated ringers. 1000 milliLiter(s) (75 mL/Hr) IV Continuous <Continuous>  levETIRAcetam 500 milliGRAM(s) Oral two times a day  multivitamin 1 Tablet(s) Oral daily  ondansetron   Disintegrating Tablet 8 milliGRAM(s) Oral three times a day  polyethylene glycol 3350 17 Gram(s) Oral daily  senna 2 Tablet(s) Oral at bedtime  sodium chloride 1 Gram(s) Oral every 8 hours    MEDICATIONS  (PRN):  acetaminophen   Tablet .. 650 milliGRAM(s) Oral every 6 hours PRN Temp greater or equal to 38C (100.4F), Mild Pain (1 - 3)  ALBUTerol    90 MICROgram(s) HFA Inhaler 2 Puff(s) Inhalation every 6 hours PRN Shortness of Breath and/or Wheezing  oxyCODONE    IR 10 milliGRAM(s) Oral every 4 hours PRN Moderate Pain (4 - 6)  sodium chloride 0.9% lock flush 10 milliLiter(s) IV Push every 1 hour PRN Pre/post blood products, medications, blood draw, and to maintain line patency      Vital Signs Last 24 Hrs  T(C): 36.3 (02 Oct 2020 15:30), Max: 36.4 (01 Oct 2020 20:06)  T(F): 97.4 (02 Oct 2020 15:30), Max: 97.5 (01 Oct 2020 20:06)  HR: 93 (02 Oct 2020 15:30) (91 - 112)  BP: 157/91 (02 Oct 2020 15:30) (148/80 - 172/80)  BP(mean): --  RR: 18 (02 Oct 2020 15:30) (18 - 20)  SpO2: 94% (02 Oct 2020 15:30) (91% - 98%)    PHYSICAL EXAM:    GENERAL: NAD,   HEAD:  Atraumatic, Normocephalic  EYES: EOMI, PERRLA, conjunctiva and sclera clear    NECK: Supple, No JVD, Normal thyroid  NERVOUS SYSTEM:    CHEST/LUNG: Clear to percussion bilaterally; No rales, rhonchi,   HEART: Regular rate and rhythm;   ABDOMEN: Soft, Nontender.  EXTREMITIES:   edema:-  LYMPH: No lymphadenopathy noted        LABS:                        10.7   7.84  )-----------( 163      ( 02 Oct 2020 06:30 )             33.0     10    139  |  105  |  28<H>  ----------------------------<  184<H>  3.8   |  24  |  1.03    Ca    9.3      02 Oct 2020 06:30  Phos  2.5     10-  Mg     2.6     10-    TPro  7.0  /  Alb  2.4<L>  /  TBili  0.7  /  DBili  0.3<H>  /  AST  162<H>  /  ALT  164<H>  /  AlkPhos  486<H>  10-02    PT/INR - ( 01 Oct 2020 08:11 )   PT: 13.5 sec;   INR: 1.17 ratio         PTT - ( 01 Oct 2020 08:11 )  PTT:23.5 sec  Urinalysis Basic - ( 02 Oct 2020 05:18 )    Color: Yellow / Appearance: Clear / S.015 / pH: x  Gluc: x / Ketone: Negative  / Bili: Negative / Urobili: Negative mg/dL   Blood: x / Protein: 30 mg/dL / Nitrite: Negative   Leuk Esterase: Negative / RBC: 0-2 /HPF / WBC Negative   Sq Epi: x / Non Sq Epi: Few / Bacteria: Negative          RADIOLOGY & ADDITIONAL STUDIES:    PATHOLOGY:

## 2020-10-02 NOTE — BEHAVIORAL HEALTH ASSESSMENT NOTE - RISK ASSESSMENT
Low Acute Suicide Risk LOW RISK     Low RISK FACTORS:  acute medical comorbidities, depression, anxiety, but no global insomnia, no suicidal plan or intent, pt has plans for future, wants to get treatement, help and is optimistic about her medical condition.     CHRONIC RISK FACTORS: depression and anxiety, medical comorbidities     PROTECTIVE FACTORS: no suicidal ideation/intent/plan    Mitigation<> meds, support and outpatient followup

## 2020-10-02 NOTE — BEHAVIORAL HEALTH ASSESSMENT NOTE - NSBHCHARTREVIEWLAB_PSY_A_CORE FT
10.7   7.84  )-----------( 163      ( 02 Oct 2020 06:30 )             33.0   10-02    139  |  105  |  28<H>  ----------------------------<  184<H>  3.8   |  24  |  1.03    Ca    9.3      02 Oct 2020 06:30  Phos  2.5     10-01  Mg     2.6     10-01    TPro  7.0  /  Alb  2.4<L>  /  TBili  0.7  /  DBili  0.3<H>  /  AST  162<H>  /  ALT  164<H>  /  AlkPhos  486<H>  10-02

## 2020-10-02 NOTE — PROGRESS NOTE ADULT - ASSESSMENT
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     Intractable pain with failure to thrive due to acute progression of chronic disease  SCLC  with liver lesions, transaminitis. s/p VATS   -cont current medications: will need to bring tigan from home  -IVF for gentle hydration given anorexia  -Heme/onc consult - palliative chemo with Carboplatin and etoposide /VP16   -pain control with oxycodone and bowel regimen  -Avoid tylenol and other hepatotoxic medicatioins  -zofran/tigan prn n/v-> pt states has been taking as standing doses  -s/p mediport placement   - MRI of the liver - liver lesions and bone lesions suspicious for metastatic  - oncology consult- chemo as per onco team  SIADH - c/w salt tabs , demeclocycline  Hypokalemia - replace, monitor, check Mg  Constipation - cont  senna, miralax  Depression - c/w Clonazepam  Severe Protein calorie malnutrition   -Nutrition consult, prealbumin, supplements    DVt proph - lovenox  Advanced directives - full code  Stacey - [phone] updated     Dispo - chemo as per onco, PT evaluation       64 yo female with a pmh/o depression/anxiety, gastric metaplasia, brain aneurysm, hyponatremia, SCLC with suspected mets to liver and paraneoplastic syndrome causing hyponatremia, who has had acute decline since discharge on 9/26, no longer able to ambulate without assistance, p/w anorexia, and intractable diffuse body pain treated with tylenol resulting in transaminitis.     Intractable pain with failure to thrive due to acute progression of chronic disease  SCLC  with liver lesions, transaminitis. s/p VATS . now bony lesions in spine and ribs on MRI  -cont current medications: will need to bring tigan from home  -IVF for gentle hydration given anorexia  -Heme/onc consult - palliative chemo with Carboplatin and etoposide /VP16   -pain control with oxycodone and bowel regimen  -Avoid tylenol and other hepatotoxic medicatioins  -zofran/tigan prn n/v-> pt states has been taking as standing doses  -s/p mediport placement   - MRI of the liver - liver lesions and bone lesions suspicious for metastatic  - oncology consult- chemo as per onco team  - palliative care consult  SIADH - c/w salt tabs , demeclocycline  Hypokalemia - replace, monitor, check Mg  Constipation - cont  senna, miralax  Depression - c/w Clonazepam, psychiatry consult  Severe Protein calorie malnutrition   -Nutrition consult, prealbumin, supplements    DVt proph - lovenox  Advanced directives - full code  Stacey - [phone] updated     Dispo - chemo as per onco, PT evaluation, psychiatry consult and palliative care consult

## 2020-10-02 NOTE — BEHAVIORAL HEALTH ASSESSMENT NOTE - SUICIDE RISK FACTORS
Access to lethal methods (pills, firearm, etc.: Ask specifically about presence or absence of a firearm in the home or ease of accessing/History of abuse/trauma/Current mood episode/Other

## 2020-10-03 ENCOUNTER — TRANSCRIPTION ENCOUNTER (OUTPATIENT)
Age: 65
End: 2020-10-03

## 2020-10-03 VITALS
RESPIRATION RATE: 18 BRPM | DIASTOLIC BLOOD PRESSURE: 89 MMHG | OXYGEN SATURATION: 95 % | HEART RATE: 92 BPM | SYSTOLIC BLOOD PRESSURE: 150 MMHG | TEMPERATURE: 97 F

## 2020-10-03 LAB
CULTURE RESULTS: NO GROWTH — SIGNIFICANT CHANGE UP
SPECIMEN SOURCE: SIGNIFICANT CHANGE UP

## 2020-10-03 PROCEDURE — 99239 HOSP IP/OBS DSCHRG MGMT >30: CPT

## 2020-10-03 RX ORDER — MIRTAZAPINE 45 MG/1
7.5 TABLET, ORALLY DISINTEGRATING ORAL AT BEDTIME
Refills: 0 | Status: DISCONTINUED | OUTPATIENT
Start: 2020-10-03 | End: 2020-10-03

## 2020-10-03 RX ORDER — MIRTAZAPINE 45 MG/1
1 TABLET, ORALLY DISINTEGRATING ORAL
Qty: 30 | Refills: 0
Start: 2020-10-03 | End: 2020-11-01

## 2020-10-03 RX ADMIN — Medication 0.5 MILLIGRAM(S): at 12:44

## 2020-10-03 RX ADMIN — Medication 600 MILLIGRAM(S): at 05:15

## 2020-10-03 RX ADMIN — SODIUM CHLORIDE 1 GRAM(S): 9 INJECTION INTRAMUSCULAR; INTRAVENOUS; SUBCUTANEOUS at 05:15

## 2020-10-03 RX ADMIN — Medication 1 TABLET(S): at 09:43

## 2020-10-03 RX ADMIN — SODIUM CHLORIDE 75 MILLILITER(S): 9 INJECTION, SOLUTION INTRAVENOUS at 03:44

## 2020-10-03 RX ADMIN — SODIUM CHLORIDE 1 GRAM(S): 9 INJECTION INTRAMUSCULAR; INTRAVENOUS; SUBCUTANEOUS at 14:19

## 2020-10-03 RX ADMIN — ONDANSETRON 8 MILLIGRAM(S): 8 TABLET, FILM COATED ORAL at 14:19

## 2020-10-03 RX ADMIN — LEVETIRACETAM 500 MILLIGRAM(S): 250 TABLET, FILM COATED ORAL at 09:42

## 2020-10-03 RX ADMIN — ONDANSETRON 8 MILLIGRAM(S): 8 TABLET, FILM COATED ORAL at 05:15

## 2020-10-03 RX ADMIN — APREPITANT 80 MILLIGRAM(S): 80 CAPSULE ORAL at 09:43

## 2020-10-03 RX ADMIN — CHLORHEXIDINE GLUCONATE 1 APPLICATION(S): 213 SOLUTION TOPICAL at 09:30

## 2020-10-03 RX ADMIN — Medication 0.5 MILLIGRAM(S): at 05:15

## 2020-10-03 RX ADMIN — Medication 509.65 MILLIGRAM(S): at 12:50

## 2020-10-03 NOTE — DISCHARGE NOTE PROVIDER - HOSPITAL COURSE
Patient is a 65y old  Female who presents with a chief complaint of Acute progression of chronic disease in setting of SCLC with concern for mets to liver given transaminitis, Failure to thrive     HPI:       66 yo female with a pmh/o depression/anxiety, gastric metaplasia, brain aneurysm, hyponatremia, SCLC with suspected mets to liver and paraneoplastic syndrome causing hyponatremia, who presents to Ed on 9/30/20 with  c/o weakness, anorexia, constipation (but did have first BM today). Pt prior to last admission was ADL independent , since discharge was usually ambulating with walker but less so over this time, used to be able to walk to bathroom now using bedside commode, unable to tolerate food because of nausea and is on medical marijuana which she has been smoking despite d/o lung ca for pain and appetite. Of note, pt with elevated liver enzymes noted in office yesterday. Pt had been taking tylenol around the clock for pain which she describes as constant, diffuse, cannot quantify on scale, alleviated by tylenol/marijuana/oxycodone only partially . Labs were performed to f/u on sodium levels post discharge. Pt was to have port placed in AM for chemo as outpatient at  with IR. Due to weakness, transaminitis, and anorexia, pt advised to go to ED by oncology for evaluation and admitted for intractable pain with failure to thrive due to acute progression of chronic disease in setting of SCLC and transaminitis.     10/1 -  Patient seen and examined at bedside earlier today, feels well , denies cp, dyspnea, abdominal pain  10/2 - pt seen and examined, afebrile, eager to go home, denies cp, dyspnea, POC discussed   10/3 - pt seen and examined , afebrile, tolerating po intake, ambulating, eager to go home   Review of system- Rest of the review of system are negative except mentioned in HPI    T(C): 36.3 (10-03-20 @ 08:38), Max: 36.3 (10-02-20 @ 15:30)  T(F): 97.3 (10-03-20 @ 08:38), Max: 97.4 (10-02-20 @ 15:30)  HR: 108 (10-03-20 @ 12:12) (93 - 108)  BP: 154/90 (10-03-20 @ 12:12) (139/97 - 157/91)  RR: 18 (10-03-20 @ 08:38) (18 - 18)  SpO2: 98% (10-03-20 @ 08:38) (94% - 98%)  Wt(kg): --    HEAD:  Atraumatic, Normocephalic  EYES: EOMI, PERRLA, conjunctiva and sclera clear  HEENT: Moist mucous membranes, Right mediport  NECK: Supple, No JVD  CHEST/LUNG: Clear to auscultation bilaterally; No rales, no rhonchi, no wheezing  HEART: Regular rate and rhythm; No murmurs, no rubs or gallops  ABDOMEN: Soft, Nontender, Nondistended; Bowel sounds present  GENITOURINARY- Voiding, no suprapubic tenderness  EXTREMITIES:  2+ Peripheral Pulses, No clubbing, cyanosis,   edema    Intractable pain with failure to thrive due to acute progression of chronic disease  SCLC  with liver lesions, transaminitis. s/p VATS . new bony lesions in spine and ribs on MRI  -cont current medications: will need to bring tigan from home  -IVF for gentle hydration given anorexia  -Heme/onc consult - palliative chemo with Carboplatin and etoposide /VP16  completed 3 days   -pain control with oxycodone and bowel regimen  -Avoid tylenol and other hepatotoxic medicatioins  -zofran/tigan prn n/v-> pt states has been taking as standing doses  -s/p mediport placement   - MRI of the liver - liver lesions and bone lesions suspicious for metastatic  - oncology consult- chemo as per onco team  - palliative care consult  SIADH - c/w salt tabs , demeclocycline  Hypokalemia , resolved - replace, monitor, check Mg  Constipation - cont  senna, miralax  Depression - c/w Clonazepam, psychiatry consult - add remeron 7.5   Severe Protein calorie malnutrition   -Nutrition consult, prealbumin, supplements    DVt proph - lovenox  Advanced directives - full code  Stacey - [phone] updated     Disposition - medically optimized to be discharged home with close follow up with PCP, oncology  within 1 week   return to ED if fever, abdominal pain, nausea, vomiting, chest pain, dyspnea  Discharge plan discussed with patient, RN  Patient advised to follow up with PCP within 3-7 days  time spend 42 min  Discharge note faxed to PCP with my contact information to call me back   PCP Dr. Marinelli

## 2020-10-03 NOTE — DISCHARGE NOTE PROVIDER - CARE PROVIDER_API CALL
[place] [address]  Phone: [phone]  Fax: [phone]  Follow Up Time: 1 week    Marcy Martin  [address], [address] [place] [address]  Phone: [phone]  Fax: [phone]  Follow Up Time: 1-3 days

## 2020-10-03 NOTE — DISCHARGE NOTE PROVIDER - PROVIDER TOKENS
PROVIDER:[TOKEN:[[id number]],FOLLOWUP:[1 week]],PROVIDER:[TOKEN:[[id number]],FOLLOWUP:[1-3 days]]

## 2020-10-03 NOTE — DISCHARGE NOTE PROVIDER - NSDCMRMEDTOKEN_GEN_ALL_CORE_FT
albuterol 90 mcg/inh inhalation aerosol: 2 puff(s) inhaled every 6 hours, As Needed -  clonazePAM 0.5 mg oral tablet: 1 tab(s) orally 3 times a day  ***6a-2p-10p***  demeclocycline 300 mg oral tablet: 2 tab(s) orally 2 times a day  levETIRAcetam 500 mg oral tablet: 1 tab(s) orally 2 times a day  MiraLax oral powder for reconstitution:   mirtazapine 7.5 mg oral tablet: 1 tab(s) orally once a day (at bedtime)  Multiple Vitamins oral tablet: 1 tab(s) orally once a day  ondansetron 8 mg oral tablet: 1 tab(s) orally 3 times a day  ***6a-2p-10p***  oxyCODONE 10 mg oral tablet: 1 tab(s) orally every 4 hours, As needed, Moderate Pain (4 - 6) MDD:6 tabs  senna oral tablet: 2 tab(s) orally once a day (at bedtime)  sodium chloride 1 g oral tablet: 1 tab(s) orally every 8 hours  Tigan Adult: 300 milligram(s) rectal 3 times a day

## 2020-10-03 NOTE — DISCHARGE NOTE PROVIDER - NSDCCPCAREPLAN_GEN_ALL_CORE_FT
PRINCIPAL DISCHARGE DIAGNOSIS  Diagnosis: Small cell lung cancer in adult  Assessment and Plan of Treatment: follow up with oncologist within 1-3 days for further management      SECONDARY DISCHARGE DIAGNOSES  Diagnosis: Depression  Assessment and Plan of Treatment: start remeron 7.5 at bedtime , follow up with psychiatry within 1 week    Diagnosis: SIADH (syndrome of inappropriate ADH production)  Assessment and Plan of Treatment: continue home meds

## 2020-10-09 DIAGNOSIS — R62.7 ADULT FAILURE TO THRIVE: ICD-10-CM

## 2020-10-09 DIAGNOSIS — E43 UNSPECIFIED SEVERE PROTEIN-CALORIE MALNUTRITION: ICD-10-CM

## 2020-10-09 DIAGNOSIS — E86.0 DEHYDRATION: ICD-10-CM

## 2020-10-09 DIAGNOSIS — Z79.899 OTHER LONG TERM (CURRENT) DRUG THERAPY: ICD-10-CM

## 2020-10-09 DIAGNOSIS — F33.40 MAJOR DEPRESSIVE DISORDER, RECURRENT, IN REMISSION, UNSPECIFIED: ICD-10-CM

## 2020-10-09 DIAGNOSIS — Z88.5 ALLERGY STATUS TO NARCOTIC AGENT: ICD-10-CM

## 2020-10-09 DIAGNOSIS — R74.01 ELEVATION OF LEVELS OF LIVER TRANSAMINASE LEVELS: ICD-10-CM

## 2020-10-09 DIAGNOSIS — Z87.891 PERSONAL HISTORY OF NICOTINE DEPENDENCE: ICD-10-CM

## 2020-10-09 DIAGNOSIS — Z79.51 LONG TERM (CURRENT) USE OF INHALED STEROIDS: ICD-10-CM

## 2020-10-09 DIAGNOSIS — C79.51 SECONDARY MALIGNANT NEOPLASM OF BONE: ICD-10-CM

## 2020-10-09 DIAGNOSIS — C34.90 MALIGNANT NEOPLASM OF UNSPECIFIED PART OF UNSPECIFIED BRONCHUS OR LUNG: ICD-10-CM

## 2020-10-09 DIAGNOSIS — E87.6 HYPOKALEMIA: ICD-10-CM

## 2020-10-09 DIAGNOSIS — K59.00 CONSTIPATION, UNSPECIFIED: ICD-10-CM

## 2020-10-09 DIAGNOSIS — R73.9 HYPERGLYCEMIA, UNSPECIFIED: ICD-10-CM

## 2020-10-09 DIAGNOSIS — R63.0 ANOREXIA: ICD-10-CM

## 2020-10-09 DIAGNOSIS — F43.10 POST-TRAUMATIC STRESS DISORDER, UNSPECIFIED: ICD-10-CM

## 2020-10-09 DIAGNOSIS — C78.7 SECONDARY MALIGNANT NEOPLASM OF LIVER AND INTRAHEPATIC BILE DUCT: ICD-10-CM

## 2020-10-09 DIAGNOSIS — E22.2 SYNDROME OF INAPPROPRIATE SECRETION OF ANTIDIURETIC HORMONE: ICD-10-CM

## 2020-10-12 ENCOUNTER — APPOINTMENT (OUTPATIENT)
Dept: THORACIC SURGERY | Facility: CLINIC | Age: 65
End: 2020-10-12
Payer: COMMERCIAL

## 2020-10-12 DIAGNOSIS — R59.0 LOCALIZED ENLARGED LYMPH NODES: ICD-10-CM

## 2020-10-12 DIAGNOSIS — C34.82 MALIGNANT NEOPLASM OF OVERLAPPING SITES OF LEFT BRONCHUS AND LUNG: ICD-10-CM

## 2020-10-12 DIAGNOSIS — Z86.59 PERSONAL HISTORY OF OTHER MENTAL AND BEHAVIORAL DISORDERS: ICD-10-CM

## 2020-10-12 DIAGNOSIS — Z87.891 PERSONAL HISTORY OF NICOTINE DEPENDENCE: ICD-10-CM

## 2020-10-12 DIAGNOSIS — Z79.899 OTHER LONG TERM (CURRENT) DRUG THERAPY: ICD-10-CM

## 2020-10-12 DIAGNOSIS — F41.1 GENERALIZED ANXIETY DISORDER: ICD-10-CM

## 2020-10-12 PROCEDURE — 99214 OFFICE O/P EST MOD 30 MIN: CPT

## 2020-10-13 ENCOUNTER — INPATIENT (INPATIENT)
Facility: HOSPITAL | Age: 65
LOS: 6 days | Discharge: HOSPICE | DRG: 314 | End: 2020-10-20
Attending: FAMILY MEDICINE | Admitting: HOSPITALIST
Payer: COMMERCIAL

## 2020-10-13 VITALS
HEIGHT: 65 IN | RESPIRATION RATE: 19 BRPM | SYSTOLIC BLOOD PRESSURE: 103 MMHG | OXYGEN SATURATION: 99 % | WEIGHT: 175.93 LBS | DIASTOLIC BLOOD PRESSURE: 76 MMHG | HEART RATE: 127 BPM

## 2020-10-13 DIAGNOSIS — A41.9 SEPSIS, UNSPECIFIED ORGANISM: ICD-10-CM

## 2020-10-13 DIAGNOSIS — Z98.890 OTHER SPECIFIED POSTPROCEDURAL STATES: Chronic | ICD-10-CM

## 2020-10-13 PROBLEM — Z79.899 MEDICAL MARIJUANA USE: Status: ACTIVE | Noted: 2020-10-13

## 2020-10-13 PROBLEM — F41.1 GENERALIZED ANXIETY DISORDER: Status: RESOLVED | Noted: 2020-10-13 | Resolved: 2020-10-13

## 2020-10-13 PROBLEM — Z87.891 FORMER SMOKER: Status: ACTIVE | Noted: 2020-10-13

## 2020-10-13 PROBLEM — Z86.59 HISTORY OF DEPRESSION: Status: RESOLVED | Noted: 2020-10-13 | Resolved: 2020-10-13

## 2020-10-13 PROBLEM — C34.82: Status: ACTIVE | Noted: 2020-10-13

## 2020-10-13 PROBLEM — R59.0 MEDIASTINAL LYMPHADENOPATHY: Status: ACTIVE | Noted: 2020-10-13

## 2020-10-13 LAB
ALBUMIN SERPL ELPH-MCNC: 2.3 G/DL — LOW (ref 3.3–5)
ALP SERPL-CCNC: 846 U/L — HIGH (ref 40–120)
ALT FLD-CCNC: 285 U/L — HIGH (ref 12–78)
ANION GAP SERPL CALC-SCNC: 15 MMOL/L — SIGNIFICANT CHANGE UP (ref 5–17)
APPEARANCE UR: CLEAR — SIGNIFICANT CHANGE UP
APTT BLD: 24.2 SEC — LOW (ref 27.5–35.5)
AST SERPL-CCNC: 245 U/L — HIGH (ref 15–37)
BASOPHILS # BLD AUTO: 0 K/UL — SIGNIFICANT CHANGE UP (ref 0–0.2)
BASOPHILS NFR BLD AUTO: 0 % — SIGNIFICANT CHANGE UP (ref 0–2)
BILIRUB SERPL-MCNC: 0.8 MG/DL — SIGNIFICANT CHANGE UP (ref 0.2–1.2)
BILIRUB UR-MCNC: NEGATIVE — SIGNIFICANT CHANGE UP
BUN SERPL-MCNC: 104 MG/DL — HIGH (ref 7–23)
CALCIUM SERPL-MCNC: 8.5 MG/DL — SIGNIFICANT CHANGE UP (ref 8.5–10.1)
CHLORIDE SERPL-SCNC: 109 MMOL/L — HIGH (ref 96–108)
CO2 SERPL-SCNC: 15 MMOL/L — LOW (ref 22–31)
COLOR SPEC: YELLOW — SIGNIFICANT CHANGE UP
CREAT SERPL-MCNC: 4.08 MG/DL — HIGH (ref 0.5–1.3)
DIFF PNL FLD: NEGATIVE — SIGNIFICANT CHANGE UP
EOSINOPHIL # BLD AUTO: 0 K/UL — SIGNIFICANT CHANGE UP (ref 0–0.5)
EOSINOPHIL NFR BLD AUTO: 0 % — SIGNIFICANT CHANGE UP (ref 0–6)
GLUCOSE SERPL-MCNC: 161 MG/DL — HIGH (ref 70–99)
GLUCOSE UR QL: NEGATIVE MG/DL — SIGNIFICANT CHANGE UP
HCT VFR BLD CALC: 45 % — SIGNIFICANT CHANGE UP (ref 34.5–45)
HGB BLD-MCNC: 14.2 G/DL — SIGNIFICANT CHANGE UP (ref 11.5–15.5)
INR BLD: 1.23 RATIO — HIGH (ref 0.88–1.16)
KETONES UR-MCNC: NEGATIVE — SIGNIFICANT CHANGE UP
LACTATE SERPL-SCNC: 5.6 MMOL/L — CRITICAL HIGH (ref 0.7–2)
LEUKOCYTE ESTERASE UR-ACNC: ABNORMAL
LYMPHOCYTES # BLD AUTO: 11 % — LOW (ref 13–44)
LYMPHOCYTES # BLD AUTO: 3.54 K/UL — HIGH (ref 1–3.3)
MCHC RBC-ENTMCNC: 29.6 PG — SIGNIFICANT CHANGE UP (ref 27–34)
MCHC RBC-ENTMCNC: 31.6 GM/DL — LOW (ref 32–36)
MCV RBC AUTO: 93.9 FL — SIGNIFICANT CHANGE UP (ref 80–100)
MONOCYTES # BLD AUTO: 2.58 K/UL — HIGH (ref 0–0.9)
MONOCYTES NFR BLD AUTO: 8 % — SIGNIFICANT CHANGE UP (ref 2–14)
NEUTROPHILS # BLD AUTO: 25.45 K/UL — HIGH (ref 1.8–7.4)
NEUTROPHILS NFR BLD AUTO: 76 % — SIGNIFICANT CHANGE UP (ref 43–77)
NITRITE UR-MCNC: NEGATIVE — SIGNIFICANT CHANGE UP
NRBC # BLD: SIGNIFICANT CHANGE UP /100 WBCS (ref 0–0)
PH UR: 6 — SIGNIFICANT CHANGE UP (ref 5–8)
PLATELET # BLD AUTO: 53 K/UL — LOW (ref 150–400)
POTASSIUM SERPL-MCNC: 5.5 MMOL/L — HIGH (ref 3.5–5.3)
POTASSIUM SERPL-SCNC: 5.5 MMOL/L — HIGH (ref 3.5–5.3)
PROT SERPL-MCNC: 6.2 GM/DL — SIGNIFICANT CHANGE UP (ref 6–8.3)
PROT UR-MCNC: 100 MG/DL
PROTHROM AB SERPL-ACNC: 14.1 SEC — HIGH (ref 10.6–13.6)
RBC # BLD: 4.79 M/UL — SIGNIFICANT CHANGE UP (ref 3.8–5.2)
RBC # FLD: 14.3 % — SIGNIFICANT CHANGE UP (ref 10.3–14.5)
SARS-COV-2 RNA SPEC QL NAA+PROBE: SIGNIFICANT CHANGE UP
SODIUM SERPL-SCNC: 139 MMOL/L — SIGNIFICANT CHANGE UP (ref 135–145)
SP GR SPEC: 1.01 — SIGNIFICANT CHANGE UP (ref 1.01–1.02)
UROBILINOGEN FLD QL: NEGATIVE MG/DL — SIGNIFICANT CHANGE UP
WBC # BLD: 32.21 K/UL — HIGH (ref 3.8–10.5)
WBC # FLD AUTO: 32.21 K/UL — HIGH (ref 3.8–10.5)

## 2020-10-13 PROCEDURE — 71250 CT THORAX DX C-: CPT

## 2020-10-13 PROCEDURE — 87075 CULTR BACTERIA EXCEPT BLOOD: CPT

## 2020-10-13 PROCEDURE — 71250 CT THORAX DX C-: CPT | Mod: 26

## 2020-10-13 PROCEDURE — 85610 PROTHROMBIN TIME: CPT

## 2020-10-13 PROCEDURE — A9540: CPT

## 2020-10-13 PROCEDURE — 93970 EXTREMITY STUDY: CPT

## 2020-10-13 PROCEDURE — 74176 CT ABD & PELVIS W/O CONTRAST: CPT

## 2020-10-13 PROCEDURE — 97162 PT EVAL MOD COMPLEX 30 MIN: CPT | Mod: GP

## 2020-10-13 PROCEDURE — 95819 EEG AWAKE AND ASLEEP: CPT

## 2020-10-13 PROCEDURE — 74176 CT ABD & PELVIS W/O CONTRAST: CPT | Mod: 26

## 2020-10-13 PROCEDURE — 85027 COMPLETE CBC AUTOMATED: CPT

## 2020-10-13 PROCEDURE — 83605 ASSAY OF LACTIC ACID: CPT

## 2020-10-13 PROCEDURE — 83735 ASSAY OF MAGNESIUM: CPT

## 2020-10-13 PROCEDURE — 71046 X-RAY EXAM CHEST 2 VIEWS: CPT

## 2020-10-13 PROCEDURE — 87070 CULTURE OTHR SPECIMN AEROBIC: CPT

## 2020-10-13 PROCEDURE — 71045 X-RAY EXAM CHEST 1 VIEW: CPT

## 2020-10-13 PROCEDURE — 84100 ASSAY OF PHOSPHORUS: CPT

## 2020-10-13 PROCEDURE — 36415 COLL VENOUS BLD VENIPUNCTURE: CPT

## 2020-10-13 PROCEDURE — 87186 SC STD MICRODIL/AGAR DIL: CPT

## 2020-10-13 PROCEDURE — 80076 HEPATIC FUNCTION PANEL: CPT

## 2020-10-13 PROCEDURE — 99223 1ST HOSP IP/OBS HIGH 75: CPT

## 2020-10-13 PROCEDURE — 85025 COMPLETE CBC W/AUTO DIFF WBC: CPT

## 2020-10-13 PROCEDURE — 36590 REMOVAL TUNNELED CV CATH: CPT

## 2020-10-13 PROCEDURE — 78580 LUNG PERFUSION IMAGING: CPT

## 2020-10-13 PROCEDURE — 81001 URINALYSIS AUTO W/SCOPE: CPT

## 2020-10-13 PROCEDURE — 77001 FLUOROGUIDE FOR VEIN DEVICE: CPT

## 2020-10-13 PROCEDURE — 82306 VITAMIN D 25 HYDROXY: CPT

## 2020-10-13 PROCEDURE — 71045 X-RAY EXAM CHEST 1 VIEW: CPT | Mod: 26

## 2020-10-13 PROCEDURE — 80048 BASIC METABOLIC PNL TOTAL CA: CPT

## 2020-10-13 PROCEDURE — 93306 TTE W/DOPPLER COMPLETE: CPT

## 2020-10-13 PROCEDURE — 80053 COMPREHEN METABOLIC PANEL: CPT

## 2020-10-13 PROCEDURE — 87086 URINE CULTURE/COLONY COUNT: CPT

## 2020-10-13 PROCEDURE — 97530 THERAPEUTIC ACTIVITIES: CPT | Mod: GP

## 2020-10-13 PROCEDURE — 93005 ELECTROCARDIOGRAM TRACING: CPT

## 2020-10-13 PROCEDURE — 97116 GAIT TRAINING THERAPY: CPT | Mod: GP

## 2020-10-13 RX ORDER — SODIUM CHLORIDE 9 MG/ML
1000 INJECTION INTRAMUSCULAR; INTRAVENOUS; SUBCUTANEOUS
Refills: 0 | Status: DISCONTINUED | OUTPATIENT
Start: 2020-10-13 | End: 2020-10-15

## 2020-10-13 RX ORDER — VANCOMYCIN HCL 1 G
1250 VIAL (EA) INTRAVENOUS ONCE
Refills: 0 | Status: COMPLETED | OUTPATIENT
Start: 2020-10-13 | End: 2020-10-13

## 2020-10-13 RX ORDER — CEFEPIME 1 G/1
1000 INJECTION, POWDER, FOR SOLUTION INTRAMUSCULAR; INTRAVENOUS EVERY 12 HOURS
Refills: 0 | Status: DISCONTINUED | OUTPATIENT
Start: 2020-10-13 | End: 2020-10-14

## 2020-10-13 RX ORDER — SODIUM CHLORIDE 9 MG/ML
1 INJECTION INTRAMUSCULAR; INTRAVENOUS; SUBCUTANEOUS EVERY 8 HOURS
Refills: 0 | Status: DISCONTINUED | OUTPATIENT
Start: 2020-10-13 | End: 2020-10-15

## 2020-10-13 RX ORDER — MORPHINE SULFATE 50 MG/1
2 CAPSULE, EXTENDED RELEASE ORAL EVERY 4 HOURS
Refills: 0 | Status: DISCONTINUED | OUTPATIENT
Start: 2020-10-13 | End: 2020-10-14

## 2020-10-13 RX ORDER — ENOXAPARIN SODIUM 100 MG/ML
30 INJECTION SUBCUTANEOUS DAILY
Refills: 0 | Status: DISCONTINUED | OUTPATIENT
Start: 2020-10-13 | End: 2020-10-14

## 2020-10-13 RX ORDER — MIRTAZAPINE 45 MG/1
7.5 TABLET, ORALLY DISINTEGRATING ORAL AT BEDTIME
Refills: 0 | Status: DISCONTINUED | OUTPATIENT
Start: 2020-10-13 | End: 2020-10-20

## 2020-10-13 RX ORDER — SODIUM POLYSTYRENE SULFONATE 4.1 MEQ/G
30 POWDER, FOR SUSPENSION ORAL ONCE
Refills: 0 | Status: COMPLETED | OUTPATIENT
Start: 2020-10-13 | End: 2020-10-13

## 2020-10-13 RX ORDER — POLYETHYLENE GLYCOL 3350 17 G/17G
0 POWDER, FOR SOLUTION ORAL
Qty: 0 | Refills: 0 | DISCHARGE

## 2020-10-13 RX ORDER — OXYCODONE HYDROCHLORIDE 5 MG/1
10 TABLET ORAL EVERY 4 HOURS
Refills: 0 | Status: DISCONTINUED | OUTPATIENT
Start: 2020-10-13 | End: 2020-10-14

## 2020-10-13 RX ORDER — CLOTRIMAZOLE 10 MG
1 TROCHE MUCOUS MEMBRANE THREE TIMES A DAY
Refills: 0 | Status: DISCONTINUED | OUTPATIENT
Start: 2020-10-13 | End: 2020-10-20

## 2020-10-13 RX ORDER — ONDANSETRON 8 MG/1
4 TABLET, FILM COATED ORAL ONCE
Refills: 0 | Status: COMPLETED | OUTPATIENT
Start: 2020-10-13 | End: 2020-10-13

## 2020-10-13 RX ORDER — CEFEPIME 1 G/1
2000 INJECTION, POWDER, FOR SOLUTION INTRAMUSCULAR; INTRAVENOUS ONCE
Refills: 0 | Status: COMPLETED | OUTPATIENT
Start: 2020-10-13 | End: 2020-10-13

## 2020-10-13 RX ORDER — MORPHINE SULFATE 50 MG/1
4 CAPSULE, EXTENDED RELEASE ORAL ONCE
Refills: 0 | Status: DISCONTINUED | OUTPATIENT
Start: 2020-10-13 | End: 2020-10-13

## 2020-10-13 RX ORDER — HYDROMORPHONE HYDROCHLORIDE 2 MG/ML
1 INJECTION INTRAMUSCULAR; INTRAVENOUS; SUBCUTANEOUS EVERY 6 HOURS
Refills: 0 | Status: DISCONTINUED | OUTPATIENT
Start: 2020-10-13 | End: 2020-10-14

## 2020-10-13 RX ORDER — CLONAZEPAM 1 MG
0.5 TABLET ORAL
Refills: 0 | Status: DISCONTINUED | OUTPATIENT
Start: 2020-10-13 | End: 2020-10-16

## 2020-10-13 RX ORDER — DEMECLOCYCLINE HCL 150 MG
600 TABLET ORAL
Refills: 0 | Status: DISCONTINUED | OUTPATIENT
Start: 2020-10-13 | End: 2020-10-16

## 2020-10-13 RX ORDER — CLONAZEPAM 1 MG
0.5 TABLET ORAL ONCE
Refills: 0 | Status: DISCONTINUED | OUTPATIENT
Start: 2020-10-13 | End: 2020-10-13

## 2020-10-13 RX ORDER — POLYETHYLENE GLYCOL 3350 17 G/17G
17 POWDER, FOR SOLUTION ORAL DAILY
Refills: 0 | Status: DISCONTINUED | OUTPATIENT
Start: 2020-10-13 | End: 2020-10-20

## 2020-10-13 RX ORDER — LEVETIRACETAM 250 MG/1
500 TABLET, FILM COATED ORAL
Refills: 0 | Status: DISCONTINUED | OUTPATIENT
Start: 2020-10-13 | End: 2020-10-15

## 2020-10-13 RX ORDER — SODIUM CHLORIDE 9 MG/ML
2500 INJECTION, SOLUTION INTRAVENOUS ONCE
Refills: 0 | Status: COMPLETED | OUTPATIENT
Start: 2020-10-13 | End: 2020-10-13

## 2020-10-13 RX ORDER — ONDANSETRON 8 MG/1
4 TABLET, FILM COATED ORAL EVERY 6 HOURS
Refills: 0 | Status: DISCONTINUED | OUTPATIENT
Start: 2020-10-13 | End: 2020-10-20

## 2020-10-13 RX ADMIN — Medication 0.5 MILLIGRAM(S): at 22:15

## 2020-10-13 RX ADMIN — SODIUM POLYSTYRENE SULFONATE 30 GRAM(S): 4.1 POWDER, FOR SUSPENSION ORAL at 20:27

## 2020-10-13 RX ADMIN — Medication 600 MILLIGRAM(S): at 22:15

## 2020-10-13 RX ADMIN — HYDROMORPHONE HYDROCHLORIDE 1 MILLIGRAM(S): 2 INJECTION INTRAMUSCULAR; INTRAVENOUS; SUBCUTANEOUS at 20:27

## 2020-10-13 RX ADMIN — Medication 0.5 MILLIGRAM(S): at 18:10

## 2020-10-13 RX ADMIN — MORPHINE SULFATE 4 MILLIGRAM(S): 50 CAPSULE, EXTENDED RELEASE ORAL at 15:56

## 2020-10-13 RX ADMIN — MORPHINE SULFATE 2 MILLIGRAM(S): 50 CAPSULE, EXTENDED RELEASE ORAL at 22:55

## 2020-10-13 RX ADMIN — SODIUM CHLORIDE 1 GRAM(S): 9 INJECTION INTRAMUSCULAR; INTRAVENOUS; SUBCUTANEOUS at 22:16

## 2020-10-13 RX ADMIN — MIRTAZAPINE 7.5 MILLIGRAM(S): 45 TABLET, ORALLY DISINTEGRATING ORAL at 22:16

## 2020-10-13 RX ADMIN — LEVETIRACETAM 500 MILLIGRAM(S): 250 TABLET, FILM COATED ORAL at 22:16

## 2020-10-13 RX ADMIN — HYDROMORPHONE HYDROCHLORIDE 1 MILLIGRAM(S): 2 INJECTION INTRAMUSCULAR; INTRAVENOUS; SUBCUTANEOUS at 21:00

## 2020-10-13 RX ADMIN — SODIUM CHLORIDE 2500 MILLILITER(S): 9 INJECTION, SOLUTION INTRAVENOUS at 15:56

## 2020-10-13 RX ADMIN — CEFEPIME 2000 MILLIGRAM(S): 1 INJECTION, POWDER, FOR SOLUTION INTRAMUSCULAR; INTRAVENOUS at 17:27

## 2020-10-13 RX ADMIN — CEFEPIME 100 MILLIGRAM(S): 1 INJECTION, POWDER, FOR SOLUTION INTRAMUSCULAR; INTRAVENOUS at 16:57

## 2020-10-13 RX ADMIN — ONDANSETRON 4 MILLIGRAM(S): 8 TABLET, FILM COATED ORAL at 15:56

## 2020-10-13 RX ADMIN — ONDANSETRON 4 MILLIGRAM(S): 8 TABLET, FILM COATED ORAL at 20:26

## 2020-10-13 RX ADMIN — MORPHINE SULFATE 4 MILLIGRAM(S): 50 CAPSULE, EXTENDED RELEASE ORAL at 16:26

## 2020-10-13 RX ADMIN — Medication 1 LOZENGE: at 22:17

## 2020-10-13 RX ADMIN — Medication 166.67 MILLIGRAM(S): at 18:32

## 2020-10-13 NOTE — H&P ADULT - ASSESSMENT
65 year old female patient presenting with weakness found to have SIRS and worsening transaminitis      -Admit to Medsurg/ 1N      #SIRS  -no clearly etiology  -pt volume resuscitated in the ED and give IV abx  -follow up blood and urine cx  -ID to evaluate pt    #Leukocytosis  -recent chemo and immunotherapy  possibly contributing  -follow up blood and urine cx    #Lactic acidosis  -no signs of poor organ perfusion noted  -IVF hydration given in ED  -get repeat lactate  -on Maintenance  IVF hydration    #Hyperkalemia  -no EKG changes noted  -give kayexalate now  -monitor K    #PAULA  -possibly pre-renal in etiology  -no obstruction/ stricture noted on CT abdomen/pelvis  -IVF hydration  -trend kidney function    #Depression  -on Mirtazapine    #Anxiety   -on clonazepam    #Hx of SIADH  -likely secondary to lung ca  -on salt tabs and demeclocycline     #Severe protein calorie malnutrition  -get Nutritionist evaluation     #Transaminitis  -possibly related to recent chemo/ immunotherapy  -no acute pathology noted on imaging    #Advanced Directives  -pt still full code  -Will get palliative team input while inpatient     #DVT ppx  -give Lovenox in lieu of Lung ca dx 65 year old female patient presenting with weakness found to have SIRS and worsening transaminitis      -Admit to Medsurg/ 1N      #SIRS  -no clearly etiology  -pt volume resuscitated in the ED and give IV abx  -follow up blood and urine cx  -ID to evaluate pt    #Leukocytosis  -recent chemo and immunotherapy  possibly contributing  -follow up blood and urine cx    #Lactic acidosis  -no signs of poor organ perfusion noted  -IVF hydration given in ED  -get repeat lactate  -on Maintenance  IVF hydration    #Hyperkalemia  -no EKG changes noted  -give kayexalate now  -monitor K    #PAULA  -possibly pre-renal in etiology  -no obstruction/ stricture noted on CT abdomen/pelvis  -IVF hydration  -trend kidney function    #Depression  -on Mirtazapine    #Anxiety   -on clonazepam    #Hx of SIADH  -likely secondary to lung ca  -on salt tabs and demeclocycline     #Severe protein calorie malnutrition  -get Nutritionist evaluation     #Transaminitis  -possibly related to recent chemo/ immunotherapy  -no acute pathology noted on imaging    #Debility  -get PT evaluation    #Advanced Directives  -pt still full code  -Will get palliative team input while inpatient     #DVT ppx  -give Lovenox in lieu of Lung ca dx

## 2020-10-13 NOTE — H&P ADULT - NSHPPHYSICALEXAM_GEN_ALL_CORE
Vital Signs Last 24 Hrs  T(C): 36.2 (13 Oct 2020 18:30), Max: 37 (13 Oct 2020 15:20)  T(F): 97.1 (13 Oct 2020 18:30), Max: 98.6 (13 Oct 2020 15:20)  HR: 107 (13 Oct 2020 20:30) (107 - 127)  BP: 120/84 (13 Oct 2020 20:30) (103/76 - 120/84)  BP(mean): 90 (13 Oct 2020 20:30) (84 - 90)  RR: 20 (13 Oct 2020 20:30) (19 - 20)  SpO2: 98% (13 Oct 2020 20:30) (98% - 99%)

## 2020-10-13 NOTE — ED ADULT NURSE NOTE - OBJECTIVE STATEMENT
Pt brought in by ambulance from home for increasing weakness x 24 hours. Pt recently dx w lung ca, port recently placed and used for first chemo treatment. as per daughter port needs to be replaced. as per daughter pt was walking to bathroom with walker and became weak and fell back,  caught patient. no head trauma, -loc. pt was lowered to ground by  and daughter but was unable to get back up, EMS was then called.

## 2020-10-13 NOTE — ED PROVIDER NOTE - OBJECTIVE STATEMENT
66 y/o female with a PMHx of anxiety, brain aneurysm, depression, hyponatremia, newly diagnosed small cell lung CA presents to the ED for increasing generalized weakness. Pt had port placed on 10/1 and chemo was started that day. Pt c/o bone pain. On Oxycodone and Percocet. No other complaints at this time. PCP: Dr. Toñito Marinelli. Oncologist: Dr. Martin.

## 2020-10-13 NOTE — HISTORY OF PRESENT ILLNESS
[FreeTextEntry1] : Patient is a 64 yo female with extensive history of smoking admitted to [facility] with mental status changes. She was found to be profoundly hyponatremic. She had a CT chest that showed mediastinal lymphadenopathy and a mediastinal mass in the AP window. \par \par Mediastinoscopy was performed which was aborted due to poor neck mobility (hx of cervical spine fusion). SHe underwent a left VATS biopsy, which showed small cell carcinoma. \par \par Patient started her chemotherapy under care of Dr. Martin. She comes to the office in a wheel chair today. She states that some times her legs "give out."

## 2020-10-13 NOTE — PHYSICAL EXAM
[Sclera] : the sclera and conjunctiva were normal [PERRL With Normal Accommodation] : pupils were equal in size, round, and reactive to light [Extraocular Movements] : extraocular movements were intact [Neck Appearance] : the appearance of the neck was normal [Neck Cervical Mass (___cm)] : no neck mass was observed [Jugular Venous Distention Increased] : there was no jugular-venous distention [Thyroid Diffuse Enlargement] : the thyroid was not enlarged [Thyroid Nodule] : there were no palpable thyroid nodules [Auscultation Breath Sounds / Voice Sounds] : lungs were clear to auscultation bilaterally [Heart Rate And Rhythm] : heart rate was normal and rhythm regular [Heart Sounds] : normal S1 and S2 [Heart Sounds Gallop] : no gallops [Murmurs] : no murmurs [Heart Sounds Pericardial Friction Rub] : no pericardial rub [Examination Of The Chest] : the chest was normal in appearance [Chest Visual Inspection Thoracic Asymmetry] : no chest asymmetry [Diminished Respiratory Excursion] : normal chest expansion [Bowel Sounds] : normal bowel sounds [Abdomen Soft] : soft [Abdomen Tenderness] : non-tender [Abdomen Mass (___ Cm)] : no abdominal mass palpated [No CVA Tenderness] : no ~M costovertebral angle tenderness [No Spinal Tenderness] : no spinal tenderness [Abnormal Walk] : normal gait [Nail Clubbing] : no clubbing  or cyanosis of the fingernails [Musculoskeletal - Swelling] : no joint swelling seen [Motor Tone] : muscle strength and tone were normal [Skin Color & Pigmentation] : normal skin color and pigmentation [Skin Turgor] : normal skin turgor [] : no rash [Deep Tendon Reflexes (DTR)] : deep tendon reflexes were 2+ and symmetric [Sensation] : the sensory exam was normal to light touch and pinprick [No Focal Deficits] : no focal deficits [FreeTextEntry1] : Depressed and anxious about her diagnosis

## 2020-10-13 NOTE — REVIEW OF SYSTEMS
[Fever] : no fever [Chills] : no chills [Feeling Poorly] : feeling poorly [Feeling Tired] : feeling tired [Leg Claudication] : intermittent leg claudication [Shortness Of Breath] : no shortness of breath [Wheezing] : no wheezing [Cough] : no cough [SOB on Exertion] : no shortness of breath during exertion [Orthopnea] : no orthopnea [Negative] : Heme/Lymph

## 2020-10-13 NOTE — H&P ADULT - HISTORY OF PRESENT ILLNESS
65 year old female patient with pertinent history of recent Lung cancer diagnosis who recently started outpatient chemo(October 1st to 3rd) followed by Immunotherapy( on October 5th) presented to the ED complaining of progressively weakness. Patient has been eating and hydrating  but not enough. Patient denies any fever, chills, coughing, vomiting, diarrhea or abdominal pain but endorses being nauseous. Patient also noted some right chest wall pain at the sight of port insertion for chemo- was initially scheduled for an outpatient revision with Interventional Radiology( Dr Mckeon)      In the ED patient found to have a WBC of 32, Lactate of 5.6, Cr of 4.  CT chest/Abdomen and Pelvis  negative for any acute pathology

## 2020-10-13 NOTE — ED PROVIDER NOTE - CLINICAL SUMMARY MEDICAL DECISION MAKING FREE TEXT BOX
64 y/o F with recently dx small cell lung CA recently started chemo last month with port to L chest wall here with lethargy and weakness. Pt ill appearing.   Plan: EKG, labs, CXR, urine, and sepsis workup.

## 2020-10-13 NOTE — ED PROVIDER NOTE - PROGRESS NOTE DETAILS
I Chrystal Dumont attest that this documentation has been prepared under the direction and in the presence of Doctor Duarte.

## 2020-10-13 NOTE — DATA REVIEWED
[FreeTextEntry1] : I reviewed Patients pathology report with her and her daughter. The biopsy was positive for small cell carcinoma.

## 2020-10-13 NOTE — ED PROVIDER NOTE - NS_ ATTENDINGSCRIBEDETAILS _ED_A_ED_FT
Neva Duarte MD: The history, relevant review of systems, past medical and surgical history, medical decision making, and physical examination was documented by the scribe in my presence and I attest to the accuracy of the documentation.

## 2020-10-13 NOTE — ED ADULT TRIAGE NOTE - CHIEF COMPLAINT QUOTE
Pt brought in by ambulance from home for increasing weakness x 24 hours. Pt has history of CA and was started on chemo recently. Pt with right chest wall port that is known to need to be replaced.

## 2020-10-13 NOTE — ED PROVIDER NOTE - SKIN, MLM
Skin normal color for race, warm, dry and intact. No evidence of rash. Port site dehisced.  No surrounding cellulitis or fluctuant masses.

## 2020-10-13 NOTE — ASSESSMENT
[FreeTextEntry1] : Patient seen and examined. Her final pathology report was reviewed. She has some dehiscence of the mediport incision. I will contact Dr. Mckeon. I think the mediport will need to be revised. \par \par Patient to return to the office as needed.

## 2020-10-14 DIAGNOSIS — F32.9 MAJOR DEPRESSIVE DISORDER, SINGLE EPISODE, UNSPECIFIED: ICD-10-CM

## 2020-10-14 DIAGNOSIS — R74.01 ELEVATION OF LEVELS OF LIVER TRANSAMINASE LEVELS: ICD-10-CM

## 2020-10-14 DIAGNOSIS — E87.1 HYPO-OSMOLALITY AND HYPONATREMIA: ICD-10-CM

## 2020-10-14 DIAGNOSIS — D72.829 ELEVATED WHITE BLOOD CELL COUNT, UNSPECIFIED: ICD-10-CM

## 2020-10-14 DIAGNOSIS — C34.90 MALIGNANT NEOPLASM OF UNSPECIFIED PART OF UNSPECIFIED BRONCHUS OR LUNG: ICD-10-CM

## 2020-10-14 DIAGNOSIS — A41.9 SEPSIS, UNSPECIFIED ORGANISM: ICD-10-CM

## 2020-10-14 DIAGNOSIS — D69.6 THROMBOCYTOPENIA, UNSPECIFIED: ICD-10-CM

## 2020-10-14 LAB
ALBUMIN SERPL ELPH-MCNC: 1.9 G/DL — LOW (ref 3.3–5)
ALP SERPL-CCNC: 682 U/L — HIGH (ref 40–120)
ALT FLD-CCNC: 262 U/L — HIGH (ref 12–78)
ANION GAP SERPL CALC-SCNC: 12 MMOL/L — SIGNIFICANT CHANGE UP (ref 5–17)
AST SERPL-CCNC: 198 U/L — HIGH (ref 15–37)
BASOPHILS # BLD AUTO: 0 K/UL — SIGNIFICANT CHANGE UP (ref 0–0.2)
BASOPHILS NFR BLD AUTO: 0 % — SIGNIFICANT CHANGE UP (ref 0–2)
BILIRUB SERPL-MCNC: 0.7 MG/DL — SIGNIFICANT CHANGE UP (ref 0.2–1.2)
BUN SERPL-MCNC: 94 MG/DL — HIGH (ref 7–23)
CALCIUM SERPL-MCNC: 7.7 MG/DL — LOW (ref 8.5–10.1)
CHLORIDE SERPL-SCNC: 114 MMOL/L — HIGH (ref 96–108)
CO2 SERPL-SCNC: 17 MMOL/L — LOW (ref 22–31)
CREAT SERPL-MCNC: 3.31 MG/DL — HIGH (ref 0.5–1.3)
EOSINOPHIL # BLD AUTO: 0.17 K/UL — SIGNIFICANT CHANGE UP (ref 0–0.5)
EOSINOPHIL NFR BLD AUTO: 0.5 % — SIGNIFICANT CHANGE UP (ref 0–6)
GLUCOSE SERPL-MCNC: 87 MG/DL — SIGNIFICANT CHANGE UP (ref 70–99)
HCT VFR BLD CALC: 35.6 % — SIGNIFICANT CHANGE UP (ref 34.5–45)
HGB BLD-MCNC: 11.6 G/DL — SIGNIFICANT CHANGE UP (ref 11.5–15.5)
INR BLD: 1.25 RATIO — HIGH (ref 0.88–1.16)
LYMPHOCYTES # BLD AUTO: 15.5 % — SIGNIFICANT CHANGE UP (ref 13–44)
LYMPHOCYTES # BLD AUTO: 5.15 K/UL — HIGH (ref 1–3.3)
MAGNESIUM SERPL-MCNC: 2.9 MG/DL — HIGH (ref 1.6–2.6)
MCHC RBC-ENTMCNC: 30 PG — SIGNIFICANT CHANGE UP (ref 27–34)
MCHC RBC-ENTMCNC: 32.6 GM/DL — SIGNIFICANT CHANGE UP (ref 32–36)
MCV RBC AUTO: 92 FL — SIGNIFICANT CHANGE UP (ref 80–100)
MONOCYTES # BLD AUTO: 1.66 K/UL — HIGH (ref 0–0.9)
MONOCYTES NFR BLD AUTO: 5 % — SIGNIFICANT CHANGE UP (ref 2–14)
NEUTROPHILS # BLD AUTO: 24.11 K/UL — HIGH (ref 1.8–7.4)
NEUTROPHILS NFR BLD AUTO: 72.5 % — SIGNIFICANT CHANGE UP (ref 43–77)
NRBC # BLD: SIGNIFICANT CHANGE UP /100 WBCS (ref 0–0)
PHOSPHATE SERPL-MCNC: 6.2 MG/DL — HIGH (ref 2.5–4.5)
PLATELET # BLD AUTO: 41 K/UL — LOW (ref 150–400)
POTASSIUM SERPL-MCNC: 4.4 MMOL/L — SIGNIFICANT CHANGE UP (ref 3.5–5.3)
POTASSIUM SERPL-SCNC: 4.4 MMOL/L — SIGNIFICANT CHANGE UP (ref 3.5–5.3)
PROT SERPL-MCNC: 5.1 GM/DL — LOW (ref 6–8.3)
PROTHROM AB SERPL-ACNC: 14.5 SEC — HIGH (ref 10.6–13.6)
RBC # BLD: 3.87 M/UL — SIGNIFICANT CHANGE UP (ref 3.8–5.2)
RBC # FLD: 14.3 % — SIGNIFICANT CHANGE UP (ref 10.3–14.5)
SARS-COV-2 IGG SERPL QL IA: NEGATIVE — SIGNIFICANT CHANGE UP
SARS-COV-2 IGM SERPL IA-ACNC: 0.1 INDEX — SIGNIFICANT CHANGE UP
SODIUM SERPL-SCNC: 143 MMOL/L — SIGNIFICANT CHANGE UP (ref 135–145)
WBC # BLD: 33.25 K/UL — HIGH (ref 3.8–10.5)
WBC # FLD AUTO: 33.25 K/UL — HIGH (ref 3.8–10.5)

## 2020-10-14 PROCEDURE — 36590 REMOVAL TUNNELED CV CATH: CPT

## 2020-10-14 PROCEDURE — 77001 FLUOROGUIDE FOR VEIN DEVICE: CPT | Mod: 26

## 2020-10-14 PROCEDURE — 99233 SBSQ HOSP IP/OBS HIGH 50: CPT

## 2020-10-14 PROCEDURE — 99223 1ST HOSP IP/OBS HIGH 75: CPT

## 2020-10-14 RX ORDER — CEFEPIME 1 G/1
1000 INJECTION, POWDER, FOR SOLUTION INTRAMUSCULAR; INTRAVENOUS EVERY 24 HOURS
Refills: 0 | Status: DISCONTINUED | OUTPATIENT
Start: 2020-10-14 | End: 2020-10-16

## 2020-10-14 RX ORDER — SODIUM CHLORIDE 9 MG/ML
1000 INJECTION, SOLUTION INTRAVENOUS
Refills: 0 | Status: DISCONTINUED | OUTPATIENT
Start: 2020-10-14 | End: 2020-10-14

## 2020-10-14 RX ORDER — HYDROMORPHONE HYDROCHLORIDE 2 MG/ML
0.5 INJECTION INTRAMUSCULAR; INTRAVENOUS; SUBCUTANEOUS EVERY 4 HOURS
Refills: 0 | Status: DISCONTINUED | OUTPATIENT
Start: 2020-10-14 | End: 2020-10-20

## 2020-10-14 RX ORDER — CLOTRIMAZOLE 10 MG
1 TROCHE MUCOUS MEMBRANE
Qty: 0 | Refills: 0 | DISCHARGE

## 2020-10-14 RX ORDER — HYDROMORPHONE HYDROCHLORIDE 2 MG/ML
2 INJECTION INTRAMUSCULAR; INTRAVENOUS; SUBCUTANEOUS EVERY 4 HOURS
Refills: 0 | Status: DISCONTINUED | OUTPATIENT
Start: 2020-10-14 | End: 2020-10-20

## 2020-10-14 RX ORDER — ONDANSETRON 8 MG/1
1 TABLET, FILM COATED ORAL
Qty: 0 | Refills: 0 | DISCHARGE

## 2020-10-14 RX ORDER — FENTANYL CITRATE 50 UG/ML
50 INJECTION INTRAVENOUS
Refills: 0 | Status: DISCONTINUED | OUTPATIENT
Start: 2020-10-14 | End: 2020-10-14

## 2020-10-14 RX ORDER — OXYCODONE HYDROCHLORIDE 5 MG/1
5 TABLET ORAL ONCE
Refills: 0 | Status: DISCONTINUED | OUTPATIENT
Start: 2020-10-14 | End: 2020-10-14

## 2020-10-14 RX ORDER — HYDROMORPHONE HYDROCHLORIDE 2 MG/ML
4 INJECTION INTRAMUSCULAR; INTRAVENOUS; SUBCUTANEOUS EVERY 4 HOURS
Refills: 0 | Status: DISCONTINUED | OUTPATIENT
Start: 2020-10-14 | End: 2020-10-16

## 2020-10-14 RX ORDER — POLYETHYLENE GLYCOL 3350 17 G/17G
0 POWDER, FOR SOLUTION ORAL
Qty: 0 | Refills: 0 | DISCHARGE

## 2020-10-14 RX ADMIN — ONDANSETRON 4 MILLIGRAM(S): 8 TABLET, FILM COATED ORAL at 21:32

## 2020-10-14 RX ADMIN — Medication 1 LOZENGE: at 21:33

## 2020-10-14 RX ADMIN — LEVETIRACETAM 500 MILLIGRAM(S): 250 TABLET, FILM COATED ORAL at 09:34

## 2020-10-14 RX ADMIN — MIRTAZAPINE 7.5 MILLIGRAM(S): 45 TABLET, ORALLY DISINTEGRATING ORAL at 22:40

## 2020-10-14 RX ADMIN — CEFEPIME 1000 MILLIGRAM(S): 1 INJECTION, POWDER, FOR SOLUTION INTRAMUSCULAR; INTRAVENOUS at 06:21

## 2020-10-14 RX ADMIN — Medication 0.5 MILLIGRAM(S): at 06:21

## 2020-10-14 RX ADMIN — HYDROMORPHONE HYDROCHLORIDE 0.5 MILLIGRAM(S): 2 INJECTION INTRAMUSCULAR; INTRAVENOUS; SUBCUTANEOUS at 23:30

## 2020-10-14 RX ADMIN — Medication 600 MILLIGRAM(S): at 09:34

## 2020-10-14 RX ADMIN — Medication 1 LOZENGE: at 15:17

## 2020-10-14 RX ADMIN — LEVETIRACETAM 500 MILLIGRAM(S): 250 TABLET, FILM COATED ORAL at 21:33

## 2020-10-14 RX ADMIN — Medication 0.5 MILLIGRAM(S): at 15:17

## 2020-10-14 RX ADMIN — Medication 0.5 MILLIGRAM(S): at 21:33

## 2020-10-14 RX ADMIN — SODIUM CHLORIDE 1 GRAM(S): 9 INJECTION INTRAMUSCULAR; INTRAVENOUS; SUBCUTANEOUS at 21:33

## 2020-10-14 RX ADMIN — Medication 1 TABLET(S): at 09:34

## 2020-10-14 RX ADMIN — Medication 1 LOZENGE: at 06:21

## 2020-10-14 RX ADMIN — Medication 600 MILLIGRAM(S): at 21:33

## 2020-10-14 RX ADMIN — HYDROMORPHONE HYDROCHLORIDE 0.5 MILLIGRAM(S): 2 INJECTION INTRAMUSCULAR; INTRAVENOUS; SUBCUTANEOUS at 23:50

## 2020-10-14 RX ADMIN — SODIUM CHLORIDE 100 MILLILITER(S): 9 INJECTION INTRAMUSCULAR; INTRAVENOUS; SUBCUTANEOUS at 00:46

## 2020-10-14 RX ADMIN — SODIUM CHLORIDE 100 MILLILITER(S): 9 INJECTION INTRAMUSCULAR; INTRAVENOUS; SUBCUTANEOUS at 10:39

## 2020-10-14 RX ADMIN — HYDROMORPHONE HYDROCHLORIDE 4 MILLIGRAM(S): 2 INJECTION INTRAMUSCULAR; INTRAVENOUS; SUBCUTANEOUS at 16:31

## 2020-10-14 RX ADMIN — SODIUM CHLORIDE 1 GRAM(S): 9 INJECTION INTRAMUSCULAR; INTRAVENOUS; SUBCUTANEOUS at 06:21

## 2020-10-14 RX ADMIN — HYDROMORPHONE HYDROCHLORIDE 1 MILLIGRAM(S): 2 INJECTION INTRAMUSCULAR; INTRAVENOUS; SUBCUTANEOUS at 06:02

## 2020-10-14 RX ADMIN — SODIUM CHLORIDE 100 MILLILITER(S): 9 INJECTION INTRAMUSCULAR; INTRAVENOUS; SUBCUTANEOUS at 15:25

## 2020-10-14 RX ADMIN — HYDROMORPHONE HYDROCHLORIDE 4 MILLIGRAM(S): 2 INJECTION INTRAMUSCULAR; INTRAVENOUS; SUBCUTANEOUS at 22:31

## 2020-10-14 RX ADMIN — SODIUM CHLORIDE 1 GRAM(S): 9 INJECTION INTRAMUSCULAR; INTRAVENOUS; SUBCUTANEOUS at 15:17

## 2020-10-14 RX ADMIN — HYDROMORPHONE HYDROCHLORIDE 4 MILLIGRAM(S): 2 INJECTION INTRAMUSCULAR; INTRAVENOUS; SUBCUTANEOUS at 21:31

## 2020-10-14 RX ADMIN — ONDANSETRON 4 MILLIGRAM(S): 8 TABLET, FILM COATED ORAL at 16:42

## 2020-10-14 NOTE — DIETITIAN INITIAL EVALUATION ADULT. - MALNUTRITION
Pt meets criteria for severe protein-calorie malnutrition in context of chronic disease.  PO intake < 75% nutritional needs > one month.  NFPE reveals moderate/severe muscle wasting (temples) moderate muscle wasting  (temples, shoulders, clavicles, scapula, interosseus, thighs, calves) moderate/severe fat wasting (triceps, buccal area.)  Wt loss of 12% of UBW in 5 weeks, clinically significant Pt meets criteria for severe protein-calorie malnutrition in context of chronic disease

## 2020-10-14 NOTE — CONSULT NOTE ADULT - SUBJECTIVE AND OBJECTIVE BOX
Patient is a 65y old  Female who presents with a chief complaint of Weakness  SIRS (14 Oct 2020 09:54)    HPI:  65 year old female patient with pertinent history of recent small cell lung cancer diagnosis who recently started outpatient chemo( to ) followed by Immunotherapy( on ) presented to the ED complaining of progressively weakness. Patient has been eating and hydrating  but not enough. Patient denies any fever, chills, coughing, vomiting, diarrhea or abdominal pain but endorses being nauseous. Patient also noted some right chest wall pain at the sight of port insertion for chemo- was initially scheduled for an outpatient revision with Interventional Radiology( Dr Mckeon) In the ED patient found to have a WBC of 32, Lactate of 5.6, Cr of 4.  CT chest/Abdomen and Pelvis negative for any acute pathology, Cr noted to be 4, elevated ALP/AST/ALT, afebrile, was given empiric antibiotics with vancomycin/cefepime. No reported abd pain/diarrhea/cough/dysuria/rashes.      .  PAST MEDICAL HISTORY:  Anxiety     Brain aneurysm     Depression     Hyponatremia- SIADH    PSH: as above    Meds: per reconciliation sheet, noted below  MEDICATIONS  (STANDING):  cefepime  Injectable. 1000 milliGRAM(s) IV Push every 12 hours  clonazePAM  Tablet 0.5 milliGRAM(s) Oral <User Schedule>  clotrimazole  Oral Lozenge - Peds 1 Lozenge Oral three times a day  demeclocycline 600 milliGRAM(s) Oral two times a day  levETIRAcetam 500 milliGRAM(s) Oral two times a day  mirtazapine 7.5 milliGRAM(s) Oral at bedtime  multivitamin 1 Tablet(s) Oral daily  polyethylene glycol 3350 17 Gram(s) Oral daily  sodium chloride 1 Gram(s) Oral every 8 hours  sodium chloride 0.9%. 1000 milliLiter(s) (100 mL/Hr) IV Continuous <Continuous>    Allergies    codeine (Unknown)    Intolerances      Social: no smoking, no alcohol, no illegal drugs; no recent travel, no exposure to TB  FAMILY HISTORY:  FH: CAD (coronary artery disease)  inmother       no history of premature cardiovascular disease in first degree relatives    ROS: the patient denies fever, no chills, no HA, no dizziness, no sore throat, no blurry vision, no CP, no palpitations, no SOB, no cough, no abdominal pain, no diarrhea, no N/V, no dysuria, no leg pain, no claudication, no rash, no joint aches, no rectal pain or bleeding, no night sweats  All other systems reviewed and are negative    Vital Signs Last 24 Hrs  T(C): 36.3 (14 Oct 2020 08:10), Max: 37 (13 Oct 2020 15:20)  T(F): 97.3 (14 Oct 2020 08:10), Max: 98.6 (13 Oct 2020 15:20)  HR: 114 (14 Oct 2020 08:10) (107 - 127)  BP: 123/80 (14 Oct 2020 08:10) (103/76 - 129/77)  BP(mean): 90 (13 Oct 2020 20:30) (84 - 90)  RR: 17 (14 Oct 2020 08:10) (17 - 20)  SpO2: 100% (14 Oct 2020 08:10) (98% - 100%)  Daily Height in cm: 165.1 (13 Oct 2020 15:12)    Daily Weight in k (14 Oct 2020 08:00)    PE:  Constitutional: frail looking  HEENT: NC/AT, EOMI, PERRLA, conjunctivae clear; ears and nose atraumatic; pharynx benign  Neck: supple; thyroid not palpable  Back: no tenderness  Respiratory: respiratory effort normal; clear to auscultation  Cardiovascular: S1S2 regular, no murmurs  Abdomen: soft, not tender, not distended, positive BS; liver and spleen WNL  Genitourinary: no suprapubic tenderness  Lymphatic: no LN palpable  Musculoskeletal: no muscle tenderness, no joint swelling or tenderness  Extremities: no pedal edema  Neurological/ Psychiatric: AxOx3, Judgement and insight normal;  moving all extremities  Skin: no rashes; port site no drainage no palpable lesions    Labs: all available labs reviewed                        11.6   33.25 )-----------( 41       ( 14 Oct 2020 06:36 )             35.6     10-14    143  |  114<H>  |  94<H>  ----------------------------<  87  4.4   |  17<L>  |  3.31<H>    Ca    7.7<L>      14 Oct 2020 06:36  Phos  6.2     10-14  Mg     2.9     10-14    TPro  5.1<L>  /  Alb  1.9<L>  /  TBili  0.7  /  DBili  x   /  AST  198<H>  /  ALT  262<H>  /  AlkPhos  682<H>  10-14     LIVER FUNCTIONS - ( 14 Oct 2020 06:36 )  Alb: 1.9 g/dL / Pro: 5.1 gm/dL / ALK PHOS: 682 U/L / ALT: 262 U/L / AST: 198 U/L / GGT: x           Urinalysis Basic - ( 13 Oct 2020 20:35 )    Color: Yellow / Appearance: Clear / S.010 / pH: x  Gluc: x / Ketone: Negative  / Bili: Negative / Urobili: Negative mg/dL   Blood: x / Protein: 100 mg/dL / Nitrite: Negative   Leuk Esterase: Trace / RBC: 0-2 /HPF / WBC 6-10   Sq Epi: x / Non Sq Epi: Few / Bacteria: Occasional          Radiology: all available radiological tests reviewed  < from: CT Abdomen and Pelvis No Cont (10.13.20 @ 18:26) >  EXAM:  CT ABDOMEN AND PELVIS                          EXAM:  CT CHEST                            PROCEDURE DATE:  10/13/2020          INTERPRETATION:  CT CHEST, CT ABDOMEN AND PELVIS    CLINICAL INFORMATION: sepsis    COMPARISON: CT chest/abdomen/pelvis 2020, MRI abdomen 10/2/2020    PROCEDURE:  CT of the Chest, Abdomen and Pelvis was performed without intravenous contrast.  Intravenous contrast: None  Oral contrast: None.  Sagittal and coronal reformats were performed.    FINDINGS:    CHEST:    LUNGS, AIRWAYS: The central airways are patent. The lungs are clear. Left suprahilar mass again seen, difficult to compare without contrast but appears smaller measuring approximately 4 x 3 cm.  PLEURA: No pleural abnormality.  VESSELS: Right chest wall infusion port is seen with the catheter tip in the SVC. Normal caliber aorta.  HEART: Normal heart size. No pericardial effusion. Coronary artery calcifications are present.  MEDIASTINUM AND ARIA: Decreased size of mediastinal lymph nodes.  CHEST WALL: No masses.    ABDOMEN AND PELVIS:    LIVER: Diffuse heterogeneous attenuation. Refer to previous MRI.  BILE DUCTS: Nondilated.  GALLBLADDER: Normal.  SPLEEN: Normal.  PANCREAS: Normal.  ADRENALS: Normal.  KIDNEYS/URETERS: No hydronephrosisor urinary tract calculi.    BLADDER: Normal.  REPRODUCTIVE ORGANS: No uterine or adnexal abnormality.    BOWEL: No bowel-related abnormality. Specifically, no evidence of acute diverticulitis. Normal appendix and ileocecal region. No bowel obstruction or bowel inflammation.  PERITONEUM: No free air or ascites.  VESSELS:  Normal caliber aorta.  RETROPERITONEUM/LYMPH NODES: No adenopathy.  ABDOMINAL WALL: No mass, hernia, or fluid collection.  BONES: No lytic or blastic lesion.    IMPRESSION:    No septic pathology.    Decreased left hilar mass and adenopathy.      Advanced directives addressed: full resuscitation Patient is a 65y old  Female who presents with a chief complaint of Weakness  SIRS (14 Oct 2020 09:54)    HPI:  65 year old female patient with pertinent history of recent small cell lung cancer diagnosis who recently started outpatient chemo( to ) followed by Immunotherapy( on ) presented to the ED complaining of progressively weakness. Patient has been eating and hydrating  but not enough. Patient denies any fever, chills, coughing, vomiting, diarrhea or abdominal pain but endorses being nauseous. Patient also noted some right chest wall pain and skin opening at the sight of port insertion for chemo- was initially scheduled for an outpatient revision with Interventional Radiology( Dr Mckeon) In the ED patient found to have a WBC of 32, Lactate of 5.6, Cr of 4. CT chest/Abdomen and Pelvis negative for any acute pathology, Cr noted to be 4, elevated ALP/AST/ALT, afebrile, was given empiric antibiotics with vancomycin/cefepime. No reported abd pain/diarrhea/cough/dysuria/rashes.      .  PAST MEDICAL HISTORY:  Anxiety     Brain aneurysm     Depression     Hyponatremia- SIADH    PSH: as above    Meds: per reconciliation sheet, noted below  MEDICATIONS  (STANDING):  cefepime  Injectable. 1000 milliGRAM(s) IV Push every 12 hours  clonazePAM  Tablet 0.5 milliGRAM(s) Oral <User Schedule>  clotrimazole  Oral Lozenge - Peds 1 Lozenge Oral three times a day  demeclocycline 600 milliGRAM(s) Oral two times a day  levETIRAcetam 500 milliGRAM(s) Oral two times a day  mirtazapine 7.5 milliGRAM(s) Oral at bedtime  multivitamin 1 Tablet(s) Oral daily  polyethylene glycol 3350 17 Gram(s) Oral daily  sodium chloride 1 Gram(s) Oral every 8 hours  sodium chloride 0.9%. 1000 milliLiter(s) (100 mL/Hr) IV Continuous <Continuous>    Allergies    codeine (Unknown)    Intolerances      Social: no smoking, no alcohol, no illegal drugs; no recent travel, no exposure to TB  FAMILY HISTORY:  FH: CAD (coronary artery disease)  inmother       no history of premature cardiovascular disease in first degree relatives    ROS: the patient denies fever, no chills, no HA, no dizziness, no sore throat, no blurry vision, no CP, no palpitations, no SOB, no cough, no abdominal pain, no diarrhea, no N/V, no dysuria, no leg pain, no claudication, no rash, no joint aches, no rectal pain or bleeding, no night sweats  All other systems reviewed and are negative    Vital Signs Last 24 Hrs  T(C): 36.3 (14 Oct 2020 08:10), Max: 37 (13 Oct 2020 15:20)  T(F): 97.3 (14 Oct 2020 08:10), Max: 98.6 (13 Oct 2020 15:20)  HR: 114 (14 Oct 2020 08:10) (107 - 127)  BP: 123/80 (14 Oct 2020 08:10) (103/76 - 129/77)  BP(mean): 90 (13 Oct 2020 20:30) (84 - 90)  RR: 17 (14 Oct 2020 08:10) (17 - 20)  SpO2: 100% (14 Oct 2020 08:10) (98% - 100%)  Daily Height in cm: 165.1 (13 Oct 2020 15:12)    Daily Weight in k (14 Oct 2020 08:00)    PE:  Constitutional: frail looking  HEENT: NC/AT, EOMI, PERRLA, conjunctivae clear; ears and nose atraumatic; pharynx benign  Neck: supple; thyroid not palpable  Back: no tenderness  Respiratory: respiratory effort normal; clear to auscultation  Cardiovascular: S1S2 regular, no murmurs  Abdomen: soft, not tender, not distended, positive BS; liver and spleen WNL  Genitourinary: no suprapubic tenderness  Lymphatic: no LN palpable  Musculoskeletal: no muscle tenderness, no joint swelling or tenderness  Extremities: no pedal edema  Neurological/ Psychiatric: AxOx3, Judgement and insight normal;  moving all extremities  Skin: no rashes; port site opening along superior portion, no purulence no palpable lesions    Labs: all available labs reviewed                        11.6   33.25 )-----------( 41       ( 14 Oct 2020 06:36 )             35.6     10-14    143  |  114<H>  |  94<H>  ----------------------------<  87  4.4   |  17<L>  |  3.31<H>    Ca    7.7<L>      14 Oct 2020 06:36  Phos  6.2     10-14  Mg     2.9     10-14    TPro  5.1<L>  /  Alb  1.9<L>  /  TBili  0.7  /  DBili  x   /  AST  198<H>  /  ALT  262<H>  /  AlkPhos  682<H>  10-14     LIVER FUNCTIONS - ( 14 Oct 2020 06:36 )  Alb: 1.9 g/dL / Pro: 5.1 gm/dL / ALK PHOS: 682 U/L / ALT: 262 U/L / AST: 198 U/L / GGT: x           Urinalysis Basic - ( 13 Oct 2020 20:35 )    Color: Yellow / Appearance: Clear / S.010 / pH: x  Gluc: x / Ketone: Negative  / Bili: Negative / Urobili: Negative mg/dL   Blood: x / Protein: 100 mg/dL / Nitrite: Negative   Leuk Esterase: Trace / RBC: 0-2 /HPF / WBC 6-10   Sq Epi: x / Non Sq Epi: Few / Bacteria: Occasional          Radiology: all available radiological tests reviewed  < from: CT Abdomen and Pelvis No Cont (10.13.20 @ 18:26) >  EXAM:  CT ABDOMEN AND PELVIS                          EXAM:  CT CHEST                            PROCEDURE DATE:  10/13/2020          INTERPRETATION:  CT CHEST, CT ABDOMEN AND PELVIS    CLINICAL INFORMATION: sepsis    COMPARISON: CT chest/abdomen/pelvis 2020, MRI abdomen 10/2/2020    PROCEDURE:  CT of the Chest, Abdomen and Pelvis was performed without intravenous contrast.  Intravenous contrast: None  Oral contrast: None.  Sagittal and coronal reformats were performed.    FINDINGS:    CHEST:    LUNGS, AIRWAYS: The central airways are patent. The lungs are clear. Left suprahilar mass again seen, difficult to compare without contrast but appears smaller measuring approximately 4 x 3 cm.  PLEURA: No pleural abnormality.  VESSELS: Right chest wall infusion port is seen with the catheter tip in the SVC. Normal caliber aorta.  HEART: Normal heart size. No pericardial effusion. Coronary artery calcifications are present.  MEDIASTINUM AND ARIA: Decreased size of mediastinal lymph nodes.  CHEST WALL: No masses.    ABDOMEN AND PELVIS:    LIVER: Diffuse heterogeneous attenuation. Refer to previous MRI.  BILE DUCTS: Nondilated.  GALLBLADDER: Normal.  SPLEEN: Normal.  PANCREAS: Normal.  ADRENALS: Normal.  KIDNEYS/URETERS: No hydronephrosisor urinary tract calculi.    BLADDER: Normal.  REPRODUCTIVE ORGANS: No uterine or adnexal abnormality.    BOWEL: No bowel-related abnormality. Specifically, no evidence of acute diverticulitis. Normal appendix and ileocecal region. No bowel obstruction or bowel inflammation.  PERITONEUM: No free air or ascites.  VESSELS:  Normal caliber aorta.  RETROPERITONEUM/LYMPH NODES: No adenopathy.  ABDOMINAL WALL: No mass, hernia, or fluid collection.  BONES: No lytic or blastic lesion.    IMPRESSION:    No septic pathology.    Decreased left hilar mass and adenopathy.      Advanced directives addressed: full resuscitation

## 2020-10-14 NOTE — CONSULT NOTE ADULT - PROBLEM SELECTOR RECOMMENDATION 7
She has a known history of depression / anxiety; has made her initial therapy understandably difficult / this is a setback; continue aggressive psychosocial support

## 2020-10-14 NOTE — CONSULT NOTE ADULT - SUBJECTIVE AND OBJECTIVE BOX
Chief Complaint:  Patient is a 65y old  Female who presents with a chief complaint of needs port removal    HPI:  65 year old female patient with pertinent history of recent Lung cancer diagnosis who recently started outpatient chemo(October 1st to 3rd) followed by Immunotherapy( on October 5th) presented to the ED complaining of progressively weakness. Patient also noted some right chest wall pain at the sight of port insertion for chemo. On admission patient was found to have SIRS. IR was consulted for evaluation of port site. Patient seen at bedside, reported feeling weak, but overall OK. Denied SOB, CP, NVD.    Allergies  codeine (Unknown)    MEDICATIONS  (STANDING):  cefepime  Injectable. 1000 milliGRAM(s) IV Push every 24 hours  clonazePAM  Tablet 0.5 milliGRAM(s) Oral <User Schedule>  clotrimazole  Oral Lozenge - Peds 1 Lozenge Oral three times a day  demeclocycline 600 milliGRAM(s) Oral two times a day  levETIRAcetam 500 milliGRAM(s) Oral two times a day  mirtazapine 7.5 milliGRAM(s) Oral at bedtime  multivitamin 1 Tablet(s) Oral daily  polyethylene glycol 3350 17 Gram(s) Oral daily  sodium chloride 1 Gram(s) Oral every 8 hours  sodium chloride 0.9%. 1000 milliLiter(s) (100 mL/Hr) IV Continuous <Continuous>    MEDICATIONS  (PRN):  HYDROmorphone  Injectable 1 milliGRAM(s) IV Push every 6 hours PRN Severe Pain (7 - 10)  morphine  - Injectable 2 milliGRAM(s) IV Push every 4 hours PRN Moderate Pain (4 - 6)  ondansetron Injectable 4 milliGRAM(s) IV Push every 6 hours PRN Nausea  oxyCODONE    IR 10 milliGRAM(s) Oral every 4 hours PRN Mild Pain (1 - 3)      PAST MEDICAL & SURGICAL HISTORY:  Hyponatremia    Brain aneurysm    Anxiety    Depression    History of neck surgery        FAMILY HISTORY:  FH: CAD (coronary artery disease)  inmother        Review of Systems:  As per HPI    Physical Exam  Vital Signs Last 24 Hrs  T(C): 36.3 (14 Oct 2020 08:10), Max: 37 (13 Oct 2020 15:20)  T(F): 97.3 (14 Oct 2020 08:10), Max: 98.6 (13 Oct 2020 15:20)  HR: 114 (14 Oct 2020 08:10) (107 - 127)  BP: 123/80 (14 Oct 2020 08:10) (103/76 - 129/77)  BP(mean): 90 (13 Oct 2020 20:30) (84 - 90)  RR: 17 (14 Oct 2020 08:10) (17 - 20)  SpO2: 100% (14 Oct 2020 08:10) (98% - 100%)    GENERAL APPEARANCE: Well developed, well nourished, in no acute distress.    LUNGS: Auscultation of the lungs revealed normal breath sounds without any other adventitious sounds or rubs.    CARDIOVASCULAR: There was a regular rate and rhythm without any murmurs, gallops, rubs.     ABDOMEN: Soft and nontender with normal bowel sounds.     PORT SITE: Right anterior chest with well healed venotomy site. Dermatotomy site is slightly dehisced with visible dry granulation tissue within the cavity. Port is not visible from the wound. No erythema, swelling, or induration noted.     CBC                        11.6   33.25 )-----------( 41       ( 14 Oct 2020 06:36 )             35.6       Chemistry  10-14    143  |  114<H>  |  94<H>  ----------------------------<  87  4.4   |  17<L>  |  3.31<H>    Ca    7.7<L>      14 Oct 2020 06:36  Phos  6.2     10-14  Mg     2.9     10-14    TPro  5.1<L>  /  Alb  1.9<L>  /  TBili  0.7  /  DBili  x   /  AST  198<H>  /  ALT  262<H>  /  AlkPhos  682<H>  10-14    Coags  PT/INR - ( 14 Oct 2020 06:36 )   PT: 14.5 sec;   INR: 1.25 ratio    PTT - ( 13 Oct 2020 15:41 )  PTT:24.2 sec

## 2020-10-14 NOTE — BRIEF OPERATIVE NOTE - OPERATION/FINDINGS
1) RIJV port removed completely  2) No Purulence in pocket.   3) Pocket irrigated and closed primarily  4) Entire port sent for C+S

## 2020-10-14 NOTE — CONSULT NOTE ADULT - CONSULT REASON
64yo F with right anterior chest wall port placed by IR, now with dehiscence, referred to IR for removal

## 2020-10-14 NOTE — PROGRESS NOTE ADULT - ASSESSMENT
#SIRS #Leukocytosis #Lactic acidosis  -no clearly etiology  -pt volume resuscitated in the ED and give IV abx  -follow up blood and urine cx  - VERSUS infected port  spoke to IR and ID - to dc port today, f/u Port Cx, cont IV Abx and when cultures negative can place a new port     # Pt with Small Cell Lung Ca with mets to the bone and liver  -recent chemo and immunotherapy   - started chemo via port on 10/1/20, got neulasta after than contributing to high WBC count     #PAULA  -possibly pre-renal in etiology  -no obstruction/ stricture noted on CT abdomen/pelvis  -IVF hydration    #Depression  -on Mirtazapine    #Anxiety   -on clonazepam    #Hx of SIADH  -likely secondary to lung ca  -on salt tabs and demeclocycline     #Severe protein calorie malnutrition  -get Nutritionist evaluation     #Transaminitis  -possibly related to recent chemo/ immunotherapy  -no acute pathology noted on imaging    #POssible Seizure disorder  Pt started on Keppra prev admission  can get EEG in 1-2 days as MS is improving     #Advanced Directives  -pt still full code  -Will get palliative team input while inpatient     #DVT ppx  -give Lovenox in lieu of Lung ca dx    DISPO 10/14: discussed with RN/IR and ID  f/u Cxs, f/u with [facility]UR[facility] for EEG   monitor LABS incl LFTs

## 2020-10-14 NOTE — CONSULT NOTE ADULT - PROBLEM SELECTOR RECOMMENDATION 9
Small cell/ stage IV; S/P Carboplatin Etoposide; Imaging : no progression; Lung mass may be slightly smaller

## 2020-10-14 NOTE — PROGRESS NOTE ADULT - SUBJECTIVE AND OBJECTIVE BOX
65 year old female patient with pertinent history of recent Lung cancer diagnosis who recently started outpatient chemo(October 1st to 3rd) followed by Immunotherapy( on October 5th) presented to the ED complaining of progressively weakness. Patient has been eating and hydrating  but not enough. Patient denies any fever, chills, coughing, vomiting, diarrhea or abdominal pain but endorses being nauseous. Patient also noted some right chest wall pain at the sight of port insertion for chemo- was initially scheduled for an outpatient revision with Interventional Radiology( Dr Mckeon)In the ED patient found to have a WBC of 32, Lactate of 5.6, Cr of 4.  CT chest/Abdomen and Pelvis  negative for any acute [id number] year old female patient presenting with weakness found to have SIRS and worsening transaminitis      10/14 - no cp palps sob abdo pain; some soreness at the port site   PHYSICAL EXAM:  GENERAL: NAD, able to lie flat in bed  HEAD:  Atraumatic, Normocephalic  EYES: EOMI, PERRLA, normal sclera  ENT: Moist mucous membranes  NECK: Supple, No JVD, no nuchal rigidity  CHEST/LUNG: Clear to auscultation bilaterally; No rales, rhonchi, wheezing, or rubs. Unlabored respirations  HEART: Regular rate and rhythm; No murmurs, rubs, or gallops  ABDOMEN: Bowel sounds present; Soft, Nontender, Nondistended. No hepatomegaly  EXTREMITIES:  no pitting bilaterally  NERVOUS SYSTEM:  Alert & Oriented X3, speech clear. No focal motor or sensory deficits  MSK: FROM all 4 extremities, full and equal strength  SKIN: Rt chest wall port with brownish discharge?    LABS: All Labs Reviewed:                        11.6   33.25 )-----------( 41       ( 14 Oct 2020 06:36 )             35.6     10-14    143  |  114<H>  |  94<H>  ----------------------------<  87  4.4   |  17<L>  |  3.31<H>    RADIOLOGY/EKG:  < from: CT Abdomen and Pelvis No Cont (10.13.20 @ 18:26) >  No septic pathology.  Decreased left hilar mass and adenopathy.      cefepime  Injectable. 1000 milliGRAM(s) IV Push every 24 hours  clonazePAM  Tablet 0.5 milliGRAM(s) Oral <User Schedule>  clotrimazole  Oral Lozenge - Peds 1 Lozenge Oral three times a day  demeclocycline 600 milliGRAM(s) Oral two times a day  HYDROmorphone   Tablet 2 milliGRAM(s) Oral every 4 hours PRN  HYDROmorphone   Tablet 4 milliGRAM(s) Oral every 4 hours PRN  HYDROmorphone  Injectable 0.5 milliGRAM(s) IV Push every 4 hours PRN  levETIRAcetam 500 milliGRAM(s) Oral two times a day  mirtazapine 7.5 milliGRAM(s) Oral at bedtime  multivitamin 1 Tablet(s) Oral daily  ondansetron Injectable 4 milliGRAM(s) IV Push every 6 hours PRN  polyethylene glycol 3350 17 Gram(s) Oral daily  sodium chloride 1 Gram(s) Oral every 8 hours  sodium chloride 0.9%. 1000 milliLiter(s) IV Continuous <Continuous>

## 2020-10-14 NOTE — DIETITIAN INITIAL EVALUATION ADULT. - ENERGY NEEDS
Ht.    65  "        Wt.  76      kg               BMI                  IBW       kg               Pt is at    %  IBW Ht.    65  "        Wt.  76      kg               BMI 27.9                 IBW    57   kg               Pt is at  133  %  IBW

## 2020-10-14 NOTE — CONSULT NOTE ADULT - SUBJECTIVE AND OBJECTIVE BOX
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 Patient presented to the ED on 10/14/2020 complaining of progressive weakness, and needed to be helped to the ground and then was unable to walk.   Patient also noted some right chest wall pain at the sight of port insertion for chemo- was initially scheduled for an outpatient revision with Interventional Radiology.      10/14/2020 patient seen and examined with no family at bedside. Patient states overnight she had a rough night because she was having multiple BMs.  Patient complain on bone pain in b/l legs but helps to control it with medication.  Patient to go to IR to have revision of port.       PAIN: (x )Yes   ( )No  Level: moderate - severe(at times)   Location:  b/l legs   Intensity:  6-10  /10   Quality: sharp   Aggravating Factors: movement   Alleviating Factors: medication and rest   Radiation: up and down legs   Duration/Timing: intermittently   Impact on ADLs: movement     DYSPNEA: ( ) Yes  ( x) No    PAST MEDICAL & SURGICAL HISTORY:  Hyponatremia    Brain aneurysm    Anxiety    Depression    History of neck surgery    SOCIAL HX:    Hx opiate tolerance (x )YES  ( )NO    Baseline ADLs  (Prior to Admission)  ( ) Independent   (x )Dependent    FAMILY HISTORY:  FH: CAD (coronary artery disease)  in mother    Review of Systems:    Anxiety- yed  Depression- maybe  Physical Discomfort- yes   Dyspnea- no   Constipation- no   Diarrhea- yes   Nausea- yes   Vomiting- no   Anorexia-  does not feel like she has an appetite   Weight Loss- unsure   Cough- denies   Secretions- denies   Fatigue- moderate   Weakness- severe, feels it is getting worse   Delirium- denies     All other systems reviewed and negative    PHYSICAL EXAM:    Vital Signs Last 24 Hrs  T(C): 36.3 (14 Oct 2020 08:10), Max: 37 (13 Oct 2020 15:20)  T(F): 97.3 (14 Oct 2020 08:10), Max: 98.6 (13 Oct 2020 15:20)  HR: 114 (14 Oct 2020 08:10) (107 - 127)  BP: 123/80 (14 Oct 2020 08:10) (103/76 - 129/77)  BP(mean): 90 (13 Oct 2020 20:30) (84 - 90)  RR: 17 (14 Oct 2020 08:10) (17 - 20)  SpO2: 100% (14 Oct 2020 08:10) (98% - 100%)  Daily Height in cm: 165.1 (13 Oct 2020 15:12)    Daily Weight in k (14 Oct 2020 08:00)    PPSV2: 50 %    General: ill, pale appearing female in bed, NAD   Mental Status: alert and oriented at this time, at times becomes forgetful   HEENT: dry oral mucosa, + temporal wasting   Lungs: Breath sounds are clear bilaterally, No wheezing, rales or rhonchi  Cardiac: S1S2 +   GI: Bowel Sounds present, soft, nontender, nondistended, no guarding, no rebound  : no suprapubic tenderness   Ext: no edema present   Neuro: weakness         LABS:                        11.6   33.25 )-----------( 41       ( 14 Oct 2020 06:36 )             35.6     10-14    143  |  114<H>  |  94<H>  ----------------------------<  87  4.4   |  17<L>  |  3.31<H>    Ca    7.7<L>      14 Oct 2020 06:36  Phos  6.2     10-14  Mg     2.9     1014    TPro  5.1<L>  /  Alb  1.9<L>  /  TBili  0.7  /  DBili  x   /  AST  198<H>  /  ALT  262<H>  /  AlkPhos  682<H>  1014    PT/INR - ( 14 Oct 2020 06:36 )   PT: 14.5 sec;   INR: 1.25 ratio         PTT - ( 13 Oct 2020 15:41 )  PTT:24.2 sec  Albumin: Albumin, Serum: 1.9 g/dL (10-14 @ 06:36)      Allergies    codeine (Unknown)    Intolerances      MEDICATIONS  (STANDING):  cefepime  Injectable. 1000 milliGRAM(s) IV Push every 12 hours  clonazePAM  Tablet 0.5 milliGRAM(s) Oral <User Schedule>  clotrimazole  Oral Lozenge - Peds 1 Lozenge Oral three times a day  demeclocycline 600 milliGRAM(s) Oral two times a day  levETIRAcetam 500 milliGRAM(s) Oral two times a day  mirtazapine 7.5 milliGRAM(s) Oral at bedtime  multivitamin 1 Tablet(s) Oral daily  polyethylene glycol 3350 17 Gram(s) Oral daily  sodium chloride 1 Gram(s) Oral every 8 hours  sodium chloride 0.9%. 1000 milliLiter(s) (100 mL/Hr) IV Continuous <Continuous>    MEDICATIONS  (PRN):  HYDROmorphone  Injectable 1 milliGRAM(s) IV Push every 6 hours PRN Severe Pain (7 - 10)  morphine  - Injectable 2 milliGRAM(s) IV Push every 4 hours PRN Moderate Pain (4 - 6)  ondansetron Injectable 4 milliGRAM(s) IV Push every 6 hours PRN Nausea  oxyCODONE    IR 10 milliGRAM(s) Oral every 4 hours PRN Mild Pain (1 - 3)      RADIOLOGY/ADDITIONAL STUDIES: HPI: Pt is a 65y old Female with hx of Pt is a 64y old Female with hx depression and anxiety, Gastric metaplasia resulting in persistent nausea,  brain aneurysm, hyponatremia, recently diagnosis with 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  Patient presented to the ED on 10/14/2020 complaining of progressive weakness, and needed to be helped to the ground and then was unable to walk.   Patient also noted some right chest wall pain at the sight of port insertion for chemo- was initially scheduled for an outpatient revision with Interventional Radiology.      10/14/2020 patient seen and examined with no family at bedside. Patient states overnight she had a rough night because she was having multiple BMs.  Patient complain on bone pain in b/l legs but helps to control it with medication.  Patient to go to IR to have removal of port.       PAIN: (x )Yes   ( )No  Level: moderate - severe(at times)   Location:  b/l legs   Intensity:  6-10  /10   Quality: sharp   Aggravating Factors: movement   Alleviating Factors: medication and rest   Radiation: up and down legs   Duration/Timing: intermittently   Impact on ADLs: movement     DYSPNEA: ( ) Yes  ( x) No    PAST MEDICAL & SURGICAL HISTORY:  Hyponatremia    Brain aneurysm    Anxiety    Depression    History of neck surgery    SOCIAL HX:    Hx opiate tolerance (x )YES  ( )NO    Baseline ADLs  (Prior to Admission)  ( ) Independent   (x )Dependent    FAMILY HISTORY:  FH: CAD (coronary artery disease)  in mother    Review of Systems:    Anxiety- yed  Depression- maybe  Physical Discomfort- yes   Dyspnea- no   Constipation- no   Diarrhea- yes   Nausea- yes   Vomiting- no   Anorexia-  does not feel like she has an appetite   Weight Loss- unsure   Cough- denies   Secretions- denies   Fatigue- moderate   Weakness- severe, feels it is getting worse   Delirium- denies     All other systems reviewed and negative    PHYSICAL EXAM:    Vital Signs Last 24 Hrs  T(C): 36.3 (14 Oct 2020 08:10), Max: 37 (13 Oct 2020 15:20)  T(F): 97.3 (14 Oct 2020 08:10), Max: 98.6 (13 Oct 2020 15:20)  HR: 114 (14 Oct 2020 08:10) (107 - 127)  BP: 123/80 (14 Oct 2020 08:10) (103/76 - 129/77)  BP(mean): 90 (13 Oct 2020 20:30) (84 - 90)  RR: 17 (14 Oct 2020 08:10) (17 - 20)  SpO2: 100% (14 Oct 2020 08:10) (98% - 100%)  Daily Height in cm: 165.1 (13 Oct 2020 15:12)    Daily Weight in k (14 Oct 2020 08:00)    PPSV2: 50 %    General: ill, pale appearing female in bed, NAD   Mental Status: alert and oriented at this time, at times becomes forgetful   HEENT: dry oral mucosa, + temporal wasting   Lungs: Breath sounds are clear bilaterally, No wheezing, rales or rhonchi  Cardiac: S1S2 +   GI: Bowel Sounds present, soft, nontender, nondistended, no guarding, no rebound  : no suprapubic tenderness   Ext: no edema present   Neuro: weakness         LABS:                        11.6   33.25 )-----------( 41       ( 14 Oct 2020 06:36 )             35.6     10-14    143  |  114<H>  |  94<H>  ----------------------------<  87  4.4   |  17<L>  |  3.31<H>    Ca    7.7<L>      14 Oct 2020 06:36  Phos  6.2     10-14  Mg     2.9     1014    TPro  5.1<L>  /  Alb  1.9<L>  /  TBili  0.7  /  DBili  x   /  AST  198<H>  /  ALT  262<H>  /  AlkPhos  682<H>  1014    PT/INR - ( 14 Oct 2020 06:36 )   PT: 14.5 sec;   INR: 1.25 ratio         PTT - ( 13 Oct 2020 15:41 )  PTT:24.2 sec  Albumin: Albumin, Serum: 1.9 g/dL (10-14 @ 06:36)      Allergies    codeine (Unknown)    Intolerances      MEDICATIONS  (STANDING):  cefepime  Injectable. 1000 milliGRAM(s) IV Push every 12 hours  clonazePAM  Tablet 0.5 milliGRAM(s) Oral <User Schedule>  clotrimazole  Oral Lozenge - Peds 1 Lozenge Oral three times a day  demeclocycline 600 milliGRAM(s) Oral two times a day  levETIRAcetam 500 milliGRAM(s) Oral two times a day  mirtazapine 7.5 milliGRAM(s) Oral at bedtime  multivitamin 1 Tablet(s) Oral daily  polyethylene glycol 3350 17 Gram(s) Oral daily  sodium chloride 1 Gram(s) Oral every 8 hours  sodium chloride 0.9%. 1000 milliLiter(s) (100 mL/Hr) IV Continuous <Continuous>    MEDICATIONS  (PRN):  HYDROmorphone  Injectable 1 milliGRAM(s) IV Push every 6 hours PRN Severe Pain (7 - 10)  morphine  - Injectable 2 milliGRAM(s) IV Push every 4 hours PRN Moderate Pain (4 - 6)  ondansetron Injectable 4 milliGRAM(s) IV Push every 6 hours PRN Nausea  oxyCODONE    IR 10 milliGRAM(s) Oral every 4 hours PRN Mild Pain (1 - 3)      RADIOLOGY/ADDITIONAL STUDIES:

## 2020-10-14 NOTE — CONSULT NOTE ADULT - SUBJECTIVE AND OBJECTIVE BOX
HPI:  65 year old female patient with stage IV small cell lung cancer: had presented with symptomatic hyponatremia , left upper lung mass and liver metastases; VATS biopsy revealed small cell cancer; At the same a mediport was placed; she received Carboplatin + Etoposide 10/1-10/3 and following DC was administered Neulasta 10/5 ; Earlier this week there was a   concern for wound dehiscence / evaluated by Dr Doyle/ She was to follow up with IR; She is admitted with weakness, fatigue, port inflammation/ infection / sepsis ; port has been removed and she is on antibiotics; CT imaging; no cancer progression ; lung mass may be slightly smaller; She has leukocytosis ( Infection/? Neulasta) .    Of note, contrary to the notes on admission she did not get any immune therapy    PAST MEDICAL & SURGICAL HISTORY:  Hyponatremia/ SIADH  Brain aneurysm  Anxiety  Depression  History of neck surgery  samll cell cancer IV         MEDICATIONS  (STANDING):  cefepime  Injectable. 1000 milliGRAM(s) IV Push every 24 hours  clonazePAM  Tablet 0.5 milliGRAM(s) Oral <User Schedule>  clotrimazole  Oral Lozenge - Peds 1 Lozenge Oral three times a day  demeclocycline 600 milliGRAM(s) Oral two times a day  levETIRAcetam 500 milliGRAM(s) Oral two times a day  mirtazapine 7.5 milliGRAM(s) Oral at bedtime  multivitamin 1 Tablet(s) Oral daily  polyethylene glycol 3350 17 Gram(s) Oral daily  sodium chloride 1 Gram(s) Oral every 8 hours  sodium chloride 0.9%. 1000 milliLiter(s) (100 mL/Hr) IV Continuous <Continuous>    MEDICATIONS  (PRN):  HYDROmorphone   Tablet 2 milliGRAM(s) Oral every 4 hours PRN Moderate Pain (4 - 6)  HYDROmorphone   Tablet 4 milliGRAM(s) Oral every 4 hours PRN Severe Pain (7 - 10)  HYDROmorphone  Injectable 0.5 milliGRAM(s) IV Push every 4 hours PRN breakthrough pain  ondansetron Injectable 4 milliGRAM(s) IV Push every 6 hours PRN Nausea      Allergies    codeine (Unknown)    Intolerances    SOCIAL HISTORY:    FAMILY HISTORY:  FH: CAD (coronary artery disease)  inmother        Vital Signs Last 24 Hrs  T(C): 36.2 (14 Oct 2020 16:54), Max: 37.1 (14 Oct 2020 14:30)  T(F): 97.2 (14 Oct 2020 16:54), Max: 98.7 (14 Oct 2020 14:30)  HR: 102 (14 Oct 2020 16:54) (102 - 118)  BP: 101/64 (14 Oct 2020 16:54) (101/64 - 133/78)  BP(mean): 90 (13 Oct 2020 20:30) (90 - 90)  RR: 18 (14 Oct 2020 16:54) (15 - 20)  SpO2: 98% (14 Oct 2020 16:54) (98% - 100%)      LABS:                        11.6   33.25 )-----------( 41       ( 14 Oct 2020 06:36 )             35.6     10-14    143  |  114<H>  |  94<H>  ----------------------------<  87  4.4   |  17<L>  |  3.31<H>    Ca    7.7<L>      14 Oct 2020 06:36  Phos  6.2     10-14  Mg     2.9     10-14    TPro  5.1<L>  /  Alb  1.9<L>  /  TBili  0.7  /  DBili  x   /  AST  198<H>  /  ALT  262<H>  /  AlkPhos  682<H>  10-14    PT/INR - ( 14 Oct 2020 06:36 )   PT: 14.5 sec;   INR: 1.25 ratio         PTT - ( 13 Oct 2020 15:41 )  PTT:24.2 sec  Urinalysis Basic - ( 13 Oct 2020 20:35 )    Color: Yellow / Appearance: Clear / S.010 / pH: x  Gluc: x / Ketone: Negative  / Bili: Negative / Urobili: Negative mg/dL   Blood: x / Protein: 100 mg/dL / Nitrite: Negative   Leuk Esterase: Trace / RBC: 0-2 /HPF / WBC 6-10   Sq Epi: x / Non Sq Epi: Few / Bacteria: Occasional        RADIOLOGY & ADDITIONAL STUDIES:

## 2020-10-14 NOTE — DIETITIAN INITIAL EVALUATION ADULT. - OTHER INFO
65 year old female patient with pertinent history of recent Lung cancer diagnosis who recently started outpatient chemo(October 1st to 3rd) followed by Immunotherapy( on October 5th) presented to the ED complaining of progressively weakness. Patient has been eating and hydrating  but not enough. Patient denies any fever, chills, coughing, vomiting, diarrhea or abdominal pain but endorses being nauseous. Patient also noted some right chest wall pain at the sight of port insertion for chemo- was initially scheduled for an outpatient revision with Interventional Radiology( Dr Mckeon) 65 year old female patient with pertinent history of recent Lung cancer diagnosis who recently started outpatient chemo(October 1st to 3rd) followed by Immunotherapy( on October 5th) presented to the ED complaining of progressively weakness. Patient has been eating and hydrating  but not enough. Patient denies any fever, chills, coughing, vomiting, diarrhea or abdominal pain but endorses being nauseous. Patient also noted some right chest wall pain at the sight of port insertion for chemo- was initially scheduled for an outpatient revision with Interventional Radiology( Dr Mckeon)  Pt is lethargic, has depressed affect, seems a bit confused when it comes to weight hx.  Shows signs of muscle wasting, fat wasting.

## 2020-10-14 NOTE — CONSULT NOTE ADULT - ASSESSMENT
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 Patient presented to the ED on 10/14/2020 complaining of progressive weakness, and needed to be helped to the ground and then was unable to walk.   Patient also noted some right chest wall pain at the sight of port insertion for chemo- was initially scheduled for an outpatient revision with Interventional Radiology.      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 Patient presented to the ED on 10/14/2020 complaining of progressive weakness, and needed to be helped to the ground and then was unable to walk.   Patient also noted some right chest wall pain at the sight of port insertion for chemo- was initially scheduled for an outpatient revision with Interventional Radiology.      1) Sepsis   - no clearly etiology  - c/w fluids   - c/w IV antibiotics   - follow up blood and urine cx  - ID consult appreciated   - dehisced port - Port to be removed today in IR     2) Pain   - Metastatic Lesions   - Dilaudid 2mg for moderate pain q4hrs PRN   - Dilaudid 4mg for severe pain q4hrs PRN   - Dilaudid 0.5mg IV for breakthrough pain PRN q4hrs     3) Anxiety/ Depression  - c/w Mirtazapine  - c/w Klonopin 0.5mg TID   - SW support   - supportive care     4) Hyperkalemia  - no EKG changes noted  - given Kayexalate in the ED on 10/13   - multiple BMs   - continue to monitor labs, K 4.4 today  (5.5 10/13/2020)    5) PAULA  - CT abdomen/pelvis reviewed no obstruction/ stricture   - c/w IVF hydration  - c/w to monitor labs trend kidney function     6) Stage 4 small cell lung cancer   - history of SIADH   - c/w salt tabs and demeclocycline   - Dr. Martin aware patient in hospital, oncology consult appreciated     7) Advance Care planning/GOC   - patient has capacity at this time   - Full Code    - updated  and daughter

## 2020-10-14 NOTE — PHYSICAL THERAPY INITIAL EVALUATION ADULT - GENERAL OBSERVATIONS, REHAB EVAL
Pt rec'd supine in bed, endorses generalized weakness and fatigue, no c/o pain, cooperative with PT.

## 2020-10-14 NOTE — CONSULT NOTE ADULT - PROBLEM SELECTOR RECOMMENDATION 6
From chemotherapy/ Carboplatin; in addition sepsis can contribute / No bleeding at this time; for further decline may need to hold lovenox/ leg compression instead

## 2020-10-14 NOTE — DIETITIAN INITIAL EVALUATION ADULT. - ADD RECOMMEND
Record PO intake in EMR after each meal (nursing.) add MVI w minerals. Monitor PO intake, tolerance, labs and weight.

## 2020-10-14 NOTE — PHYSICAL THERAPY INITIAL EVALUATION ADULT - PERTINENT HX OF CURRENT PROBLEM, REHAB EVAL
65 year old female patient with pertinent history of recent Lung cancer diagnosis who recently started outpatient chemo(October 1st to 3rd) followed by Immunotherapy( on October 5th) presented to the ED complaining of progressively weakness. Patient has been eating and hydrating  but not enough.

## 2020-10-14 NOTE — CONSULT NOTE ADULT - ASSESSMENT
66yo F with right anterior chest wall port placed by IR, now with dehiscence, referred to IR for removal  - IR to remove port due to wound dehiscence. Placement of new port can be performed after patient's infection clears.   - Please keep pt NPO for procedure

## 2020-10-14 NOTE — CHART NOTE - NSCHARTNOTEFT_GEN_A_CORE
Pt meets criteria for severe protein-calorie malnutrition in context of chronic disease.    PO intake < 75% nutritional needs > one month.    NFPE reveals moderate/severe muscle wasting (temples)   moderate muscle wasting  (temples, shoulders, clavicles, scapula, interosseus, thighs, calves)   moderate/severe fat wasting (triceps, buccal area.)    Wt loss of 12% of UBW in 5 weeks, clinically significant

## 2020-10-14 NOTE — DIETITIAN INITIAL EVALUATION ADULT. - PERTINENT MEDS FT
MEDICATIONS  (STANDING):  cefepime  Injectable. 1000 milliGRAM(s) IV Push every 12 hours  clonazePAM  Tablet 0.5 milliGRAM(s) Oral <User Schedule>  clotrimazole  Oral Lozenge - Peds 1 Lozenge Oral three times a day  demeclocycline 600 milliGRAM(s) Oral two times a day  enoxaparin Injectable 30 milliGRAM(s) SubCutaneous daily  levETIRAcetam 500 milliGRAM(s) Oral two times a day  mirtazapine 7.5 milliGRAM(s) Oral at bedtime  multivitamin 1 Tablet(s) Oral daily  polyethylene glycol 3350 17 Gram(s) Oral daily  sodium chloride 1 Gram(s) Oral every 8 hours  sodium chloride 0.9%. 1000 milliLiter(s) (100 mL/Hr) IV Continuous <Continuous>    MEDICATIONS  (PRN):  HYDROmorphone  Injectable 1 milliGRAM(s) IV Push every 6 hours PRN Severe Pain (7 - 10)  morphine  - Injectable 2 milliGRAM(s) IV Push every 4 hours PRN Moderate Pain (4 - 6)  ondansetron Injectable 4 milliGRAM(s) IV Push every 6 hours PRN Nausea  oxyCODONE    IR 10 milliGRAM(s) Oral every 4 hours PRN Mild Pain (1 - 3)

## 2020-10-14 NOTE — DIETITIAN INITIAL EVALUATION ADULT. - PERTINENT LABORATORY DATA
10-14 Na143 mmol/L Glu 87 mg/dL K+ 4.4 mmol/L Cr  3.31 mg/dL<H> BUN 94 mg/dL<H> Phos 6.2 mg/dL<H> Alb 1.9 g/dL<L> PAB n/a

## 2020-10-14 NOTE — CONSULT NOTE ADULT - ASSESSMENT
65 year old female patient with pertinent history of recent small cell lung cancer diagnosis who recently started outpatient chemo(October 1st to 3rd) followed by Immunotherapy( on October 5th) presented to the ED complaining of progressively weakness. Patient has been eating and hydrating  but not enough. Patient denies any fever, chills, coughing, vomiting, diarrhea or abdominal pain but endorses being nauseous. Patient also noted some right chest wall pain at the sight of port insertion for chemo- was initially scheduled for an outpatient revision with Interventional Radiology( Dr Mckeon) In the ED patient found to have a WBC of 32, Lactate of 5.6, Cr of 4.  CT chest/Abdomen and Pelvis negative for any acute pathology, Cr noted to be 4, elevated ALP/AST/ALT, afebrile, was given empiric antibiotics with vancomycin/cefepime.     1. leukocytosis. transamnitis. thrombocytopenia. PAULA. small cell lung cancer s/p chemo/immunotherapy  - 65 year old female patient with pertinent history of recent small cell lung cancer diagnosis who recently started outpatient chemo(October 1st to 3rd) followed by Immunotherapy( on October 5th) presented to the ED complaining of progressively weakness. Patient has been eating and hydrating  but not enough. Patient denies any fever, chills, coughing, vomiting, diarrhea or abdominal pain but endorses being nauseous. Patient also noted some right chest wall pain and skin opening at the sight of port insertion for chemo- was initially scheduled for an outpatient revision with Interventional Radiology( Dr Mckeon) In the ED patient found to have a WBC of 32, Lactate of 5.6, Cr of 4. CT chest/Abdomen and Pelvis negative for any acute pathology, Cr noted to be 4, elevated ALP/AST/ALT, afebrile, was given empiric antibiotics with vancomycin/cefepime. No reported abd pain/diarrhea/cough/dysuria/rashes.      1. leukocytosis. transamnitis. thrombocytopenia. PAULA. small cell lung cancer s/p chemo/immunotherapy  - imaging reviewed, could lab abnormalities be d/t chemo/immunotherapy?  - port in place - plan for revision with IR  - agree with IV cefepime empirically for now #2  - s/p vancomycin x 1 10/13  - f/u cultures  - given neulasta on 10/9 - this could explain elevated wbc ct, s/p chemo 10/1, immunotherapy on 10/5   - monitor temps  - tolerating abx well so far; no side effects noted  - reason for abx use and side effects reviewed with patient  - oncology f/u  - supportive care  - fu cbc

## 2020-10-15 LAB
ALBUMIN SERPL ELPH-MCNC: 1.7 G/DL — LOW (ref 3.3–5)
ALP SERPL-CCNC: 674 U/L — HIGH (ref 40–120)
ALT FLD-CCNC: 268 U/L — HIGH (ref 12–78)
ANION GAP SERPL CALC-SCNC: 11 MMOL/L — SIGNIFICANT CHANGE UP (ref 5–17)
ANION GAP SERPL CALC-SCNC: 11 MMOL/L — SIGNIFICANT CHANGE UP (ref 5–17)
AST SERPL-CCNC: 181 U/L — HIGH (ref 15–37)
BASOPHILS # BLD AUTO: 0 K/UL — SIGNIFICANT CHANGE UP (ref 0–0.2)
BASOPHILS NFR BLD AUTO: 0 % — SIGNIFICANT CHANGE UP (ref 0–2)
BILIRUB DIRECT SERPL-MCNC: 0.4 MG/DL — HIGH (ref 0–0.2)
BILIRUB INDIRECT FLD-MCNC: 0.5 MG/DL — SIGNIFICANT CHANGE UP (ref 0.2–1)
BILIRUB SERPL-MCNC: 0.9 MG/DL — SIGNIFICANT CHANGE UP (ref 0.2–1.2)
BUN SERPL-MCNC: 84 MG/DL — HIGH (ref 7–23)
BUN SERPL-MCNC: 85 MG/DL — HIGH (ref 7–23)
CALCIUM SERPL-MCNC: 7.5 MG/DL — LOW (ref 8.5–10.1)
CALCIUM SERPL-MCNC: 7.5 MG/DL — LOW (ref 8.5–10.1)
CHLORIDE SERPL-SCNC: 124 MMOL/L — HIGH (ref 96–108)
CHLORIDE SERPL-SCNC: 126 MMOL/L — HIGH (ref 96–108)
CO2 SERPL-SCNC: 15 MMOL/L — LOW (ref 22–31)
CO2 SERPL-SCNC: 15 MMOL/L — LOW (ref 22–31)
CREAT SERPL-MCNC: 2.73 MG/DL — HIGH (ref 0.5–1.3)
CREAT SERPL-MCNC: 2.86 MG/DL — HIGH (ref 0.5–1.3)
EOSINOPHIL # BLD AUTO: 0 K/UL — SIGNIFICANT CHANGE UP (ref 0–0.5)
EOSINOPHIL NFR BLD AUTO: 0 % — SIGNIFICANT CHANGE UP (ref 0–6)
GLUCOSE SERPL-MCNC: 110 MG/DL — HIGH (ref 70–99)
GLUCOSE SERPL-MCNC: 94 MG/DL — SIGNIFICANT CHANGE UP (ref 70–99)
HCT VFR BLD CALC: 34.6 % — SIGNIFICANT CHANGE UP (ref 34.5–45)
HGB BLD-MCNC: 11 G/DL — LOW (ref 11.5–15.5)
LYMPHOCYTES # BLD AUTO: 1.56 K/UL — SIGNIFICANT CHANGE UP (ref 1–3.3)
LYMPHOCYTES # BLD AUTO: 5 % — LOW (ref 13–44)
MAGNESIUM SERPL-MCNC: 3 MG/DL — HIGH (ref 1.6–2.6)
MCHC RBC-ENTMCNC: 30.1 PG — SIGNIFICANT CHANGE UP (ref 27–34)
MCHC RBC-ENTMCNC: 31.8 GM/DL — LOW (ref 32–36)
MCV RBC AUTO: 94.8 FL — SIGNIFICANT CHANGE UP (ref 80–100)
MONOCYTES # BLD AUTO: 1.56 K/UL — HIGH (ref 0–0.9)
MONOCYTES NFR BLD AUTO: 5 % — SIGNIFICANT CHANGE UP (ref 2–14)
NEUTROPHILS # BLD AUTO: 28.04 K/UL — HIGH (ref 1.8–7.4)
NEUTROPHILS NFR BLD AUTO: 88 % — HIGH (ref 43–77)
NRBC # BLD: SIGNIFICANT CHANGE UP /100 WBCS (ref 0–0)
PLATELET # BLD AUTO: 42 K/UL — LOW (ref 150–400)
POTASSIUM SERPL-MCNC: 3.7 MMOL/L — SIGNIFICANT CHANGE UP (ref 3.5–5.3)
POTASSIUM SERPL-MCNC: 3.8 MMOL/L — SIGNIFICANT CHANGE UP (ref 3.5–5.3)
POTASSIUM SERPL-SCNC: 3.7 MMOL/L — SIGNIFICANT CHANGE UP (ref 3.5–5.3)
POTASSIUM SERPL-SCNC: 3.8 MMOL/L — SIGNIFICANT CHANGE UP (ref 3.5–5.3)
PROT SERPL-MCNC: 5.1 GM/DL — LOW (ref 6–8.3)
RBC # BLD: 3.65 M/UL — LOW (ref 3.8–5.2)
RBC # FLD: 14.9 % — HIGH (ref 10.3–14.5)
SODIUM SERPL-SCNC: 150 MMOL/L — HIGH (ref 135–145)
SODIUM SERPL-SCNC: 152 MMOL/L — HIGH (ref 135–145)
WBC # BLD: 31.15 K/UL — HIGH (ref 3.8–10.5)
WBC # FLD AUTO: 31.15 K/UL — HIGH (ref 3.8–10.5)

## 2020-10-15 PROCEDURE — 95819 EEG AWAKE AND ASLEEP: CPT | Mod: 26

## 2020-10-15 PROCEDURE — 99233 SBSQ HOSP IP/OBS HIGH 50: CPT

## 2020-10-15 RX ORDER — SODIUM CHLORIDE 9 MG/ML
1000 INJECTION, SOLUTION INTRAVENOUS
Refills: 0 | Status: DISCONTINUED | OUTPATIENT
Start: 2020-10-15 | End: 2020-10-15

## 2020-10-15 RX ORDER — LEVETIRACETAM 250 MG/1
250 TABLET, FILM COATED ORAL
Refills: 0 | Status: DISCONTINUED | OUTPATIENT
Start: 2020-10-15 | End: 2020-10-20

## 2020-10-15 RX ORDER — FENTANYL CITRATE 50 UG/ML
1 INJECTION INTRAVENOUS
Refills: 0 | Status: DISCONTINUED | OUTPATIENT
Start: 2020-10-15 | End: 2020-10-20

## 2020-10-15 RX ORDER — CLONAZEPAM 1 MG
1 TABLET ORAL ONCE
Refills: 0 | Status: DISCONTINUED | OUTPATIENT
Start: 2020-10-15 | End: 2020-10-15

## 2020-10-15 RX ORDER — SODIUM CHLORIDE 9 MG/ML
1000 INJECTION, SOLUTION INTRAVENOUS
Refills: 0 | Status: DISCONTINUED | OUTPATIENT
Start: 2020-10-15 | End: 2020-10-18

## 2020-10-15 RX ADMIN — Medication 600 MILLIGRAM(S): at 21:27

## 2020-10-15 RX ADMIN — Medication 0.5 MILLIGRAM(S): at 21:27

## 2020-10-15 RX ADMIN — FENTANYL CITRATE 1 PATCH: 50 INJECTION INTRAVENOUS at 09:52

## 2020-10-15 RX ADMIN — Medication 1 LOZENGE: at 21:27

## 2020-10-15 RX ADMIN — FENTANYL CITRATE 1 PATCH: 50 INJECTION INTRAVENOUS at 20:38

## 2020-10-15 RX ADMIN — HYDROMORPHONE HYDROCHLORIDE 4 MILLIGRAM(S): 2 INJECTION INTRAMUSCULAR; INTRAVENOUS; SUBCUTANEOUS at 06:50

## 2020-10-15 RX ADMIN — Medication 1 LOZENGE: at 15:43

## 2020-10-15 RX ADMIN — HYDROMORPHONE HYDROCHLORIDE 4 MILLIGRAM(S): 2 INJECTION INTRAMUSCULAR; INTRAVENOUS; SUBCUTANEOUS at 05:50

## 2020-10-15 RX ADMIN — Medication 1 LOZENGE: at 05:52

## 2020-10-15 RX ADMIN — Medication 1 MILLIGRAM(S): at 09:45

## 2020-10-15 RX ADMIN — MIRTAZAPINE 7.5 MILLIGRAM(S): 45 TABLET, ORALLY DISINTEGRATING ORAL at 21:27

## 2020-10-15 RX ADMIN — HYDROMORPHONE HYDROCHLORIDE 4 MILLIGRAM(S): 2 INJECTION INTRAMUSCULAR; INTRAVENOUS; SUBCUTANEOUS at 20:45

## 2020-10-15 RX ADMIN — CEFEPIME 1000 MILLIGRAM(S): 1 INJECTION, POWDER, FOR SOLUTION INTRAMUSCULAR; INTRAVENOUS at 05:52

## 2020-10-15 RX ADMIN — Medication 600 MILLIGRAM(S): at 09:46

## 2020-10-15 RX ADMIN — Medication 0.5 MILLIGRAM(S): at 05:52

## 2020-10-15 RX ADMIN — SODIUM CHLORIDE 50 MILLILITER(S): 9 INJECTION, SOLUTION INTRAVENOUS at 20:35

## 2020-10-15 RX ADMIN — SODIUM CHLORIDE 1 GRAM(S): 9 INJECTION INTRAMUSCULAR; INTRAVENOUS; SUBCUTANEOUS at 05:52

## 2020-10-15 RX ADMIN — Medication 0.5 MILLIGRAM(S): at 15:43

## 2020-10-15 RX ADMIN — Medication 1 TABLET(S): at 09:46

## 2020-10-15 RX ADMIN — SODIUM CHLORIDE 1 GRAM(S): 9 INJECTION INTRAMUSCULAR; INTRAVENOUS; SUBCUTANEOUS at 15:43

## 2020-10-15 RX ADMIN — SODIUM CHLORIDE 60 MILLILITER(S): 9 INJECTION, SOLUTION INTRAVENOUS at 12:58

## 2020-10-15 RX ADMIN — LEVETIRACETAM 250 MILLIGRAM(S): 250 TABLET, FILM COATED ORAL at 21:27

## 2020-10-15 RX ADMIN — HYDROMORPHONE HYDROCHLORIDE 4 MILLIGRAM(S): 2 INJECTION INTRAMUSCULAR; INTRAVENOUS; SUBCUTANEOUS at 22:00

## 2020-10-15 RX ADMIN — LEVETIRACETAM 500 MILLIGRAM(S): 250 TABLET, FILM COATED ORAL at 09:46

## 2020-10-15 RX ADMIN — POLYETHYLENE GLYCOL 3350 17 GRAM(S): 17 POWDER, FOR SOLUTION ORAL at 09:46

## 2020-10-15 NOTE — PROGRESS NOTE ADULT - SUBJECTIVE AND OBJECTIVE BOX
Date of service: 10-15-20 @ 12:48    pt seen and examined  s/p IR removal of port yesterday  has no pain or discharge from port site  has no fevers  no abd pain/cough or new symptoms    ROS: no fever or chills; denies dizziness, no HA, no SOB or cough, no abdominal pain, no diarrhea or constipation; no dysuria, no urinary frequency, no legs pain, no rashes    MEDICATIONS  (STANDING):  cefepime  Injectable. 1000 milliGRAM(s) IV Push every 24 hours  clonazePAM  Tablet 0.5 milliGRAM(s) Oral <User Schedule>  clotrimazole  Oral Lozenge - Peds 1 Lozenge Oral three times a day  demeclocycline 600 milliGRAM(s) Oral two times a day  fentaNYL   Patch  12 MICROgram(s)/Hr 1 Patch Transdermal every 72 hours  levETIRAcetam 500 milliGRAM(s) Oral two times a day  mirtazapine 7.5 milliGRAM(s) Oral at bedtime  multivitamin 1 Tablet(s) Oral daily  polyethylene glycol 3350 17 Gram(s) Oral daily  sodium chloride 1 Gram(s) Oral every 8 hours  sodium chloride 0.45%. 1000 milliLiter(s) (60 mL/Hr) IV Continuous <Continuous>    Vital Signs Last 24 Hrs  T(C): 36.3 (15 Oct 2020 08:10), Max: 37.1 (14 Oct 2020 14:30)  T(F): 97.3 (15 Oct 2020 08:10), Max: 98.7 (14 Oct 2020 14:30)  HR: 112 (15 Oct 2020 08:10) (96 - 122)  BP: 143/85 (15 Oct 2020 08:10) (101/64 - 143/85)  BP(mean): --  RR: 17 (15 Oct 2020 08:10) (15 - 20)  SpO2: 100% (15 Oct 2020 08:10) (98% - 100%)    PE:  Constitutional: frail looking  HEENT: NC/AT, EOMI, PERRLA, conjunctivae clear; ears and nose atraumatic; pharynx benign  Neck: supple; thyroid not palpable  Back: no tenderness  Respiratory: respiratory effort normal; clear to auscultation  Cardiovascular: S1S2 regular, no murmurs  Abdomen: soft, not tender, not distended, positive BS; liver and spleen WNL  Genitourinary: no suprapubic tenderness  Lymphatic: no LN palpable  Musculoskeletal: no muscle tenderness, no joint swelling or tenderness  Extremities: no pedal edema  Neurological/ Psychiatric: AxOx3, Judgement and insight normal;  moving all extremities  Skin: no rashes; port site clean, no purulence no palpable lesions    Labs: all available labs reviewed                                   11.0   31.15 )-----------( 42       ( 15 Oct 2020 07:17 )             34.6     10-15    150<H>  |  124<H>  |  84<H>  ----------------------------<  94  3.8   |  15<L>  |  2.73<H>    Ca    7.5<L>      15 Oct 2020 07:17  Phos  6.2     10-14  Mg     3.0     10-15    TPro  5.1<L>  /  Alb  1.7<L>  /  TBili  0.9  /  DBili  0.4<H>  /  AST  181<H>  /  ALT  268<H>  /  AlkPhos  674<H>  10-15      Urinalysis Basic - ( 13 Oct 2020 20:35 )    Color: Yellow / Appearance: Clear / S.010 / pH: x  Gluc: x / Ketone: Negative  / Bili: Negative / Urobili: Negative mg/dL   Blood: x / Protein: 100 mg/dL / Nitrite: Negative   Leuk Esterase: Trace / RBC: 0-2 /HPF / WBC 6-10   Sq Epi: x / Non Sq Epi: Few / Bacteria: Occasional    Culture - Blood (10.13.20 @ 15:41)   Specimen Source: .Blood Blood-Peripheral   Culture Results:   No growth to date. Culture - Blood (10.13.20 @ 15:41)   Specimen Source: .Blood Blood-Peripheral   Culture Results:   No growth to date.       Radiology: all available radiological tests reviewed    EXAM:  CT ABDOMEN AND PELVIS                          EXAM:  CT CHEST                            PROCEDURE DATE:  10/13/2020          INTERPRETATION:  CT CHEST, CT ABDOMEN AND PELVIS    CLINICAL INFORMATION: sepsis    COMPARISON: CT chest/abdomen/pelvis 2020, MRI abdomen 10/2/2020    PROCEDURE:  CT of the Chest, Abdomen and Pelvis was performed without intravenous contrast.  Intravenous contrast: None  Oral contrast: None.  Sagittal and coronal reformats were performed.    FINDINGS:    CHEST:    LUNGS, AIRWAYS: The central airways are patent. The lungs are clear. Left suprahilar mass again seen, difficult to compare without contrast but appears smaller measuring approximately 4 x 3 cm.  PLEURA: No pleural abnormality.  VESSELS: Right chest wall infusion port is seen with the catheter tip in the SVC. Normal caliber aorta.  HEART: Normal heart size. No pericardial effusion. Coronary artery calcifications are present.  MEDIASTINUM AND ARIA: Decreased size of mediastinal lymph nodes.  CHEST WALL: No masses.    ABDOMEN AND PELVIS:    LIVER: Diffuse heterogeneous attenuation. Refer to previous MRI.  BILE DUCTS: Nondilated.  GALLBLADDER: Normal.  SPLEEN: Normal.  PANCREAS: Normal.  ADRENALS: Normal.  KIDNEYS/URETERS: No hydronephrosisor urinary tract calculi.    BLADDER: Normal.  REPRODUCTIVE ORGANS: No uterine or adnexal abnormality.    BOWEL: No bowel-related abnormality. Specifically, no evidence of acute diverticulitis. Normal appendix and ileocecal region. No bowel obstruction or bowel inflammation.  PERITONEUM: No free air or ascites.  VESSELS:  Normal caliber aorta.  RETROPERITONEUM/LYMPH NODES: No adenopathy.  ABDOMINAL WALL: No mass, hernia, or fluid collection.  BONES: No lytic or blastic lesion.    IMPRESSION:    No septic pathology.    Decreased left hilar mass and adenopathy.      Advanced directives addressed: full resuscitation

## 2020-10-15 NOTE — PROGRESS NOTE ADULT - ASSESSMENT
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 Patient presented to the ED on 10/14/2020 complaining of progressive weakness, and needed to be helped to the ground and then was unable to walk.   Patient also noted some right chest wall pain at the sight of port insertion for chemo- was initially scheduled for an outpatient revision with Interventional Radiology.  Palliative medicine consulted to assist with symptom management and for continuity.    1) Sepsis   - no clearly etiology  - c/w fluids   - c/w IV antibiotics   - follow up blood and urine cx  - ID consult appreciated   - dehisced port s/p removal 10/14, f/u cu;ture    2) Pain   - Metastatic Lesions   - Dilaudid 2mg for moderate pain q4hrs PRN   - Dilaudid 4mg for severe pain q4hrs PRN   - Dilaudid 0.5mg IV for breakthrough pain PRN q4hrs   - Based on pts 24 hr opiate usage will start Fentanyl 12mcg/hr patch    3) Anxiety/ Depression  - c/w Mirtazapine  - c/w Klonopin 0.5mg TID (give 1mg dose this am and evaluate response.  If good response will change to 1mg TID)  - SW support   - supportive care     4) Hyperkalemia  - no EKG changes noted  - given Kayexalate in the ED on 10/13   - multiple BMs resolved now  - continue to monitor labs    5) PAULA  - Improving  - CT abdomen/pelvis reviewed no obstruction/ stricture   - c/w IVF hydration  - c/w to monitor labs trend kidney function     6) Stage 4 small cell lung cancer   - history of SIADH, now with hypernatremia  - c/w salt tabs and demeclocycline but decrease dose of either or both due to hypernatremia  - Dr. Martin aware patient in hospital, oncology consult appreciated     7) Advance Care planning/GOC   - patient has capacity at this time   - Full Code    -Will follow

## 2020-10-15 NOTE — PROGRESS NOTE ADULT - ASSESSMENT
65 year old female patient with pertinent history of recent small cell lung cancer diagnosis who recently started outpatient chemo(October 1st to 3rd) followed by Immunotherapy( on October 5th) presented to the ED complaining of progressively weakness. Patient has been eating and hydrating  but not enough. Patient denies any fever, chills, coughing, vomiting, diarrhea or abdominal pain but endorses being nauseous. Patient also noted some right chest wall pain and skin opening at the sight of port insertion for chemo- was initially scheduled for an outpatient revision with Interventional Radiology( Dr Mckeon) In the ED patient found to have a WBC of 32, Lactate of 5.6, Cr of 4. CT chest/Abdomen and Pelvis negative for any acute pathology, Cr noted to be 4, elevated ALP/AST/ALT, afebrile, was given empiric antibiotics with vancomycin/cefepime. No reported abd pain/diarrhea/cough/dysuria/rashes.      1. port site wound dehiscence s/p removal. leukocytosis. small cell lung cancer s/p chemo/immunotherapy  - port removed by IR 10/14 f/u culture  - leukocytosis likely related to recently neulasta given 10/9  - CT chest abd/pelvis reviewed, no infectious focus  - transamnitis. thrombocytopenia. PAULA likely related to mets and chemo/immunotherapy  - on IV cefepime #3  - s/p vancomycin x 1 10/13  - blood cx/urine cx no growth  - monitor temps  - tolerating abx well so far; no side effects noted  - reason for abx use and side effects reviewed with patient  - eventual port replacement planned   - oncology f/u noted   - supportive care  - fu cbc

## 2020-10-15 NOTE — PROGRESS NOTE ADULT - PROVIDER SPECIALTY LIST ADULT
----- Message from Albertina Scott MA sent at 8/23/2018  3:15 PM CDT -----  Contact: [phone]  Please call and advise   ----- Message -----  From: Valentina Leonard  Sent: 8/23/2018   9:16 AM  To: [facility] Staff    Patient requesting a call from the office in regards to predniSONE (DELTASONE) 20 MG tablet , stated her breast feel like she has fluid . Please call and advise, Thanks       Heme/Onc

## 2020-10-15 NOTE — PROGRESS NOTE ADULT - PROVIDER SPECIALTY LIST ADULT
Anesthesia ROS/Med Hx        Pulmonary Review:    Pt. positive for sleep apnea - CPAP    Neuro/Psych Review:    Pt. positive for psychiatric history    Cardiovascular Review:    Pt. positive for hypertension - well controlled    End/Other Review:    Pt. positive for obesity class III - 40.00 - 49.99      Anesthesia Plan     ASA Status: 2  Anesthesia Type: General  Induction: Intravenous  Preferred Airway Type: ETT  Reviewed: Lab Results, Pre-Induction Reassessment, Medications, Problem List, NPO Status, Patient Summary, Nursing Notes, EKG, Consultations, Beta Blocker Status, Allergies and Past Med History  The proposed anesthetic plan, including its risks and benefits, have been discussed with the Patient and Spouse - along with the risks and benefits of alternatives.  Questions were encouraged and answered and the patient and/or representative agrees to proceed.  Blood Products: Not Anticipated      Physical Exam  Mallampati: I  TM Distance: >3 FB  Neck ROM: Full  Cardio Rhythm: Regular  Cardio Rate: Normal  pulmonary exam normal  Patient Demonstrates:  Obese  abdominal exam normal  dental exam normal                   Hospitalist

## 2020-10-15 NOTE — PROGRESS NOTE ADULT - SUBJECTIVE AND OBJECTIVE BOX
65 year old female patient with pertinent history of recent Lung cancer diagnosis who recently started outpatient chemo(October 1st to 3rd) followed by Immunotherapy( on October 5th) presented to the ED complaining of progressively weakness. Patient has been eating and hydrating  but not enough. Patient denies any fever, chills, coughing, vomiting, diarrhea or abdominal pain but endorses being nauseous. Patient also noted some right chest wall pain at the sight of port insertion for chemo- was initially scheduled for an outpatient revision with Interventional Radiology( Dr Mckeon)In the ED patient found to have a WBC of 32, Lactate of 5.6, Cr of 4.  CT chest/Abdomen and Pelvis  negative for any acute [id number] year old female patient presenting with weakness found to have SIRS and worsening transaminitis      10/14 - no cp palps sob abdo pain; some soreness at the port site   10/15 - no cp palps sob, tired after being washed up   PHYSICAL EXAM:  GENERAL: NAD, able to lie flat in bed  HEAD:  Atraumatic, Normocephalic  EYES: EOMI, PERRLA, normal sclera  ENT: Moist mucous membranes  NECK: Supple, No JVD, no nuchal rigidity  CHEST/LUNG: Clear to auscultation bilaterally; No rales, rhonchi, wheezing, or rubs. Unlabored respirations  HEART: Regular rate and rhythm; No murmurs, rubs, or gallops  ABDOMEN: Bowel sounds present; Soft, Nontender, Nondistended. No hepatomegaly  EXTREMITIES:  no pitting bilaterally  NERVOUS SYSTEM:  Alert & Oriented X3, speech clear. No focal motor or sensory deficits  MSK: FROM all 4 extremities, full and equal strength  SKIN: Rt chest wall port removed       RADIOLOGY/EKG:  < from: CT Abdomen and Pelvis No Cont (10.13.20 @ 18:26) >  No septic pathology.  Decreased left hilar mass and adenopathy.    LABS: All Labs Reviewed:                        11.0   31.15 )-----------( 42       ( 15 Oct 2020 07:17 )             34.6     150<H>  |  124<H>  |  84<H>  ----------------------------<  94  3.8   |  15<L>  |  2.73<H>    TPro  5.1<L>  /  Alb  1.7<L>  /  TBili  0.9  /  DBili  0.4<H>  /  AST  181<H>  /  ALT  268<H>  /  AlkPhos  674<H>  10-15  PT/INR - ( 14 Oct 2020 06:36 )   PT: 14.5 sec;   INR: 1.25 ratio            cefepime  Injectable. 1000 milliGRAM(s) IV Push every 24 hours  clonazePAM  Tablet 0.5 milliGRAM(s) Oral <User Schedule>  clotrimazole  Oral Lozenge - Peds 1 Lozenge Oral three times a day  demeclocycline 600 milliGRAM(s) Oral two times a day  fentaNYL   Patch  12 MICROgram(s)/Hr 1 Patch Transdermal every 72 hours  HYDROmorphone   Tablet 2 milliGRAM(s) Oral every 4 hours PRN  HYDROmorphone   Tablet 4 milliGRAM(s) Oral every 4 hours PRN  HYDROmorphone  Injectable 0.5 milliGRAM(s) IV Push every 4 hours PRN  levETIRAcetam 250 milliGRAM(s) Oral two times a day  mirtazapine 7.5 milliGRAM(s) Oral at bedtime  multivitamin 1 Tablet(s) Oral daily  ondansetron Injectable 4 milliGRAM(s) IV Push every 6 hours PRN  polyethylene glycol 3350 17 Gram(s) Oral daily  sodium chloride 1 Gram(s) Oral every 8 hours  sodium chloride 0.45%. 1000 milliLiter(s) IV Continuous <Continuous>

## 2020-10-15 NOTE — PROGRESS NOTE ADULT - SUBJECTIVE AND OBJECTIVE BOX
HPI:     Pt seen and examined by me for followup of sx management.  Pt appears anxious upon my arrival.  she does admit to feeling anxious despite taking Klonopin.  she is agreeable to try a higher dose.  She reprots bone pain and headache intermittently.  she states the Dilaudid helps when she takes it but she sometimes forgets to ask for it.  In the previous 24 hrs she has taken 3 doses of Dilaudid 4mg Po and 1 dose of Dilaudid 0.5mg IV.  she states the meds do help when she takes them.  she reports her bowel movements have slowed down.  she also reports poor appetite but is trying hard to make herself eat.      PAIN: (x )Yes   ( )No  Level: moderate - severe(at times)     DYSPNEA: ( ) Yes  ( x) No  Review of Systems:    Anxiety- yes  Depression- maybe  Physical Discomfort- yes   Dyspnea- no   Constipation- no   Diarrhea- improved  Nausea- yes, intermittent  Vomiting- no   Anorexia-  does not feel like she has an appetite   Weight Loss- unsure   Cough- denies   Secretions- denies   Fatigue- moderate   Weakness- severe  Delirium- denies     All other systems reviewed and negative    PHYSICAL EXAM:    Vital Signs Last 24 Hrs  T(C): 36.3 (10-15-20 @ 08:10), Max: 37.1 (10-14-20 @ 14:30)  T(F): 97.3 (10-15-20 @ 08:10), Max: 98.7 (10-14-20 @ 14:30)  HR: 112 (10-15-20 @ 08:10) (96 - 122)  BP: 143/85 (10-15-20 @ 08:10) (101/64 - 143/85)  RR: 17 (10-15-20 @ 08:10) (15 - 20)  SpO2: 100% (10-15-20 @ 08:10) (98% - 100%)    PPSV2: 50 %    General: ill, pale appearing female in bed, anxious  Mental Status: alert and oriented at this time, at times becomes forgetful   HEENT: dry oral mucosa, + temporal wasting   Lungs: Breath sounds are clear bilaterally, No wheezing, rales or rhonchi  Cardiac: S1S2 +   GI: Bowel Sounds present, soft, nontender, nondistended, no guarding, no rebound  : no suprapubic tenderness   Ext: no edema present   Neuro: generalized weakness         LABS                          11.0   31.15 )-----------( 42       ( 15 Oct 2020 07:17 )             34.6     10-15    150<H>  |  124<H>  |  84<H>  ----------------------------<  94  3.8   |  15<L>  |  2.73<H>    Ca    7.5<L>      15 Oct 2020 07:17  Phos  6.2     10-14  Mg     3.0     10-15    TPro  5.1<L>  /  Alb  1.7<L>  /  TBili  0.9  /  DBili  0.4<H>  /  AST  181<H>  /  ALT  268<H>  /  AlkPhos  674<H>  10-15    PT/INR - ( 14 Oct 2020 06:36 )   PT: 14.5 sec;   INR: 1.25 ratio         PTT - ( 13 Oct 2020 15:41 )  PTT:24.2 sec      Allergies  codeine (Unknown)    MEDICATIONS  (STANDING):  cefepime  Injectable. 1000 milliGRAM(s) IV Push every 24 hours  clonazePAM  Tablet 0.5 milliGRAM(s) Oral <User Schedule>  clotrimazole  Oral Lozenge - Peds 1 Lozenge Oral three times a day  demeclocycline 600 milliGRAM(s) Oral two times a day  dextrose 5%. 1000 milliLiter(s) (60 mL/Hr) IV Continuous <Continuous>  fentaNYL   Patch  12 MICROgram(s)/Hr 1 Patch Transdermal every 72 hours  levETIRAcetam 500 milliGRAM(s) Oral two times a day  mirtazapine 7.5 milliGRAM(s) Oral at bedtime  multivitamin 1 Tablet(s) Oral daily  polyethylene glycol 3350 17 Gram(s) Oral daily  sodium chloride 1 Gram(s) Oral every 8 hours    MEDICATIONS  (PRN):  HYDROmorphone   Tablet 2 milliGRAM(s) Oral every 4 hours PRN Moderate Pain (4 - 6)  HYDROmorphone   Tablet 4 milliGRAM(s) Oral every 4 hours PRN Severe Pain (7 - 10)  HYDROmorphone  Injectable 0.5 milliGRAM(s) IV Push every 4 hours PRN breakthrough pain  ondansetron Injectable 4 milliGRAM(s) IV Push every 6 hours PRN Nausea        RADIOLOGY/ADDITIONAL STUDIES:    EXAM:  CT ABDOMEN AND PELVIS                          EXAM:  CT CHEST                            PROCEDURE DATE:  10/13/2020          INTERPRETATION:  CT CHEST, CT ABDOMEN AND PELVIS    CLINICAL INFORMATION: sepsis    COMPARISON: CT chest/abdomen/pelvis 9/17/2020, MRI abdomen 10/2/2020    PROCEDURE:  CT of the Chest, Abdomen and Pelvis was performed without intravenous contrast.  Intravenous contrast: None  Oral contrast: None.  Sagittal and coronal reformats were performed.    FINDINGS:    CHEST:    LUNGS, AIRWAYS: The central airways are patent. The lungs are clear. Left suprahilar mass again seen, difficult to compare without contrast but appears smaller measuring approximately 4 x 3 cm.  PLEURA: No pleural abnormality.  VESSELS: Right chest wall infusion port is seen with the catheter tip in the SVC. Normal caliber aorta.  HEART: Normal heart size. No pericardial effusion. Coronary artery calcifications are present.  MEDIASTINUM AND ARIA: Decreased size of mediastinal lymph nodes.  CHEST WALL: No masses.    ABDOMEN AND PELVIS:    LIVER: Diffuse heterogeneous attenuation. Refer to previous MRI.  BILE DUCTS: Nondilated.  GALLBLADDER: Normal.  SPLEEN: Normal.  PANCREAS: Normal.  ADRENALS: Normal.  KIDNEYS/URETERS: No hydronephrosisor urinary tract calculi.    BLADDER: Normal.  REPRODUCTIVE ORGANS: No uterine or adnexal abnormality.    BOWEL: No bowel-related abnormality. Specifically, no evidence of acute diverticulitis. Normal appendix and ileocecal region. No bowel obstruction or bowel inflammation.  PERITONEUM: No free air or ascites.  VESSELS:  Normal caliber aorta.  RETROPERITONEUM/LYMPH NODES: No adenopathy.  ABDOMINAL WALL: No mass, hernia, or fluid collection.  BONES: No lytic or blastic lesion.    IMPRESSION:    No septic pathology.    Decreased left hilar mass and adenopathy.

## 2020-10-15 NOTE — CHART NOTE - NSCHARTNOTEFT_GEN_A_CORE
HPI:  65 year old female patient with pertinent history of recent Lung cancer diagnosis who recently started outpatient chemo(October 1st to 3rd) followed by Immunotherapy( on October 5th) presented to the ED complaining of progressively weakness.  (13 Oct 2020 20:17)      PERTINENT PMH REVIEWED:  [ X ] YES [ ] NO           Primary Contact:   Pts  Tong is HCP     HCP [ X ] Surrogate [   ] Guardian [   ]    Mental Status: [ X ] Alert  [ X ] Oriented [  ] Confused [  ] Lethargic [  ]  Concerns of Depression [  ]- down at times, history of  Anxiety [   ]- anxious at times, history of   Baseline ADLs (prior to admission):  Independent [ X ] moderately [ ] fully   Dependent   [ ] moderately [ ]fully    Anticipated Grief: Patient [  X ] Family [  X ]    Caregiver [place] Assessed: Yes [ X ] No [  ]    Baptism: Jehovah's witness    Spiritual Concerns: pt. is a spiritual person     Goals of Care: Comfort [  ] Rehabilitation [  ] Curative [  ] Life Prolonging [ X  ]    ADVANCE DIRECTIVES: Pt. is full resuscitation    Anticipated D/C Plan: Home and follow up with Oncology for further treatment                      Summary:    This SW met with pt. at bedside to provide support. Pt. well known to our team from previous admission. Pt. lives at home with her . She has been receiving treatment for her cancer with Dr. Martin.     We discussed how pt. is coping with her cancer. Pt. discussed being scared. When asked what scared her she responded that her cancer is causing a lot of fear. Pts feelings explored and support provided. Pt. discussed her Granddaughter and how she has the support of her family. She is aware of her plan moving forward and is waiting to be able to have her port replaced so that she can continue with treatment when she is able.     Plan at this time is for pt. to return home upon d/c and follow up with Oncology. Emotional support provided. Our team will continue to follow.

## 2020-10-15 NOTE — PROGRESS NOTE ADULT - ASSESSMENT
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 Savage to help manage electrolyte abnormalities    Lactic acidosis/ Leukocytosis  Port removed  Antibiotics as per infectious disease  Follow WBC which is hard to assess as patient had infection as well as growth factors    Thrombocytopenia anemia related to chemotherapy  Follow CBC    Depressed mental status questionable history of seizures/Abnormal EEG  Neurology follow-up

## 2020-10-15 NOTE — PROGRESS NOTE ADULT - SUBJECTIVE AND OBJECTIVE BOX
65-year-old femaleSmall cell carcinoma of the lung involving left upper lobe/liver/Bone  Has had recurrent problems with SIADH Hyponatremia  Started on demeclocycline    Received  Palliative chemotherapy carboplatin /-16 cycle number 2 weeks ago  Growth factors/filgastrim    Now  Admitted with wound dehiscence at the site of her port;  Leukocytosis, lactic acidosis, hypotensive  Dehydration    Port removed    Initially dehydrated treated with IV fluids Ringer's lactate/Twice daily demeclocycline    Remains weak depressed      CBC Full  -  ( 15 Oct 2020 07:17 )  WBC Count : 31.15 K/uL  RBC Count : 3.65 M/uL  Hemoglobin : 11.0 g/dL  Hematocrit : 34.6 %  Platelet Count - Automated : 42 K/uL  Mean Cell Volume : 94.8 fl  Mean Cell Hemoglobin : 30.1 pg  Mean Cell Hemoglobin Concentration : 31.8 gm/dL  Auto Neutrophil # : 28.04 K/uL  Auto Lymphocyte # : 1.56 K/uL  Auto Monocyte # : 1.56 K/uL  Auto Eosinophil # : 0.00 K/uL  Auto Basophil # : 0.00 K/uL  Auto Neutrophil % : 88.0 %  Auto Lymphocyte % : 5.0 %  Auto Monocyte % : 5.0 %  Auto Eosinophil % : 0.0 %  Auto Basophil % : 0.0 %      10-15    152<H>  |  126<H>  |  85<H>  ----------------------------<  110<H>  3.7   |  15<L>  |  2.86<H>    Ca    7.5<L>      15 Oct 2020 18:54  Phos  6.2     10-14  Mg     3.0     10-15    TPro  5.1<L>  /  Alb  1.7<L>  /  TBili  0.9  /  DBili  0.4<H>  /  AST  181<H>  /  ALT  268<H>  /  AlkPhos  674<H>  10-15

## 2020-10-15 NOTE — PROGRESS NOTE ADULT - ASSESSMENT
#SIRS #Leukocytosis #Lactic acidosis  -no clearly etiology  -pt volume resuscitated in the ED and give IV abx  -follow up blood and urine cx  - VERSUS infected port  spoke to IR and ID - to dc port today, f/u Port Cx, cont IV Abx and when cultures negative can place a new port   10/15 - port dced yesterday, f/u Cxs    # Pt with Small Cell Lung Ca with mets to the bone and liver  -recent chemo and immunotherapy   - started chemo via port on 10/1/20, got neulasta after than contributing to high WBC count     #SIADH  on DEMECLOCYCLINE  10/15 - Pt has hypernatremia, will place on 1/2NS  HOLD SALT TABS   will get Renal Cx - pt needs close f/u as OP   has seen Dr Savage in the past, will recall to adjust meds       #PAULA  -possibly pre-renal in etiology  -no obstruction/ stricture noted on CT abdomen/pelvis  -IVF hydration    #Depression  -on Mirtazapine    #Anxiety   -on clonazepam    #Severe protein calorie malnutrition  -get Nutritionist evaluation     #Transaminitis  -possibly related to recent chemo/ immunotherapy  -no acute pathology noted on imaging    #POssible Seizure disorder  Pt started on Keppra prev admission  10/15 - EEG noted, can reduce Keppra dose, discussed with Dr Madison     #Advanced Directives  -pt still full code  -Will get palliative team input while inpatient     #DVT ppx  -give Lovenox     DISPO 10/15: discussed with RN/Heme-Onc  f/u Cxs, based on EEG Keppra dose reduced   On IVFs for hypernatremia, will get Renal Cx as discussed with Dr Amato   WIll hold salt tabs for now   monitor LABS incl LFTs

## 2020-10-15 NOTE — PROVIDER CONTACT NOTE (OTHER) - SITUATION
SPOKE WITH DR. VANG//DR GARCIA.
spoke with Eden in office regarding consult
spoke with ans service Wallace regarding consult

## 2020-10-16 ENCOUNTER — TRANSCRIPTION ENCOUNTER (OUTPATIENT)
Age: 65
End: 2020-10-16

## 2020-10-16 LAB
ANION GAP SERPL CALC-SCNC: 10 MMOL/L — SIGNIFICANT CHANGE UP (ref 5–17)
ANION GAP SERPL CALC-SCNC: 11 MMOL/L — SIGNIFICANT CHANGE UP (ref 5–17)
BASOPHILS # BLD AUTO: 0 K/UL — SIGNIFICANT CHANGE UP (ref 0–0.2)
BASOPHILS NFR BLD AUTO: 0 % — SIGNIFICANT CHANGE UP (ref 0–2)
BUN SERPL-MCNC: 85 MG/DL — HIGH (ref 7–23)
BUN SERPL-MCNC: 87 MG/DL — HIGH (ref 7–23)
CALCIUM SERPL-MCNC: 7.4 MG/DL — LOW (ref 8.5–10.1)
CALCIUM SERPL-MCNC: 7.6 MG/DL — LOW (ref 8.5–10.1)
CHLORIDE SERPL-SCNC: 123 MMOL/L — HIGH (ref 96–108)
CHLORIDE SERPL-SCNC: 124 MMOL/L — HIGH (ref 96–108)
CO2 SERPL-SCNC: 16 MMOL/L — LOW (ref 22–31)
CO2 SERPL-SCNC: 17 MMOL/L — LOW (ref 22–31)
CREAT SERPL-MCNC: 2.67 MG/DL — HIGH (ref 0.5–1.3)
CREAT SERPL-MCNC: 2.9 MG/DL — HIGH (ref 0.5–1.3)
EOSINOPHIL # BLD AUTO: 0 K/UL — SIGNIFICANT CHANGE UP (ref 0–0.5)
EOSINOPHIL NFR BLD AUTO: 0 % — SIGNIFICANT CHANGE UP (ref 0–6)
GLUCOSE SERPL-MCNC: 113 MG/DL — HIGH (ref 70–99)
GLUCOSE SERPL-MCNC: 128 MG/DL — HIGH (ref 70–99)
HCT VFR BLD CALC: 35.6 % — SIGNIFICANT CHANGE UP (ref 34.5–45)
HGB BLD-MCNC: 11.3 G/DL — LOW (ref 11.5–15.5)
LYMPHOCYTES # BLD AUTO: 11 % — LOW (ref 13–44)
LYMPHOCYTES # BLD AUTO: 2.78 K/UL — SIGNIFICANT CHANGE UP (ref 1–3.3)
MAGNESIUM SERPL-MCNC: 3.1 MG/DL — HIGH (ref 1.6–2.6)
MCHC RBC-ENTMCNC: 29.7 PG — SIGNIFICANT CHANGE UP (ref 27–34)
MCHC RBC-ENTMCNC: 31.7 GM/DL — LOW (ref 32–36)
MCV RBC AUTO: 93.4 FL — SIGNIFICANT CHANGE UP (ref 80–100)
MONOCYTES # BLD AUTO: 3.28 K/UL — HIGH (ref 0–0.9)
MONOCYTES NFR BLD AUTO: 13 % — SIGNIFICANT CHANGE UP (ref 2–14)
NEUTROPHILS # BLD AUTO: 19.2 K/UL — HIGH (ref 1.8–7.4)
NEUTROPHILS NFR BLD AUTO: 75 % — SIGNIFICANT CHANGE UP (ref 43–77)
NRBC # BLD: SIGNIFICANT CHANGE UP /100 WBCS (ref 0–0)
PLATELET # BLD AUTO: 42 K/UL — LOW (ref 150–400)
POTASSIUM SERPL-MCNC: 3.4 MMOL/L — LOW (ref 3.5–5.3)
POTASSIUM SERPL-MCNC: 3.7 MMOL/L — SIGNIFICANT CHANGE UP (ref 3.5–5.3)
POTASSIUM SERPL-SCNC: 3.4 MMOL/L — LOW (ref 3.5–5.3)
POTASSIUM SERPL-SCNC: 3.7 MMOL/L — SIGNIFICANT CHANGE UP (ref 3.5–5.3)
RBC # BLD: 3.81 M/UL — SIGNIFICANT CHANGE UP (ref 3.8–5.2)
RBC # FLD: 15.3 % — HIGH (ref 10.3–14.5)
SODIUM SERPL-SCNC: 150 MMOL/L — HIGH (ref 135–145)
SODIUM SERPL-SCNC: 151 MMOL/L — HIGH (ref 135–145)
WBC # BLD: 25.26 K/UL — HIGH (ref 3.8–10.5)
WBC # FLD AUTO: 25.26 K/UL — HIGH (ref 3.8–10.5)

## 2020-10-16 PROCEDURE — 99232 SBSQ HOSP IP/OBS MODERATE 35: CPT

## 2020-10-16 RX ORDER — CLONAZEPAM 1 MG
1 TABLET ORAL
Refills: 0 | Status: DISCONTINUED | OUTPATIENT
Start: 2020-10-16 | End: 2020-10-16

## 2020-10-16 RX ORDER — VANCOMYCIN HCL 1 G
1250 VIAL (EA) INTRAVENOUS ONCE
Refills: 0 | Status: COMPLETED | OUTPATIENT
Start: 2020-10-16 | End: 2020-10-16

## 2020-10-16 RX ORDER — CLONAZEPAM 1 MG
1 TABLET ORAL
Refills: 0 | Status: DISCONTINUED | OUTPATIENT
Start: 2020-10-16 | End: 2020-10-20

## 2020-10-16 RX ORDER — POTASSIUM CHLORIDE 20 MEQ
20 PACKET (EA) ORAL
Refills: 0 | Status: COMPLETED | OUTPATIENT
Start: 2020-10-16 | End: 2020-10-16

## 2020-10-16 RX ORDER — CEFEPIME 1 G/1
2000 INJECTION, POWDER, FOR SOLUTION INTRAMUSCULAR; INTRAVENOUS DAILY
Refills: 0 | Status: DISCONTINUED | OUTPATIENT
Start: 2020-10-16 | End: 2020-10-16

## 2020-10-16 RX ORDER — CEFEPIME 1 G/1
2000 INJECTION, POWDER, FOR SOLUTION INTRAMUSCULAR; INTRAVENOUS DAILY
Refills: 0 | Status: DISCONTINUED | OUTPATIENT
Start: 2020-10-16 | End: 2020-10-19

## 2020-10-16 RX ADMIN — Medication 1 LOZENGE: at 13:56

## 2020-10-16 RX ADMIN — Medication 1 MILLIGRAM(S): at 13:56

## 2020-10-16 RX ADMIN — Medication 166.67 MILLIGRAM(S): at 09:55

## 2020-10-16 RX ADMIN — LEVETIRACETAM 250 MILLIGRAM(S): 250 TABLET, FILM COATED ORAL at 21:28

## 2020-10-16 RX ADMIN — Medication 0.5 MILLIGRAM(S): at 05:32

## 2020-10-16 RX ADMIN — Medication 1 LOZENGE: at 21:28

## 2020-10-16 RX ADMIN — MIRTAZAPINE 7.5 MILLIGRAM(S): 45 TABLET, ORALLY DISINTEGRATING ORAL at 21:28

## 2020-10-16 RX ADMIN — HYDROMORPHONE HYDROCHLORIDE 4 MILLIGRAM(S): 2 INJECTION INTRAMUSCULAR; INTRAVENOUS; SUBCUTANEOUS at 13:58

## 2020-10-16 RX ADMIN — Medication 20 MILLIEQUIVALENT(S): at 13:58

## 2020-10-16 RX ADMIN — FENTANYL CITRATE 1 PATCH: 50 INJECTION INTRAVENOUS at 20:00

## 2020-10-16 RX ADMIN — CEFEPIME 100 MILLIGRAM(S): 1 INJECTION, POWDER, FOR SOLUTION INTRAMUSCULAR; INTRAVENOUS at 09:56

## 2020-10-16 RX ADMIN — LEVETIRACETAM 250 MILLIGRAM(S): 250 TABLET, FILM COATED ORAL at 09:55

## 2020-10-16 RX ADMIN — Medication 20 MILLIEQUIVALENT(S): at 17:30

## 2020-10-16 RX ADMIN — Medication 1 LOZENGE: at 05:32

## 2020-10-16 RX ADMIN — Medication 1 MILLIGRAM(S): at 21:28

## 2020-10-16 RX ADMIN — HYDROMORPHONE HYDROCHLORIDE 4 MILLIGRAM(S): 2 INJECTION INTRAMUSCULAR; INTRAVENOUS; SUBCUTANEOUS at 09:04

## 2020-10-16 RX ADMIN — Medication 1 TABLET(S): at 09:54

## 2020-10-16 RX ADMIN — Medication 600 MILLIGRAM(S): at 09:48

## 2020-10-16 NOTE — PROGRESS NOTE ADULT - SUBJECTIVE AND OBJECTIVE BOX
Date of service: 10-16-20 @ 08:43    Lying in bed in NAD  Weak looking  Mediport removed  Right upper chest prior mediport site feels tender, no discharge  Wound clean    ROS: no fever or chills; denies dizziness, no HA, no SOB or cough, no abdominal pain, no diarrhea or constipation; no dysuria, no legs pain    MEDICATIONS  (STANDING):  cefepime  Injectable. 1000 milliGRAM(s) IV Push every 24 hours  clonazePAM  Tablet 0.5 milliGRAM(s) Oral <User Schedule>  clotrimazole  Oral Lozenge - Peds 1 Lozenge Oral three times a day  demeclocycline 600 milliGRAM(s) Oral two times a day  dextrose 5%. 1000 milliLiter(s) (50 mL/Hr) IV Continuous <Continuous>  fentaNYL   Patch  12 MICROgram(s)/Hr 1 Patch Transdermal every 72 hours  levETIRAcetam 250 milliGRAM(s) Oral two times a day  mirtazapine 7.5 milliGRAM(s) Oral at bedtime  multivitamin 1 Tablet(s) Oral daily  polyethylene glycol 3350 17 Gram(s) Oral daily    Vital Signs Last 24 Hrs  T(C): 36.3 (16 Oct 2020 05:29), Max: 36.8 (15 Oct 2020 16:02)  T(F): 97.3 (16 Oct 2020 05:29), Max: 98.2 (15 Oct 2020 16:02)  HR: 108 (15 Oct 2020 21:42) (108 - 114)  BP: 111/67 (15 Oct 2020 21:42) (98/53 - 111/67)  BP(mean): --  RR: 18 (15 Oct 2020 21:42) (18 - 18)  SpO2: 98% (15 Oct 2020 21:42) (96% - 98%)     Physical exam:    Constitutional: frail looking  HEENT: NC/AT, EOMI, PERRLA, conjunctivae clear  Neck: supple; thyroid not palpable; Right upper chest prior mediport site postsurgical wound clean; no discharge  Back: no tenderness  Respiratory: respiratory effort normal; clear to auscultation  Cardiovascular: S1S2 regular, no murmurs  Abdomen: soft, not tender, not distended, positive BS; liver and spleen WNL  Genitourinary: no suprapubic tenderness  Lymphatic: no LN palpable  Musculoskeletal: no muscle tenderness, no joint swelling or tenderness  Extremities: no pedal edema  Neurological/ Psychiatric: AxOx3, Judgement and insight normal;  moving all extremities  Skin: no rashes; port site clean, no purulence no palpable lesions    Labs: reviewed                        11.3   25.26 )-----------( 42       ( 16 Oct 2020 07:08 )             35.6     10-16    151<H>  |  124<H>  |  85<H>  ----------------------------<  113<H>  3.4<L>   |  16<L>  |  2.67<H>    Ca    7.6<L>      16 Oct 2020 07:08  Mg     3.1     10-16    TPro  5.1<L>  /  Alb  1.7<L>  /  TBili  0.9  /  DBili  0.4<H>  /  AST  181<H>  /  ALT  268<H>  /  AlkPhos  674<H>  10-15                                   11.0   31.15 )-----------( 42       ( 15 Oct 2020 07:17 )             34.6     10-15    150<H>  |  124<H>  |  84<H>  ----------------------------<  94  3.8   |  15<L>  |  2.73<H>    Ca    7.5<L>      15 Oct 2020 07:17  Phos  6.2     10-14  Mg     3.0     10-15    TPro  5.1<L>  /  Alb  1.7<L>  /  TBili  0.9  /  DBili  0.4<H>  /  AST  181<H>  /  ALT  268<H>  /  AlkPhos  674<H>  10-15      Urinalysis Basic - ( 13 Oct 2020 20:35 )    Color: Yellow / Appearance: Clear / S.010 / pH: x  Gluc: x / Ketone: Negative  / Bili: Negative / Urobili: Negative mg/dL   Blood: x / Protein: 100 mg/dL / Nitrite: Negative   Leuk Esterase: Trace / RBC: 0-2 /HPF / WBC 6-10   Sq Epi: x / Non Sq Epi: Few / Bacteria: Occasional    Culture - Surgical Swab (collected 14 Oct 2020 13:10)  Source: .Surgical Swab RIJV venous port  Preliminary Report (15 Oct 2020 17:56):    No growth    Culture - Urine (collected 13 Oct 2020 20:35)  Source: .Urine None  Preliminary Report (15 Oct 2020 14:06):    10,000 - 49,000 CFU/mL Enterococcus faecalis    <10,000 CFU/ml Normal Urogenital david present    Culture - Blood (collected 13 Oct 2020 15:41)  Source: .Blood Blood-Peripheral  Preliminary Report (14 Oct 2020 20:01):    No growth to date.    Culture - Blood (collected 13 Oct 2020 15:41)  Source: .Blood Blood-Peripheral  Preliminary Report (14 Oct 2020 20:01):    No growth to date.    Radiology: all available radiological tests reviewed    EXAM:  CT ABDOMEN AND PELVIS                          EXAM:  CT CHEST                            PROCEDURE DATE:  10/13/2020          INTERPRETATION:  CT CHEST, CT ABDOMEN AND PELVIS    CLINICAL INFORMATION: sepsis    COMPARISON: CT chest/abdomen/pelvis 2020, MRI abdomen 10/2/2020    PROCEDURE:  CT of the Chest, Abdomen and Pelvis was performed without intravenous contrast.  Intravenous contrast: None  Oral contrast: None.  Sagittal and coronal reformats were performed.    FINDINGS:    CHEST:    LUNGS, AIRWAYS: The central airways are patent. The lungs are clear. Left suprahilar mass again seen, difficult to compare without contrast but appears smaller measuring approximately 4 x 3 cm.  PLEURA: No pleural abnormality.  VESSELS: Right chest wall infusion port is seen with the catheter tip in the SVC. Normal caliber aorta.  HEART: Normal heart size. No pericardial effusion. Coronary artery calcifications are present.  MEDIASTINUM AND ARIA: Decreased size of mediastinal lymph nodes.  CHEST WALL: No masses.    ABDOMEN AND PELVIS:    LIVER: Diffuse heterogeneous attenuation. Refer to previous MRI.  BILE DUCTS: Nondilated.  GALLBLADDER: Normal.  SPLEEN: Normal.  PANCREAS: Normal.  ADRENALS: Normal.  KIDNEYS/URETERS: No hydronephrosisor urinary tract calculi.    BLADDER: Normal.  REPRODUCTIVE ORGANS: No uterine or adnexal abnormality.    BOWEL: No bowel-related abnormality. Specifically, no evidence of acute diverticulitis. Normal appendix and ileocecal region. No bowel obstruction or bowel inflammation.  PERITONEUM: No free air or ascites.  VESSELS:  Normal caliber aorta.  RETROPERITONEUM/LYMPH NODES: No adenopathy.  ABDOMINAL WALL: No mass, hernia, or fluid collection.  BONES: No lytic or blastic lesion.    IMPRESSION:    No septic pathology.    Decreased left hilar mass and adenopathy.      Advanced directives addressed: full resuscitation

## 2020-10-16 NOTE — PROGRESS NOTE ADULT - ASSESSMENT
#SIRS #Leukocytosis #Lactic acidosis  -no clearly etiology  -pt volume resuscitated in the ED and give IV abx  -follow up blood and urine cx  - VERSUS infected port  spoke to IR and ID - to dc port today, f/u Port Cx, cont IV Abx and when cultures negative can place a new port   10/15 - port dced yesterday, f/u Cxs    # Pt with Small Cell Lung Ca with mets to the bone and liver  -recent chemo and immunotherapy   - started chemo via port on 10/1/20, got neulasta after than contributing to high WBC count     #SIADH  on DEMECLOCYCLINE  10/15 - Pt has hypernatremia, will place on 1/2NS  HOLD SALT TABS   will get Renal Cx - pt needs close f/u as OP   has seen Dr Savage in the past, will recall to adjust meds   10/16 - Hypernatremia - discussed with Dr Savage, on D5W   OFF demeclocycline and NACL tabs       #PAULA  -possibly pre-renal in etiology  -no obstruction/ stricture noted on CT abdomen/pelvis  -IVF hydration    #Depression  -on Mirtazapine    #Anxiety   -on clonazepam    #Severe protein calorie malnutrition  -get Nutritionist evaluation     #Transaminitis  -possibly related to recent chemo/ immunotherapy  -no acute pathology noted on imaging    #POssible Seizure disorder  Pt started on Keppra prev admission  10/15 - EEG noted, can reduce Keppra dose, discussed with Dr Madison     #Advanced Directives  -pt still full code  -Will get palliative team input while inpatient     #DVT ppx  -give Lovenox     DISPO 10/16: discussed with RN/Heme-Onc  f/u Cxs, based on EEG Keppra dose reduced   On IVFs for hypernatremia, will get Renal Cx as discussed with Dr Amato   WIll hold salt tabs for now   monitor LABS incl LFTs

## 2020-10-16 NOTE — PROGRESS NOTE ADULT - SUBJECTIVE AND OBJECTIVE BOX
HPI: Pt seen and examined by me for followup of sx management.  Pt appears anxious upon my arrival.  she does admit to feeling anxious despite taking Klonopin.  She is agreeable to try a higher dose.  Tried the 1mg dose yesterday will increase.  still complains of  bone pain, but feels that it may be better with the patch.      PAIN: (x )Yes   ( )No  Level: moderate - severe(at times)     DYSPNEA: ( ) Yes  ( x) No  Review of Systems:    Anxiety- yes  Depression- maybe  Physical Discomfort- yes   Dyspnea- no   Constipation- no   Diarrhea- improved  Nausea- yes, intermittent  Vomiting- no   Anorexia-  does not feel like she has an appetite   Weight Loss- unsure   Cough- denies   Secretions- denies   Fatigue- moderate   Weakness- severe  Delirium- denies     All other systems reviewed and negative    PHYSICAL EXAM:    Vital Signs Last 24 Hrs  T(C): 36.3 (16 Oct 2020 08:43), Max: 36.8 (15 Oct 2020 16:02)  T(F): 97.4 (16 Oct 2020 08:43), Max: 98.2 (15 Oct 2020 16:02)  HR: 117 (16 Oct 2020 08:43) (108 - 117)  BP: 118/67 (16 Oct 2020 08:43) (98/53 - 118/67)  RR: 18 (16 Oct 2020 08:43) (18 - 18)  SpO2: 100% (16 Oct 2020 08:43) (96% - 100%)    PPSV2: 50 %    General: ill, pale appearing female in bed, anxious  Mental Status: alert and oriented at this time, at times becomes forgetful   HEENT: dry oral mucosa, + temporal wasting   Lungs: Breath sounds are clear bilaterally, No wheezing, rales or rhonchi  Cardiac: S1S2 +   GI: Bowel Sounds present, soft, nontender, nondistended, no guarding, no rebound  : no suprapubic tenderness   Ext: no edema present   Neuro: generalized weakness     LABS:                        11.3   25.26 )-----------( 42       ( 16 Oct 2020 07:08 )             35.6     10-16    151<H>  |  124<H>  |  85<H>  ----------------------------<  113<H>  3.4<L>   |  16<L>  |  2.67<H>    Ca    7.6<L>      16 Oct 2020 07:08  Mg     3.1     10-16    TPro  5.1<L>  /  Alb  1.7<L>  /  TBili  0.9  /  DBili  0.4<H>  /  AST  181<H>  /  ALT  268<H>  /  AlkPhos  674<H>  10-15      Albumin: Albumin, Serum: 1.7 g/dL (10-15 @ 07:17)      Allergies    codeine (Unknown)    Intolerances      MEDICATIONS  (STANDING):  cefepime   IVPB 2000 milliGRAM(s) IV Intermittent daily  clonazePAM  Tablet 1 milliGRAM(s) Oral <User Schedule>  clotrimazole  Oral Lozenge - Peds 1 Lozenge Oral three times a day  demeclocycline 600 milliGRAM(s) Oral two times a day  dextrose 5%. 1000 milliLiter(s) (50 mL/Hr) IV Continuous <Continuous>  fentaNYL   Patch  12 MICROgram(s)/Hr 1 Patch Transdermal every 72 hours  levETIRAcetam 250 milliGRAM(s) Oral two times a day  mirtazapine 7.5 milliGRAM(s) Oral at bedtime  multivitamin 1 Tablet(s) Oral daily  polyethylene glycol 3350 17 Gram(s) Oral daily    MEDICATIONS  (PRN):  HYDROmorphone   Tablet 2 milliGRAM(s) Oral every 4 hours PRN Moderate Pain (4 - 6)  HYDROmorphone   Tablet 4 milliGRAM(s) Oral every 4 hours PRN Severe Pain (7 - 10)  HYDROmorphone  Injectable 0.5 milliGRAM(s) IV Push every 4 hours PRN breakthrough pain  ondansetron Injectable 4 milliGRAM(s) IV Push every 6 hours PRN Nausea      RADIOLOGY:

## 2020-10-16 NOTE — PROGRESS NOTE ADULT - SUBJECTIVE AND OBJECTIVE BOX
65 year old female patient with pertinent history of recent Lung cancer diagnosis who recently started outpatient chemo(October 1st to 3rd) followed by Immunotherapy( on October 5th) presented to the ED complaining of progressively weakness. Patient has been eating and hydrating  but not enough. Patient denies any fever, chills, coughing, vomiting, diarrhea or abdominal pain but endorses being nauseous. Patient also noted some right chest wall pain at the sight of port insertion for chemo- was initially scheduled for an outpatient revision with Interventional Radiology( Dr Mckeon)In the ED patient found to have a WBC of 32, Lactate of 5.6, Cr of 4.  CT chest/Abdomen and Pelvis  negative for any acute [id number] year old female patient presenting with weakness found to have SIRS and worsening transaminitis      10/14 - no cp palps sob abdo pain; some soreness at the port site   10/15 - no cp palps sob, tired after being washed up   10/16 - no cp palps sob abdo pain; tired     PHYSICAL EXAM:  GENERAL: NAD, able to lie flat in bed  HEAD:  Atraumatic, Normocephalic  EYES: EOMI, PERRLA, normal sclera  ENT: Moist mucous membranes  NECK: Supple, No JVD, no nuchal rigidity  CHEST/LUNG: Clear to auscultation bilaterally; No rales, rhonchi, wheezing, or rubs. Unlabored respirations  HEART: Regular rate and rhythm; No murmurs, rubs, or gallops  ABDOMEN: Bowel sounds present; Soft, Nontender, Nondistended. No hepatomegaly  EXTREMITIES:  no pitting bilaterally  NERVOUS SYSTEM:  Alert & Oriented X3, speech clear. No focal motor or sensory deficits  MSK: FROM all 4 extremities, full and equal strength  SKIN: Rt chest wall port removed       RADIOLOGY/EKG:  < from: CT Abdomen and Pelvis No Cont (10.13.20 @ 18:26) >  No septic pathology.  Decreased left hilar mass and adenopathy.    LABS: All Labs Reviewed:                        11.3   25.26 )-----------( 42       ( 16 Oct 2020 07:08 )             35.6     150<H>  |  123<H>  |  87<H>  ----------------------------<  128<H>  3.7   |  17<L>  |  2.90<H>    Ca    7.4<L>      16 Oct 2020 17:06  Mg     3.1     10-16    TPro  5.1<L>  /  Alb  1.7<L>  /  TBili  0.9  /  DBili  0.4<H>  /  AST  181<H>  /  ALT  268<H>  /  AlkPhos  674<H>  10-15          cefepime   IVPB 2000 milliGRAM(s) IV Intermittent daily  clonazePAM  Tablet 1 milliGRAM(s) Oral <User Schedule>  clotrimazole  Oral Lozenge - Peds 1 Lozenge Oral three times a day  dextrose 5%. 1000 milliLiter(s) IV Continuous <Continuous>  fentaNYL   Patch  12 MICROgram(s)/Hr 1 Patch Transdermal every 72 hours  HYDROmorphone   Tablet 2 milliGRAM(s) Oral every 4 hours PRN  HYDROmorphone  Injectable 0.5 milliGRAM(s) IV Push every 4 hours PRN  levETIRAcetam 250 milliGRAM(s) Oral two times a day  mirtazapine 7.5 milliGRAM(s) Oral at bedtime  multivitamin 1 Tablet(s) Oral daily  ondansetron Injectable 4 milliGRAM(s) IV Push every 6 hours PRN  polyethylene glycol 3350 17 Gram(s) Oral daily

## 2020-10-16 NOTE — PROGRESS NOTE ADULT - ASSESSMENT
Small cell lung cancer/extensive ; left upper lung mass, liver metastases  Status post carboplatin and etoposide x3 days a week ago  SIADH on demeclocycline.  Now with hyponatremia  Port infection/possible sepsis  Depression  Declining performance status    Plan    Continue IV antibiotics  Continue hydration  Agreed to discontinue demeclocycline  Antidepressants  Daily labs  Palliative care follow-up    In terms of further chemotherapy this will entirely depend on disease activity, symptoms, performance status in the ensuing weeks.

## 2020-10-16 NOTE — CONSULT NOTE ADULT - ASSESSMENT
65 year old female patient with pertinent history of recent Lung cancer diagnosis who recently started outpatient chemo(October 1st to 3rd) followed by Immunotherapy( on October 5th) presented to the ED complaining of progressively weakness. Patient has been eating and hydrating  but not enough. Patient denies any fever, chills, coughing, vomiting, diarrhea or abdominal pain but endorses being nauseous. Patient also noted some right chest wall pain at the sight of port insertion for chemo- was initially scheduled for an outpatient revision with Interventional Radiology  W h/o SIADH, pt was dc d on Na Cl 1 g tabs TID and Demeclocycline 600 bid  Was being continued during her current state and pt started to become hypernatremic  She was also on 1/2 NS  Na level was 152, then NaCl and Demeclocycline dcd , Pt started on D5W last night with Na down to 151 this am  She is awake responsive pleasant    A/P  Hypernatremia 152, h/o SIADH on treatment  dc NaCl and Demeclocycline  D5W @ 50 ml /hr will increase to 100 ml/hr and repeat labs.  May allow the pt to take PO fluids once Na level @ 145  to prevent Na from falling if SIADH still underlying  Chest Medi port removed so far no growth

## 2020-10-16 NOTE — PROGRESS NOTE ADULT - ASSESSMENT
65 year old female patient with pertinent history of recent small cell lung cancer diagnosis who recently started outpatient chemo(October 1st to 3rd) followed by Immunotherapy( on October 5th) presented to the ED complaining of progressively weakness. Patient has been eating and hydrating  but not enough. Patient denies any fever, chills, coughing, vomiting, diarrhea or abdominal pain but endorses being nauseous. Patient also noted some right chest wall pain and skin opening at the sight of port insertion for chemo- was initially scheduled for an outpatient revision with Interventional Radiology( Dr Mckeon) In the ED patient found to have a WBC of 32, Lactate of 5.6, Cr of 4. CT chest/Abdomen and Pelvis negative for any acute pathology, Cr noted to be 4, elevated ALP/AST/ALT, afebrile, was given empiric antibiotics with vancomycin/cefepime. No reported abd pain/diarrhea/cough/dysuria/rashes.      1. Mediport site infection s/p port site wound dehiscence s/p removal. leukocytosis. Small cell lung cancer s/p chemo/immunotherapy. CRF stage 3-4.   - port removed by IR 10/14  -on cefepime IV # 4  -given vancomycin 1250 mg IV x 1 on 10/13  -tolerating abx well so far; no side effects noted  -continue cefepime  -give vancomycin 1250 mg IV x 1 today pending further culture results   -f/u culture  - leukocytosis likely related to recently neulasta given 10/9  - CT chest abd/pelvis reviewed, no infectious focus  - transamnitis. thrombocytopenia. PAULA likely related to mets and chemo/immunotherapy  -monitor renal function closely  - blood cx/urine cx no growth  - monitor temps  - tolerating abx well so far; no side effects noted  - reason for abx use and side effects reviewed with patient  - oncology f/u noted   - supportive care  - fu cbc

## 2020-10-16 NOTE — CDI QUERY NOTE - NSCDIOTHERTXTBX_GEN_ALL_CORE_HH
Documentation on chart:    Maxipime - NS 2.5 Liter - WBC 32 -     ED Provider: Sepsis and Septic work up - Sepsis present on admission     H&P: SIRS no clear etiology    ID documented on 10/16:  Mediport site infection    Palliative Care on 10/16: Sepsis no clear etiology    Hem-Onc on 10/15: ·  Problem: Sepsis.  Recommendation: R/O port infection/ Port now removed; on antibiotics.     Hospitalist on 10/15  #SIRS #Leukocytosis #Lactic acidosis  -no clearly etiology  -pt volume resuscitated in the ED and give IV abx  -follow up blood and urine cx  - VERSUS infected port  spoke to IR and ID - to dc port today, f/u Port Cx, cont IV Abx and when cultures negative can place a new port   10/15 - port dced yesterday, f/u Cxs    Please clarify a diagnosis based on the above clinical criteria:  A) Sepsis ruled in and present on admission and is due to/related to infected RIJ mediport that was removed.   B) Sepsis ruled in and present on admission  but NOT due to/NOT related to infected Mediport  C) Sepsis ruled out  and Infected RIJ Mediport ruled in  and present on admission  D) Sepsis ruled out and Infected RIJ Mediport ruled out  E) Unable to determine  F) Other ( Please specify condition)

## 2020-10-16 NOTE — PROGRESS NOTE ADULT - ASSESSMENT
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 Patient presented to the ED on 10/14/2020 complaining of progressive weakness, and needed to be helped to the ground and then was unable to walk.   Patient also noted some right chest wall pain at the sight of port insertion for chemo- was initially scheduled for an outpatient revision with Interventional Radiology.  Palliative medicine consulted to assist with symptom management and for continuity.    1) Sepsis   - no clearly etiology  - c/w fluids   - c/w IV antibiotics   - follow up blood and urine cx  - ID consult appreciated   - dehisced port s/p removal 10/14, f/u cu;ture    2) Pain   - Metastatic Lesions   - Dilaudid 2mg for moderate pain q4hrs PRN   - Dilaudid 4mg for severe pain q4hrs PRN   - Dilaudid 0.5mg IV for breakthrough pain PRN q4hrs   - Based on pts 24 hr opiate usage will start Fentanyl 12mcg/hr patch    3) Anxiety/ Depression  - c/w Mirtazapine  - c/w Klonopin 0.5mg TID (give 1mg dose this am and evaluate response.  If good response will change to 1mg TID)  - SW support   - supportive care     4) Hyperkalemia  - no EKG changes noted  - given Kayexalate in the ED on 10/13   - multiple BMs resolved now  - continue to monitor labs    5) PAULA  - Improving  - CT abdomen/pelvis reviewed no obstruction/ stricture   - c/w IVF hydration  - c/w to monitor labs trend kidney function     6) Stage 4 small cell lung cancer   - history of SIADH, now with hypernatremia  - c/w salt tabs and demeclocycline but decrease dose of either or both due to hypernatremia  - Dr. 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 Patient presented to the ED on 10/14/2020 complaining of progressive weakness, and needed to be helped to the ground and then was unable to walk.   Patient also noted some right chest wall pain at the sight of port insertion for chemo- was initially scheduled for an outpatient revision with Interventional Radiology.  Palliative medicine consulted to assist with symptom management and for continuity.    1) Sepsis   - no clearly etiology  - c/w fluids   - c/w IV antibiotics   - follow up blood and urine cx  - ID consult appreciated   - dehisced port s/p removal 10/14, f/u cu;ture    2) Pain   - Metastatic Lesions   - Dilaudid 2mg for moderate pain q4hrs PRN   - Dilaudid 4mg for severe pain q4hrs PRN   - Dilaudid 0.5mg IV for breakthrough pain PRN q4hrs   - Based on pts 24 hr opiate usage will start Fentanyl 12mcg/hr patch    3) Anxiety/ Depression  - c/w Mirtazapine  - c/w Klonopin 0.5mg TID (give 1mg dose this am and evaluate response.  If good response will change to 1mg TID)  - SW support   - supportive care     4) Hyperkalemia  - no EKG changes noted  - given Kayexalate in the ED on 10/13   - multiple BMs resolved now  - continue to monitor labs    5) PAULA  - Improving  - CT abdomen/pelvis reviewed no obstruction/ stricture   - c/w IVF hydration  - c/w to monitor labs trend kidney function     6) Stage 4 small cell lung cancer   - history of SIADH, now with hypernatremia  - c/w salt tabs and demeclocycline but decrease dose of either or both due to hypernatremia  - Dr. Martin aware patient in hospital, oncology consult appreciated     7) Advance Care planning/GOC   - patient has capacity at this time   - Full Code   - GOC meeting scheduled for Monday    - Will follow

## 2020-10-16 NOTE — CONSULT NOTE ADULT - SUBJECTIVE AND OBJECTIVE BOX
NEPHROLOGY CONSULT  HPI:  65 year old female patient with pertinent history of recent Lung cancer diagnosis who recently started outpatient chemo(October 1st to 3rd) followed by Immunotherapy( on October 5th) presented to the ED complaining of progressively weakness. Patient has been eating and hydrating  but not enough. Patient denies any fever, chills, coughing, vomiting, diarrhea or abdominal pain but endorses being nauseous. Patient also noted some right chest wall pain at the sight of port insertion for chemo- was initially scheduled for an outpatient revision with Interventional Radiology  W h/o SIADH, pt was dc d on Na Cl 1 g tabs TID and Demeclocycline 600 bid  Was being continued during her current state and pt started to become hypernatremic  She was also on 1/2 NS  Na level was 152, then NaCl and Demeclocycline dcd , Pt started on D5W last night with Na down to 151 this am  She is awake responsive pleasant      In the ED patient found to have a WBC of 32, Lactate of 5.6, Cr of 4.  CT chest/Abdomen and Pelvis  negative for any acute pathology (13 Oct 2020 20:17)      PAST MEDICAL & SURGICAL HISTORY:  Hyponatremia    Brain aneurysm    Anxiety    Depression    History of neck surgery        FAMILY HISTORY:  FH: CAD (coronary artery disease)  in mother      MEDICATIONS  (STANDING):  cefepime   IVPB 2000 milliGRAM(s) IV Intermittent daily  clonazePAM  Tablet 1 milliGRAM(s) Oral <User Schedule>  clotrimazole  Oral Lozenge - Peds 1 Lozenge Oral three times a day  dextrose 5%. 1000 milliLiter(s) (100 mL/Hr) IV Continuous <Continuous>  fentaNYL   Patch  12 MICROgram(s)/Hr 1 Patch Transdermal every 72 hours  levETIRAcetam 250 milliGRAM(s) Oral two times a day  mirtazapine 7.5 milliGRAM(s) Oral at bedtime  multivitamin 1 Tablet(s) Oral daily  polyethylene glycol 3350 17 Gram(s) Oral daily    MEDICATIONS  (PRN):  HYDROmorphone   Tablet 2 milliGRAM(s) Oral every 4 hours PRN Moderate Pain (4 - 6)  HYDROmorphone   Tablet 4 milliGRAM(s) Oral every 4 hours PRN Severe Pain (7 - 10)  HYDROmorphone  Injectable 0.5 milliGRAM(s) IV Push every 4 hours PRN breakthrough pain  ondansetron Injectable 4 milliGRAM(s) IV Push every 6 hours PRN Nausea    I&O's Detail    15 Oct 2020 07:01  -  16 Oct 2020 07:00  --------------------------------------------------------  IN:  Total IN: 0 mL    OUT:    Voided (mL): 750 mL  Total OUT: 750 mL    Total NET: -750 mL          Allergies    codeine (Unknown)    Intolerances          REVIEW OF SYSTEMS:    CONSTITUTIONAL:  As per HPI.  CONSTITUTIONAL: No weakness, fevers or chills  EYES/ENT: No visual changes;  No vertigo or throat pain   NECK: No pain or stiffness  CARDIOVASCULAR: No chest pain or palpitations  GASTROINTESTINAL: No abdominal or epigastric pain. No nausea, vomiting, or hematemesis; No diarrhea or constipation. No melena or hematochezia.  GENITOURINARY: No dysuria, frequency or hematuria  NEUROLOGICAL: No numbness or weakness  SKIN: No itching, burning, rashes, or lesions   All other review of systems is negative unless indicated above      Vital Signs Last 24 Hrs  T(C): 36.3 (16 Oct 2020 08:43), Max: 36.8 (15 Oct 2020 16:02)  T(F): 97.4 (16 Oct 2020 08:43), Max: 98.2 (15 Oct 2020 16:02)  HR: 117 (16 Oct 2020 08:43) (108 - 117)  BP: 118/67 (16 Oct 2020 08:43) (98/53 - 118/67)  BP(mean): --  RR: 18 (16 Oct 2020 08:43) (18 - 18)  SpO2: 100% (16 Oct 2020 08:43) (96% - 100%)      PHYSICAL EXAM:    General:  Alert, well-developed ,No acute distress.    Neuro:  Alert and oriented to person, place, and time.      HEENT:  No JVD, no masses, Eyes anicteric, No carotid bruits.No lymphadenopathy,    Cardiovascular:  Regular rate and rhythm, with normal S1 and S2. No murmurs, rubs,  or gallops. No JVD.     Lungs:  clear. no rales, no wheezing, .    Abdomen:  Normoactive bowel sounds. Soft, flat, non-tender, and non-distended.  No hepatosplenomegaly, positive bowel sounds    Skin:  Warm, dry, well-perfused. No rashes or other lesions.     Extremities:  2+ pulses in upper and lower extremities. No lower extremity pain or  edema; legs are symmetric in appearance.    LABS:                            11.3   25.26 )-----------( 42       ( 16 Oct 2020 07:08 )             35.6     10-16    151<H>  |  124<H>  |  85<H>  ----------------------------<  113<H>  3.4<L>   |  16<L>  |  2.67<H>    Ca    7.6<L>      16 Oct 2020 07:08  Mg     3.1     10-16    TPro  5.1<L>  /  Alb  1.7<L>  /  TBili  0.9  /  DBili  0.4<H>  /  AST  181<H>  /  ALT  268<H>  /  AlkPhos  674<H>  10-15        Magnesium, Serum: 3.1 mg/dL (10-16 @ 07:08)

## 2020-10-16 NOTE — DISCHARGE NOTE NURSING/CASE MANAGEMENT/SOCIAL WORK - PATIENT PORTAL LINK FT
You can access the FollowMyHealth Patient Portal offered by [facility] by registering at the following website: [email or web address]. By joining [employer]’s FollowMyHealth portal, you will also be able to view your health information using other applications (apps) compatible with our system.

## 2020-10-16 NOTE — PROGRESS NOTE ADULT - SUBJECTIVE AND OBJECTIVE BOX
INTERVAL HX    Patient with a history of extensive small cell lung cancer associated with SIADH ; has left upper lung mass, liver metastases.  Received a first cycle of chemotherapy and readmitted because of right port wound dehiscence, infection.  Port removed, on antibiotics.  Hemodynamically stable.  Laboratory test notable for leukocytosis, hyponatremia.    On today's visit continues to have fatigue.  Admits to depression.  Denies significant pains    Allergies    codeine (Unknown)    Intolerances        MEDICATIONS  (STANDING):  cefepime   IVPB 2000 milliGRAM(s) IV Intermittent daily  clonazePAM  Tablet 1 milliGRAM(s) Oral <User Schedule>  clotrimazole  Oral Lozenge - Peds 1 Lozenge Oral three times a day  dextrose 5%. 1000 milliLiter(s) (100 mL/Hr) IV Continuous <Continuous>  fentaNYL   Patch  12 MICROgram(s)/Hr 1 Patch Transdermal every 72 hours  levETIRAcetam 250 milliGRAM(s) Oral two times a day  mirtazapine 7.5 milliGRAM(s) Oral at bedtime  multivitamin 1 Tablet(s) Oral daily  polyethylene glycol 3350 17 Gram(s) Oral daily    MEDICATIONS  (PRN):  HYDROmorphone   Tablet 2 milliGRAM(s) Oral every 4 hours PRN Moderate Pain (4 - 6)  HYDROmorphone   Tablet 4 milliGRAM(s) Oral every 4 hours PRN Severe Pain (7 - 10)  HYDROmorphone  Injectable 0.5 milliGRAM(s) IV Push every 4 hours PRN breakthrough pain  ondansetron Injectable 4 milliGRAM(s) IV Push every 6 hours PRN Nausea      Vital Signs Last 24 Hrs  T(C): 36.4 (16 Oct 2020 15:44), Max: 36.6 (15 Oct 2020 21:42)  T(F): 97.5 (16 Oct 2020 15:44), Max: 97.9 (15 Oct 2020 21:42)  HR: 119 (16 Oct 2020 15:44) (108 - 119)  BP: 132/87 (16 Oct 2020 15:44) (111/67 - 132/87)  BP(mean): --  RR: 18 (16 Oct 2020 15:44) (18 - 18)  SpO2: 99% (16 Oct 2020 15:44) (98% - 100%)    PHYSICAL EXAM:    GENERAL: NAD, APPEARS fatigued, sl withdrawn/ depressed  HEAD:  Atraumatic, Normocephalic  EYES: EOMI, PERRLA, conjunctiva and sclera clear    NECK: Supple, No JVD, Normal thyroid  NERVOUS SYSTEM:    CHEST/LUNG: Clear to percussion bilaterally; No rales, rhonchi,   HEART: Regular rate and rhythm;   ABDOMEN: Soft, Nontender.  EXTREMITIES:   edema:-  LYMPH: No lymphadenopathy noted        LABS:                        11.3   25.26 )-----------( 42       ( 16 Oct 2020 07:08 )             35.6     10-16    151<H>  |  124<H>  |  85<H>  ----------------------------<  113<H>  3.4<L>   |  16<L>  |  2.67<H>    Ca    7.6<L>      16 Oct 2020 07:08  Mg     3.1     10-16    TPro  5.1<L>  /  Alb  1.7<L>  /  TBili  0.9  /  DBili  0.4<H>  /  AST  181<H>  /  ALT  268<H>  /  AlkPhos  674<H>  10-15            RADIOLOGY & ADDITIONAL STUDIES:    PATHOLOGY:

## 2020-10-17 LAB
ANION GAP SERPL CALC-SCNC: 14 MMOL/L — SIGNIFICANT CHANGE UP (ref 5–17)
BUN SERPL-MCNC: 94 MG/DL — HIGH (ref 7–23)
CALCIUM SERPL-MCNC: 7.2 MG/DL — LOW (ref 8.5–10.1)
CHLORIDE SERPL-SCNC: 120 MMOL/L — HIGH (ref 96–108)
CO2 SERPL-SCNC: 13 MMOL/L — LOW (ref 22–31)
CREAT SERPL-MCNC: 3.23 MG/DL — HIGH (ref 0.5–1.3)
GLUCOSE SERPL-MCNC: 101 MG/DL — HIGH (ref 70–99)
HCT VFR BLD CALC: 35.1 % — SIGNIFICANT CHANGE UP (ref 34.5–45)
HGB BLD-MCNC: 11.5 G/DL — SIGNIFICANT CHANGE UP (ref 11.5–15.5)
MAGNESIUM SERPL-MCNC: 2.7 MG/DL — HIGH (ref 1.6–2.6)
MCHC RBC-ENTMCNC: 30.2 PG — SIGNIFICANT CHANGE UP (ref 27–34)
MCHC RBC-ENTMCNC: 32.8 GM/DL — SIGNIFICANT CHANGE UP (ref 32–36)
MCV RBC AUTO: 92.1 FL — SIGNIFICANT CHANGE UP (ref 80–100)
PLATELET # BLD AUTO: 45 K/UL — LOW (ref 150–400)
POTASSIUM SERPL-MCNC: 3.9 MMOL/L — SIGNIFICANT CHANGE UP (ref 3.5–5.3)
POTASSIUM SERPL-SCNC: 3.9 MMOL/L — SIGNIFICANT CHANGE UP (ref 3.5–5.3)
RBC # BLD: 3.81 M/UL — SIGNIFICANT CHANGE UP (ref 3.8–5.2)
RBC # FLD: 15.6 % — HIGH (ref 10.3–14.5)
SODIUM SERPL-SCNC: 147 MMOL/L — HIGH (ref 135–145)
WBC # BLD: 20.01 K/UL — HIGH (ref 3.8–10.5)
WBC # FLD AUTO: 20.01 K/UL — HIGH (ref 3.8–10.5)

## 2020-10-17 PROCEDURE — 99233 SBSQ HOSP IP/OBS HIGH 50: CPT

## 2020-10-17 PROCEDURE — 71046 X-RAY EXAM CHEST 2 VIEWS: CPT | Mod: 26

## 2020-10-17 PROCEDURE — 93970 EXTREMITY STUDY: CPT | Mod: 26

## 2020-10-17 RX ORDER — ENOXAPARIN SODIUM 100 MG/ML
30 INJECTION SUBCUTANEOUS DAILY
Refills: 0 | Status: DISCONTINUED | OUTPATIENT
Start: 2020-10-17 | End: 2020-10-20

## 2020-10-17 RX ORDER — SODIUM CHLORIDE 9 MG/ML
1000 INJECTION, SOLUTION INTRAVENOUS
Refills: 0 | Status: DISCONTINUED | OUTPATIENT
Start: 2020-10-17 | End: 2020-10-18

## 2020-10-17 RX ADMIN — ENOXAPARIN SODIUM 30 MILLIGRAM(S): 100 INJECTION SUBCUTANEOUS at 21:32

## 2020-10-17 RX ADMIN — POLYETHYLENE GLYCOL 3350 17 GRAM(S): 17 POWDER, FOR SOLUTION ORAL at 09:20

## 2020-10-17 RX ADMIN — Medication 1 MILLIGRAM(S): at 21:33

## 2020-10-17 RX ADMIN — SODIUM CHLORIDE 100 MILLILITER(S): 9 INJECTION, SOLUTION INTRAVENOUS at 14:27

## 2020-10-17 RX ADMIN — CEFEPIME 100 MILLIGRAM(S): 1 INJECTION, POWDER, FOR SOLUTION INTRAMUSCULAR; INTRAVENOUS at 09:19

## 2020-10-17 RX ADMIN — FENTANYL CITRATE 1 PATCH: 50 INJECTION INTRAVENOUS at 19:41

## 2020-10-17 RX ADMIN — MIRTAZAPINE 7.5 MILLIGRAM(S): 45 TABLET, ORALLY DISINTEGRATING ORAL at 21:33

## 2020-10-17 RX ADMIN — Medication 1 MILLIGRAM(S): at 14:25

## 2020-10-17 RX ADMIN — HYDROMORPHONE HYDROCHLORIDE 2 MILLIGRAM(S): 2 INJECTION INTRAMUSCULAR; INTRAVENOUS; SUBCUTANEOUS at 05:34

## 2020-10-17 RX ADMIN — Medication 1 LOZENGE: at 14:25

## 2020-10-17 RX ADMIN — Medication 1 MILLIGRAM(S): at 05:10

## 2020-10-17 RX ADMIN — HYDROMORPHONE HYDROCHLORIDE 2 MILLIGRAM(S): 2 INJECTION INTRAMUSCULAR; INTRAVENOUS; SUBCUTANEOUS at 10:30

## 2020-10-17 RX ADMIN — SODIUM CHLORIDE 100 MILLILITER(S): 9 INJECTION, SOLUTION INTRAVENOUS at 23:37

## 2020-10-17 RX ADMIN — Medication 1 LOZENGE: at 21:33

## 2020-10-17 RX ADMIN — HYDROMORPHONE HYDROCHLORIDE 2 MILLIGRAM(S): 2 INJECTION INTRAMUSCULAR; INTRAVENOUS; SUBCUTANEOUS at 20:25

## 2020-10-17 RX ADMIN — HYDROMORPHONE HYDROCHLORIDE 2 MILLIGRAM(S): 2 INJECTION INTRAMUSCULAR; INTRAVENOUS; SUBCUTANEOUS at 19:34

## 2020-10-17 RX ADMIN — FENTANYL CITRATE 1 PATCH: 50 INJECTION INTRAVENOUS at 07:30

## 2020-10-17 RX ADMIN — LEVETIRACETAM 250 MILLIGRAM(S): 250 TABLET, FILM COATED ORAL at 09:20

## 2020-10-17 RX ADMIN — HYDROMORPHONE HYDROCHLORIDE 2 MILLIGRAM(S): 2 INJECTION INTRAMUSCULAR; INTRAVENOUS; SUBCUTANEOUS at 06:00

## 2020-10-17 RX ADMIN — HYDROMORPHONE HYDROCHLORIDE 2 MILLIGRAM(S): 2 INJECTION INTRAMUSCULAR; INTRAVENOUS; SUBCUTANEOUS at 09:30

## 2020-10-17 RX ADMIN — LEVETIRACETAM 250 MILLIGRAM(S): 250 TABLET, FILM COATED ORAL at 21:33

## 2020-10-17 RX ADMIN — Medication 1 LOZENGE: at 05:11

## 2020-10-17 RX ADMIN — Medication 1 TABLET(S): at 09:20

## 2020-10-17 RX ADMIN — SODIUM CHLORIDE 100 MILLILITER(S): 9 INJECTION, SOLUTION INTRAVENOUS at 04:33

## 2020-10-17 NOTE — PROGRESS NOTE ADULT - ASSESSMENT
65 year old female patient with pertinent history of recent Lung cancer diagnosis who recently started outpatient chemo(October 1st to 3rd) followed by Immunotherapy( on October 5th) presented to the ED complaining of progressively weakness. Patient has been eating and hydrating  but not enough. Patient denies any fever, chills, coughing, vomiting, diarrhea or abdominal pain but endorses being nauseous. Patient also noted some right chest wall pain at the sight of port insertion for chemo- was initially scheduled for an outpatient revision with Interventional Radiology  W h/o SIADH, pt was dc d on Na Cl 1 g tabs TID and Demeclocycline 600 bid  Was being continued during her current state and pt started to become hypernatremic  She was also on 1/2 NS  Na level was 152, then NaCl and Demeclocycline dcd , Pt started on D5W last night with Na down to 151 this am  She is awake responsive pleasant    A/P  Hypernatremia 152, h/o SIADH on treatment  dc NaCl and Demeclocycline  D5W @ 50 ml /hr will increase to 100 ml/hr and repeat labs.  May allow the pt to take PO fluids once Na level @ 145  to prevent Na from falling if SIADH still underlying  Chest Medi port removed so far no growth    10/17 SY  --PAULA : post chemo and immunotherapy leading to decreased po intake.   Will need volume as well as free water repletion.    Creat increased again.  Continue IVF --change to HCO3 infusion to address worsening metabolic acidosis.  --Lung CA : stage IV small cell CA ; post chemo and immunotherapy.  --ID : leukocytosis slowly improving.  Mediport d/brianna --to be reinserted once clear from ID .

## 2020-10-17 NOTE — PROGRESS NOTE ADULT - SUBJECTIVE AND OBJECTIVE BOX
65 year old female patient with pertinent history of recent Lung cancer diagnosis who recently started outpatient chemo(October 1st to 3rd) followed by Immunotherapy( on October 5th) presented to the ED complaining of progressively weakness. Patient has been eating and hydrating  but not enough. Patient denies any fever, chills, coughing, vomiting, diarrhea or abdominal pain but endorses being nauseous. Patient also noted some right chest wall pain at the sight of port insertion for chemo- was initially scheduled for an outpatient revision with Interventional Radiology( Dr Mckeon)In the ED patient found to have a WBC of 32, Lactate of 5.6, Cr of 4.  CT chest/Abdomen and Pelvis  negative for any acute [id number] year old female patient presenting with weakness found to have SIRS and worsening transaminitis      10/14 - no cp palps sob abdo pain; some soreness at the port site   10/15 - no cp palps sob, tired after being washed up   10/16 - no cp palps sob abdo pain; tired   10/17 - no cp palps sob however tachy present     PHYSICAL EXAM:  GENERAL: NAD, able to lie flat in bed  HEAD:  Atraumatic, Normocephalic  EYES: EOMI, PERRLA, normal sclera  ENT: Moist mucous membranes  NECK: Supple, No JVD, no nuchal rigidity  CHEST/LUNG: Clear to auscultation bilaterally; No rales, rhonchi, wheezing, or rubs. Unlabored respirations  HEART: Regular rate and rhythm; No murmurs, rubs, or gallops  ABDOMEN: Bowel sounds present; Soft, Nontender, Nondistended. No hepatomegaly  EXTREMITIES:  no pitting bilaterally  NERVOUS SYSTEM:  Alert & Oriented X3, speech clear. No focal motor or sensory deficits  MSK: FROM all 4 extremities, full and equal strength  SKIN: Rt chest wall port removed       RADIOLOGY/EKG:  < from: CT Abdomen and Pelvis No Cont (10.13.20 @ 18:26) >  No septic pathology.  Decreased left hilar mass and adenopathy.    LABS: All Labs Reviewed:                        11.5   20.01 )-----------( 45       ( 17 Oct 2020 07:01 )             35.1     10-17    147<H>  |  120<H>  |  94<H>  ----------------------------<  101<H>  3.9   |  13<L>  |  3.23<H>      RADIOLOGY/EKG:  CXR NEG for infiltrates  USG NEG FOR DVTs       cefepime   IVPB 2000 milliGRAM(s) IV Intermittent daily  clonazePAM  Tablet 1 milliGRAM(s) Oral <User Schedule>  clotrimazole  Oral Lozenge - Peds 1 Lozenge Oral three times a day  dextrose 5%. 1000 milliLiter(s) IV Continuous <Continuous>  enoxaparin Injectable 30 milliGRAM(s) SubCutaneous daily  fentaNYL   Patch  12 MICROgram(s)/Hr 1 Patch Transdermal every 72 hours  HYDROmorphone   Tablet 2 milliGRAM(s) Oral every 4 hours PRN  HYDROmorphone  Injectable 0.5 milliGRAM(s) IV Push every 4 hours PRN  levETIRAcetam 250 milliGRAM(s) Oral two times a day  mirtazapine 7.5 milliGRAM(s) Oral at bedtime  multivitamin 1 Tablet(s) Oral daily  ondansetron Injectable 4 milliGRAM(s) IV Push every 6 hours PRN  polyethylene glycol 3350 17 Gram(s) Oral daily

## 2020-10-17 NOTE — PROGRESS NOTE ADULT - ASSESSMENT
65 year old female patient with pertinent history of recent small cell lung cancer diagnosis who recently started outpatient chemo(October 1st to 3rd) followed by Immunotherapy( on October 5th) presented to the ED complaining of progressively weakness. Patient has been eating and hydrating  but not enough. Patient denies any fever, chills, coughing, vomiting, diarrhea or abdominal pain but endorses being nauseous. Patient also noted some right chest wall pain and skin opening at the sight of port insertion for chemo- was initially scheduled for an outpatient revision with Interventional Radiology( Dr Mckeno) In the ED patient found to have a WBC of 32, Lactate of 5.6, Cr of 4. CT chest/Abdomen and Pelvis negative for any acute pathology, Cr noted to be 4, elevated ALP/AST/ALT, afebrile, was given empiric antibiotics with vancomycin/cefepime. No reported abd pain/diarrhea/cough/dysuria/rashes.      1. Mediport site infection s/p port site wound dehiscence s/p removal. leukocytosis. Small cell lung cancer s/p chemo/immunotherapy. CRF stage 3-4.   - port removed by IR 10/14  -on cefepime IV # 5  -given vancomycin 1250 mg IV x 1 on 10/13 and again on 10/16  -tolerating abx well so far; no side effects noted  -continue abx coverage   -mediport site is improving as expected   -f/u culture  - leukocytosis likely related to recently neulasta given 10/9 - improving  - CT chest abd/pelvis reviewed, no infectious focus  - transamnitis. thrombocytopenia. PAULA likely related to mets and chemo/immunotherapy  -monitor renal function closely  - blood cx/urine cx no growth  - monitor temps  - tolerating abx well so far; no side effects noted  - oncology f/u noted   - supportive care  - fu cbc

## 2020-10-17 NOTE — PROGRESS NOTE ADULT - ASSESSMENT
#SIRS #Leukocytosis #Lactic acidosis  -no clearly etiology  -pt volume resuscitated in the ED and give IV abx  -follow up blood and urine cx  - VERSUS infected port  spoke to IR and ID - to dc port today, f/u Port Cx, cont IV Abx and when cultures negative can place a new port   10/15 - port dced yesterday, f/u Cxs    # Pt with Small Cell Lung Ca with mets to the bone and liver  -recent chemo and immunotherapy   - started chemo via port on 10/1/20, got neulasta after than contributing to high WBC count     #SIADH  on DEMECLOCYCLINE  10/15 - Pt has hypernatremia, will place on 1/2NS  HOLD SALT TABS   will get Renal Cx - pt needs close f/u as OP   has seen Dr Savage in the past, will recall to adjust meds   10/16 - Hypernatremia - discussed with Dr Savage, on D5W   Off demeclocycline and NACL tabs       #PAULA  -possibly pre-renal in etiology  -no obstruction/ stricture noted on CT abdomen/pelvis  -IVF hydration    #Depression  -on Mirtazapine    #Anxiety   -on clonazepam    #Severe protein calorie malnutrition  -get Nutritionist evaluation     #Transaminitis  -possibly related to recent chemo/ immunotherapy  -no acute pathology noted on imaging    #POssible Seizure disorder  Pt started on Keppra prev admission  10/15 - EEG noted, can reduce Keppra dose, discussed with Dr Madison     #Advanced Directives  -pt still full code  -Will get palliative team input while inpatient     #DVT ppx  -give Lovenox     DISPO 10/16: discussed with RN/Heme-Onc  f/u Cxs, based on EEG Keppra dose reduced   On IVFs for hypernatremia, will get Renal Cx as discussed with Dr Amato   WIll hold salt tabs for now   monitor LABS incl LFTs   DISPO 10/17 - due to tachy ordered VQ scan - pending; USG neg for DVT  low platelets - will monitor closely and renally dose lovenox for VTE Px  discussed with Heme-Onc #SIRS #Leukocytosis #Lactic acidosis  -no clearly etiology  -pt volume resuscitated in the ED and give IV abx  -follow up blood and urine cx  - VERSUS infected port  spoke to IR and ID - to dc port today, f/u Port Cx, cont IV Abx and when cultures negative can place a new port   10/15 - port dced yesterday, f/u Cxs    # Pt with Small Cell Lung Ca with mets to the bone and liver  -recent chemo and immunotherapy   - started chemo via port on 10/1/20, got neulasta after than contributing to high WBC count     #SIADH  on DEMECLOCYCLINE  10/15 - Pt has hypernatremia, will place on 1/2NS  HOLD SALT TABS   will get Renal Cx - pt needs close f/u as OP   has seen Dr Savage in the past, will recall to adjust meds   10/16 - Hypernatremia - discussed with Dr Savage, on D5W   Off demeclocycline and NACL tabs   10/17 - on D5W      #PAULA  -possibly pre-renal in etiology  -no obstruction/ stricture noted on CT abdomen/pelvis  -IVF hydration    #Depression  -on Mirtazapine    #Anxiety   -on clonazepam    #Severe protein calorie malnutrition  -get Nutritionist evaluation     #Transaminitis  -possibly related to recent chemo/ immunotherapy  -no acute pathology noted on imaging    #POssible Seizure disorder  Pt started on Keppra prev admission  10/15 - EEG noted, can reduce Keppra dose, discussed with Dr Madison     #Advanced Directives  -pt still full code  -Will get palliative team input while inpatient     #DVT ppx  -give Lovenox     DISPO 10/16: discussed with RN/Heme-Onc  f/u Cxs, based on EEG Keppra dose reduced   On IVFs for hypernatremia, will get Renal Cx as discussed with Dr Amato   WIll hold salt tabs for now   monitor LABS incl LFTs   DISPO 10/17 - due to tachy ordered VQ scan - pending; USG neg for DVT  low platelets - will monitor closely and renally dose lovenox for VTE Px  on D5W  discussed with Heme-Onc

## 2020-10-17 NOTE — PROGRESS NOTE ADULT - SUBJECTIVE AND OBJECTIVE BOX
NEPHROLOGY INTERVAL HPI/OVERNIGHT EVENTS:    Date of Service: 10-17-20 @ 20:53  10/17 SY  Alert and responsive.  Able answer some questions.  Positive difficulty with eating.    HPI:  65 year old female patient with pertinent history of recent Lung cancer diagnosis who recently started outpatient chemo(October 1st to 3rd) followed by Immunotherapy( on October 5th) presented to the ED complaining of progressively weakness. Patient has been eating and hydrating  but not enough. Patient denies any fever, chills, coughing, vomiting, diarrhea or abdominal pain but endorses being nauseous. Patient also noted some right chest wall pain at the sight of port insertion for chemo- was initially scheduled for an outpatient revision with Interventional Radiology  W h/o SIADH, pt was dc d on Na Cl 1 g tabs TID and Demeclocycline 600 bid  Was being continued during her current state and pt started to become hypernatremic  She was also on 1/2 NS  Na level was 152, then NaCl and Demeclocycline dcd , Pt started on D5W last night with Na down to 151 this am  She is awake responsive pleasant    In the ED patient found to have a WBC of 32, Lactate of 5.6, Cr of 4.  CT chest/Abdomen and Pelvis  negative for any acute pathology (13 Oct 2020 20:17)      PAST MEDICAL & SURGICAL HISTORY:  Hyponatremia  Brain aneurysm  Anxiety  Depression  History of neck surgery    MEDICATIONS  (STANDING):  cefepime   IVPB 2000 milliGRAM(s) IV Intermittent daily  clonazePAM  Tablet 1 milliGRAM(s) Oral <User Schedule>  clotrimazole  Oral Lozenge - Peds 1 Lozenge Oral three times a day  dextrose 5%. 1000 milliLiter(s) (100 mL/Hr) IV Continuous <Continuous>  enoxaparin Injectable 30 milliGRAM(s) SubCutaneous daily  fentaNYL   Patch  12 MICROgram(s)/Hr 1 Patch Transdermal every 72 hours  levETIRAcetam 250 milliGRAM(s) Oral two times a day  mirtazapine 7.5 milliGRAM(s) Oral at bedtime  multivitamin 1 Tablet(s) Oral daily  polyethylene glycol 3350 17 Gram(s) Oral daily    MEDICATIONS  (PRN):  HYDROmorphone   Tablet 2 milliGRAM(s) Oral every 4 hours PRN Moderate Pain (4 - 6)  HYDROmorphone  Injectable 0.5 milliGRAM(s) IV Push every 4 hours PRN breakthrough pain  ondansetron Injectable 4 milliGRAM(s) IV Push every 6 hours PRN Nausea    Vital Signs Last 24 Hrs  T(C): 36.5 (17 Oct 2020 15:20), Max: 36.6 (16 Oct 2020 21:13)  T(F): 97.7 (17 Oct 2020 15:20), Max: 97.9 (16 Oct 2020 21:13)  HR: 105 (17 Oct 2020 15:20) (105 - 123)  BP: 126/76 (17 Oct 2020 15:20) (101/71 - 138/95)  BP(mean): --  RR: 18 (17 Oct 2020 15:20) (17 - 18)  SpO2: 98% (17 Oct 2020 15:20) (98% - 100%)    10-16 @ 07:01  -  10-17 @ 07:00  --------------------------------------------------------  IN: 0 mL / OUT: 800 mL / NET: -800 mL    PHYSICAL EXAM:  GENERAL: Alert, comfortable.  CHEST/LUNG: fair air entry  HEART: S1S2 RRR  ABDOMEN: soft  EXTREMITIES: no edema  SKIN:     LABS:                        11.5   20.01 )-----------( 45       ( 17 Oct 2020 07:01 )             35.1     10-17    147<H>  |  120<H>  |  94<H>  ----------------------------<  101<H>  3.9   |  13<L>  |  3.23<H>    Ca    7.2<L>      17 Oct 2020 07:01  Mg     2.7     10-17          Magnesium, Serum: 2.7 mg/dL (10-17 @ 07:01)          RADIOLOGY & ADDITIONAL TESTS:

## 2020-10-18 DIAGNOSIS — E22.2 SYNDROME OF INAPPROPRIATE SECRETION OF ANTIDIURETIC HORMONE: ICD-10-CM

## 2020-10-18 LAB
-  AMPICILLIN: SIGNIFICANT CHANGE UP
-  CIPROFLOXACIN: SIGNIFICANT CHANGE UP
-  DAPTOMYCIN: SIGNIFICANT CHANGE UP
-  LEVOFLOXACIN: SIGNIFICANT CHANGE UP
-  LINEZOLID: SIGNIFICANT CHANGE UP
-  NITROFURANTOIN: SIGNIFICANT CHANGE UP
-  PENICILLIN: SIGNIFICANT CHANGE UP
-  RIFAMPIN: SIGNIFICANT CHANGE UP
-  TETRACYCLINE: SIGNIFICANT CHANGE UP
-  VANCOMYCIN: SIGNIFICANT CHANGE UP
ANION GAP SERPL CALC-SCNC: 11 MMOL/L — SIGNIFICANT CHANGE UP (ref 5–17)
BASOPHILS # BLD AUTO: 0 K/UL — SIGNIFICANT CHANGE UP (ref 0–0.2)
BASOPHILS NFR BLD AUTO: 0 % — SIGNIFICANT CHANGE UP (ref 0–2)
BUN SERPL-MCNC: 103 MG/DL — HIGH (ref 7–23)
CALCIUM SERPL-MCNC: 7.2 MG/DL — LOW (ref 8.5–10.1)
CHLORIDE SERPL-SCNC: 112 MMOL/L — HIGH (ref 96–108)
CO2 SERPL-SCNC: 17 MMOL/L — LOW (ref 22–31)
CREAT SERPL-MCNC: 3.42 MG/DL — HIGH (ref 0.5–1.3)
CULTURE RESULTS: SIGNIFICANT CHANGE UP
EOSINOPHIL # BLD AUTO: 0 K/UL — SIGNIFICANT CHANGE UP (ref 0–0.5)
EOSINOPHIL NFR BLD AUTO: 0 % — SIGNIFICANT CHANGE UP (ref 0–6)
GLUCOSE SERPL-MCNC: 89 MG/DL — SIGNIFICANT CHANGE UP (ref 70–99)
HCT VFR BLD CALC: 35.3 % — SIGNIFICANT CHANGE UP (ref 34.5–45)
HGB BLD-MCNC: 11.3 G/DL — LOW (ref 11.5–15.5)
LYMPHOCYTES # BLD AUTO: 14 % — SIGNIFICANT CHANGE UP (ref 13–44)
LYMPHOCYTES # BLD AUTO: 2.27 K/UL — SIGNIFICANT CHANGE UP (ref 1–3.3)
MAGNESIUM SERPL-MCNC: 2.8 MG/DL — HIGH (ref 1.6–2.6)
MCHC RBC-ENTMCNC: 29.8 PG — SIGNIFICANT CHANGE UP (ref 27–34)
MCHC RBC-ENTMCNC: 32 GM/DL — SIGNIFICANT CHANGE UP (ref 32–36)
MCV RBC AUTO: 93.1 FL — SIGNIFICANT CHANGE UP (ref 80–100)
METHOD TYPE: SIGNIFICANT CHANGE UP
MONOCYTES # BLD AUTO: 0.65 K/UL — SIGNIFICANT CHANGE UP (ref 0–0.9)
MONOCYTES NFR BLD AUTO: 4 % — SIGNIFICANT CHANGE UP (ref 2–14)
NEUTROPHILS # BLD AUTO: 12.98 K/UL — HIGH (ref 1.8–7.4)
NEUTROPHILS NFR BLD AUTO: 76 % — SIGNIFICANT CHANGE UP (ref 43–77)
NRBC # BLD: SIGNIFICANT CHANGE UP /100 WBCS (ref 0–0)
ORGANISM # SPEC MICROSCOPIC CNT: SIGNIFICANT CHANGE UP
ORGANISM # SPEC MICROSCOPIC CNT: SIGNIFICANT CHANGE UP
PLATELET # BLD AUTO: 40 K/UL — LOW (ref 150–400)
POTASSIUM SERPL-MCNC: 3.8 MMOL/L — SIGNIFICANT CHANGE UP (ref 3.5–5.3)
POTASSIUM SERPL-SCNC: 3.8 MMOL/L — SIGNIFICANT CHANGE UP (ref 3.5–5.3)
RBC # BLD: 3.79 M/UL — LOW (ref 3.8–5.2)
RBC # FLD: 15.9 % — HIGH (ref 10.3–14.5)
SODIUM SERPL-SCNC: 140 MMOL/L — SIGNIFICANT CHANGE UP (ref 135–145)
SPECIMEN SOURCE: SIGNIFICANT CHANGE UP
WBC # BLD: 16.22 K/UL — HIGH (ref 3.8–10.5)
WBC # FLD AUTO: 16.22 K/UL — HIGH (ref 3.8–10.5)

## 2020-10-18 PROCEDURE — 99233 SBSQ HOSP IP/OBS HIGH 50: CPT

## 2020-10-18 RX ORDER — SODIUM BICARBONATE 1 MEQ/ML
0.05 SYRINGE (ML) INTRAVENOUS
Qty: 75 | Refills: 0 | Status: DISCONTINUED | OUTPATIENT
Start: 2020-10-18 | End: 2020-10-20

## 2020-10-18 RX ADMIN — Medication 1 LOZENGE: at 13:38

## 2020-10-18 RX ADMIN — FENTANYL CITRATE 1 PATCH: 50 INJECTION INTRAVENOUS at 20:43

## 2020-10-18 RX ADMIN — Medication 75 MEQ/KG/HR: at 17:07

## 2020-10-18 RX ADMIN — MIRTAZAPINE 7.5 MILLIGRAM(S): 45 TABLET, ORALLY DISINTEGRATING ORAL at 22:40

## 2020-10-18 RX ADMIN — FENTANYL CITRATE 1 PATCH: 50 INJECTION INTRAVENOUS at 09:35

## 2020-10-18 RX ADMIN — Medication 1 LOZENGE: at 22:40

## 2020-10-18 RX ADMIN — CEFEPIME 100 MILLIGRAM(S): 1 INJECTION, POWDER, FOR SOLUTION INTRAMUSCULAR; INTRAVENOUS at 09:35

## 2020-10-18 RX ADMIN — LEVETIRACETAM 250 MILLIGRAM(S): 250 TABLET, FILM COATED ORAL at 09:36

## 2020-10-18 RX ADMIN — Medication 1 MILLIGRAM(S): at 22:40

## 2020-10-18 RX ADMIN — Medication 1 MILLIGRAM(S): at 13:37

## 2020-10-18 RX ADMIN — Medication 1 MILLIGRAM(S): at 06:08

## 2020-10-18 RX ADMIN — FENTANYL CITRATE 1 PATCH: 50 INJECTION INTRAVENOUS at 09:36

## 2020-10-18 RX ADMIN — HYDROMORPHONE HYDROCHLORIDE 2 MILLIGRAM(S): 2 INJECTION INTRAMUSCULAR; INTRAVENOUS; SUBCUTANEOUS at 10:30

## 2020-10-18 RX ADMIN — SODIUM CHLORIDE 100 MILLILITER(S): 9 INJECTION, SOLUTION INTRAVENOUS at 11:43

## 2020-10-18 RX ADMIN — Medication 1 TABLET(S): at 09:36

## 2020-10-18 RX ADMIN — Medication 1 LOZENGE: at 06:08

## 2020-10-18 RX ADMIN — HYDROMORPHONE HYDROCHLORIDE 2 MILLIGRAM(S): 2 INJECTION INTRAMUSCULAR; INTRAVENOUS; SUBCUTANEOUS at 09:34

## 2020-10-18 RX ADMIN — FENTANYL CITRATE 1 PATCH: 50 INJECTION INTRAVENOUS at 07:36

## 2020-10-18 RX ADMIN — LEVETIRACETAM 250 MILLIGRAM(S): 250 TABLET, FILM COATED ORAL at 22:40

## 2020-10-18 NOTE — PROGRESS NOTE ADULT - SUBJECTIVE AND OBJECTIVE BOX
65 year old female patient with pertinent history of recent Lung cancer diagnosis who recently started outpatient chemo(October 1st to 3rd) followed by Immunotherapy( on October 5th) presented to the ED complaining of progressively weakness. Patient has been eating and hydrating  but not enough. Patient denies any fever, chills, coughing, vomiting, diarrhea or abdominal pain but endorses being nauseous. Patient also noted some right chest wall pain at the sight of port insertion for chemo- was initially scheduled for an outpatient revision with Interventional Radiology( Dr Mckeon)In the ED patient found to have a WBC of 32, Lactate of 5.6, Cr of 4.  CT chest/Abdomen and Pelvis  negative for any acute [id number] year old female patient presenting with weakness found to have SIRS and worsening transaminitis      10/14 - no cp palps sob abdo pain; some soreness at the port site   10/15 - no cp palps sob, tired after being washed up   10/16 - no cp palps sob abdo pain; tired   10/17 - no cp palps sob however tachy present   10/18 - withdrawn, flat affect, no cp palps sob     PHYSICAL EXAM:  GENERAL: NAD, able to lie flat in bed  HEAD:  Atraumatic, Normocephalic  EYES: EOMI, PERRLA, normal sclera  ENT: Moist mucous membranes  NECK: Supple, No JVD, no nuchal rigidity  CHEST/LUNG: Clear to auscultation bilaterally; No rales, rhonchi, wheezing, or rubs. Unlabored respirations  HEART: Regular rate and rhythm; No murmurs, rubs, or gallops  ABDOMEN: Bowel sounds present; Soft, Nontender, Nondistended. No hepatomegaly  EXTREMITIES:  no pitting bilaterally  NERVOUS SYSTEM:  Alert & Oriented X3, speech clear. No focal motor or sensory deficits  MSK: FROM all 4 extremities, full and equal strength  SKIN: Rt chest wall port removed       RADIOLOGY/EKG:  < from: CT Abdomen and Pelvis No Cont (10.13.20 @ 18:26) >  No septic pathology.  Decreased left hilar mass and adenopathy.    RADIOLOGY/EKG:  CXR NEG for infiltrates  USG NEG FOR DVTs     LABS: All Labs Reviewed:                        11.3   16.22 )-----------( 40       ( 18 Oct 2020 07:16 )             35.3     140  |  112<H>  |  103<H>  ----------------------------<  89  3.8   |  17<L>  |  3.42<H>      cefepime   IVPB 2000 milliGRAM(s) IV Intermittent daily  clonazePAM  Tablet 1 milliGRAM(s) Oral <User Schedule>  clotrimazole  Oral Lozenge - Peds 1 Lozenge Oral three times a day  enoxaparin Injectable 30 milliGRAM(s) SubCutaneous daily  fentaNYL   Patch  12 MICROgram(s)/Hr 1 Patch Transdermal every 72 hours  HYDROmorphone   Tablet 2 milliGRAM(s) Oral every 4 hours PRN  HYDROmorphone  Injectable 0.5 milliGRAM(s) IV Push every 4 hours PRN  levETIRAcetam 250 milliGRAM(s) Oral two times a day  mirtazapine 7.5 milliGRAM(s) Oral at bedtime  multivitamin 1 Tablet(s) Oral daily  ondansetron Injectable 4 milliGRAM(s) IV Push every 6 hours PRN  polyethylene glycol 3350 17 Gram(s) Oral daily  sodium bicarbonate  Infusion 0.074 mEq/kG/Hr IV Continuous <Continuous>

## 2020-10-18 NOTE — PROGRESS NOTE ADULT - SUBJECTIVE AND OBJECTIVE BOX
INTERVAL HISTORY:    65 y/ with advance small cell cancer , SIADH, depression, here with port associated infection on antibiotics  Had elevated Na/ Demeclocycline held; Had tachycardia , duplex LE negative for DVT      REVIEW OF SYSTEMS:  Generally weak, fatigue appetite picking up    Allergies    codeine (Unknown)    Intolerances        MEDICATIONS  (STANDING):  cefepime   IVPB 2000 milliGRAM(s) IV Intermittent daily  clonazePAM  Tablet 1 milliGRAM(s) Oral <User Schedule>  clotrimazole  Oral Lozenge - Peds 1 Lozenge Oral three times a day  dextrose 5% 1000 milliLiter(s) (100 mL/Hr) IV Continuous <Continuous>  dextrose 5%. 1000 milliLiter(s) (100 mL/Hr) IV Continuous <Continuous>  enoxaparin Injectable 30 milliGRAM(s) SubCutaneous daily  fentaNYL   Patch  12 MICROgram(s)/Hr 1 Patch Transdermal every 72 hours  levETIRAcetam 250 milliGRAM(s) Oral two times a day  mirtazapine 7.5 milliGRAM(s) Oral at bedtime  multivitamin 1 Tablet(s) Oral daily  polyethylene glycol 3350 17 Gram(s) Oral daily    MEDICATIONS  (PRN):  HYDROmorphone   Tablet 2 milliGRAM(s) Oral every 4 hours PRN Moderate Pain (4 - 6)  HYDROmorphone  Injectable 0.5 milliGRAM(s) IV Push every 4 hours PRN breakthrough pain  ondansetron Injectable 4 milliGRAM(s) IV Push every 6 hours PRN Nausea      Vital Signs Last 24 Hrs  T(C): 36.2 (18 Oct 2020 08:00), Max: 36.5 (17 Oct 2020 15:20)  T(F): 97.2 (18 Oct 2020 08:00), Max: 97.7 (17 Oct 2020 15:20)  HR: 105 (18 Oct 2020 08:00) (105 - 119)  BP: 113/69 (18 Oct 2020 08:00) (113/69 - 126/76)  BP(mean): --  RR: 18 (18 Oct 2020 08:00) (18 - 18)  SpO2: 100% (18 Oct 2020 08:00) (98% - 100%)    PHYSICAL EXAM:    GENERAL: NAD, affect flat/ ECOG 2- 3  HEAD:  Atraumatic, Normocephalic  EYES: EOMI, PERRLA, conjunctiva and sclera clear    NECK: Supple, No JVD, Normal thyroid  NERVOUS SYSTEM:    CHEST/LUNG: Clear to percussion bilaterally; No rales, rhonchi,   HEART: Regular rate and rhythm;   ABDOMEN: Soft, Nontender.  EXTREMITIES:   +2 edema:-  LYMPH: No lymphadenopathy noted        LABS:                        11.3   16.22 )-----------( 40       ( 18 Oct 2020 07:16 )             35.3     10-18    140  |  112<H>  |  103<H>  ----------------------------<  89  3.8   |  17<L>  |  3.42<H>    Ca    7.2<L>      18 Oct 2020 07:16  Mg     2.8     10-18    RADIOLOGY & ADDITIONAL STUDIES:

## 2020-10-18 NOTE — PROGRESS NOTE ADULT - ASSESSMENT
65 year old female patient with pertinent history of recent Lung cancer diagnosis who recently started outpatient chemo(October 1st to 3rd) followed by Immunotherapy( on October 5th) presented to the ED complaining of progressively weakness. Patient has been eating and hydrating  but not enough. Patient denies any fever, chills, coughing, vomiting, diarrhea or abdominal pain but endorses being nauseous. Patient also noted some right chest wall pain at the sight of port insertion for chemo- was initially scheduled for an outpatient revision with Interventional Radiology  W h/o SIADH, pt was dc d on Na Cl 1 g tabs TID and Demeclocycline 600 bid  Was being continued during her current state and pt started to become hypernatremic  She was also on 1/2 NS  Na level was 152, then NaCl and Demeclocycline dcd , Pt started on D5W last night with Na down to 151 this am  She is awake responsive pleasant    A/P  Hypernatremia 152, h/o SIADH on treatment  dc NaCl and Demeclocycline  D5W @ 50 ml /hr will increase to 100 ml/hr and repeat labs.  May allow the pt to take PO fluids once Na level @ 145  to prevent Na from falling if SIADH still underlying  Chest Medi port removed so far no growth    10/17 SY  --PAULA : post chemo and immunotherapy leading to decreased po intake.   Will need volume as well as free water repletion.    Creat increased again.  Continue IVF --change to HCO3 infusion to address worsening metabolic acidosis.  --Lung CA : stage IV small cell CA ; post chemo and immunotherapy.  --ID : leukocytosis slowly improving.  Mediport d/brianna --to be reinserted once clear from ID .    10/18 SY  --PAULA : post chemo/immunotherapy resulting in GI symptoms with decreased po intake.    Creat continues to increase.  Continue gentle hydration.    Change IVF to Isotonic solution.  --Lung CA : stage IV small cell CA : post chemo and immunotherapy.  --Leukocytosis : continues to improve.  Continue abtx.

## 2020-10-18 NOTE — PROGRESS NOTE ADULT - ASSESSMENT
#SIRS #Leukocytosis #Lactic acidosis  -no clearly etiology  -pt volume resuscitated in the ED and give IV abx  -follow up blood and urine cx  - VERSUS infected port  spoke to IR and ID - to dc port today, f/u Port Cx, cont IV Abx and when cultures negative can place a new port   10/15 - port dced yesterday, f/u Cxs  10/18 - Heme_onc to decide if patient to receive further chemo or not - to make decisions in the near future  last chemo was 1.5 weeks ago    # Pt with Small Cell Lung Ca with mets to the bone and liver  -recent chemo and immunotherapy   - started chemo via port on 10/1/20, got neulasta after that contributing to high WBC count     #SIADH  on DEMECLOCYCLINE  10/15 - Pt has hypernatremia, will place on 1/2NS  HOLD SALT TABS   will get Renal Cx - pt needs close f/u as OP   has seen Dr Savage in the past, will recall to adjust meds   10/16 - Hypernatremia - discussed with Dr Savage, on D5W   Off demeclocycline and NACL tabs   10/17 - on D5W  10/18 - now on 1/2NS with bicarb; Cr still rising       #PAULA  -possibly pre-renal in etiology  -no obstruction/ stricture noted on CT abdomen/pelvis  -IVF hydration  10/18 - Cr still rising, cont IVFs - see change above     #Depression  -on Mirtazapine  10/18 - flat affect, withdrawn     #Anxiety   -on clonazepam    #Severe protein calorie malnutrition  -get Nutritionist evaluation     #Transaminitis  -possibly related to recent chemo/ immunotherapy  -no acute pathology noted on imaging    #POssible Seizure disorder  Pt started on Keppra prev admission  10/15 - EEG noted, can reduce Keppra dose, discussed with Dr Madison     #Advanced Directives  -pt still full code  -Will get palliative team input while inpatient     #DVT ppx  -give Lovenox     DISPO 10/16: On IVFs for hypernatremia  WIll hold salt tabs for now     DISPO 10/18 - due to tachy ordered VQ scan - pending; USG neg for DVT; ECHO pending   low platelets - will monitor closely and renally dose lovenox for VTE Px  discussed with Heme-Onc

## 2020-10-18 NOTE — PROGRESS NOTE ADULT - PROBLEM SELECTOR PLAN 4
This is a sig contributer to clinical status; continue anti depressants; encourage ambulation/ PT to improve PS

## 2020-10-18 NOTE — PROGRESS NOTE ADULT - SUBJECTIVE AND OBJECTIVE BOX
Date of service: 10-18-20 @ 09:09    Lying in bed in NAD  Weak looking  Right upper chest tenderness is improving  Appetite is poor    ROS: no fever or chills; denies dizziness, no HA, no SOB or cough, no abdominal pain, no diarrhea or constipation; no dysuria, no legs pain    MEDICATIONS  (STANDING):  cefepime   IVPB 2000 milliGRAM(s) IV Intermittent daily  clonazePAM  Tablet 1 milliGRAM(s) Oral <User Schedule>  clotrimazole  Oral Lozenge - Peds 1 Lozenge Oral three times a day  dextrose 5% 1000 milliLiter(s) (100 mL/Hr) IV Continuous <Continuous>  dextrose 5%. 1000 milliLiter(s) (100 mL/Hr) IV Continuous <Continuous>  enoxaparin Injectable 30 milliGRAM(s) SubCutaneous daily  fentaNYL   Patch  12 MICROgram(s)/Hr 1 Patch Transdermal every 72 hours  levETIRAcetam 250 milliGRAM(s) Oral two times a day  mirtazapine 7.5 milliGRAM(s) Oral at bedtime  multivitamin 1 Tablet(s) Oral daily  polyethylene glycol 3350 17 Gram(s) Oral daily    Vital Signs Last 24 Hrs  T(C): 36.2 (18 Oct 2020 08:00), Max: 36.5 (17 Oct 2020 15:20)  T(F): 97.2 (18 Oct 2020 08:00), Max: 97.7 (17 Oct 2020 15:20)  HR: 105 (18 Oct 2020 08:00) (105 - 119)  BP: 113/69 (18 Oct 2020 08:00) (113/69 - 126/76)  BP(mean): --  RR: 18 (18 Oct 2020 08:00) (18 - 18)  SpO2: 100% (18 Oct 2020 08:00) (98% - 100%)     Physical exam:    Constitutional: frail looking  HEENT: NC/AT, EOMI, PERRLA, conjunctivae clear  Neck: supple; thyroid not palpable; Right upper chest prior mediport site postsurgical wound clean; no discharge  Back: no tenderness  Respiratory: respiratory effort normal; clear to auscultation  Cardiovascular: S1S2 regular, no murmurs  Abdomen: soft, not tender, not distended, positive BS  Genitourinary: no suprapubic tenderness  Lymphatic: no LN palpable  Musculoskeletal: no muscle tenderness, no joint swelling or tenderness  Extremities: no pedal edema  Neurological/ Psychiatric: AxOx3, Judgement and insight normal;  moving all extremities  Skin: no rashes; port site clean, no purulence no palpable lesions    Labs: reviewed                        11.3   16.22 )-----------( 40       ( 18 Oct 2020 07:16 )             35.3     10-18    140  |  112<H>  |  103<H>  ----------------------------<  89  3.8   |  17<L>  |  3.42<H>    Ca    7.2<L>      18 Oct 2020 07:16  Mg     2.8     10-18                                   11.0   31.15 )-----------( 42       ( 15 Oct 2020 07:17 )             34.6     10-15    150<H>  |  124<H>  |  84<H>  ----------------------------<  94  3.8   |  15<L>  |  2.73<H>    Ca    7.5<L>      15 Oct 2020 07:17  Phos  6.2     10-14  Mg     3.0     10-15    TPro  5.1<L>  /  Alb  1.7<L>  /  TBili  0.9  /  DBili  0.4<H>  /  AST  181<H>  /  ALT  268<H>  /  AlkPhos  674<H>  10-15      Culture - Surgical Swab (collected 14 Oct 2020 13:10)  Source: .Surgical Swab RIJV venous port  Preliminary Report (15 Oct 2020 17:56):    No growth    Culture - Urine (collected 13 Oct 2020 20:35)  Source: .Urine None  Preliminary Report (15 Oct 2020 14:06):    10,000 - 49,000 CFU/mL Enterococcus faecalis    <10,000 CFU/ml Normal Urogenital david present    Culture - Blood (collected 13 Oct 2020 15:41)  Source: .Blood Blood-Peripheral  Preliminary Report (14 Oct 2020 20:01):    No growth to date.    Culture - Blood (collected 13 Oct 2020 15:41)  Source: .Blood Blood-Peripheral  Preliminary Report (14 Oct 2020 20:01):    No growth to date.      Urinalysis Basic - ( 13 Oct 2020 20:35 )    Color: Yellow / Appearance: Clear / S.010 / pH: x  Gluc: x / Ketone: Negative  / Bili: Negative / Urobili: Negative mg/dL   Blood: x / Protein: 100 mg/dL / Nitrite: Negative   Leuk Esterase: Trace / RBC: 0-2 /HPF / WBC 6-10   Sq Epi: x / Non Sq Epi: Few / Bacteria: Occasional      Culture - Surgical Swab (collected 14 Oct 2020 13:10)  Source: .Surgical Swab RIJV venous port  Preliminary Report (15 Oct 2020 17:56):    No growth    Culture - Urine (collected 13 Oct 2020 20:35)  Source: .Urine None  Preliminary Report (15 Oct 2020 14:06):    10,000 - 49,000 CFU/mL Enterococcus faecalis    <10,000 CFU/ml Normal Urogenital david present    Culture - Blood (collected 13 Oct 2020 15:41)  Source: .Blood Blood-Peripheral  Preliminary Report (14 Oct 2020 20:01):    No growth to date.    Culture - Blood (collected 13 Oct 2020 15:41)  Source: .Blood Blood-Peripheral  Preliminary Report (14 Oct 2020 20:01):    No growth to date.      Radiology: all available radiological tests reviewed    EXAM:  CT ABDOMEN AND PELVIS                          EXAM:  CT CHEST                            PROCEDURE DATE:  10/13/2020          INTERPRETATION:  CT CHEST, CT ABDOMEN AND PELVIS    CLINICAL INFORMATION: sepsis    COMPARISON: CT chest/abdomen/pelvis 2020, MRI abdomen 10/2/2020    PROCEDURE:  CT of the Chest, Abdomen and Pelvis was performed without intravenous contrast.  Intravenous contrast: None  Oral contrast: None.  Sagittal and coronal reformats were performed.    FINDINGS:    CHEST:    LUNGS, AIRWAYS: The central airways are patent. The lungs are clear. Left suprahilar mass again seen, difficult to compare without contrast but appears smaller measuring approximately 4 x 3 cm.  PLEURA: No pleural abnormality.  VESSELS: Right chest wall infusion port is seen with the catheter tip in the SVC. Normal caliber aorta.  HEART: Normal heart size. No pericardial effusion. Coronary artery calcifications are present.  MEDIASTINUM AND ARIA: Decreased size of mediastinal lymph nodes.  CHEST WALL: No masses.    ABDOMEN AND PELVIS:    LIVER: Diffuse heterogeneous attenuation. Refer to previous MRI.  BILE DUCTS: Nondilated.  GALLBLADDER: Normal.  SPLEEN: Normal.  PANCREAS: Normal.  ADRENALS: Normal.  KIDNEYS/URETERS: No hydronephrosisor urinary tract calculi.    BLADDER: Normal.  REPRODUCTIVE ORGANS: No uterine or adnexal abnormality.    BOWEL: No bowel-related abnormality. Specifically, no evidence of acute diverticulitis. Normal appendix and ileocecal region. No bowel obstruction or bowel inflammation.  PERITONEUM: No free air or ascites.  VESSELS:  Normal caliber aorta.  RETROPERITONEUM/LYMPH NODES: No adenopathy.  ABDOMINAL WALL: No mass, hernia, or fluid collection.  BONES: No lytic or blastic lesion.    IMPRESSION:    No septic pathology.    Decreased left hilar mass and adenopathy.      Advanced directives addressed: full resuscitation

## 2020-10-18 NOTE — PROGRESS NOTE ADULT - PROBLEM SELECTOR PLAN 1
Stage IV / small cell; S/P carbo/ Etoposide; did not receive Immune therapy ( mentioned in notes); Clinical course did not improve; not as a result of the cancer but because of complications of therapy/ port/ with infection etc; compounded by depression/ anxiety; Performance status has declined; will need to allow full recovery prior to considering further therapy; the latter depending on performance status, goals of therapy etc.

## 2020-10-18 NOTE — PROGRESS NOTE ADULT - SUBJECTIVE AND OBJECTIVE BOX
NEPHROLOGY INTERVAL HPI/OVERNIGHT EVENTS:    Date of Service: 10-18-20 @ 15:30  10/18 SY  Alert and more engaging than yesterday.  Eating lunch without difficulty though small amount.  Pt's  at bedside.    10/17 SY  Alert and responsive.  Able answer some questions.  Positive difficulty with eating.    HPI:  65 year old female patient with pertinent history of recent Lung cancer diagnosis who recently started outpatient chemo(October 1st to 3rd) followed by Immunotherapy( on October 5th) presented to the ED complaining of progressively weakness. Patient has been eating and hydrating  but not enough. Patient denies any fever, chills, coughing, vomiting, diarrhea or abdominal pain but endorses being nauseous. Patient also noted some right chest wall pain at the sight of port insertion for chemo- was initially scheduled for an outpatient revision with Interventional Radiology  W h/o SIADH, pt was dc d on Na Cl 1 g tabs TID and Demeclocycline 600 bid  Was being continued during her current state and pt started to become hypernatremic  She was also on 1/2 NS  Na level was 152, then NaCl and Demeclocycline dcd , Pt started on D5W last night with Na down to 151 this am  She is awake responsive pleasant    In the ED patient found to have a WBC of 32, Lactate of 5.6, Cr of 4.  CT chest/Abdomen and Pelvis  negative for any acute pathology (13 Oct 2020 20:17)    PAST MEDICAL & SURGICAL HISTORY:  Hyponatremia  Brain aneurysm  Anxiety  Depression  History of neck surgery    MEDICATIONS  (STANDING):  cefepime   IVPB 2000 milliGRAM(s) IV Intermittent daily  clonazePAM  Tablet 1 milliGRAM(s) Oral <User Schedule>  clotrimazole  Oral Lozenge - Peds 1 Lozenge Oral three times a day  dextrose 5% 1000 milliLiter(s) (100 mL/Hr) IV Continuous <Continuous>  dextrose 5%. 1000 milliLiter(s) (100 mL/Hr) IV Continuous <Continuous>  enoxaparin Injectable 30 milliGRAM(s) SubCutaneous daily  fentaNYL   Patch  12 MICROgram(s)/Hr 1 Patch Transdermal every 72 hours  levETIRAcetam 250 milliGRAM(s) Oral two times a day  mirtazapine 7.5 milliGRAM(s) Oral at bedtime  multivitamin 1 Tablet(s) Oral daily  polyethylene glycol 3350 17 Gram(s) Oral daily    MEDICATIONS  (PRN):  HYDROmorphone   Tablet 2 milliGRAM(s) Oral every 4 hours PRN Moderate Pain (4 - 6)  HYDROmorphone  Injectable 0.5 milliGRAM(s) IV Push every 4 hours PRN breakthrough pain  ondansetron Injectable 4 milliGRAM(s) IV Push every 6 hours PRN Nausea    Vital Signs Last 24 Hrs  T(C): 36.2 (18 Oct 2020 08:00), Max: 36.3 (17 Oct 2020 21:17)  T(F): 97.2 (18 Oct 2020 08:00), Max: 97.4 (17 Oct 2020 21:17)  HR: 105 (18 Oct 2020 08:00) (105 - 119)  BP: 113/69 (18 Oct 2020 08:00) (113/69 - 119/85)  BP(mean): --  RR: 18 (18 Oct 2020 08:00) (18 - 18)  SpO2: 100% (18 Oct 2020 08:00) (98% - 100%)    PHYSICAL EXAM:  GENERAL: comfortable  CHEST/LUNG: fair air entry  HEART: S1S2 RRR  ABDOMEN: soft  EXTREMITIES: no edema  SKIN:     LABS:                        11.3   16.22 )-----------( 40       ( 18 Oct 2020 07:16 )             35.3     10-18    140  |  112<H>  |  103<H>  ----------------------------<  89  3.8   |  17<L>  |  3.42<H>    Ca    7.2<L>      18 Oct 2020 07:16  Mg     2.8     10-18          Magnesium, Serum: 2.8 mg/dL (10-18 @ 07:16)          RADIOLOGY & ADDITIONAL TESTS:

## 2020-10-19 LAB
ANION GAP SERPL CALC-SCNC: 12 MMOL/L — SIGNIFICANT CHANGE UP (ref 5–17)
BUN SERPL-MCNC: 103 MG/DL — HIGH (ref 7–23)
CALCIUM SERPL-MCNC: 7.4 MG/DL — LOW (ref 8.5–10.1)
CHLORIDE SERPL-SCNC: 113 MMOL/L — HIGH (ref 96–108)
CO2 SERPL-SCNC: 20 MMOL/L — LOW (ref 22–31)
CREAT SERPL-MCNC: 3.01 MG/DL — HIGH (ref 0.5–1.3)
GLUCOSE SERPL-MCNC: 83 MG/DL — SIGNIFICANT CHANGE UP (ref 70–99)
HCT VFR BLD CALC: 35.9 % — SIGNIFICANT CHANGE UP (ref 34.5–45)
HGB BLD-MCNC: 12.2 G/DL — SIGNIFICANT CHANGE UP (ref 11.5–15.5)
MAGNESIUM SERPL-MCNC: 2.8 MG/DL — HIGH (ref 1.6–2.6)
MCHC RBC-ENTMCNC: 30 PG — SIGNIFICANT CHANGE UP (ref 27–34)
MCHC RBC-ENTMCNC: 34 GM/DL — SIGNIFICANT CHANGE UP (ref 32–36)
MCV RBC AUTO: 88.4 FL — SIGNIFICANT CHANGE UP (ref 80–100)
PLATELET # BLD AUTO: 49 K/UL — LOW (ref 150–400)
POTASSIUM SERPL-MCNC: 3.8 MMOL/L — SIGNIFICANT CHANGE UP (ref 3.5–5.3)
POTASSIUM SERPL-SCNC: 3.8 MMOL/L — SIGNIFICANT CHANGE UP (ref 3.5–5.3)
RBC # BLD: 4.06 M/UL — SIGNIFICANT CHANGE UP (ref 3.8–5.2)
RBC # FLD: 15.4 % — HIGH (ref 10.3–14.5)
SODIUM SERPL-SCNC: 145 MMOL/L — SIGNIFICANT CHANGE UP (ref 135–145)
WBC # BLD: 14.55 K/UL — HIGH (ref 3.8–10.5)
WBC # FLD AUTO: 14.55 K/UL — HIGH (ref 3.8–10.5)

## 2020-10-19 PROCEDURE — 99497 ADVNCD CARE PLAN 30 MIN: CPT | Mod: 25

## 2020-10-19 PROCEDURE — 99233 SBSQ HOSP IP/OBS HIGH 50: CPT

## 2020-10-19 PROCEDURE — 78580 LUNG PERFUSION IMAGING: CPT | Mod: 26

## 2020-10-19 PROCEDURE — 99498 ADVNCD CARE PLAN ADDL 30 MIN: CPT

## 2020-10-19 PROCEDURE — 71045 X-RAY EXAM CHEST 1 VIEW: CPT | Mod: 26

## 2020-10-19 PROCEDURE — 93306 TTE W/DOPPLER COMPLETE: CPT | Mod: 26

## 2020-10-19 RX ORDER — CHOLECALCIFEROL (VITAMIN D3) 125 MCG
1000 CAPSULE ORAL DAILY
Refills: 0 | Status: DISCONTINUED | OUTPATIENT
Start: 2020-10-19 | End: 2020-10-20

## 2020-10-19 RX ADMIN — Medication 1 MILLIGRAM(S): at 04:42

## 2020-10-19 RX ADMIN — LEVETIRACETAM 250 MILLIGRAM(S): 250 TABLET, FILM COATED ORAL at 22:47

## 2020-10-19 RX ADMIN — Medication 1 MILLIGRAM(S): at 12:53

## 2020-10-19 RX ADMIN — FENTANYL CITRATE 1 PATCH: 50 INJECTION INTRAVENOUS at 19:49

## 2020-10-19 RX ADMIN — Medication 1 LOZENGE: at 04:42

## 2020-10-19 RX ADMIN — Medication 1 LOZENGE: at 22:48

## 2020-10-19 RX ADMIN — Medication 1 LOZENGE: at 12:54

## 2020-10-19 RX ADMIN — Medication 1 TABLET(S): at 12:53

## 2020-10-19 RX ADMIN — Medication 75 MEQ/KG/HR: at 06:07

## 2020-10-19 RX ADMIN — HYDROMORPHONE HYDROCHLORIDE 0.5 MILLIGRAM(S): 2 INJECTION INTRAMUSCULAR; INTRAVENOUS; SUBCUTANEOUS at 17:17

## 2020-10-19 RX ADMIN — FENTANYL CITRATE 1 PATCH: 50 INJECTION INTRAVENOUS at 12:55

## 2020-10-19 RX ADMIN — Medication 0.25 MILLIGRAM(S): at 17:17

## 2020-10-19 RX ADMIN — LEVETIRACETAM 250 MILLIGRAM(S): 250 TABLET, FILM COATED ORAL at 12:52

## 2020-10-19 RX ADMIN — Medication 1 MILLIGRAM(S): at 22:48

## 2020-10-19 RX ADMIN — Medication 50 MEQ/KG/HR: at 12:54

## 2020-10-19 RX ADMIN — MIRTAZAPINE 7.5 MILLIGRAM(S): 45 TABLET, ORALLY DISINTEGRATING ORAL at 22:47

## 2020-10-19 NOTE — PROGRESS NOTE ADULT - ASSESSMENT
65 year old female patient with pertinent history of recent small cell lung cancer diagnosis who recently started outpatient chemo(October 1st to 3rd) followed by Immunotherapy( on October 5th) presented to the ED complaining of progressively weakness. Patient has been eating and hydrating  but not enough. Patient denies any fever, chills, coughing, vomiting, diarrhea or abdominal pain but endorses being nauseous. Patient also noted some right chest wall pain and skin opening at the sight of port insertion for chemo- was initially scheduled for an outpatient revision with Interventional Radiology( Dr Mckeon) In the ED patient found to have a WBC of 32, Lactate of 5.6, Cr of 4. CT chest/Abdomen and Pelvis negative for any acute pathology, Cr noted to be 4, elevated ALP/AST/ALT, afebrile, was given empiric antibiotics with vancomycin/cefepime. No reported abd pain/diarrhea/cough/dysuria/rashes.      1. Mediport site infection s/p port site wound dehiscence s/p removal. leukocytosis. Small cell lung cancer s/p chemo/immunotherapy. CRF stage 3-4.   - port removed by IR 10/14, port culture no growth  - completed cefepime #6 --> 10/18 - dc further antibiotics   - given vancomycin 1250 mg IV x 1 on 10/13 and again on 10/16  - blood cx no growth  - leukocytosis likely related to recently neulasta given 10/9 - improving  - CT chest abd/pelvis reviewed, no infectious focus  - monitor temps  - oncology f/u noted   - supportive care  - fu cbc

## 2020-10-19 NOTE — PROGRESS NOTE ADULT - ASSESSMENT
65 year old female patient with pertinent history of recent Lung cancer diagnosis who recently started outpatient chemo(October 1st to 3rd) followed by Immunotherapy( on October 5th) presented to the ED complaining of progressively weakness.   Sepsis ruled in and present on admission and is due to/related to infected RIJ mediport that was removed 10/14.   s/p  resuscitated in the ED and give IV abx  -follow up blood - neg , + urine cx  Enterococcus fecalis   - spoke to IR and ID - to dc port today, f/u Port Cx, cont IV Abx and when cultures negative can place a new port   Small Cell Lung Ca with mets to the bone and liver  -recent chemo and immunotherapy   - started chemo via port on 10/1/20, got neulasta after that contributing to high WBC count   10/18 - Heme_onc to decide if patient to receive further chemo or not - to make decisions in the near future  last chemo was 1.5 weeks ago  PAULA, SIADH , Hypernatremia hypovolemic   was on DEMECLOCYCLINE ,HOLD SALT TABS   s/p  1/2NS ,  Renal Cx - pt needs close f/u as OP   s/p   on D5W , Off demeclocycline and NACL tabs   10/18 - c/w on 1/2NS with bicarb; Cr 3.4--> 3.0  -no obstruction/ stricture noted on CT abdomen/pelvis  Depression -on Mirtazapine, with  flat affect, withdrawn   Anxiety -on clonazepam  Thrombocytopenia - monitor  Severe protein calorie malnutrition -get Nutritionist evaluation   Transaminitis  -possibly related to recent chemo/ immunotherapy  -no acute pathology noted on imaging  Seizure disorder  Pt started on Keppra prev admission  10/15 - EEG noted, can reduce Keppra dose, discussed with Dr Madison   Sinus tachycardia due to sepsis, deconditioning, low Calcium ruled out VTE   - VQ - no PE, US doppler no DVT   - Echo - diastolic disfunction  Severe protein-calorie malnutrition- supplements   Advanced Directives  -pt still full code  -Will get palliative team input while inpatient   DVT ppx -give Lovenox     DISPO  -inpatient  monitoring  , d/c planning , palliative care

## 2020-10-19 NOTE — PROGRESS NOTE ADULT - NUTRITIONAL ASSESSMENT
This patient has been assessed with a concern for Malnutrition and has been determined to have a diagnosis/diagnoses of Severe protein-calorie malnutrition.    This patient is being managed with:   Diet Regular-  Entered: Oct 13 2020  6:09PM    

## 2020-10-19 NOTE — PROGRESS NOTE ADULT - SUBJECTIVE AND OBJECTIVE BOX
Date of service: 10-19-20 @ 10:32    Pt seen and examined  Feels better   Sitting up in bed eating breakfast  Right upper chest tenderness is improving  No reported fevers    ROS: no fever or chills; denies dizziness, no HA, no SOB or cough, no abdominal pain, no diarrhea or constipation; no dysuria, no legs pain    MEDICATIONS  (STANDING):  clonazePAM  Tablet 1 milliGRAM(s) Oral <User Schedule>  clotrimazole  Oral Lozenge - Peds 1 Lozenge Oral three times a day  enoxaparin Injectable 30 milliGRAM(s) SubCutaneous daily  fentaNYL   Patch  12 MICROgram(s)/Hr 1 Patch Transdermal every 72 hours  levETIRAcetam 250 milliGRAM(s) Oral two times a day  mirtazapine 7.5 milliGRAM(s) Oral at bedtime  multivitamin 1 Tablet(s) Oral daily  polyethylene glycol 3350 17 Gram(s) Oral daily  sodium bicarbonate  Infusion 0.074 mEq/kG/Hr (75 mL/Hr) IV Continuous <Continuous>      Vital Signs Last 24 Hrs  T(C): 36.4 (19 Oct 2020 08:28), Max: 36.4 (18 Oct 2020 16:03)  T(F): 97.6 (19 Oct 2020 08:28), Max: 97.6 (19 Oct 2020 08:28)  HR: 98 (19 Oct 2020 08:28) (98 - 104)  BP: 116/73 (19 Oct 2020 08:28) (104/59 - 116/73)  BP(mean): 84 (19 Oct 2020 08:28) (84 - 84)  RR: 18 (19 Oct 2020 08:28) (18 - 18)  SpO2: 100% (19 Oct 2020 08:28) (99% - 100%)        Physical exam:  Constitutional: frail looking  HEENT: NC/AT, EOMI, PERRLA, conjunctivae clear  Neck: supple; thyroid not palpable; Right upper chest prior mediport site postsurgical wound clean; no discharge  Back: no tenderness  Respiratory: respiratory effort normal; clear to auscultation  Cardiovascular: S1S2 regular, no murmurs  Abdomen: soft, not tender, not distended, positive BS  Genitourinary: no suprapubic tenderness  Lymphatic: no LN palpable  Musculoskeletal: no muscle tenderness, no joint swelling or tenderness  Extremities: no pedal edema  Neurological/ Psychiatric: AxOx3, Judgement and insight normal;  moving all extremities  Skin: no rashes; port site clean, no palpable lesions    Labs: reviewed                                                           12.2   14.55 )-----------( 49       ( 19 Oct 2020 08:01 )             35.9     10-19    145  |  113<H>  |  103<H>  ----------------------------<  83  3.8   |  20<L>  |  3.01<H>    Ca    7.4<L>      19 Oct 2020 08:01  Mg     2.8     10-19              Culture - Surgical Swab (collected 14 Oct 2020 13:10)  Source: .Surgical Swab RIJV venous port  Preliminary Report (15 Oct 2020 17:56):    No growth    Culture - Urine (collected 13 Oct 2020 20:35)  Source: .Urine None  Preliminary Report (15 Oct 2020 14:06):    10,000 - 49,000 CFU/mL Enterococcus faecalis    <10,000 CFU/ml Normal Urogenital david present    Culture - Blood (collected 13 Oct 2020 15:41)  Source: .Blood Blood-Peripheral  Preliminary Report (14 Oct 2020 20:01):    No growth to date.    Culture - Blood (collected 13 Oct 2020 15:41)  Source: .Blood Blood-Peripheral  Preliminary Report (14 Oct 2020 20:01):    No growth to date.      Urinalysis Basic - ( 13 Oct 2020 20:35 )    Color: Yellow / Appearance: Clear / S.010 / pH: x  Gluc: x / Ketone: Negative  / Bili: Negative / Urobili: Negative mg/dL   Blood: x / Protein: 100 mg/dL / Nitrite: Negative   Leuk Esterase: Trace / RBC: 0-2 /HPF / WBC 6-10   Sq Epi: x / Non Sq Epi: Few / Bacteria: Occasional      Culture - Surgical Swab (collected 14 Oct 2020 13:10)  Source: .Surgical Swab RIJV venous port  Preliminary Report (15 Oct 2020 17:56):    No growth    Culture - Urine (collected 13 Oct 2020 20:35)  Source: .Urine None  Preliminary Report (15 Oct 2020 14:06):    10,000 - 49,000 CFU/mL Enterococcus faecalis    <10,000 CFU/ml Normal Urogenital david present    Culture - Blood (collected 13 Oct 2020 15:41)  Source: .Blood Blood-Peripheral  Preliminary Report (14 Oct 2020 20:01):    No growth to date.    Culture - Blood (collected 13 Oct 2020 15:41)  Source: .Blood Blood-Peripheral  Preliminary Report (14 Oct 2020 20:01):    No growth to date.      Radiology: all available radiological tests reviewed    EXAM:  CT ABDOMEN AND PELVIS                          EXAM:  CT CHEST                            PROCEDURE DATE:  10/13/2020          INTERPRETATION:  CT CHEST, CT ABDOMEN AND PELVIS    CLINICAL INFORMATION: sepsis    COMPARISON: CT chest/abdomen/pelvis 2020, MRI abdomen 10/2/2020    PROCEDURE:  CT of the Chest, Abdomen and Pelvis was performed without intravenous contrast.  Intravenous contrast: None  Oral contrast: None.  Sagittal and coronal reformats were performed.    FINDINGS:    CHEST:    LUNGS, AIRWAYS: The central airways are patent. The lungs are clear. Left suprahilar mass again seen, difficult to compare without contrast but appears smaller measuring approximately 4 x 3 cm.  PLEURA: No pleural abnormality.  VESSELS: Right chest wall infusion port is seen with the catheter tip in the SVC. Normal caliber aorta.  HEART: Normal heart size. No pericardial effusion. Coronary artery calcifications are present.  MEDIASTINUM AND ARIA: Decreased size of mediastinal lymph nodes.  CHEST WALL: No masses.    ABDOMEN AND PELVIS:    LIVER: Diffuse heterogeneous attenuation. Refer to previous MRI.  BILE DUCTS: Nondilated.  GALLBLADDER: Normal.  SPLEEN: Normal.  PANCREAS: Normal.  ADRENALS: Normal.  KIDNEYS/URETERS: No hydronephrosisor urinary tract calculi.    BLADDER: Normal.  REPRODUCTIVE ORGANS: No uterine or adnexal abnormality.    BOWEL: No bowel-related abnormality. Specifically, no evidence of acute diverticulitis. Normal appendix and ileocecal region. No bowel obstruction or bowel inflammation.  PERITONEUM: No free air or ascites.  VESSELS:  Normal caliber aorta.  RETROPERITONEUM/LYMPH NODES: No adenopathy.  ABDOMINAL WALL: No mass, hernia, or fluid collection.  BONES: No lytic or blastic lesion.    IMPRESSION:    No septic pathology.    Decreased left hilar mass and adenopathy.      Advanced directives addressed: full resuscitation

## 2020-10-19 NOTE — GOALS OF CARE CONVERSATION - ADVANCED CARE PLANNING - CONVERSATION DETAILS
Staceyanthony Sanchez and this SW met with pt. at bedside this morning. Pt. discussed how she is very tired and weak often and how that can be frustrating. She expressed being scared of her cancer and that she would die from it. When asked what about dying scared her the most pt. replied that leaving her family is what is the most concerning. Pt. was able to recognize that she has been declining. She reports that she knows she cant go home in the condition she is in currently. We discussed family meeting that was scheduled and originally pt. wanted to be apart of it however, when the meeting was about to begin pt. stated that she did not want to talk or be apart of the meeting. Pt. deferred the discussion and decision making to her family.     Team met with pts family to discuss goals of care, assist with planning and provide supportive counseling. Family known to our team from a previous admission where a goals of care meeting was held at that time. Pts current medical condition and goals of care discussed. Team explained that as per Dr. Martin pt. is not currently a candidate for further treatment at this time based off of her performance status. Pts family expressed understanding of this and reports that they were not surprised as they have noticed a decline in pt. We discussed the option of focusing on pts comfort and hospice services.     Team explained both inpatient and home services in detail as well as hospices philosophy of care. Team explained criteria for inpatient and that pt. would need to have a symptom managed by IV medication to be appropriate. Pts anxiety has increased and IV ativan ordered and pt. also has IV Dilaudid for break through pain. We explained that pts insurance would also have to be worked out with hospice as pt. has private insurance. Team explained if pt. was not accepted for inpatient hospice then the other option would be home hospice however, she would need a 24 hour aide. Pt. also is eating minimally. Pts family prefers inpatient hospice as they feel this would be the best option for pt, which our team agrees with. They would like to try to speak to pt. about this later today and our team will also attempt to speak with pt. tomorrow before placing a referral. Pts family prefers [facility].     Team discussed pts wishes regarding resuscitation and intubation. Pt. deferred decision making to her family. Pts family is all in agreement that pt. would not want to be resuscitated or intubated. Pts /HCP consented to DNR/DNI. MOLST completed with DNR/DNI portion only.     Plan at this time is possible inpatient hospice however, team will speak with pt. tomorrow before a referral is placed. Emotional support provided. Our team will continue to follow.

## 2020-10-19 NOTE — PROGRESS NOTE ADULT - ASSESSMENT
65 year old female patient with pertinent history of recent Lung cancer diagnosis who recently started outpatient chemo(October 1st to 3rd) followed by Immunotherapy( on October 5th) presented to the ED complaining of progressively weakness. Patient has been eating and hydrating  but not enough. Patient denies any fever, chills, coughing, vomiting, diarrhea or abdominal pain but endorses being nauseous. Patient also noted some right chest wall pain at the sight of port insertion for chemo- was initially scheduled for an outpatient revision with Interventional Radiology  W h/o SIADH, pt was dc d on Na Cl 1 g tabs TID and Demeclocycline 600 bid  Was being continued during her current state and pt started to become hypernatremic  She was also on 1/2 NS  Na level was 152, then NaCl and Demeclocycline dcd , Pt started on D5W last night with Na down to 151 this am  She is awake responsive pleasant    A/P  Hypernatremia 152, h/o SIADH on treatment  dc NaCl and Demeclocycline  D5W @ 50 ml /hr will increase to 100 ml/hr and repeat labs.  May allow the pt to take PO fluids once Na level @ 145  to prevent Na from falling if SIADH still underlying  Chest Medi port removed so far no growth    10/17 SY  --PAULA : post chemo and immunotherapy leading to decreased po intake.   Will need volume as well as free water repletion.    Creat increased again.  Continue IVF --change to HCO3 infusion to address worsening metabolic acidosis.  --Lung CA : stage IV small cell CA ; post chemo and immunotherapy.  --ID : leukocytosis slowly improving.  Mediport d/brianna --to be reinserted once clear from ID .    10/18 SY  --PAULA : post chemo/immunotherapy resulting in GI symptoms with decreased po intake.    Creat continues to increase.  Continue gentle hydration.    Change IVF to Isotonic solution.  --Lung CA : stage IV small cell CA : post chemo and immunotherapy.  --Leukocytosis : continues to improve.  Continue abtx.    10/19 MK   - PAULA with slow improvement     dec ivf rate and monitor po intkae    hx of siadh on demeclocycline and nacl tabs

## 2020-10-19 NOTE — PROGRESS NOTE ADULT - SUBJECTIVE AND OBJECTIVE BOX
NEPHROLOGY INTERVAL HPI/OVERNIGHT EVENTS:  10-19-20 @ 12:45  HPI:  65 year old female patient with pertinent history of recent Lung cancer diagnosis who recently started outpatient chemo(October 1st to 3rd) followed by Immunotherapy( on October 5th) presented to the ED complaining of progressively weakness. Patient has been eating and hydrating  but not enough. Patient denies any fever, chills, coughing, vomiting, diarrhea or abdominal pain but endorses being nauseous. Patient also noted some right chest wall pain at the sight of port insertion for chemo- was initially scheduled for an outpatient revision with Interventional Radiology( Dr Mckeon)      In the ED patient found to have a WBC of 32, Lactate of 5.6, Cr of 4.  CT chest/Abdomen and Pelvis  negative for any acute pathology (13 Oct 2020 20:17)      Patient is a 65y old  Female who presents with a chief complaint of Weakness  SIRS (19 Oct 2020 10:39)      MEDICATIONS  (STANDING):  clonazePAM  Tablet 1 milliGRAM(s) Oral <User Schedule>  clotrimazole  Oral Lozenge - Peds 1 Lozenge Oral three times a day  enoxaparin Injectable 30 milliGRAM(s) SubCutaneous daily  fentaNYL   Patch  12 MICROgram(s)/Hr 1 Patch Transdermal every 72 hours  levETIRAcetam 250 milliGRAM(s) Oral two times a day  mirtazapine 7.5 milliGRAM(s) Oral at bedtime  multivitamin 1 Tablet(s) Oral daily  polyethylene glycol 3350 17 Gram(s) Oral daily  sodium bicarbonate  Infusion 0.074 mEq/kG/Hr (75 mL/Hr) IV Continuous <Continuous>    MEDICATIONS  (PRN):  HYDROmorphone   Tablet 2 milliGRAM(s) Oral every 4 hours PRN Moderate Pain (4 - 6)  HYDROmorphone  Injectable 0.5 milliGRAM(s) IV Push every 4 hours PRN breakthrough pain  ondansetron Injectable 4 milliGRAM(s) IV Push every 6 hours PRN Nausea      Allergies    codeine (Unknown)    Intolerances        I&O's Detail    18 Oct 2020 07:01  -  19 Oct 2020 07:00  --------------------------------------------------------  IN:    Sodium Bicarbonate: 980 mL  Total IN: 980 mL    OUT:  Total OUT: 0 mL    Total NET: 980 mL          .    Patient was seen and evaluated on dialysis.   Patient is tolerating the procedure well.   Continue dialysis:   Dialyzer:          QB:        QD:   Goal UF ___ over ___ Hours       Vital Signs Last 24 Hrs  T(C): 36.4 (19 Oct 2020 08:28), Max: 36.4 (18 Oct 2020 16:03)  T(F): 97.6 (19 Oct 2020 08:28), Max: 97.6 (19 Oct 2020 08:28)  HR: 98 (19 Oct 2020 08:28) (98 - 104)  BP: 116/73 (19 Oct 2020 08:28) (104/59 - 116/73)  BP(mean): 84 (19 Oct 2020 08:28) (84 - 84)  RR: 18 (19 Oct 2020 08:28) (18 - 18)  SpO2: 100% (19 Oct 2020 08:28) (99% - 100%)  Daily     Daily     PHYSICAL EXAM:  General: alert. awake Ox3  HEENT: MMM  CV: s1s2 rrr  LUNGS: B/L CTA  EXT: no edema    LABS:                        12.2   14.55 )-----------( 49       ( 19 Oct 2020 08:01 )             35.9     10-19    145  |  113<H>  |  103<H>  ----------------------------<  83  3.8   |  20<L>  |  3.01<H>    Ca    7.4<L>      19 Oct 2020 08:01  Mg     2.8     10-19          Magnesium, Serum: 2.8 mg/dL (10-19 @ 08:01)       NEPHROLOGY INTERVAL HPI/OVERNIGHT EVENTS:  10-19-20 @ 12:45  doing well, no new c/o   s/p VQ scan to r/o pe  as per RN, taking in PO svitlana coke       MEDICATIONS  (STANDING):  clonazePAM  Tablet 1 milliGRAM(s) Oral <User Schedule>  clotrimazole  Oral Lozenge - Peds 1 Lozenge Oral three times a day  enoxaparin Injectable 30 milliGRAM(s) SubCutaneous daily  fentaNYL   Patch  12 MICROgram(s)/Hr 1 Patch Transdermal every 72 hours  levETIRAcetam 250 milliGRAM(s) Oral two times a day  mirtazapine 7.5 milliGRAM(s) Oral at bedtime  multivitamin 1 Tablet(s) Oral daily  polyethylene glycol 3350 17 Gram(s) Oral daily  sodium bicarbonate  Infusion 0.074 mEq/kG/Hr (75 mL/Hr) IV Continuous <Continuous>    MEDICATIONS  (PRN):  HYDROmorphone   Tablet 2 milliGRAM(s) Oral every 4 hours PRN Moderate Pain (4 - 6)  HYDROmorphone  Injectable 0.5 milliGRAM(s) IV Push every 4 hours PRN breakthrough pain  ondansetron Injectable 4 milliGRAM(s) IV Push every 6 hours PRN Nausea      Allergies    codeine (Unknown)    Intolerances        I&O's Detail    18 Oct 2020 07:01  -  19 Oct 2020 07:00  --------------------------------------------------------  IN:    Sodium Bicarbonate: 980 mL  Total IN: 980 mL    OUT:  Total OUT: 0 mL    Total NET: 980 mL      Vital Signs Last 24 Hrs  T(C): 36.4 (19 Oct 2020 08:28), Max: 36.4 (18 Oct 2020 16:03)  T(F): 97.6 (19 Oct 2020 08:28), Max: 97.6 (19 Oct 2020 08:28)  HR: 98 (19 Oct 2020 08:28) (98 - 104)  BP: 116/73 (19 Oct 2020 08:28) (104/59 - 116/73)  BP(mean): 84 (19 Oct 2020 08:28) (84 - 84)  RR: 18 (19 Oct 2020 08:28) (18 - 18)  SpO2: 100% (19 Oct 2020 08:28) (99% - 100%)  Daily     Daily     PHYSICAL EXAM:  General: alert. awake NAD  HEENT: MMM  CV: s1s2 rrr  LUNGS: B/L CTA  EXT: no edema    LABS:                        12.2   14.55 )-----------( 49       ( 19 Oct 2020 08:01 )             35.9     10-19    145  |  113<H>  |  103<H>  ----------------------------<  83  3.8   |  20<L>  |  3.01<H>    Ca    7.4<L>      19 Oct 2020 08:01  Mg     2.8     10-19          Magnesium, Serum: 2.8 mg/dL (10-19 @ 08:01)

## 2020-10-19 NOTE — PROGRESS NOTE ADULT - CONVERSATION DETAILS
Met with pt. at bedside this morning. Pt. discussed how she is very tired and weak often and how that can be frustrating. She expressed being scared of her cancer and that she would die from it. When asked what about dying scared her the most pt. replied that leaving her family is what is the most concerning. Pt. was able to recognize that she has been declining. She reports that she knows she cant go home in the condition she is in currently. We discussed family meeting that was scheduled and originally pt. wanted to be apart of it however, when the meeting was about to begin pt. stated that she did not want to talk or be apart of the meeting. Pt. deferred the discussion and decision making to her family.     Team met with pt's family to discuss goals of care, assist with planning and provide supportive counseling. Family known to our team from a previous admission where a goals of care meeting was held at that time. Pt's current medical condition and goals of care discussed. Team explained that as per Dr. Martin pt. is not currently a candidate for further treatment at this time based off of her performance status. Pt's family expressed understanding of this and reports that they were not surprised as they have noticed a decline in pt. We discussed the option of focusing on pt's comfort and hospice services.     Team explained both inpatient and home services in detail as well as hospices philosophy of care. Team explained criteria for inpatient and that pt. would need to have a symptom managed by IV medication to be appropriate. Pt's anxiety has increased and IV ativan ordered and pt. also has IV Dilaudid for break through pain. We explained that pt's insurance would also have to be worked out with hospice as pt. has private insurance. Team explained if pt. was not accepted for inpatient hospice then the other option would be home hospice however, she would need a 24 hour aide. Pt. also is eating minimally. Pt's family prefers inpatient hospice as they feel this would be the best option for pt, which our team agrees with. They would like to try to speak to pt. about this later today and our team will also attempt to speak with pt. tomorrow before placing a referral. Pt's family prefers [facility].     Team discussed pts wishes regarding resuscitation and intubation. Pt. deferred decision making to her family. Pts family is all in agreement that pt. would not want to be resuscitated or intubated. Pts /HCP consented to DNR/DNI. MOLST completed with DNR/DNI portion only.     Plan at this time is possible inpatient hospice however, team will speak with pt. tomorrow before a referral is placed. Emotional support provided. Our team will continue to follow.

## 2020-10-19 NOTE — PROGRESS NOTE ADULT - ASSESSMENT
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 Patient presented to the ED on 10/14/2020 complaining of progressive weakness, and needed to be helped to the ground and then was unable to walk.   Patient also noted some right chest wall pain at the sight of port insertion for chemo- was initially scheduled for an outpatient revision with Interventional Radiology.  Palliative medicine consulted to assist with symptom management and for continuity.    1) Sepsis   - no clearly etiology  - c/w fluids   - c/w IV antibiotics   - follow up blood and urine cx  - ID consult appreciated   - dehisced port s/p removal 10/14, f/u cu;ture    2) Pain   - Metastatic Lesions   - Dilaudid 2mg for moderate pain q4hrs PRN   - Dilaudid 0.5mg IV for breakthrough pain PRN q4hrs   - Based on pts 24 hr opiate usage will start Fentanyl 12mcg/hr patch    3) Anxiety/ Depression  - c/w Mirtazapine  - c/w Klonopin 1mg TID   - SW support   - supportive care     4) Hyperkalemia  - Resolved   - no EKG changes noted  - given Kayexalate in the ED on 10/13   - multiple BMs resolved now  - continue to monitor labs    5) PAULA  - nephrology consult appreciated   - CT abdomen/pelvis reviewed no obstruction/ stricture   - c/w IVF hydration  - c/w to monitor labs trend kidney function     6) Stage 4 small cell lung cancer   - history of SIADH, now with hypernatremia  - c/w salt tabs and demeclocycline but decrease dose of either or both due to hypernatremia  - Dr. Martin aware patient in hospital, oncology consult appreciated     7) Advance Care planning/GOC   - patient has capacity at this time   - Full Code   - GOC meeting scheduled for Monday  10/19/2020 at 1pm   - Will follow

## 2020-10-19 NOTE — PROGRESS NOTE ADULT - SUBJECTIVE AND OBJECTIVE BOX
cc - weakness      65 year old female patient with pertinent history of recent Lung cancer diagnosis who recently started outpatient chemo(October 1st to 3rd) followed by Immunotherapy( on October 5th) presented to the ED complaining of progressively weakness. Patient has been eating and hydrating  but not enough. Patient denies any fever, chills, coughing, vomiting, diarrhea or abdominal pain but endorses being nauseous. Patient also noted some right chest wall pain at the sight of port insertion for chemo- was initially scheduled for an outpatient revision with Interventional Radiology( Dr Mckeon)In the ED patient found to have a WBC of 32, Lactate of 5.6, Cr of 4.  CT chest/Abdomen and Pelvis  negative for any acute [id number] year old female patient presenting with weakness found to have SIRS and worsening transaminitis     10/14 - no cp palps sob abdo pain; some soreness at the port site   10/15 - no cp palps sob, tired after being washed up   10/16 - no cp palps sob abdo pain; tired   10/17 - no cp palps sob however tachy present   10/18 - withdrawn, flat affect, no cp palps sob   10/19 - pt seen and examined, weakness improving, denies cp, dyspnea, POC discussed  Review of system- Rest of the review of system are negative except mentioned in HPI      T(C): 36.8 (10-19-20 @ 15:48), Max: 36.8 (10-19-20 @ 15:48)  T(F): 98.3 (10-19-20 @ 15:48), Max: 98.3 (10-19-20 @ 15:48)  HR: 97 (10-19-20 @ 15:48) (97 - 104)  BP: 109/73 (10-19-20 @ 15:48) (104/59 - 116/73)  RR: 18 (10-19-20 @ 15:48) (18 - 18)  SpO2: 99% (10-19-20 @ 15:48) (99% - 100%)  Wt(kg): --    PHYSICAL EXAM:  GENERAL: NAD, able to lie flat in bed  HEAD:  Atraumatic, Normocephalic  EYES: EOMI, PERRLA, normal sclera  ENT: Moist mucous membranes  NECK: Supple, No JVD, no nuchal rigidity  CHEST/LUNG: Clear to auscultation bilaterally; No rales, rhonchi, wheezing, or rubs. Unlabored respirations  HEART: Regular rate and rhythm; No murmurs, rubs, or gallops  ABDOMEN: Bowel sounds present; Soft, Nontender, Nondistended. No hepatomegaly  EXTREMITIES:  no pitting bilaterally  NERVOUS SYSTEM:  Alert & Oriented X3, speech clear. No focal motor or sensory deficits  MSK: FROM all 4 extremities, full and equal strength  SKIN: Rt chest wall port removed       RADIOLOGY/EKG:  < from: 12 Lead ECG (10.13.20 @ 15:08) >    Ventricular Rate 124 BPM    Atrial Rate 124 BPM    P-R Interval 112 ms    QRS Duration 66 ms    Q-T Interval 304 ms    QTC Calculation(Bazett) 436 ms    P Axis 70 degrees    R Axis 62 degrees    T Axis 54 degrees    Diagnosis Line Sinus tachycardia  Otherwise normal ECG  When compared with ECG of 30-SEP-2020 18:24,    < end of copied text >  < from: TTE Echo Complete w/o Contrast w/ Doppler (10.19.20 @ 10:12) >   The left ventricle is normal in size, wall thickness, wall motion and   contractility.   Estimated left ventricular ejection fraction is 60-65 %.   Normal appearing right ventricle structure and function.   EA reversal of the mitral inflow consistent with reduced compliance of the   left ventricle.     Trace mitral regurgitation is present.     No evidence of pericardial effusion.    < end of copied text >    < from: CT Abdomen and Pelvis No Cont (10.13.20 @ 18:26) >  No septic pathology.  Decreased left hilar mass and adenopathy.    RADIOLOGY/EKG:  CXR NEG for infiltrates  USG NEG FOR DVTs     < from: NM Pulmonary Perfusion Scan (10.19.20 @ 12:35) >  FINDINGS: The left hemidiaphragm is elevated. There is heterogeneous distribution of radiopharmaceutical in both lungs. There are no segmental perfusion defects in either lung.    IMPRESSION: Very low probability of pulmonary embolus        LABS: All Labs Reviewed:  10-19    145  |  113<H>  |  103<H>  ----------------------------<  83  3.8   |  20<L>  |  3.01<H>    Ca    7.4<L>      19 Oct 2020 08:01  Mg     2.8     10-19          Hepatic Function Panel (10.15.20 @ 07:17)    Albumin, Serum: 1.7 g/dL                 12.2   14.55 )-----------( 49       ( 19 Oct 2020 08:01 )             35.9                         11.3   16.22 )-----------( 40       ( 18 Oct 2020 07:16 )             35.3       < end of copied text >  140  |  112<H>  |  103<H>  ----------------------------<  89  3.8   |  17<L>  |  3.42<H>    MEDICATIONS  (STANDING):  cholecalciferol 1000 Unit(s) Oral daily  clonazePAM  Tablet 1 milliGRAM(s) Oral <User Schedule>  clotrimazole  Oral Lozenge - Peds 1 Lozenge Oral three times a day  enoxaparin Injectable 30 milliGRAM(s) SubCutaneous daily  fentaNYL   Patch  12 MICROgram(s)/Hr 1 Patch Transdermal every 72 hours  levETIRAcetam 250 milliGRAM(s) Oral two times a day  mirtazapine 7.5 milliGRAM(s) Oral at bedtime  multivitamin 1 Tablet(s) Oral daily  polyethylene glycol 3350 17 Gram(s) Oral daily  sodium bicarbonate  Infusion 0.049 mEq/kG/Hr (50 mL/Hr) IV Continuous <Continuous>

## 2020-10-19 NOTE — PROGRESS NOTE ADULT - SUBJECTIVE AND OBJECTIVE BOX
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  Patient presented to the ED on 10/14/2020 complaining of progressive weakness, and needed to be helped to the ground and then was unable to walk.   Patient also noted some right chest wall pain at the sight of port insertion for chemo- was initially scheduled for an outpatient revision with Interventional Radiology.      10/19/2020 patient resting in bed, states that her pain is getting better but sometimes she forgets to ask for pain medications.  Still feels anxious at this time. GOC meeting with family at 1pm.       PAIN: (x )Yes   ( )No  Level: moderate - severe(at times)   Location:  b/l legs   Intensity:  6-10  /10   Quality: sharp   Aggravating Factors: movement   Alleviating Factors: medication and rest   Radiation: up and down legs   Duration/Timing: intermittently   Impact on ADLs: movement     DYSPNEA: ( ) Yes  ( x) No    Review of Systems:    Anxiety- yes  Depression- maybe  Physical Discomfort- yes   Dyspnea- no   Constipation- no   Diarrhea- yes   Nausea- yes   Vomiting- no   Anorexia-  does not feel like she has an appetite   Weight Loss- unsure   Cough- denies   Secretions- denies   Fatigue- moderate   Weakness- severe, feels it is getting worse   Delirium- denies     All other systems reviewed and negative    PHYSICAL EXAM:    Vital Signs Last 24 Hrs  T(C): 36.4 (19 Oct 2020 08:28), Max: 36.4 (18 Oct 2020 16:03)  T(F): 97.6 (19 Oct 2020 08:28), Max: 97.6 (19 Oct 2020 08:28)  HR: 98 (19 Oct 2020 08:28) (98 - 104)  BP: 116/73 (19 Oct 2020 08:28) (104/59 - 116/73)  BP(mean): 84 (19 Oct 2020 08:28) (84 - 84)  RR: 18 (19 Oct 2020 08:28) (18 - 18)  SpO2: 100% (19 Oct 2020 08:28) (99% - 100%)    PPSV2: 50 %    General: ill, pale appearing female in bed, NAD   Mental Status: alert and oriented at this time, at times becomes forgetful   HEENT: dry oral mucosa, + temporal wasting   Lungs: Breath sounds are clear bilaterally, No wheezing, rales or rhonchi  Cardiac: S1S2 +   GI: Bowel Sounds present, soft, nontender, nondistended, no guarding, no rebound  : no suprapubic tenderness   Ext: no edema present   Neuro: weakness       LABS:                        12.2   14.55 )-----------( 49       ( 19 Oct 2020 08:01 )             35.9     10-19    145  |  113<H>  |  103<H>  ----------------------------<  83  3.8   |  20<L>  |  3.01<H>    Ca    7.4<L>      19 Oct 2020 08:01  Mg     2.8     10-19        Albumin: Albumin, Serum: 1.7 g/dL (10-15 @ 07:17)      Allergies    codeine (Unknown)    Intolerances      MEDICATIONS  (STANDING):  clonazePAM  Tablet 1 milliGRAM(s) Oral <User Schedule>  clotrimazole  Oral Lozenge - Peds 1 Lozenge Oral three times a day  enoxaparin Injectable 30 milliGRAM(s) SubCutaneous daily  fentaNYL   Patch  12 MICROgram(s)/Hr 1 Patch Transdermal every 72 hours  levETIRAcetam 250 milliGRAM(s) Oral two times a day  mirtazapine 7.5 milliGRAM(s) Oral at bedtime  multivitamin 1 Tablet(s) Oral daily  polyethylene glycol 3350 17 Gram(s) Oral daily  sodium bicarbonate  Infusion 0.074 mEq/kG/Hr (75 mL/Hr) IV Continuous <Continuous>    MEDICATIONS  (PRN):  HYDROmorphone   Tablet 2 milliGRAM(s) Oral every 4 hours PRN Moderate Pain (4 - 6)  HYDROmorphone  Injectable 0.5 milliGRAM(s) IV Push every 4 hours PRN breakthrough pain  ondansetron Injectable 4 milliGRAM(s) IV Push every 6 hours PRN Nausea      RADIOLOGY:

## 2020-10-20 ENCOUNTER — TRANSCRIPTION ENCOUNTER (OUTPATIENT)
Age: 65
End: 2020-10-20

## 2020-10-20 VITALS
RESPIRATION RATE: 18 BRPM | HEART RATE: 97 BPM | OXYGEN SATURATION: 100 % | DIASTOLIC BLOOD PRESSURE: 72 MMHG | SYSTOLIC BLOOD PRESSURE: 115 MMHG | TEMPERATURE: 99 F

## 2020-10-20 LAB
24R-OH-CALCIDIOL SERPL-MCNC: 35.8 NG/ML — SIGNIFICANT CHANGE UP (ref 30–80)
ANION GAP SERPL CALC-SCNC: 14 MMOL/L — SIGNIFICANT CHANGE UP (ref 5–17)
BUN SERPL-MCNC: 109 MG/DL — HIGH (ref 7–23)
CALCIUM SERPL-MCNC: 7.6 MG/DL — LOW (ref 8.5–10.1)
CHLORIDE SERPL-SCNC: 112 MMOL/L — HIGH (ref 96–108)
CO2 SERPL-SCNC: 18 MMOL/L — LOW (ref 22–31)
CREAT SERPL-MCNC: 2.85 MG/DL — HIGH (ref 0.5–1.3)
GLUCOSE SERPL-MCNC: 98 MG/DL — SIGNIFICANT CHANGE UP (ref 70–99)
HCT VFR BLD CALC: 35.6 % — SIGNIFICANT CHANGE UP (ref 34.5–45)
HGB BLD-MCNC: 11.9 G/DL — SIGNIFICANT CHANGE UP (ref 11.5–15.5)
MCHC RBC-ENTMCNC: 29.6 PG — SIGNIFICANT CHANGE UP (ref 27–34)
MCHC RBC-ENTMCNC: 33.4 GM/DL — SIGNIFICANT CHANGE UP (ref 32–36)
MCV RBC AUTO: 88.6 FL — SIGNIFICANT CHANGE UP (ref 80–100)
PLATELET # BLD AUTO: 56 K/UL — LOW (ref 150–400)
POTASSIUM SERPL-MCNC: 3.9 MMOL/L — SIGNIFICANT CHANGE UP (ref 3.5–5.3)
POTASSIUM SERPL-SCNC: 3.9 MMOL/L — SIGNIFICANT CHANGE UP (ref 3.5–5.3)
RBC # BLD: 4.02 M/UL — SIGNIFICANT CHANGE UP (ref 3.8–5.2)
RBC # FLD: 15.6 % — HIGH (ref 10.3–14.5)
SODIUM SERPL-SCNC: 144 MMOL/L — SIGNIFICANT CHANGE UP (ref 135–145)
WBC # BLD: 15.78 K/UL — HIGH (ref 3.8–10.5)
WBC # FLD AUTO: 15.78 K/UL — HIGH (ref 3.8–10.5)

## 2020-10-20 PROCEDURE — 99232 SBSQ HOSP IP/OBS MODERATE 35: CPT

## 2020-10-20 PROCEDURE — 93010 ELECTROCARDIOGRAM REPORT: CPT

## 2020-10-20 PROCEDURE — 99239 HOSP IP/OBS DSCHRG MGMT >30: CPT

## 2020-10-20 RX ORDER — HYDROMORPHONE HYDROCHLORIDE 2 MG/ML
1 INJECTION INTRAMUSCULAR; INTRAVENOUS; SUBCUTANEOUS
Qty: 0 | Refills: 0 | DISCHARGE
Start: 2020-10-20

## 2020-10-20 RX ORDER — SALIVA SUBSTITUTE COMB NO.11 351 MG
5 POWDER IN PACKET (EA) MUCOUS MEMBRANE EVERY 6 HOURS
Refills: 0 | Status: DISCONTINUED | OUTPATIENT
Start: 2020-10-20 | End: 2020-10-20

## 2020-10-20 RX ORDER — HYDROMORPHONE HYDROCHLORIDE 2 MG/ML
0.5 INJECTION INTRAMUSCULAR; INTRAVENOUS; SUBCUTANEOUS
Refills: 0 | Status: DISCONTINUED | OUTPATIENT
Start: 2020-10-20 | End: 2020-10-20

## 2020-10-20 RX ORDER — SALIVA SUBSTITUTE COMB NO.11 351 MG
5 POWDER IN PACKET (EA) MUCOUS MEMBRANE
Qty: 0 | Refills: 0 | DISCHARGE
Start: 2020-10-20

## 2020-10-20 RX ORDER — FENTANYL CITRATE 50 UG/ML
1 INJECTION INTRAVENOUS
Qty: 0 | Refills: 0 | DISCHARGE
Start: 2020-10-20

## 2020-10-20 RX ORDER — CHOLECALCIFEROL (VITAMIN D3) 125 MCG
1000 CAPSULE ORAL
Qty: 0 | Refills: 0 | DISCHARGE
Start: 2020-10-20

## 2020-10-20 RX ORDER — HYDROMORPHONE HYDROCHLORIDE 2 MG/ML
0.5 INJECTION INTRAMUSCULAR; INTRAVENOUS; SUBCUTANEOUS
Qty: 0 | Refills: 0 | DISCHARGE
Start: 2020-10-20

## 2020-10-20 RX ORDER — LEVETIRACETAM 250 MG/1
1 TABLET, FILM COATED ORAL
Qty: 0 | Refills: 0 | DISCHARGE
Start: 2020-10-20

## 2020-10-20 RX ADMIN — Medication 1 MILLIGRAM(S): at 04:27

## 2020-10-20 RX ADMIN — FENTANYL CITRATE 1 PATCH: 50 INJECTION INTRAVENOUS at 07:45

## 2020-10-20 RX ADMIN — Medication 50 MEQ/KG/HR: at 04:28

## 2020-10-20 RX ADMIN — Medication 1 LOZENGE: at 04:27

## 2020-10-20 RX ADMIN — Medication 1 MILLIGRAM(S): at 15:33

## 2020-10-20 RX ADMIN — HYDROMORPHONE HYDROCHLORIDE 0.5 MILLIGRAM(S): 2 INJECTION INTRAMUSCULAR; INTRAVENOUS; SUBCUTANEOUS at 09:21

## 2020-10-20 RX ADMIN — HYDROMORPHONE HYDROCHLORIDE 0.5 MILLIGRAM(S): 2 INJECTION INTRAMUSCULAR; INTRAVENOUS; SUBCUTANEOUS at 09:36

## 2020-10-20 RX ADMIN — Medication 1000 UNIT(S): at 11:01

## 2020-10-20 RX ADMIN — HYDROMORPHONE HYDROCHLORIDE 0.5 MILLIGRAM(S): 2 INJECTION INTRAMUSCULAR; INTRAVENOUS; SUBCUTANEOUS at 16:38

## 2020-10-20 RX ADMIN — Medication 1 TABLET(S): at 11:01

## 2020-10-20 RX ADMIN — ENOXAPARIN SODIUM 30 MILLIGRAM(S): 100 INJECTION SUBCUTANEOUS at 11:01

## 2020-10-20 RX ADMIN — Medication 1 LOZENGE: at 15:33

## 2020-10-20 RX ADMIN — LEVETIRACETAM 250 MILLIGRAM(S): 250 TABLET, FILM COATED ORAL at 11:01

## 2020-10-20 RX ADMIN — POLYETHYLENE GLYCOL 3350 17 GRAM(S): 17 POWDER, FOR SOLUTION ORAL at 11:02

## 2020-10-20 RX ADMIN — HYDROMORPHONE HYDROCHLORIDE 0.5 MILLIGRAM(S): 2 INJECTION INTRAMUSCULAR; INTRAVENOUS; SUBCUTANEOUS at 16:53

## 2020-10-20 RX ADMIN — Medication 5 MILLILITER(S): at 15:46

## 2020-10-20 RX ADMIN — Medication 0.25 MILLIGRAM(S): at 09:06

## 2020-10-20 RX ADMIN — Medication 1 MILLIGRAM(S): at 17:06

## 2020-10-20 NOTE — DISCHARGE NOTE PROVIDER - NSDCMRMEDTOKEN_GEN_ALL_CORE_FT
cholecalciferol oral tablet: 1000 unit(s) orally once a day  clonazePAM 0.5 mg oral tablet: 1 tab(s) orally 3 times a day  ***6a-2p-10p***  clotrimazole 10 mg oral lozenge: 1 lozenge orally 3 times a day  fentaNYL 12 mcg/hr transdermal film, extended release: 1 patch transdermal every 72 hours  HYDROmorphone: 0.5 milligram(s) intravenous every 3 hours, As Needed  HYDROmorphone 2 mg oral tablet: 1 tab(s) orally every 4 hours, As needed, Moderate Pain (4 - 6)  levETIRAcetam 250 mg oral tablet: 1 tab(s) orally 2 times a day  LORazepam: 0.25 milligram(s) intravenous every 6 hours, As Needed  MiraLax oral powder for reconstitution: Use as directed as needed for constipation  mirtazapine 7.5 mg oral tablet: 1 tab(s) orally once a day (at bedtime)  Multiple Vitamins oral tablet: 1 tab(s) orally once a day  ondansetron 8 mg oral tablet: 1 tab(s) orally 3 times a day  ***6a-2p-10p***  saliva substitutes oral solution: 5 milliliter(s) orally every 6 hours swish and spit

## 2020-10-20 NOTE — PROGRESS NOTE ADULT - SUBJECTIVE AND OBJECTIVE BOX
NEPHROLOGY INTERVAL HPI/OVERNIGHT EVENTS:    HPI:  65 year old female patient with pertinent history of recent Lung cancer diagnosis who recently started outpatient chemo(October 1st to 3rd) followed by Immunotherapy( on October 5th) presented to the ED complaining of progressively weakness. Patient has been eating and hydrating  but not enough. Patient denies any fever, chills, coughing, vomiting, diarrhea or abdominal pain but endorses being nauseous. Patient also noted some right chest wall pain at the sight of port insertion for chemo- was initially scheduled for an outpatient revision with Interventional Radiology( Dr Mckeon)      In the ED patient found to have a WBC of 32, Lactate of 5.6, Cr of 4.  CT chest/Abdomen and Pelvis  negative for any acute pathology (13 Oct 2020 20:17)      PAST MEDICAL & SURGICAL HISTORY:  Hyponatremia    Brain aneurysm    Anxiety    Depression    History of neck surgery        FAMILY HISTORY:  FH: CAD (coronary artery disease)  inmother        MEDICATIONS  (STANDING):  cholecalciferol 1000 Unit(s) Oral daily  clonazePAM  Tablet 1 milliGRAM(s) Oral <User Schedule>  clotrimazole  Oral Lozenge - Peds 1 Lozenge Oral three times a day  enoxaparin Injectable 30 milliGRAM(s) SubCutaneous daily  fentaNYL   Patch  12 MICROgram(s)/Hr 1 Patch Transdermal every 72 hours  levETIRAcetam 250 milliGRAM(s) Oral two times a day  mirtazapine 7.5 milliGRAM(s) Oral at bedtime  multivitamin 1 Tablet(s) Oral daily  polyethylene glycol 3350 17 Gram(s) Oral daily  sodium bicarbonate  Infusion 0.049 mEq/kG/Hr (50 mL/Hr) IV Continuous <Continuous>    MEDICATIONS  (PRN):  HYDROmorphone   Tablet 2 milliGRAM(s) Oral every 4 hours PRN Moderate Pain (4 - 6)  HYDROmorphone  Injectable 0.5 milliGRAM(s) IV Push every 3 hours PRN Severe Pain (7 - 10)  LORazepam   Injectable 0.25 milliGRAM(s) IV Push every 6 hours PRN Anxiety  ondansetron Injectable 4 milliGRAM(s) IV Push every 6 hours PRN Nausea      Allergies    codeine (Unknown)    Intolerances        I&O's Summary    19 Oct 2020 07:01  -  20 Oct 2020 07:00  --------------------------------------------------------  IN: 980 mL / OUT: 700 mL / NET: 280 mL          REVIEW OF SYSTEMS:    CONSTITUTIONAL:  As per HPI.    CONSTITUTIONAL: No weakness, fevers or chills  EYES/ENT: No visual changes;  No vertigo or throat pain   NECK: No pain or stiffness  CARDIOVASCULAR: No chest pain or palpitations  GASTROINTESTINAL: No abdominal or epigastric pain. No nausea, vomiting, or hematemesis; No diarrhea or constipation. No melena or hematochezia.  GENITOURINARY: No dysuria, frequency or hematuria  NEUROLOGICAL: No numbness or weakness  SKIN: No itching, burning, rashes, or lesions   All other review of systems is negative unless indicated above      Vital Signs Last 24 Hrs  T(C): 36.6 (20 Oct 2020 09:33), Max: 37 (19 Oct 2020 21:17)  T(F): 97.8 (20 Oct 2020 09:33), Max: 98.6 (19 Oct 2020 21:17)  HR: 109 (20 Oct 2020 09:33) (97 - 114)  BP: 111/71 (20 Oct 2020 09:33) (109/73 - 111/71)  BP(mean): --  RR: 17 (20 Oct 2020 09:33) (16 - 18)  SpO2: 99% (20 Oct 2020 09:33) (99% - 99%)  Daily     Daily     PHYSICAL EXAM:    General:  Alert, well-developed ,No acute distress.    Neuro:  cranial nerves intact on gross evaluation    HEENT:  No JVD, Eyes anicteric, No carotid bruits.No lymphadenopathy,    Cardiovascular:  Regular rate and rhythm, with normal S1 and S2. No murmurs, rubs,  or gallops. No JVD.    Lungs:  Lungs clear. no rales, no wheezing, .    Abdomen:  Normoactive bowel sounds. Soft, flat, non-tender, and non-distended.  No hepatosplenomegaly, positive bowel sounds    Skin:  Warm, dry, well-perfused. No rashes or other lesions.     Extremities:  edema, warm    LABS:                                   11.9   15.78 )-----------( 56       ( 20 Oct 2020 07:01 )             35.6     10-20    144  |  112<H>  |  109<H>  ----------------------------<  98  3.9   |  18<L>  |  2.85<H>    Ca    7.6<L>      20 Oct 2020 07:01  Mg     2.8     10-19

## 2020-10-20 NOTE — PROGRESS NOTE ADULT - SUBJECTIVE AND OBJECTIVE BOX
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  Patient presented to the ED on 10/14/2020 complaining of progressive weakness, and needed to be helped to the ground and then was unable to walk.   Patient also noted some right chest wall pain at the sight of port insertion for chemo- was initially scheduled for an outpatient revision with Interventional Radiology.      10/19/2020 patient resting in bed, states that her pain is getting better but sometimes she forgets to ask for pain medications.  Still feels anxious at this time. GOC meeting with family at 1pm.       PAIN: (x )Yes   ( )No  Level: moderate - severe(at times)   Location:  b/l legs   Intensity:  6-10  /10   Quality: sharp   Aggravating Factors: movement   Alleviating Factors: medication and rest   Radiation: up and down legs   Duration/Timing: intermittently   Impact on ADLs: movement     DYSPNEA: ( ) Yes  ( x) No    Review of Systems:    Anxiety- yes  Depression- maybe  Physical Discomfort- yes   Dyspnea- no   Constipation- no   Diarrhea- yes   Nausea- yes   Vomiting- no   Anorexia-  does not feel like she has an appetite   Weight Loss- unsure   Cough- denies   Secretions- denies   Fatigue- moderate   Weakness- severe, feels it is getting worse   Delirium- denies     All other systems reviewed and negative    PHYSICAL EXAM:    Vital Signs Last 24 Hrs  T(C): 36.6 (20 Oct 2020 09:33), Max: 37 (19 Oct 2020 21:17)  T(F): 97.8 (20 Oct 2020 09:33), Max: 98.6 (19 Oct 2020 21:17)  HR: 109 (20 Oct 2020 09:33) (97 - 114)  BP: 111/71 (20 Oct 2020 09:33) (109/73 - 111/71)  RR: 17 (20 Oct 2020 09:33) (16 - 18)  SpO2: 99% (20 Oct 2020 09:33) (99% - 99%)    PPSV2: 50 %    General: ill, pale appearing female in bed, NAD   Mental Status: alert and oriented at this time, at times becomes forgetful   HEENT: dry oral mucosa, + temporal wasting   Lungs: Breath sounds are clear bilaterally, No wheezing, rales or rhonchi  Cardiac: S1S2 +   GI: Bowel Sounds present, soft, nontender, nondistended, no guarding, no rebound  : no suprapubic tenderness   Ext: no edema present   Neuro: weakness     LABS:                        11.9   15.78 )-----------( 56       ( 20 Oct 2020 07:01 )             35.6     10-20    144  |  112<H>  |  109<H>  ----------------------------<  98  3.9   |  18<L>  |  2.85<H>    Ca    7.6<L>      20 Oct 2020 07:01  Mg     2.8     10-19        Albumin: Albumin, Serum: 1.7 g/dL (10-15 @ 07:17)      Allergies    codeine (Unknown)    Intolerances      MEDICATIONS  (STANDING):  cholecalciferol 1000 Unit(s) Oral daily  clonazePAM  Tablet 1 milliGRAM(s) Oral <User Schedule>  clotrimazole  Oral Lozenge - Peds 1 Lozenge Oral three times a day  enoxaparin Injectable 30 milliGRAM(s) SubCutaneous daily  fentaNYL   Patch  12 MICROgram(s)/Hr 1 Patch Transdermal every 72 hours  levETIRAcetam 250 milliGRAM(s) Oral two times a day  mirtazapine 7.5 milliGRAM(s) Oral at bedtime  multivitamin 1 Tablet(s) Oral daily  polyethylene glycol 3350 17 Gram(s) Oral daily  sodium bicarbonate  Infusion 0.049 mEq/kG/Hr (50 mL/Hr) IV Continuous <Continuous>    MEDICATIONS  (PRN):  HYDROmorphone   Tablet 2 milliGRAM(s) Oral every 4 hours PRN Moderate Pain (4 - 6)  HYDROmorphone  Injectable 0.5 milliGRAM(s) IV Push every 3 hours PRN Severe Pain (7 - 10)  LORazepam   Injectable 0.25 milliGRAM(s) IV Push every 6 hours PRN Anxiety  ondansetron Injectable 4 milliGRAM(s) IV Push every 6 hours PRN Nausea      RADIOLOGY: HPI: Pt is a 65y old Female with hx of Pt is a 64y old Female with hx depression and anxiety, Gastric metaplasia resulting in persistent nausea,  brain aneurysm, hyponatremia, recently diagnosis with 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  Patient presented to the ED on 10/14/2020 complaining of progressive weakness, and needed to be helped to the ground and then was unable to walk.   Patient also noted some right chest wall pain at the sight of port insertion for chemo- was initially scheduled for an outpatient revision with Interventional Radiology.      10/20/2020 patient seen and examined, complains of pain and anxiety. Patient more withdrawn today, reviewed plan with patient who states she understands but does not want to talk any further at this time.  Spoke with patients daughter who is in agreement for referral to [facility] to be placed.      PAIN: (x )Yes   ( )No  Level: moderate - severe(at times)   Location:  b/l legs   Intensity:  6-10  /10   Quality: sharp   Aggravating Factors: movement   Alleviating Factors: medication and rest   Radiation: up and down legs   Duration/Timing: intermittently   Impact on ADLs: movement     DYSPNEA: ( ) Yes  ( x) No    Review of Systems:    Anxiety- yes  Depression- maybe  Physical Discomfort- yes   Dyspnea- no   Constipation- no   Diarrhea- yes   Nausea- yes   Vomiting- no   Anorexia-  does not feel like she has an appetite   Weight Loss- unsure   Cough- denies   Secretions- denies   Fatigue- moderate   Weakness- severe, feels it is getting worse   Delirium- denies     All other systems reviewed and negative    PHYSICAL EXAM:    Vital Signs Last 24 Hrs  T(C): 36.6 (20 Oct 2020 09:33), Max: 37 (19 Oct 2020 21:17)  T(F): 97.8 (20 Oct 2020 09:33), Max: 98.6 (19 Oct 2020 21:17)  HR: 109 (20 Oct 2020 09:33) (97 - 114)  BP: 111/71 (20 Oct 2020 09:33) (109/73 - 111/71)  RR: 17 (20 Oct 2020 09:33) (16 - 18)  SpO2: 99% (20 Oct 2020 09:33) (99% - 99%)    PPSV2: 30 %    General: ill, pale appearing female in bed, NAD   Mental Status: alert and oriented at this time, at times becomes forgetful   HEENT: dry oral mucosa, + temporal wasting   Lungs: Breath sounds are clear bilaterally, No wheezing, rales or rhonchi  Cardiac: S1S2 +   GI: Bowel Sounds present, soft, nontender, nondistended, no guarding, no rebound  : no suprapubic tenderness   Ext: no edema present   Neuro: weakness     LABS:                        11.9   15.78 )-----------( 56       ( 20 Oct 2020 07:01 )             35.6     10-20    144  |  112<H>  |  109<H>  ----------------------------<  98  3.9   |  18<L>  |  2.85<H>    Ca    7.6<L>      20 Oct 2020 07:01  Mg     2.8     10-19        Albumin: Albumin, Serum: 1.7 g/dL (10-15 @ 07:17)      Allergies    codeine (Unknown)    Intolerances      MEDICATIONS  (STANDING):  cholecalciferol 1000 Unit(s) Oral daily  clonazePAM  Tablet 1 milliGRAM(s) Oral <User Schedule>  clotrimazole  Oral Lozenge - Peds 1 Lozenge Oral three times a day  enoxaparin Injectable 30 milliGRAM(s) SubCutaneous daily  fentaNYL   Patch  12 MICROgram(s)/Hr 1 Patch Transdermal every 72 hours  levETIRAcetam 250 milliGRAM(s) Oral two times a day  mirtazapine 7.5 milliGRAM(s) Oral at bedtime  multivitamin 1 Tablet(s) Oral daily  polyethylene glycol 3350 17 Gram(s) Oral daily  sodium bicarbonate  Infusion 0.049 mEq/kG/Hr (50 mL/Hr) IV Continuous <Continuous>    MEDICATIONS  (PRN):  HYDROmorphone   Tablet 2 milliGRAM(s) Oral every 4 hours PRN Moderate Pain (4 - 6)  HYDROmorphone  Injectable 0.5 milliGRAM(s) IV Push every 3 hours PRN Severe Pain (7 - 10)  LORazepam   Injectable 0.25 milliGRAM(s) IV Push every 6 hours PRN Anxiety  ondansetron Injectable 4 milliGRAM(s) IV Push every 6 hours PRN Nausea      RADIOLOGY:

## 2020-10-20 NOTE — PROGRESS NOTE ADULT - ASSESSMENT
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 Patient presented to the ED on 10/14/2020 complaining of progressive weakness, and needed to be helped to the ground and then was unable to walk.   Patient also noted some right chest wall pain at the sight of port insertion for chemo- was initially scheduled for an outpatient revision with Interventional Radiology.  Palliative medicine consulted to assist with symptom management and for continuity.    1) Sepsis   - no clearly etiology  - c/w fluids   - c/w IV antibiotics   - follow up blood and urine cx  - ID consult appreciated   - dehisced port s/p removal 10/14, f/u cu;ture    2) Pain   - Metastatic Lesions   - Dilaudid 2mg for moderate pain q4hrs PRN   - Dilaudid 0.5mg IV for breakthrough pain PRN q4hrs   - Based on pts 24 hr opiate usage will start Fentanyl 12mcg/hr patch    3) Anxiety/ Depression  - c/w Mirtazapine  - c/w Klonopin 1mg TID   - SW support   - supportive care     4) Hyperkalemia  - Resolved   - no EKG changes noted  - given Kayexalate in the ED on 10/13   - multiple BMs resolved now  - continue to monitor labs    5) PAULA  - nephrology consult appreciated   - CT abdomen/pelvis reviewed no obstruction/ stricture   - c/w IVF hydration  - c/w to monitor labs trend kidney function     6) Stage 4 small cell lung cancer   - history of SIADH, now with hypernatremia  - c/w salt tabs and demeclocycline but decrease dose of either or both due to hypernatremia  - Dr. Martin aware patient in hospital, oncology consult appreciated- patient not a candidate for further treatment at this time as patient has a poor performance status     7) Advance Care planning/GOC   - GOC - meeting held with family on 10/19 patient did not want to be apart of the conversation, reviewed plan with patient who is in agreement but does not want to talk any further   - patient has capacity at this time   - patient deferred decision making to family, DNR/I put in place   - Will follow  - referral to inpatient hospice - reviewed plan with family and patient    Pt is a 65y old Female with hx of Pt is a 64y old Female with hx depression and anxiety, Gastric metaplasia resulting in persistent nausea,  brain aneurysm, hyponatremia, recently diagnosis with 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 Patient presented to the ED on 10/14/2020 complaining of progressive weakness, and needed to be helped to the ground and then was unable to walk.   Patient also noted some right chest wall pain at the sight of port insertion for chemo- was initially scheduled for an outpatient revision with Interventional Radiology.  Palliative medicine consulted to assist with symptom management and for continuity.    1) Sepsis   - no clearly etiology  - c/w fluids   - c/w IV antibiotics   - follow up blood and urine cx  - ID consult appreciated   - dehisced port s/p removal 10/14, f/u cu;ture    2) Pain   - Metastatic Lesions   - Dilaudid 2mg for moderate pain q4hrs PRN   - Dilaudid 0.5mg IV for severe pain PRN q3hrs   - Based on pts 24 hr opiate usage will start Fentanyl 12mcg/hr patch    3) Anxiety/ Depression  - c/w Mirtazapine  - c/w Klonopin 1mg TID   - added ativan 0.25mg IV q6hrs   - SW support   - supportive care     4) Hyperkalemia  - Resolved   - no EKG changes noted  - given Kayexalate in the ED on 10/13   - multiple BMs resolved now  - continue to monitor labs    5) PAULA  - nephrology consult appreciated   - CT abdomen/pelvis reviewed no obstruction/ stricture   - c/w IVF hydration  - c/w to monitor labs trend kidney function     6) Stage 4 small cell lung cancer   - history of SIADH, now with hypernatremia  - c/w salt tabs and demeclocycline but decrease dose of either or both due to hypernatremia  - Dr. Martin aware patient in hospital, oncology consult appreciated- patient not a candidate for further treatment at this time as patient has a poor performance status     7) Advance Care planning/GOC   - GOC - meeting held with family on 10/19 patient did not want to be apart of the conversation, reviewed plan with patient who is in agreement but does not want to talk any further   - patient has capacity at this time   - patient deferred decision making to family, DNR/I put in place   - Will follow  - referral to inpatient hospice - reviewed plan with family and patient

## 2020-10-20 NOTE — DISCHARGE NOTE PROVIDER - NSDCCPCAREPLAN_GEN_ALL_CORE_FT
PRINCIPAL DISCHARGE DIAGNOSIS  Diagnosis: Sepsis  Assessment and Plan of Treatment: completed antibiotics      SECONDARY DISCHARGE DIAGNOSES  Diagnosis: Lung cancer  Assessment and Plan of Treatment: comfort care

## 2020-10-20 NOTE — CHART NOTE - NSCHARTNOTEFT_GEN_A_CORE
Stacey Sanchez NP and this SW met with pt. at bedside. Team discussed with pt. the meeting that took place yesterday with her family. Pt. had not wanted to be apart of the meeting. She was willing to hear the plan from us however, wanted to talk briefly and minimally. Team explained that at this point due to pts performance status she would not be a candidate for further treatment at this time. We discussed the plan of focusing on keeping pt. comfortable and going to inpatient hospice as a transition to see how she does. Pt. expressed understanding and although understandably upset agreed that going to inpatient hospice at this point would be the best plan. Pt. did not want to keep talking about this. Emotional support was provided. We explained we would make arrangements for her and speak with her daughter and  about the plan. Pts daughter Stacey aware of the plan and conversation and permission given to send a referral to S for inpatient hospice.

## 2020-10-20 NOTE — DISCHARGE NOTE PROVIDER - HOSPITAL COURSE
cc - weakness      65 year old female patient with pertinent history of recent Lung cancer diagnosis who recently started outpatient chemo(October 1st to 3rd) followed by Immunotherapy( on October 5th) presented to the ED complaining of progressively weakness. Patient has been eating and hydrating  but not enough. Patient denies any fever, chills, coughing, vomiting, diarrhea or abdominal pain but endorses being nauseous. Patient also noted some right chest wall pain at the sight of port insertion for chemo- was initially scheduled for an outpatient revision with Interventional Radiology( Dr Mckeon)In the ED patient found to have a WBC of 32, Lactate of 5.6, Cr of 4.  CT chest/Abdomen and Pelvis  negative for any acute [id number] year old female patient presenting with weakness found to have SIRS and worsening transaminitis     10/14 - no cp palps sob abdo pain; some soreness at the port site   10/15 - no cp palps sob, tired after being washed up   10/16 - no cp palps sob abdo pain; tired   10/17 - no cp palps sob however tachy present   10/18 - withdrawn, flat affect, no cp palps sob   10/19 - pt seen and examined, weakness improving, denies cp, dyspnea, POC discussed  10/20 - pt seen and examined, + weak, poor po intake, afebrile, denies CP dyspnea, abdominal pain  Review of system- Rest of the review of system are negative except mentioned in HPI  T(C): 36.6 (10-20-20 @ 09:33), Max: 37 (10-19-20 @ 21:17)  T(F): 97.8 (10-20-20 @ 09:33), Max: 98.6 (10-19-20 @ 21:17)  HR: 109 (10-20-20 @ 09:33) (97 - 114)  BP: 111/71 (10-20-20 @ 09:33) (109/73 - 111/71)  RR: 17 (10-20-20 @ 09:33) (16 - 18)  SpO2: 99% (10-20-20 @ 09:33) (99% - 99%)  Wt(kg): --    PHYSICAL EXAM:  GENERAL: NAD, able to lie flat in bed  HEAD:  Atraumatic, Normocephalic  EYES: EOMI, PERRLA, normal sclera  ENT: Moist mucous membranes  NECK: Supple, No JVD, no nuchal rigidity  CHEST/LUNG: Clear to auscultation bilaterally; No rales, rhonchi, wheezing, or rubs. Unlabored respirations  HEART: Regular rate and rhythm; No murmurs, rubs, or gallops  ABDOMEN: Bowel sounds present; Soft, Nontender, Nondistended. No hepatomegaly  EXTREMITIES:  no pitting bilaterally  NERVOUS SYSTEM:  Alert & Oriented X3, speech clear. No focal motor or sensory deficits  MSK: FROM all 4 extremities, full and equal strength  SKIN: Rt chest wall port removed       65 year old female patient with pertinent history of recent Lung cancer diagnosis who recently started outpatient chemo(October 1st to 3rd) followed by Immunotherapy( on October 5th) presented to the ED complaining of progressively weakness.   Sepsis ruled in and present on admission and is due to/related to infected RIJ mediport that was removed 10/14.   s/p  resuscitated in the ED and give IV abx  -follow up blood - neg , + urine cx  Enterococcus fecalis   - spoke to IR and ID - to dc port today, f/u Port Cx, cont IV Abx and when cultures negative can place a new port   Small Cell Lung Ca with mets to the bone and liver  -recent chemo and immunotherapy   - started chemo via port on 10/1/20, got neulasta after that contributing to high WBC count   10/18 - Heme_onc to decide if patient to receive further chemo or not - to make decisions in the near future  last chemo was 1.5 weeks ago  PAULA, SIADH , Hypernatremia hypovolemic   was on DEMECLOCYCLINE ,HOLD SALT TABS   s/p  1/2NS ,  Renal Cx - pt needs close f/u as OP   s/p   on D5W , Off demeclocycline and NACL tabs   10/18 - c/w on 1/2NS with bicarb; Cr 3.4--> 3.0  -no obstruction/ stricture noted on CT abdomen/pelvis  Depression -on Mirtazapine, with  flat affect, withdrawn   Anxiety -on clonazepam  Thrombocytopenia - monitor  Severe protein calorie malnutrition -get Nutritionist evaluation   Transaminitis  -possibly related to recent chemo/ immunotherapy  -no acute pathology noted on imaging  Seizure disorder  Pt started on Keppra prev admission  10/15 - EEG noted, can reduce Keppra dose, discussed with Dr Bernbaum   Sinus tachycardia due to sepsis, deconditioning, low Calcium ruled out VTE   - VQ - no PE, US doppler no DVT   - Echo - diastolic disfunction  Severe protein-calorie malnutrition- supplements   Advanced Directives  -pt still full code  -Will get palliative team input while inpatient     Disposition - medically optimized to be discharged to inpatient hospice with GOC - comfort care  Discharge plan discussed with patient, RN  Patient advised to follow up with PCP within 3-7 days  time spend 40 mi

## 2020-10-20 NOTE — PROGRESS NOTE ADULT - REASON FOR ADMISSION
Weakness  SIRS

## 2020-10-22 DIAGNOSIS — E87.5 HYPERKALEMIA: ICD-10-CM

## 2020-10-22 DIAGNOSIS — C78.7 SECONDARY MALIGNANT NEOPLASM OF LIVER AND INTRAHEPATIC BILE DUCT: ICD-10-CM
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
## 2020-10-22 DIAGNOSIS — Z98.890 OTHER SPECIFIED POSTPROCEDURAL STATES: ICD-10-CM

## 2020-10-22 DIAGNOSIS — C34.12 MALIGNANT NEOPLASM OF UPPER LOBE, LEFT BRONCHUS OR LUNG: ICD-10-CM

## 2020-10-22 DIAGNOSIS — A41.9 SEPSIS, UNSPECIFIED ORGANISM: ICD-10-CM

## 2020-10-22 DIAGNOSIS — E43 UNSPECIFIED SEVERE PROTEIN-CALORIE MALNUTRITION: ICD-10-CM

## 2020-10-22 DIAGNOSIS — E87.2 ACIDOSIS: ICD-10-CM

## 2020-10-22 DIAGNOSIS — G40.909 EPILEPSY, UNSPECIFIED, NOT INTRACTABLE, WITHOUT STATUS EPILEPTICUS: ICD-10-CM

## 2020-10-22 DIAGNOSIS — Z79.899 OTHER LONG TERM (CURRENT) DRUG THERAPY: ICD-10-CM

## 2020-10-22 DIAGNOSIS — E22.2 SYNDROME OF INAPPROPRIATE SECRETION OF ANTIDIURETIC HORMONE: ICD-10-CM

## 2020-10-22 DIAGNOSIS — Z79.51 LONG TERM (CURRENT) USE OF INHALED STEROIDS: ICD-10-CM

## 2020-10-22 DIAGNOSIS — T80.211A BLOODSTREAM INFECTION DUE TO CENTRAL VENOUS CATHETER, INITIAL ENCOUNTER: ICD-10-CM

## 2020-10-22 DIAGNOSIS — N17.9 ACUTE KIDNEY FAILURE, UNSPECIFIED: ICD-10-CM

## 2020-10-22 DIAGNOSIS — I67.1 CEREBRAL ANEURYSM, NONRUPTURED: ICD-10-CM

## 2020-10-22 DIAGNOSIS — C79.51 SECONDARY MALIGNANT NEOPLASM OF BONE: ICD-10-CM

## 2020-10-22 DIAGNOSIS — D64.81 ANEMIA DUE TO ANTINEOPLASTIC CHEMOTHERAPY: ICD-10-CM

## 2020-10-22 DIAGNOSIS — Z82.49 FAMILY HISTORY OF ISCHEMIC HEART DISEASE AND OTHER DISEASES OF THE CIRCULATORY SYSTEM: ICD-10-CM

## 2020-10-22 DIAGNOSIS — F32.9 MAJOR DEPRESSIVE DISORDER, SINGLE EPISODE, UNSPECIFIED: ICD-10-CM

## 2020-10-22 DIAGNOSIS — F41.9 ANXIETY DISORDER, UNSPECIFIED: ICD-10-CM

## 2020-10-22 DIAGNOSIS — D69.59 OTHER SECONDARY THROMBOCYTOPENIA: ICD-10-CM

## 2020-10-22 DIAGNOSIS — Z88.5 ALLERGY STATUS TO NARCOTIC AGENT: ICD-10-CM

## 2020-10-22 DIAGNOSIS — Z79.891 LONG TERM (CURRENT) USE OF OPIATE ANALGESIC: ICD-10-CM

## 2020-10-22 DIAGNOSIS — T45.1X5A ADVERSE EFFECT OF ANTINEOPLASTIC AND IMMUNOSUPPRESSIVE DRUGS, INITIAL ENCOUNTER: ICD-10-CM

## 2020-10-22 DIAGNOSIS — N18.9 CHRONIC KIDNEY DISEASE, UNSPECIFIED: ICD-10-CM

## 2020-10-22 DIAGNOSIS — T81.31XA DISRUPTION OF EXTERNAL OPERATION (SURGICAL) WOUND, NOT ELSEWHERE CLASSIFIED, INITIAL ENCOUNTER: ICD-10-CM

## 2021-01-06 ENCOUNTER — APPOINTMENT (OUTPATIENT)
Dept: DERMATOLOGY | Facility: CLINIC | Age: 66
End: 2021-01-06

## 2021-01-07 NOTE — GOALS OF CARE CONVERSATION - ADVANCED CARE PLANNING - PARTICIPANTS
Family/Staff Rifampin Counseling: I discussed with the patient the risks of rifampin including but not limited to liver damage, kidney damage, red-orange body fluids, nausea/vomiting and severe allergy.

## 2021-01-15 NOTE — H&P ADULT - HISTORY OF PRESENT ILLNESS
no
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 Pt prior to last admission was ADL independent , since discharge was usually ambulating with walker but less so over this time, used to be able to walk to bathroom now using bedside commode, unable to tolerate food because of nausea and is on medical marijuana which she has been smoking despite d/o lung ca for pain and appetite. Of note, pt with elevated liver enzymes noted in office yesterday. Pt had been taking tylenol around the clock for pain which she describes as constant, diffuse, cannot quantify on scale, alleviated by tylenol/marijuana/oxycodone only partially . Labs were performed to f/u on sodium levels post discharge. Pt was to have port placed in AM for chemo as outpatient at  with IR. Due to weakness, transaminitis, and anorexia, pt advised to go to ED by oncology for evaluation and admitted for intractable pain with failure to thrive due to acute progression of chronic disease in setting of SCLC and transaminitis.

## 2021-03-25 NOTE — DIETITIAN INITIAL EVALUATION ADULT. - PATIENT PROFILE REVIEWED
- Patient has history of Hypertension on amlodipine and metoprolol at home   - on Amlodipine 5mg daily with holding parameters   - 3/24, Lopressor discontinued.  - Monitor BP and adjust meds as needed yes

## 2021-03-31 NOTE — H&P ADULT - NSICDXPASTMEDICALHX_GEN_ALL_CORE_FT
HOME O2 EVALUATION    Pulse Ox Room Air Rest:94    Pulse Ox Room Air Ambulatin    Pulse Ox on O2          L Ambulating:    Pulse Ox Post Ambulation: PAST MEDICAL HISTORY:  Anxiety     Brain aneurysm     Depression     Hyponatremia

## 2021-08-06 NOTE — PROGRESS NOTE ADULT - CV[facility] HE PE MLT D E PC
regular rate and rhythm
yes

## 2021-11-09 NOTE — BEHAVIORAL HEALTH ASSESSMENT NOTE - NS ED BHA MSE SPEECH ARTICULATION
Spoke with patient , discussed treatment plan :  He will see PCP for LAB and physical prior to  Procedure - msg sent to PCP  Patient have COVID apt scheduled 11/26 Fri - as pre procedure testing  Surgery/ biopsy scheduled 11/29 Mon   Patient will discuss Chemotherapy and post-op treatment when path results are back.   Plan confirmed with patient and routed to Med Onc and PCP team Normal

## 2022-03-09 NOTE — H&P ADULT - GEN GEN HX ROS MEA POS PC

## 2022-10-27 NOTE — PHYSICAL THERAPY INITIAL EVALUATION ADULT - ASSISTIVE DEVICE, REHAB EVAL
Pt assisted by 2 staff members to restroom. Pt denies dizziness or lightheadedness. Orozco catheter removed. Pericare done by pt with RN instruction. New pad and panties put on pt. Hand hygiene done. Bed pad changed. Pt tolerated well. bed rails

## 2022-11-09 NOTE — CONSULT NOTE ADULT - HEMATOLOGY/LYMPHATICS
November 9, 2022         Patient: Bee Valle   YOB: 2006   Date of Visit: 11/9/2022           To Whom it May Concern:    Bee Valle was seen in my clinic on 11/9/2022. She may return to school    11/10/2022.    If you have any questions or concerns, please don't hesitate to call.        Sincerely,           Isaiah Burks M.D.  Electronically Signed      negative

## 2022-12-20 NOTE — BEHAVIORAL HEALTH ASSESSMENT NOTE - NS ED BHA MED ROS GASTROINTESTINAL
-- CST Phone (Incoming) NAS QUAN (Emergency Contact) [phone]        Alternative phone number: [phone]    Can a detailed message be left? Yes    Message Turnaround:     Is it Working Hours? Yes - Working Hours     [place]:    Please give this turnaround time to the caller:   \"This message will be sent to [state Provider's name]. The clinical team will fulfill your request as soon as they review your message.\"                 No complaints

## 2023-02-15 NOTE — PROVIDER CONTACT NOTE (CRITICAL VALUE NOTIFICATION) - TEST AND RESULT REPORTED:
Na 120 Plan: Patient will finish doxycycline that was prescribed by pcp. Detail Level: Zone Initiate Treatment: clindamycin 1 % lotion QD\\nSig: Apply to bumps on scalp BID

## 2023-03-26 NOTE — ED PROVIDER NOTE - CPE EDP HEAD NORM PED
PAST MEDICAL HISTORY:  Afib     Gastritis     HTN (hypertension)     Hypothyroid     Pulmonary TB     SBO (small bowel obstruction)     TB (pulmonary tuberculosis) TB of abdomen 1960    UTI (urinary tract infection)      Head atraumatic, normal cephalic shape.

## 2023-12-06 NOTE — DISCHARGE NOTE PROVIDER - NSDCHHHOMEBOUND_GEN_ALL_CORE
Caller: Patient    Doctor: Red    Reason for call: Patient dropped off discs that Dr. Riddle reviewed and decided patient was best to see Dr. Garcia.  Patient was told her discs would be mailed to her and she never received them. She is calling to check if they were mailed back to her.     Call back#: [phone]    Fall risk/Ataxic gait

## 2024-08-30 NOTE — CDI QUERY NOTE - NSCDINOTEPOA_GEN_ALL_CORE_FT
Cardiac diet   Was the condition present on admission? If so, please document in the chart that “(the condition) was present on admission.”

## 2025-01-08 NOTE — PROVIDER CONTACT NOTE (OTHER) - ACTION/TREATMENT ORDERED:
I spoke with Slick from the doctor's answering service and the doctor will be made aware.
repeat BMP in AM
No - the patient is unable to be screened due to medical condition

## 2025-04-02 NOTE — ED ADULT NURSE NOTE - PRO INTERPRETER NEED 2
Radiofrequency ablation of Bilateral L4-S1  [facility]    PREOPERATIVE DIAGNOSIS:  Lumbar spondylosis without myelopathy   POSTOPERATIVE DIAGNOSIS:  Lumbar spondylosis without myelopathy     PROCEDURES PERFORMED:   Bilateral  lumbar radiofrequency ablation of the medial branches at the transverse processes of L4, L5, and the sacral alar groove to thermally treat these facet joints.  1.  60372 -50 Lumbar radiofrequency ablation 1st level.  2.  59553 -50 Lumbar radiofrequency ablation 2nd level.    INFORMED CONSENT:  In preprocedure discussion with the patient, the risks and complications were discussed prior to starting the procedure and informed consent was obtained.     SURGEON:   Kamini Ballesteros MD    INDICATIONS:  The patient presents with chronic lower back pain. The patient underwent 2 lumbar medial branch blocks with diagnostically positive relief. Given the patient’s significant pain relief, it is diagnostic that we have likely found the source of the patient’s pain; therefore, lumbar radiofrequency ablation has been indicated.     SEDATION:  Anxiolysis was used for this procedure which included Versed 1mg & Fentanyl 50mcg    TIME OF PROCEDURE:  The Interoperative procedure time, after administration of the IV sedative, was 6861-8957 minutes.    ANESTHETIC:  Lidocaine 1% for skin infiltration, 2% lidocaine and 0.25% bupivacaine for injection.    STEROID:  None.    DESCRIPTION OF PROCEDURE:  After obtaining informed consent an IV was  started in the preoperative area. The patient was taken to the operating room. The patient was placed in prone position. All pressure points were padded. Heart rate, blood pressure, and pulse oximetry were monitored. The patient was  sedated. The lumbosacral area was prepped with ChloraPrep and draped in a sterile fashion.     The junction of the right S1 superior articular process and sacral ala was identified in a AP fluoroscopic view. The skin and  subcutaneous tissue inferior to the junction was anesthetized with 1% lidocaine. A 20-gauge 150mm RF Abbott needle was then advanced percutaneously through the anesthetized skin tract under fluoroscopic guidance until the non-insulated portion of the needle lie at the junction.  The image was then obliqued towards the right side to maximize visualization of the junctions of the L4, L5 superior articular processes with the transverse processes.  Needle placement was performed as described above until the non-insulated portion of the needles lie at the targeted junctions.  All needle tips were confirmed to be posterior to the neural foramen in the lateral fluoroscopic view. Sensory stimulation was then performed with good stimulation of the back at 0.5V or less at each level. Motor stimulation was performed up to 1.5V at each level producing stimulation of the multifidus muscles of the back and no stimulation of the lower extremity at any level. Each level was then anesthetized with 2% lidocaine prior to treatment with pulsed radiofrequency thermocoagulation at 42 degrees Celsius. Each level was then treated with thermal radiofrequency thermocoagulation at 80 degrees Celsius for 60 seconds in two separate cycles.  Prior to the removal of each needle, a volume of 1 mL of injectate was administered at each site.  The total volume consisted of 1mL of 2% lidocaine and 1mL of 0.25% bupivacaine.    The same procedure was then performed on the contralateral side in the exact same fashion.      ESTIMATED BLOOD LOSS:  Minimal.    SPECIMENS:  None.    COMPLICATIONS:  None.    TOLERANCE AND DISCHARGE:  The patient tolerated the procedure well. The patient was transported to the recovery area without difficulties. The patient was discharged home under the care of family in stable and satisfactory condition.    PLAN:  1.  The patient was given our standard instruction sheet.  2.  The patient will resume all medications per the  medication reconciliation sheet.  3.  The patient will Return to clinic 6 wks.    English